# Patient Record
Sex: FEMALE | Race: WHITE | NOT HISPANIC OR LATINO | Employment: OTHER | URBAN - METROPOLITAN AREA
[De-identification: names, ages, dates, MRNs, and addresses within clinical notes are randomized per-mention and may not be internally consistent; named-entity substitution may affect disease eponyms.]

---

## 2017-12-09 ENCOUNTER — APPOINTMENT (OUTPATIENT)
Dept: RADIOLOGY | Facility: MEDICAL CENTER | Age: 52
DRG: 853 | End: 2017-12-09
Attending: HOSPITALIST
Payer: MEDICAID

## 2017-12-09 ENCOUNTER — RESOLUTE PROFESSIONAL BILLING HOSPITAL PROF FEE (OUTPATIENT)
Dept: HOSPITALIST | Facility: MEDICAL CENTER | Age: 52
End: 2017-12-09
Payer: MEDICAID

## 2017-12-09 ENCOUNTER — HOSPITAL ENCOUNTER (INPATIENT)
Facility: MEDICAL CENTER | Age: 52
LOS: 11 days | DRG: 853 | End: 2017-12-20
Attending: EMERGENCY MEDICINE | Admitting: HOSPITALIST
Payer: MEDICAID

## 2017-12-09 DIAGNOSIS — R07.9 CHEST PAIN, UNSPECIFIED TYPE: ICD-10-CM

## 2017-12-09 DIAGNOSIS — E10.8 TYPE 1 DIABETES MELLITUS WITH COMPLICATION (HCC): ICD-10-CM

## 2017-12-09 DIAGNOSIS — R79.89 ELEVATED TROPONIN: ICD-10-CM

## 2017-12-09 DIAGNOSIS — E11.628 DIABETIC FOOT INFECTION (HCC): ICD-10-CM

## 2017-12-09 DIAGNOSIS — J44.1 COPD WITH ACUTE EXACERBATION (HCC): ICD-10-CM

## 2017-12-09 DIAGNOSIS — L08.9 DIABETIC FOOT INFECTION (HCC): ICD-10-CM

## 2017-12-09 PROBLEM — E11.01 HYPEROSMOLAR COMA (HCC): Status: ACTIVE | Noted: 2017-12-09

## 2017-12-09 PROBLEM — I21.4 NSTEMI (NON-ST ELEVATED MYOCARDIAL INFARCTION) (HCC): Status: ACTIVE | Noted: 2017-12-09

## 2017-12-09 PROBLEM — E11.610: Status: ACTIVE | Noted: 2017-12-09

## 2017-12-09 LAB
ALBUMIN SERPL BCP-MCNC: 2.5 G/DL (ref 3.2–4.9)
ALBUMIN/GLOB SERPL: 0.8 G/DL
ALP SERPL-CCNC: 81 U/L (ref 30–99)
ALT SERPL-CCNC: 20 U/L (ref 2–50)
AMORPH CRY #/AREA URNS HPF: PRESENT /HPF
ANION GAP SERPL CALC-SCNC: 11 MMOL/L (ref 0–11.9)
ANION GAP SERPL CALC-SCNC: 11 MMOL/L (ref 0–11.9)
APPEARANCE UR: ABNORMAL
APTT PPP: 41.1 SEC (ref 24.7–36)
APTT PPP: 51 SEC (ref 24.7–36)
AST SERPL-CCNC: 23 U/L (ref 12–45)
BACTERIA #/AREA URNS HPF: ABNORMAL /HPF
BASOPHILS # BLD AUTO: 0 % (ref 0–1.8)
BASOPHILS # BLD: 0 K/UL (ref 0–0.12)
BILIRUB SERPL-MCNC: 0.5 MG/DL (ref 0.1–1.5)
BILIRUB UR QL STRIP.AUTO: NEGATIVE
BNP SERPL-MCNC: 610 PG/ML (ref 0–100)
BUN SERPL-MCNC: 31 MG/DL (ref 8–22)
BUN SERPL-MCNC: 32 MG/DL (ref 8–22)
BURR CELLS BLD QL SMEAR: NORMAL
CALCIUM SERPL-MCNC: 8 MG/DL (ref 8.5–10.5)
CALCIUM SERPL-MCNC: 8.5 MG/DL (ref 8.5–10.5)
CHLORIDE SERPL-SCNC: 95 MMOL/L (ref 96–112)
CHLORIDE SERPL-SCNC: 95 MMOL/L (ref 96–112)
CO2 SERPL-SCNC: 20 MMOL/L (ref 20–33)
CO2 SERPL-SCNC: 20 MMOL/L (ref 20–33)
COLOR UR: YELLOW
CREAT SERPL-MCNC: 0.78 MG/DL (ref 0.5–1.4)
CREAT SERPL-MCNC: 0.89 MG/DL (ref 0.5–1.4)
DOHLE BOD BLD QL SMEAR: NORMAL
EOSINOPHIL # BLD AUTO: 0 K/UL (ref 0–0.51)
EOSINOPHIL NFR BLD: 0 % (ref 0–6.9)
EPI CELLS #/AREA URNS HPF: ABNORMAL /HPF
ERYTHROCYTE [DISTWIDTH] IN BLOOD BY AUTOMATED COUNT: 48 FL (ref 35.9–50)
EST. AVERAGE GLUCOSE BLD GHB EST-MCNC: 280 MG/DL
GFR SERPL CREATININE-BSD FRML MDRD: >60 ML/MIN/1.73 M 2
GFR SERPL CREATININE-BSD FRML MDRD: >60 ML/MIN/1.73 M 2
GLOBULIN SER CALC-MCNC: 3.2 G/DL (ref 1.9–3.5)
GLUCOSE BLD-MCNC: 341 MG/DL (ref 65–99)
GLUCOSE BLD-MCNC: 424 MG/DL (ref 65–99)
GLUCOSE SERPL-MCNC: 352 MG/DL (ref 65–99)
GLUCOSE SERPL-MCNC: 416 MG/DL (ref 65–99)
GLUCOSE UR STRIP.AUTO-MCNC: >=1000 MG/DL
HBA1C MFR BLD: 11.4 % (ref 0–5.6)
HCT VFR BLD AUTO: 34.7 % (ref 37–47)
HGB BLD-MCNC: 11.2 G/DL (ref 12–16)
HYALINE CASTS #/AREA URNS LPF: ABNORMAL /LPF
KETONES UR STRIP.AUTO-MCNC: 15 MG/DL
LACTATE BLD-SCNC: 2.1 MMOL/L (ref 0.5–2)
LEUKOCYTE ESTERASE UR QL STRIP.AUTO: NEGATIVE
LYMPHOCYTES # BLD AUTO: 0.9 K/UL (ref 1–4.8)
LYMPHOCYTES NFR BLD: 7.9 % (ref 22–41)
MANUAL DIFF BLD: NORMAL
MCH RBC QN AUTO: 27.6 PG (ref 27–33)
MCHC RBC AUTO-ENTMCNC: 32.3 G/DL (ref 33.6–35)
MCV RBC AUTO: 85.5 FL (ref 81.4–97.8)
MICRO URNS: ABNORMAL
MONOCYTES # BLD AUTO: 1.4 K/UL (ref 0–0.85)
MONOCYTES NFR BLD AUTO: 12.3 % (ref 0–13.4)
MORPHOLOGY BLD-IMP: NORMAL
MUCOUS THREADS #/AREA URNS HPF: ABNORMAL /HPF
MYELOCYTES NFR BLD MANUAL: 0.9 %
NEUTROPHILS # BLD AUTO: 8.99 K/UL (ref 2–7.15)
NEUTROPHILS NFR BLD: 75.4 % (ref 44–72)
NEUTS BAND NFR BLD MANUAL: 3.5 % (ref 0–10)
NITRITE UR QL STRIP.AUTO: NEGATIVE
NRBC # BLD AUTO: 0 K/UL
NRBC BLD AUTO-RTO: 0 /100 WBC
PH UR STRIP.AUTO: 5 [PH]
PLATELET # BLD AUTO: 106 K/UL (ref 164–446)
PLATELET BLD QL SMEAR: NORMAL
PMV BLD AUTO: 11.6 FL (ref 9–12.9)
POIKILOCYTOSIS BLD QL SMEAR: NORMAL
POTASSIUM SERPL-SCNC: 4.1 MMOL/L (ref 3.6–5.5)
POTASSIUM SERPL-SCNC: 4.1 MMOL/L (ref 3.6–5.5)
PROT SERPL-MCNC: 5.7 G/DL (ref 6–8.2)
PROT UR QL STRIP: 100 MG/DL
RBC # BLD AUTO: 4.06 M/UL (ref 4.2–5.4)
RBC # URNS HPF: ABNORMAL /HPF
RBC BLD AUTO: PRESENT
RBC UR QL AUTO: ABNORMAL
SODIUM SERPL-SCNC: 126 MMOL/L (ref 135–145)
SODIUM SERPL-SCNC: 126 MMOL/L (ref 135–145)
SP GR UR STRIP.AUTO: 1.03
TROPONIN I SERPL-MCNC: 0.53 NG/ML (ref 0–0.04)
TROPONIN I SERPL-MCNC: 1.13 NG/ML (ref 0–0.04)
TROPONIN I SERPL-MCNC: 1.36 NG/ML (ref 0–0.04)
UROBILINOGEN UR STRIP.AUTO-MCNC: 0.2 MG/DL
WBC # BLD AUTO: 11.4 K/UL (ref 4.8–10.8)
WBC #/AREA URNS HPF: ABNORMAL /HPF

## 2017-12-09 PROCEDURE — 87040 BLOOD CULTURE FOR BACTERIA: CPT

## 2017-12-09 PROCEDURE — 96375 TX/PRO/DX INJ NEW DRUG ADDON: CPT

## 2017-12-09 PROCEDURE — 700105 HCHG RX REV CODE 258: Performed by: EMERGENCY MEDICINE

## 2017-12-09 PROCEDURE — 84484 ASSAY OF TROPONIN QUANT: CPT

## 2017-12-09 PROCEDURE — 85730 THROMBOPLASTIN TIME PARTIAL: CPT | Mod: 91

## 2017-12-09 PROCEDURE — 80048 BASIC METABOLIC PNL TOTAL CA: CPT

## 2017-12-09 PROCEDURE — 96372 THER/PROPH/DIAG INJ SC/IM: CPT

## 2017-12-09 PROCEDURE — 85027 COMPLETE CBC AUTOMATED: CPT

## 2017-12-09 PROCEDURE — 80053 COMPREHEN METABOLIC PANEL: CPT

## 2017-12-09 PROCEDURE — 81001 URINALYSIS AUTO W/SCOPE: CPT

## 2017-12-09 PROCEDURE — 700102 HCHG RX REV CODE 250 W/ 637 OVERRIDE(OP): Performed by: HOSPITALIST

## 2017-12-09 PROCEDURE — 83036 HEMOGLOBIN GLYCOSYLATED A1C: CPT

## 2017-12-09 PROCEDURE — 94760 N-INVAS EAR/PLS OXIMETRY 1: CPT

## 2017-12-09 PROCEDURE — 700105 HCHG RX REV CODE 258: Performed by: HOSPITALIST

## 2017-12-09 PROCEDURE — 99285 EMERGENCY DEPT VISIT HI MDM: CPT

## 2017-12-09 PROCEDURE — 96365 THER/PROPH/DIAG IV INF INIT: CPT

## 2017-12-09 PROCEDURE — 700111 HCHG RX REV CODE 636 W/ 250 OVERRIDE (IP): Performed by: HOSPITALIST

## 2017-12-09 PROCEDURE — 96366 THER/PROPH/DIAG IV INF ADDON: CPT

## 2017-12-09 PROCEDURE — 93005 ELECTROCARDIOGRAM TRACING: CPT | Performed by: EMERGENCY MEDICINE

## 2017-12-09 PROCEDURE — 770020 HCHG ROOM/CARE - TELE (206)

## 2017-12-09 PROCEDURE — 36415 COLL VENOUS BLD VENIPUNCTURE: CPT

## 2017-12-09 PROCEDURE — A9270 NON-COVERED ITEM OR SERVICE: HCPCS | Performed by: HOSPITALIST

## 2017-12-09 PROCEDURE — 82962 GLUCOSE BLOOD TEST: CPT | Mod: 91

## 2017-12-09 PROCEDURE — 83605 ASSAY OF LACTIC ACID: CPT

## 2017-12-09 PROCEDURE — 99221 1ST HOSP IP/OBS SF/LOW 40: CPT | Performed by: HOSPITALIST

## 2017-12-09 PROCEDURE — 83880 ASSAY OF NATRIURETIC PEPTIDE: CPT

## 2017-12-09 PROCEDURE — 71010 DX-CHEST-PORTABLE (1 VIEW): CPT

## 2017-12-09 PROCEDURE — 85007 BL SMEAR W/DIFF WBC COUNT: CPT

## 2017-12-09 RX ORDER — SODIUM CHLORIDE 9 MG/ML
INJECTION, SOLUTION INTRAVENOUS CONTINUOUS
Status: DISCONTINUED | OUTPATIENT
Start: 2017-12-09 | End: 2017-12-10

## 2017-12-09 RX ORDER — SODIUM CHLORIDE 9 MG/ML
1000 INJECTION, SOLUTION INTRAVENOUS ONCE
Status: COMPLETED | OUTPATIENT
Start: 2017-12-09 | End: 2017-12-09

## 2017-12-09 RX ORDER — OXYCODONE HYDROCHLORIDE 5 MG/1
5 TABLET ORAL EVERY 4 HOURS PRN
Status: DISCONTINUED | OUTPATIENT
Start: 2017-12-09 | End: 2017-12-17

## 2017-12-09 RX ORDER — NICOTINE 21 MG/24HR
21 PATCH, TRANSDERMAL 24 HOURS TRANSDERMAL
Status: DISCONTINUED | OUTPATIENT
Start: 2017-12-09 | End: 2017-12-20 | Stop reason: HOSPADM

## 2017-12-09 RX ORDER — INSULIN GLARGINE 100 [IU]/ML
87 INJECTION, SOLUTION SUBCUTANEOUS 2 TIMES DAILY
COMMUNITY
End: 2020-09-28

## 2017-12-09 RX ORDER — POLYETHYLENE GLYCOL 3350 17 G/17G
1 POWDER, FOR SOLUTION ORAL
Status: DISCONTINUED | OUTPATIENT
Start: 2017-12-09 | End: 2017-12-20 | Stop reason: HOSPADM

## 2017-12-09 RX ORDER — ATORVASTATIN CALCIUM 20 MG/1
40 TABLET, FILM COATED ORAL
Status: DISCONTINUED | OUTPATIENT
Start: 2017-12-09 | End: 2017-12-20 | Stop reason: HOSPADM

## 2017-12-09 RX ORDER — ACETAMINOPHEN 325 MG/1
650 TABLET ORAL EVERY 4 HOURS PRN
Status: DISCONTINUED | OUTPATIENT
Start: 2017-12-09 | End: 2017-12-20 | Stop reason: HOSPADM

## 2017-12-09 RX ORDER — DEXTROSE MONOHYDRATE 25 G/50ML
25 INJECTION, SOLUTION INTRAVENOUS
Status: DISCONTINUED | OUTPATIENT
Start: 2017-12-09 | End: 2017-12-20 | Stop reason: HOSPADM

## 2017-12-09 RX ORDER — HEPARIN SODIUM 1000 [USP'U]/ML
3200 INJECTION, SOLUTION INTRAVENOUS; SUBCUTANEOUS PRN
Status: DISCONTINUED | OUTPATIENT
Start: 2017-12-09 | End: 2017-12-10

## 2017-12-09 RX ORDER — HEPARIN SODIUM 1000 [USP'U]/ML
6000 INJECTION, SOLUTION INTRAVENOUS; SUBCUTANEOUS ONCE
Status: COMPLETED | OUTPATIENT
Start: 2017-12-09 | End: 2017-12-09

## 2017-12-09 RX ORDER — INSULIN GLARGINE 100 [IU]/ML
25 INJECTION, SOLUTION SUBCUTANEOUS 2 TIMES DAILY
Status: DISCONTINUED | OUTPATIENT
Start: 2017-12-09 | End: 2017-12-10

## 2017-12-09 RX ORDER — OXYCODONE HYDROCHLORIDE 5 MG/1
5-10 TABLET ORAL
Status: DISCONTINUED | OUTPATIENT
Start: 2017-12-09 | End: 2017-12-09

## 2017-12-09 RX ORDER — AMOXICILLIN 250 MG
2 CAPSULE ORAL 2 TIMES DAILY
Status: DISCONTINUED | OUTPATIENT
Start: 2017-12-09 | End: 2017-12-20 | Stop reason: HOSPADM

## 2017-12-09 RX ORDER — BISACODYL 10 MG
10 SUPPOSITORY, RECTAL RECTAL
Status: DISCONTINUED | OUTPATIENT
Start: 2017-12-09 | End: 2017-12-20 | Stop reason: HOSPADM

## 2017-12-09 RX ORDER — OXYCODONE HYDROCHLORIDE 10 MG/1
10 TABLET ORAL EVERY 4 HOURS PRN
Status: DISCONTINUED | OUTPATIENT
Start: 2017-12-09 | End: 2017-12-17

## 2017-12-09 RX ORDER — ASPIRIN 325 MG
325 TABLET ORAL DAILY
Status: DISCONTINUED | OUTPATIENT
Start: 2017-12-09 | End: 2017-12-20 | Stop reason: HOSPADM

## 2017-12-09 RX ADMIN — METOPROLOL TARTRATE 25 MG: 25 TABLET, FILM COATED ORAL at 20:54

## 2017-12-09 RX ADMIN — INSULIN GLARGINE 25 UNITS: 100 INJECTION, SOLUTION SUBCUTANEOUS at 17:18

## 2017-12-09 RX ADMIN — ASPIRIN 325 MG: 325 TABLET, FILM COATED ORAL at 15:59

## 2017-12-09 RX ADMIN — HEPARIN SODIUM 6000 UNITS: 1000 INJECTION, SOLUTION INTRAVENOUS; SUBCUTANEOUS at 16:08

## 2017-12-09 RX ADMIN — HEPARIN SODIUM 1450 UNITS/HR: 5000 INJECTION, SOLUTION INTRAVENOUS at 23:59

## 2017-12-09 RX ADMIN — INSULIN HUMAN 14 UNITS: 100 INJECTION, SOLUTION PARENTERAL at 20:58

## 2017-12-09 RX ADMIN — OXYCODONE HYDROCHLORIDE 10 MG: 10 TABLET ORAL at 22:44

## 2017-12-09 RX ADMIN — SODIUM CHLORIDE 1000 ML: 9 INJECTION, SOLUTION INTRAVENOUS at 15:38

## 2017-12-09 RX ADMIN — SODIUM CHLORIDE 1000 ML: 9 INJECTION, SOLUTION INTRAVENOUS at 12:30

## 2017-12-09 RX ADMIN — NICOTINE 21 MG: 21 PATCH, EXTENDED RELEASE TRANSDERMAL at 15:58

## 2017-12-09 RX ADMIN — HEPARIN SODIUM 3200 UNITS: 1000 INJECTION, SOLUTION INTRAVENOUS; SUBCUTANEOUS at 23:58

## 2017-12-09 RX ADMIN — SODIUM CHLORIDE: 9 INJECTION, SOLUTION INTRAVENOUS at 22:44

## 2017-12-09 RX ADMIN — ATORVASTATIN CALCIUM 40 MG: 40 TABLET, FILM COATED ORAL at 20:54

## 2017-12-09 RX ADMIN — HEPARIN SODIUM 1200 UNITS/HR: 5000 INJECTION, SOLUTION INTRAVENOUS at 16:08

## 2017-12-09 RX ADMIN — OXYCODONE HYDROCHLORIDE 10 MG: 10 TABLET ORAL at 18:47

## 2017-12-09 ASSESSMENT — PATIENT HEALTH QUESTIONNAIRE - PHQ9
1. LITTLE INTEREST OR PLEASURE IN DOING THINGS: NOT AT ALL
2. FEELING DOWN, DEPRESSED, IRRITABLE, OR HOPELESS: NOT AT ALL
SUM OF ALL RESPONSES TO PHQ QUESTIONS 1-9: 0
SUM OF ALL RESPONSES TO PHQ9 QUESTIONS 1 AND 2: 0

## 2017-12-09 ASSESSMENT — LIFESTYLE VARIABLES
DO YOU DRINK ALCOHOL: NO
ALCOHOL_USE: NO
EVER_SMOKED: YES

## 2017-12-09 ASSESSMENT — COGNITIVE AND FUNCTIONAL STATUS - GENERAL
SUGGESTED CMS G CODE MODIFIER DAILY ACTIVITY: CK
MOBILITY SCORE: 13
TOILETING: A LOT
DRESSING REGULAR UPPER BODY CLOTHING: A LITTLE
SUGGESTED CMS G CODE MODIFIER MOBILITY: CL
DRESSING REGULAR LOWER BODY CLOTHING: A LITTLE
DAILY ACTIVITIY SCORE: 19
STANDING UP FROM CHAIR USING ARMS: A LOT
MOVING TO AND FROM BED TO CHAIR: A LITTLE
CLIMB 3 TO 5 STEPS WITH RAILING: TOTAL
HELP NEEDED FOR BATHING: A LITTLE
MOVING FROM LYING ON BACK TO SITTING ON SIDE OF FLAT BED: A LOT
WALKING IN HOSPITAL ROOM: TOTAL

## 2017-12-09 ASSESSMENT — PAIN SCALES - GENERAL
PAINLEVEL_OUTOF10: 8
PAINLEVEL_OUTOF10: 10
PAINLEVEL_OUTOF10: 9

## 2017-12-09 ASSESSMENT — ENCOUNTER SYMPTOMS
CHILLS: 0
SHORTNESS OF BREATH: 0
FEVER: 0
COUGH: 1
DIZZINESS: 1
WHEEZING: 0

## 2017-12-09 NOTE — ED PROVIDER NOTES
"ED Provider Note    CHIEF COMPLAINT  Chest pain, elevated troponin, elevated blood sugar.    HPI  Greta Echevarria is a 52 y.o. female who presents by ambulance from [facility] for evaluation of chest pain. Reviewing the EKG from the previous facility the EKG shows a sinus tachycardia rate of 120 with a left axis deviation. She has a previous history of opioid withdrawal, alcohol and tobacco abuse, diabetes, diabetic ulcer insulin therapy toe amputation from his diabetes. His blood sugar today is 647. Creatinine 1.46. CO2 21. Troponin #2 is 0.61. Troponin #1 was 0.27. CBC normal no anemia and no bandemia. Chest x-ray showed some cardiomegaly.    REVIEW OF SYSTEMS  No headache, jaw pain, no abdominal pain.  ALL OTHER SYSTEMS NEGATIVE    ALLERGIES  No Known Allergies    CURRENT MEDICATIONS  Insulin.    PAST MEDICAL HISTORY  Past Medical History:   Diagnosis Date   • Bronchitis    • Diabetes    • Hypertension        SURGICAL HISTORY  Past Surgical History:   Procedure Laterality Date   • SPLIT THICKNESS SKIN GRAFT Left 1/7/2016    Procedure: SPLIT THICKNESS SKIN GRAFT FOOT;  Surgeon: Jerzy Mayberry M.D.;  Location: SURGERY [facility];  Service:    • STUMP REVISION Left 12/24/2015    Procedure: STUMP REVISION FOOT WITH VAC DRESSING;  Surgeon: Jerzy Mayberry M.D.;  Location: SURGERY [facility];  Service:    • INCISION AND DRAINAGE ORTHOPEDIC Left 12/17/2015    Procedure: INCISION AND DRAINAGE ORTHOPEDIC- Foot ;  Surgeon: Jerzy Mayberry M.D.;  Location: SURGERY [facility];  Service:    • CHOLECYSTECTOMY ROBOTIC  07/2015   • HYSTERECTOMY, VAGINAL  04/2001       FAMILY HISTORY  ABDs and hypertension    SOCIAL HISTORY  Cigarette smoker    PHYSICAL EXAM  GENERAL: Alert female  VITAL SIGNS: /70   Pulse (!) 123   Temp 36.6 °C (97.8 °F)   Resp (!) 28   Ht 1.702 m (5' 7\")   Wt 121.1 kg (267 lb)   SpO2 99%   BMI 41.82 kg/m²   Constitutional: Alert female adult   HENT: Scalp is normal " size and nontender. Ears are clear. Nose is clear. Throat is clear with no stridor no drooling no trismus. Teeth are all intact.  Eyes: Pupils equal round and reactive to light, extraocular motor fall. There is no scleral icterus.  Neck: Neck is supple and nontender. There is no meningismus. No adenitis. No thyromegaly.  Lymphatic: No adenopathy.   Cardiovascular: Heart regular rhythm without murmurs or gallops   Thorax & Lungs: No chest wall tenderness. Lungs are clear. Patient has good breath sounds bilateral. No rales, no rhonchi, no wheezes.  Abdomen: Abdomen is soft, nontender, not rigid, no guarding, and no organomegaly. There is no palpable hernia   Skin: Warm, pink, and dry with no erythema and no rash.   Back: Nontender, no midline bony tenderness, no flank tenderness.                                        Extremities: Full range of motion  No tenderness to palpation and no deformities noted. No calf or thigh swelling. No calf or thigh tenderness. No clinical DVT.  Neurologic: Alert & oriented . Cranial nerves are grossly intact as tested. Patient moves all 4 extremities well. Patient has good strong flexion and extension of the ankle joints knee joints hip joints and elbow joints. Sensation is normal and symmetrical in the upper and lower extremities.   Psychiatric: Patient is alert oriented coherent and rational.     EKG  EKG Interpretation from the previous facility [facility].    Interpreted by me at 12:15 PM    Rhythm: normal sinus   Rate: normal  Axis: normal  Ectopy: none  Conduction: normal  ST Segments: no acute change  T Waves: no acute change  Q Waves: none    Clinical Impression: Normal sinus rhythm, left axis deviation, no specific ST-T wave change.    RADIOLOGY/PROCEDURES  Next x-ray at the previous facility showed a little bit of a patchy infiltrate probably not pneumonia and a little cardiomegaly.    COURSE & MEDICAL DECISION MAKING  Patient arrives by ambulance from [facility]  [facility] for evaluation of chest pain and an elevated troponin and an elevated blood sugar. Cigarette smoker.    Review of the medical records from the previous facility: Blood sugar is elevated. No anemia. No bandemia. No renal dysfunction. No liver dysfunction. Patient was placed on an insulin drip there. EKG showed no acute change. Troponin #1 was 0.2 and troponin #2 was 0.6.    Plan: #1 IV #2 cardiac monitor, pulse ox monitor, blood pressure monitor. #3. Lab including repeat troponin, repeat EKG, admission to the hospital here for cardiac evaluation.      Hospitalist has been called and case discussed. Stable on admission at 1:30 PM.  FINAL IMPRESSION  1. Chest pain  2. Elevated troponin  3. Diabetes mellitus with elevated blood sugar      Electronically signed by: Gary Gansert, 12/9/2017 12:13 PM

## 2017-12-09 NOTE — ED NOTES
.  Chief Complaint   Patient presents with   • High Blood Sugar   • Foot Ulcer   Pt transport via air [facility] from [facility]. Pt's initial blood sugar 647.  Pt arrived to [facility] on an insulin gtt - FSBS .  Pt on NRBM, per medic was uncomfortable wearing a nasal canula.  Pt RR of 28/min.  Right foot dressing intact. Pt received PTA Rocephin 2gm, Zofran total of 8 mg IVP, Morphine Sulfate total 15 mg IVP, Dilaudid 2 mgIVP, and NS 2 liters.

## 2017-12-09 NOTE — ASSESSMENT & PLAN NOTE
WBC 17 in [place].  She states that her foot is at baseline.   UA, cxr, and BC x 2 ordered. Refrain from antibiotics unless a source of bacterial infection is found.

## 2017-12-09 NOTE — ED NOTES
Pt stated that she was in a nursing home in [facility] but has   Been back home in [place].  Only current medication that pt has been taking/using has been her insulin  Since her discharge home.  No antibiotic use at home.  Confirmed that pt did arrive from home to [facility]  Today. (savannah verma)

## 2017-12-09 NOTE — ED NOTES
Dr Abbott at bedside  Additional green top sent to lab  Urine sample collected from bedpan and sent to lab

## 2017-12-10 ENCOUNTER — APPOINTMENT (OUTPATIENT)
Dept: RADIOLOGY | Facility: MEDICAL CENTER | Age: 52
DRG: 853 | End: 2017-12-10
Attending: INTERNAL MEDICINE
Payer: MEDICAID

## 2017-12-10 PROBLEM — E11.40 POORLY CONTROLLED TYPE 2 DIABETES MELLITUS WITH NEUROPATHY (HCC): Status: ACTIVE | Noted: 2017-12-09

## 2017-12-10 PROBLEM — E11.65 POORLY CONTROLLED TYPE 2 DIABETES MELLITUS WITH NEUROPATHY (HCC): Status: ACTIVE | Noted: 2017-12-09

## 2017-12-10 PROBLEM — J96.01 ACUTE RESPIRATORY FAILURE WITH HYPOXIA (HCC): Status: ACTIVE | Noted: 2017-12-10

## 2017-12-10 PROBLEM — A41.9 SEVERE SEPSIS (HCC): Status: ACTIVE | Noted: 2017-12-10

## 2017-12-10 PROBLEM — E66.01 MORBID OBESITY WITH BMI OF 40.0-44.9, ADULT (HCC): Status: ACTIVE | Noted: 2017-12-10

## 2017-12-10 PROBLEM — L08.9 DIABETIC FOOT INFECTION (HCC): Status: ACTIVE | Noted: 2017-12-10

## 2017-12-10 PROBLEM — J44.1 COPD WITH ACUTE EXACERBATION (HCC): Status: ACTIVE | Noted: 2017-12-10

## 2017-12-10 PROBLEM — E11.628 DIABETIC FOOT INFECTION (HCC): Status: ACTIVE | Noted: 2017-12-10

## 2017-12-10 PROBLEM — E87.1 HYPONATREMIA: Status: ACTIVE | Noted: 2017-12-10

## 2017-12-10 PROBLEM — R65.20 SEVERE SEPSIS (HCC): Status: ACTIVE | Noted: 2017-12-10

## 2017-12-10 LAB
ALBUMIN SERPL BCP-MCNC: 2.4 G/DL (ref 3.2–4.9)
ALBUMIN/GLOB SERPL: 0.7 G/DL
ALP SERPL-CCNC: 80 U/L (ref 30–99)
ALT SERPL-CCNC: 19 U/L (ref 2–50)
ANION GAP SERPL CALC-SCNC: 10 MMOL/L (ref 0–11.9)
APTT PPP: 41.9 SEC (ref 24.7–36)
AST SERPL-CCNC: 25 U/L (ref 12–45)
BASOPHILS # BLD AUTO: 0 % (ref 0–1.8)
BASOPHILS # BLD: 0 K/UL (ref 0–0.12)
BILIRUB SERPL-MCNC: 0.5 MG/DL (ref 0.1–1.5)
BUN SERPL-MCNC: 29 MG/DL (ref 8–22)
BURR CELLS BLD QL SMEAR: NORMAL
CALCIUM SERPL-MCNC: 7.9 MG/DL (ref 8.5–10.5)
CHLORIDE SERPL-SCNC: 98 MMOL/L (ref 96–112)
CHOLEST SERPL-MCNC: 71 MG/DL (ref 100–199)
CO2 SERPL-SCNC: 20 MMOL/L (ref 20–33)
CORTIS SERPL-MCNC: 31.7 UG/DL (ref 0–23)
CREAT SERPL-MCNC: 0.63 MG/DL (ref 0.5–1.4)
CRP SERPL HS-MCNC: 38.84 MG/DL (ref 0–0.75)
EOSINOPHIL # BLD AUTO: 0.17 K/UL (ref 0–0.51)
EOSINOPHIL NFR BLD: 1.8 % (ref 0–6.9)
ERYTHROCYTE [DISTWIDTH] IN BLOOD BY AUTOMATED COUNT: 49.1 FL (ref 35.9–50)
ERYTHROCYTE [SEDIMENTATION RATE] IN BLOOD BY WESTERGREN METHOD: 109 MM/HOUR (ref 0–30)
FLUAV RNA SPEC QL NAA+PROBE: NEGATIVE
FLUBV RNA SPEC QL NAA+PROBE: NEGATIVE
GFR SERPL CREATININE-BSD FRML MDRD: >60 ML/MIN/1.73 M 2
GLOBULIN SER CALC-MCNC: 3.3 G/DL (ref 1.9–3.5)
GLUCOSE BLD-MCNC: 344 MG/DL (ref 65–99)
GLUCOSE BLD-MCNC: 362 MG/DL (ref 65–99)
GLUCOSE BLD-MCNC: 364 MG/DL (ref 65–99)
GLUCOSE BLD-MCNC: 377 MG/DL (ref 65–99)
GLUCOSE BLD-MCNC: 498 MG/DL (ref 65–99)
GLUCOSE SERPL-MCNC: 356 MG/DL (ref 65–99)
HCT VFR BLD AUTO: 34.4 % (ref 37–47)
HDLC SERPL-MCNC: 4 MG/DL
HGB BLD-MCNC: 11.1 G/DL (ref 12–16)
LACTATE BLD-SCNC: 1.9 MMOL/L (ref 0.5–2)
LACTATE BLD-SCNC: 1.9 MMOL/L (ref 0.5–2)
LACTATE BLD-SCNC: 2 MMOL/L (ref 0.5–2)
LACTATE BLD-SCNC: 2.1 MMOL/L (ref 0.5–2)
LDLC SERPL CALC-MCNC: 5 MG/DL
LYMPHOCYTES # BLD AUTO: 1.7 K/UL (ref 1–4.8)
LYMPHOCYTES NFR BLD: 17.7 % (ref 22–41)
MANUAL DIFF BLD: NORMAL
MCH RBC QN AUTO: 28.1 PG (ref 27–33)
MCHC RBC AUTO-ENTMCNC: 32.3 G/DL (ref 33.6–35)
MCV RBC AUTO: 87.1 FL (ref 81.4–97.8)
MONOCYTES # BLD AUTO: 0.68 K/UL (ref 0–0.85)
MONOCYTES NFR BLD AUTO: 7.1 % (ref 0–13.4)
MORPHOLOGY BLD-IMP: NORMAL
NEUTROPHILS # BLD AUTO: 7.05 K/UL (ref 2–7.15)
NEUTROPHILS NFR BLD: 72.5 % (ref 44–72)
NEUTS BAND NFR BLD MANUAL: 0.9 % (ref 0–10)
NRBC # BLD AUTO: 0 K/UL
NRBC BLD AUTO-RTO: 0 /100 WBC
OVALOCYTES BLD QL SMEAR: NORMAL
PLATELET # BLD AUTO: 93 K/UL (ref 164–446)
PLATELET BLD QL SMEAR: NORMAL
PMV BLD AUTO: 12.7 FL (ref 9–12.9)
POIKILOCYTOSIS BLD QL SMEAR: NORMAL
POTASSIUM SERPL-SCNC: 4.1 MMOL/L (ref 3.6–5.5)
PROCALCITONIN SERPL-MCNC: 34.73 NG/ML
PROT SERPL-MCNC: 5.7 G/DL (ref 6–8.2)
RBC # BLD AUTO: 3.95 M/UL (ref 4.2–5.4)
RBC BLD AUTO: PRESENT
SODIUM SERPL-SCNC: 128 MMOL/L (ref 135–145)
TOXIC GRANULES BLD QL SMEAR: SLIGHT
TRIGL SERPL-MCNC: 309 MG/DL (ref 0–149)
TROPONIN I SERPL-MCNC: 0.29 NG/ML (ref 0–0.04)
TROPONIN I SERPL-MCNC: 0.37 NG/ML (ref 0–0.04)
TROPONIN I SERPL-MCNC: 0.69 NG/ML (ref 0–0.04)
TSH SERPL DL<=0.005 MIU/L-ACNC: 1.96 UIU/ML (ref 0.3–3.7)
WBC # BLD AUTO: 9.6 K/UL (ref 4.8–10.8)

## 2017-12-10 PROCEDURE — 85027 COMPLETE CBC AUTOMATED: CPT

## 2017-12-10 PROCEDURE — 84145 PROCALCITONIN (PCT): CPT

## 2017-12-10 PROCEDURE — 84443 ASSAY THYROID STIM HORMONE: CPT

## 2017-12-10 PROCEDURE — 71010 DX-CHEST-PORTABLE (1 VIEW): CPT

## 2017-12-10 PROCEDURE — 87502 INFLUENZA DNA AMP PROBE: CPT

## 2017-12-10 PROCEDURE — 700102 HCHG RX REV CODE 250 W/ 637 OVERRIDE(OP): Performed by: INTERNAL MEDICINE

## 2017-12-10 PROCEDURE — 85007 BL SMEAR W/DIFF WBC COUNT: CPT

## 2017-12-10 PROCEDURE — 36415 COLL VENOUS BLD VENIPUNCTURE: CPT

## 2017-12-10 PROCEDURE — 700105 HCHG RX REV CODE 258: Performed by: INTERNAL MEDICINE

## 2017-12-10 PROCEDURE — A9270 NON-COVERED ITEM OR SERVICE: HCPCS | Performed by: HOSPITALIST

## 2017-12-10 PROCEDURE — 700111 HCHG RX REV CODE 636 W/ 250 OVERRIDE (IP)

## 2017-12-10 PROCEDURE — 87040 BLOOD CULTURE FOR BACTERIA: CPT

## 2017-12-10 PROCEDURE — 700105 HCHG RX REV CODE 258

## 2017-12-10 PROCEDURE — 84484 ASSAY OF TROPONIN QUANT: CPT | Mod: 91

## 2017-12-10 PROCEDURE — 85652 RBC SED RATE AUTOMATED: CPT

## 2017-12-10 PROCEDURE — 80061 LIPID PANEL: CPT

## 2017-12-10 PROCEDURE — 82533 TOTAL CORTISOL: CPT

## 2017-12-10 PROCEDURE — 83605 ASSAY OF LACTIC ACID: CPT | Mod: 91

## 2017-12-10 PROCEDURE — 770020 HCHG ROOM/CARE - TELE (206)

## 2017-12-10 PROCEDURE — 80053 COMPREHEN METABOLIC PANEL: CPT

## 2017-12-10 PROCEDURE — 700111 HCHG RX REV CODE 636 W/ 250 OVERRIDE (IP): Performed by: INTERNAL MEDICINE

## 2017-12-10 PROCEDURE — 86140 C-REACTIVE PROTEIN: CPT

## 2017-12-10 PROCEDURE — 99291 CRITICAL CARE FIRST HOUR: CPT | Performed by: INTERNAL MEDICINE

## 2017-12-10 PROCEDURE — 73630 X-RAY EXAM OF FOOT: CPT | Mod: RT

## 2017-12-10 PROCEDURE — 82962 GLUCOSE BLOOD TEST: CPT

## 2017-12-10 PROCEDURE — 700111 HCHG RX REV CODE 636 W/ 250 OVERRIDE (IP): Performed by: HOSPITALIST

## 2017-12-10 PROCEDURE — 73701 CT LOWER EXTREMITY W/DYE: CPT | Mod: RT

## 2017-12-10 PROCEDURE — 700102 HCHG RX REV CODE 250 W/ 637 OVERRIDE(OP): Performed by: HOSPITALIST

## 2017-12-10 PROCEDURE — 700105 HCHG RX REV CODE 258: Performed by: HOSPITALIST

## 2017-12-10 PROCEDURE — 700117 HCHG RX CONTRAST REV CODE 255: Performed by: INTERNAL MEDICINE

## 2017-12-10 PROCEDURE — 73590 X-RAY EXAM OF LOWER LEG: CPT | Mod: RT

## 2017-12-10 PROCEDURE — 85730 THROMBOPLASTIN TIME PARTIAL: CPT

## 2017-12-10 RX ORDER — INSULIN GLARGINE 100 [IU]/ML
35 INJECTION, SOLUTION SUBCUTANEOUS ONCE
Status: COMPLETED | OUTPATIENT
Start: 2017-12-10 | End: 2017-12-10

## 2017-12-10 RX ORDER — FUROSEMIDE 10 MG/ML
40 INJECTION INTRAMUSCULAR; INTRAVENOUS
Status: DISCONTINUED | OUTPATIENT
Start: 2017-12-10 | End: 2017-12-14

## 2017-12-10 RX ORDER — SODIUM CHLORIDE 9 MG/ML
500 INJECTION, SOLUTION INTRAVENOUS ONCE
Status: COMPLETED | OUTPATIENT
Start: 2017-12-10 | End: 2017-12-10

## 2017-12-10 RX ORDER — LORAZEPAM 2 MG/ML
2 INJECTION INTRAMUSCULAR ONCE
Status: COMPLETED | OUTPATIENT
Start: 2017-12-10 | End: 2017-12-10

## 2017-12-10 RX ORDER — SODIUM CHLORIDE 9 MG/ML
INJECTION, SOLUTION INTRAVENOUS
Status: ACTIVE
Start: 2017-12-10 | End: 2017-12-11

## 2017-12-10 RX ORDER — METHYLPREDNISOLONE SODIUM SUCCINATE 125 MG/2ML
125 INJECTION, POWDER, LYOPHILIZED, FOR SOLUTION INTRAMUSCULAR; INTRAVENOUS ONCE
Status: COMPLETED | OUTPATIENT
Start: 2017-12-10 | End: 2017-12-10

## 2017-12-10 RX ORDER — SODIUM CHLORIDE 9 MG/ML
500 INJECTION, SOLUTION INTRAVENOUS
Status: DISCONTINUED | OUTPATIENT
Start: 2017-12-10 | End: 2017-12-20 | Stop reason: HOSPADM

## 2017-12-10 RX ORDER — INSULIN GLARGINE 100 [IU]/ML
60 INJECTION, SOLUTION SUBCUTANEOUS 2 TIMES DAILY
Status: DISCONTINUED | OUTPATIENT
Start: 2017-12-10 | End: 2017-12-11

## 2017-12-10 RX ORDER — LORAZEPAM 2 MG/ML
0.5 INJECTION INTRAMUSCULAR ONCE
Status: DISCONTINUED | OUTPATIENT
Start: 2017-12-10 | End: 2017-12-10

## 2017-12-10 RX ORDER — HEPARIN SODIUM 5000 [USP'U]/ML
5000 INJECTION, SOLUTION INTRAVENOUS; SUBCUTANEOUS EVERY 8 HOURS
Status: DISCONTINUED | OUTPATIENT
Start: 2017-12-10 | End: 2017-12-20 | Stop reason: HOSPADM

## 2017-12-10 RX ORDER — MORPHINE SULFATE 4 MG/ML
2-4 INJECTION, SOLUTION INTRAMUSCULAR; INTRAVENOUS
Status: DISCONTINUED | OUTPATIENT
Start: 2017-12-10 | End: 2017-12-13

## 2017-12-10 RX ORDER — METHYLPREDNISOLONE SODIUM SUCCINATE 125 MG/2ML
62.5 INJECTION, POWDER, LYOPHILIZED, FOR SOLUTION INTRAMUSCULAR; INTRAVENOUS EVERY 6 HOURS
Status: DISCONTINUED | OUTPATIENT
Start: 2017-12-10 | End: 2017-12-11

## 2017-12-10 RX ORDER — SODIUM CHLORIDE 9 MG/ML
30 INJECTION, SOLUTION INTRAVENOUS
Status: DISCONTINUED | OUTPATIENT
Start: 2017-12-10 | End: 2017-12-20 | Stop reason: HOSPADM

## 2017-12-10 RX ORDER — ALPRAZOLAM 0.25 MG/1
0.25 TABLET ORAL 4 TIMES DAILY PRN
Status: DISCONTINUED | OUTPATIENT
Start: 2017-12-10 | End: 2017-12-20 | Stop reason: HOSPADM

## 2017-12-10 RX ADMIN — OXYCODONE HYDROCHLORIDE 10 MG: 10 TABLET ORAL at 06:34

## 2017-12-10 RX ADMIN — MORPHINE SULFATE 4 MG: 4 INJECTION INTRAVENOUS at 18:00

## 2017-12-10 RX ADMIN — INSULIN GLARGINE 35 UNITS: 100 INJECTION, SOLUTION SUBCUTANEOUS at 11:23

## 2017-12-10 RX ADMIN — SODIUM CHLORIDE: 9 INJECTION, SOLUTION INTRAVENOUS at 11:37

## 2017-12-10 RX ADMIN — INSULIN HUMAN 12 UNITS: 100 INJECTION, SOLUTION PARENTERAL at 06:15

## 2017-12-10 RX ADMIN — METHYLPREDNISOLONE SODIUM SUCCINATE 125 MG: 125 INJECTION, POWDER, FOR SOLUTION INTRAMUSCULAR; INTRAVENOUS at 12:30

## 2017-12-10 RX ADMIN — SODIUM CHLORIDE: 9 INJECTION, SOLUTION INTRAVENOUS at 06:07

## 2017-12-10 RX ADMIN — MORPHINE SULFATE 4 MG: 4 INJECTION INTRAVENOUS at 09:58

## 2017-12-10 RX ADMIN — LORAZEPAM 2 MG: 2 INJECTION INTRAMUSCULAR; INTRAVENOUS at 12:30

## 2017-12-10 RX ADMIN — OXYCODONE HYDROCHLORIDE 10 MG: 10 TABLET ORAL at 11:23

## 2017-12-10 RX ADMIN — SODIUM CHLORIDE 500 ML: 9 INJECTION, SOLUTION INTRAVENOUS at 01:21

## 2017-12-10 RX ADMIN — SODIUM CHLORIDE 500 ML: 9 INJECTION, SOLUTION INTRAVENOUS at 12:30

## 2017-12-10 RX ADMIN — METOPROLOL TARTRATE 25 MG: 25 TABLET, FILM COATED ORAL at 09:56

## 2017-12-10 RX ADMIN — ATORVASTATIN CALCIUM 40 MG: 40 TABLET, FILM COATED ORAL at 20:05

## 2017-12-10 RX ADMIN — ASPIRIN 325 MG: 325 TABLET, FILM COATED ORAL at 09:56

## 2017-12-10 RX ADMIN — INSULIN GLARGINE 25 UNITS: 100 INJECTION, SOLUTION SUBCUTANEOUS at 08:35

## 2017-12-10 RX ADMIN — IOHEXOL 100 ML: 350 INJECTION, SOLUTION INTRAVENOUS at 14:30

## 2017-12-10 RX ADMIN — HEPARIN SODIUM 5000 UNITS: 5000 INJECTION, SOLUTION INTRAVENOUS; SUBCUTANEOUS at 22:08

## 2017-12-10 RX ADMIN — INSULIN HUMAN 12 UNITS: 100 INJECTION, SOLUTION PARENTERAL at 16:54

## 2017-12-10 RX ADMIN — INSULIN HUMAN 10 UNITS: 100 INJECTION, SOLUTION PARENTERAL at 11:22

## 2017-12-10 RX ADMIN — HEPARIN SODIUM 1700 UNITS/HR: 5000 INJECTION, SOLUTION INTRAVENOUS at 07:14

## 2017-12-10 RX ADMIN — HEPARIN SODIUM 1700 UNITS/HR: 5000 INJECTION, SOLUTION INTRAVENOUS at 08:36

## 2017-12-10 RX ADMIN — METHYLPREDNISOLONE SODIUM SUCCINATE 62.5 MG: 125 INJECTION, POWDER, FOR SOLUTION INTRAMUSCULAR; INTRAVENOUS at 20:05

## 2017-12-10 RX ADMIN — FUROSEMIDE 40 MG: 10 INJECTION, SOLUTION INTRAMUSCULAR; INTRAVENOUS at 17:48

## 2017-12-10 RX ADMIN — OXYCODONE HYDROCHLORIDE 10 MG: 10 TABLET ORAL at 02:41

## 2017-12-10 RX ADMIN — MORPHINE SULFATE 2 MG: 4 INJECTION INTRAVENOUS at 20:07

## 2017-12-10 RX ADMIN — METOPROLOL TARTRATE 25 MG: 25 TABLET, FILM COATED ORAL at 20:06

## 2017-12-10 RX ADMIN — HEPARIN SODIUM 3200 UNITS: 1000 INJECTION, SOLUTION INTRAVENOUS; SUBCUTANEOUS at 07:14

## 2017-12-10 RX ADMIN — INSULIN HUMAN 14 UNITS: 100 INJECTION, SOLUTION PARENTERAL at 22:07

## 2017-12-10 RX ADMIN — NICOTINE 21 MG: 21 PATCH, EXTENDED RELEASE TRANSDERMAL at 06:07

## 2017-12-10 RX ADMIN — INSULIN GLARGINE 60 UNITS: 100 INJECTION, SOLUTION SUBCUTANEOUS at 22:07

## 2017-12-10 RX ADMIN — PIPERACILLIN SODIUM AND TAZOBACTAM SODIUM 4.5 G: 4; .5 INJECTION, POWDER, FOR SOLUTION INTRAVENOUS at 09:56

## 2017-12-10 RX ADMIN — VANCOMYCIN HYDROCHLORIDE 3000 MG: 100 INJECTION, POWDER, LYOPHILIZED, FOR SOLUTION INTRAVENOUS at 11:30

## 2017-12-10 RX ADMIN — HEPARIN SODIUM 5000 UNITS: 5000 INJECTION, SOLUTION INTRAVENOUS; SUBCUTANEOUS at 14:34

## 2017-12-10 RX ADMIN — OXYCODONE HYDROCHLORIDE 10 MG: 10 TABLET ORAL at 22:19

## 2017-12-10 ASSESSMENT — PAIN SCALES - GENERAL
PAINLEVEL_OUTOF10: 0
PAINLEVEL_OUTOF10: 0
PAINLEVEL_OUTOF10: 8
PAINLEVEL_OUTOF10: 5
PAINLEVEL_OUTOF10: 8
PAINLEVEL_OUTOF10: 8
PAINLEVEL_OUTOF10: 9

## 2017-12-10 ASSESSMENT — ENCOUNTER SYMPTOMS
SHORTNESS OF BREATH: 0
ABDOMINAL PAIN: 0
BLOOD IN STOOL: 0
DIZZINESS: 0
EYE DISCHARGE: 0
HALLUCINATIONS: 0
SENSORY CHANGE: 0
MYALGIAS: 0
NAUSEA: 0
PND: 0
EYE PAIN: 0
PALPITATIONS: 0
BRUISES/BLEEDS EASILY: 0
FALLS: 0
CLAUDICATION: 0
BLURRED VISION: 0
CHILLS: 0
SPEECH CHANGE: 0
COUGH: 0
DEPRESSION: 0
WEIGHT LOSS: 0
HEADACHES: 0
VOMITING: 0
ORTHOPNEA: 0
FEVER: 0
LOSS OF CONSCIOUSNESS: 0
DOUBLE VISION: 0

## 2017-12-10 NOTE — PROGRESS NOTES
Pt arrived on floor. Tele box placed and MR notified. Chart and MAR reviewed. POC discussed with pt, all questions answered. Pt has no needs at this time.

## 2017-12-10 NOTE — PROGRESS NOTES
Bedside report received, call light in reach, heparin gtt adjusted per protocol, updated on POC, all questions answered

## 2017-12-10 NOTE — ASSESSMENT & PLAN NOTE
s/p Right transtibial amputation  DC Zosyn  Po pain meds, avoid narcotics, cont IV Toradol, N    neurontin increased to 600 mg tid  Fentanyl patch

## 2017-12-10 NOTE — PROGRESS NOTES
- Radiology called for CT RLE results, showing large abscess replacing the majority of the right midfoot, with gas within the midfoot and proximal calcaneus, along with extensive destructive changes in the midfoot c/w osteomyelitis. Pt may need amputation, which she's amenable to when I spoke to her earlier. I paged the on-call orthopedic surgeon (Dr. Crowell, [facility]) at 2:52PM, await call back.   - Additionally, CXR showed new mild to moderate pulmonary edema. D/C IVF, and start lasix 40mg IV BID.

## 2017-12-10 NOTE — CARE PLAN
Problem: Skin Integrity  Goal: Risk for impaired skin integrity will decrease  Outcome: PROGRESSING AS EXPECTED  Educated patient about the importance of frequent repositioning to prevent skin breakdown. Patient expressed understanding of importance of repositioning.

## 2017-12-10 NOTE — PROGRESS NOTES
Bedside report received by ector Gallardo. Patient lying in bed watching TV at this time. POC discussed, verbalized understanding. Heparin drip verified. No immediate concerns for patient at this time. All safety measures in place.

## 2017-12-10 NOTE — CODE DOCUMENTATION
Pt diaphoretic and tachycardic. RLE swollen, pulses present with doppler. On 5LNC, pt states does not wear at home. Pain medication given and pt currently on heparin gtt for NSTEMI per MAR. Pt resting comfortably and calm in bed.

## 2017-12-10 NOTE — CODE DOCUMENTATION
RR called for SOB and tachycardia. Dr. Castillo at bedside. Stat CXR ordered. Solu-mederol and ativan X1 ordered.

## 2017-12-10 NOTE — PROGRESS NOTES
[facility] Hospitalist Progress Note    Date of Service: 12/10/2017    Chief Complaint  52 y.o. female with T2DM, charcot foot s/p left foot amputation, HTN, tobacco dependence, admitted 2017 with CP. Presented to OSH, noted to have , WBC 17K, and trop 0.27. Initial labs here showed trop went up to 1.36, Na 126, . . EKG showed sinus tachycradia, inferior Qwaves, no ST-T wave changes. CXR showed stable cardiac silhouette enlargement with borderline interstitial edema. Started on heparin gtt, ASA, statin and beta blocker. Cardiology consulted, recommended continued medical management, and stress test in the future. Echo ordered. No other signs of infection. Limb preservation service consulted for charcot foot.     Interval Problem Update  12/10/2017 - uneventful night. VSS. Afebrile. Saturating well on 5L O2 NC. Tachycardic. No leukocytosis. Hgb stable at 11.1. Na 128. . Lactate normal. Trop trended down to 0.69.    > Seen and examined. LLE swelling and erythema worse. Pain rated 10/10. No CP, SOB, nausea, vomiting, abd pain, diarrhea. States she uses 83units lantus BID, but her PCP lowered it.        Consultants/Specialty  Cardiology  Limb preservation service    Disposition  Monitor on telemetry.        ROS     Pertinent positives/negatives as mentioned above.     A complete review of systems was done. All other systems were negative.      Physical Exam  Laboratory/Imaging   Hemodynamics  Temp (24hrs), Av.6 °C (97.9 °F), Min:36.3 °C (97.4 °F), Max:36.9 °C (98.4 °F)   Temperature: 36.3 °C (97.4 °F)  Pulse  Av.8  Min: 101  Max: 125 Heart Rate (Monitored): (!) 116  Blood Pressure: 108/70, NIBP: 110/84      Respiratory      Respiration: 20, Pulse Oximetry: 96 %, O2 Daily Delivery Respiratory : Silicone Nasal Cannula     Work Of Breathing / Effort: Mild  RUL Breath Sounds: Clear, RML Breath Sounds: Diminished, RLL Breath Sounds: Diminished, KWAME Breath Sounds: Clear, LLL Breath Sounds:  Diminished    Fluids  No intake or output data in the 24 hours ending 12/10/17 0751    Nutrition  Orders Placed This Encounter   Procedures   • DIET ORDER     Standing Status:   Standing     Number of Occurrences:   1     Order Specific Question:   Diet:     Answer:   Diabetic [3]     Physical Exam   Constitutional: She is oriented to person, place, and time. She appears well-developed. No distress.   morbidly obese. Body mass index is 43.71 kg/m².   HENT:   Head: Normocephalic and atraumatic.   Mouth/Throat: No oropharyngeal exudate.   Eyes: Conjunctivae are normal. Pupils are equal, round, and reactive to light. No scleral icterus.   Neck: Normal range of motion. Neck supple.   Cardiovascular: Normal rate and regular rhythm.  Exam reveals no gallop and no friction rub.    No murmur heard.  Pulmonary/Chest: Effort normal and breath sounds normal. No respiratory distress. She has no wheezes. She has no rales. She exhibits no tenderness.   Diminished air entry B/L bases   Abdominal: Soft. Bowel sounds are normal. She exhibits no distension. There is no tenderness. There is no rebound and no guarding.   Musculoskeletal:   Swollen, fluctuant right foot, with erythema extending to right leg. Fluid blister, erosion ankle and lateral foot.  Amputated toes left foot   Lymphadenopathy:     She has no cervical adenopathy.   Neurological: She is alert and oriented to person, place, and time. No cranial nerve deficit. Coordination normal.   Skin: Skin is warm and dry. No rash noted. She is not diaphoretic. No erythema. No pallor.   Psychiatric: She has a normal mood and affect. Her behavior is normal. Judgment and thought content normal.   Nursing note and vitals reviewed.      Recent Labs      12/09/17   2250  12/10/17   0354   WBC  11.4*  9.6   RBC  4.06*  3.95*   HEMOGLOBIN  11.2*  11.1*   HEMATOCRIT  34.7*  34.4*   MCV  85.5  87.1   MCH  27.6  28.1   MCHC  32.3*  32.3*   RDW  48.0  49.1   PLATELETCT  106*  93*   MPV  11.6   12.7     Recent Labs      12/09/17   1535  12/09/17   2250  12/10/17   0354   SODIUM  126*  126*  128*   POTASSIUM  4.1  4.1  4.1   CHLORIDE  95*  95*  98   CO2  20  20  20   GLUCOSE  352*  416*  356*   BUN  32*  31*  29*   CREATININE  0.89  0.78  0.63   CALCIUM  8.5  8.0*  7.9*     Recent Labs      12/09/17   1849  12/09/17   2209  12/10/17   0633   APTT  51.0*  41.1*  41.9*     Recent Labs      12/09/17   1240   BNPBTYPENAT  610*     Recent Labs      12/10/17   0354   TRIGLYCERIDE  309*   HDL  4*   LDL  5          Assessment/Plan     Severe sepsis due to infected right diabetic foot (CMS-HCC)- (present on admission)   Assessment & Plan    - This is severe sepsis with the following associated acute organ dysfunction(s): acute respiratory failure., NSTEMI  - continue IVF, start PRN bolus per sepsis protocol. Continue serial lactate monitoring.   - start xosyn and vancomycin, and further work-up as below.   - close hemodynamic monitoring on telemetry.         Hyponatremia- (present on admission)   Assessment & Plan    - suspect due to hypovolemia from sepsis, and component of SIADH, and hyperglycemia.   - continue IVF. Continue BG control. Check TSH. Control pain. Follow Na level.         Right diabetic foot infection, cellulits, possible abscess (CMS-HCC)- (present on admission)   Assessment & Plan    - very fluctuant, and tender, with cellulitic changes. ?abscess.   - limb preservation service consult placed. I will start her on IV zosyn and vancomycin for now. Monitor clinical appearance.   - check ESR, CRP, and procalcitonin. Will obtain a CT of the LE to evaluate for deep abscess or bone involvement.   - continue BG control, aim for BG<150 to allow for adequate healing.   - continue pain control with IV morphine, and PO oxycodone.         Acute respiratory failure with hypoxia due to sepsis (CMS-HCC)- (present on admission)   Assessment & Plan    - continue BD per RT protocol, O2 to keep sats>88%.  - check  influenza PCR.         Poorly controlled type 2 diabetes mellitus with neuropathy (CMS-HCC)- (present on admission)   Assessment & Plan    - HbA1c 11.4.   - will adjust insulin for better BG control. Will change lantus to 60 units BID (takes >80 units prior), and adjust based on response. Continue SSI. Accuchecks AC and HS.         NSTEMI (non-ST elevated myocardial infarction) (CMS-HCC)- (present on admission)   Assessment & Plan    - discussed with cardiology, doubt Type 1 NSTEMI, likely demand ischemia/type 2 from severe sepsis. Troponin has peaked.   - hold off on further cardiac intervention. Await echo. D/C heparin drip.  - continue ASA, statin, beta blocker.   - monitor on telemetry. Monitor for chest pains.           Morbid obesity with BMI of 40.0-44.9, adult (CMS-HCC)- (present on admission)   Assessment & Plan    - Body mass index is 43.7 kg/m²..  - outpatient referral for outpatient weight management.          Tobacco abuse- (present on admission)   Assessment & Plan    - I spent 5 minutes counseling her on smoking cessation. Pt states she wants to quit. Continue nicotine replacement while in house.         Hypertension- (present on admission)   Assessment & Plan    - continue lopressor. Monitor BG trend.            Reviewed items::  Labs reviewed, Medications reviewed and Radiology images reviewed  Long catheter::  No Long  DVT prophylaxis pharmacological::  Heparin  Ulcer Prophylaxis::  Not indicated  Antibiotics:  Treating active infection/contamination beyond 24 hours perioperative coverage

## 2017-12-10 NOTE — PROGRESS NOTES
Antonia Cano Fall Risk Assessment:     Last Known Fall: Within the last six months  Mobility: Use of assistive device/requires assist of two people  Medications: Cardiovascular or central nervous system meds  Mental Status/LOC/Awareness: Awake, alert, and oriented to date, place, and person  Toileting Needs: Use of assistive device (Bedside commode, bedpan, urinal)  Volume/Electrolyte Status: Use of IV fluids/tube feeds  Communication/Sensory: Visual (Glasses)/hearing deficit  Behavior: Appropriate behavior  Antonia Cano Fall Risk Total: 13  Fall Risk Level: MODERATE RISK    Universal Fall Precautions:  bed in low position and locked, call light/belongings in reach, wheelchairs and assistive devices out of sight, siderails up x 2, use non-slip footwear, adequate lighting, clutter free and spill free environment, educate on level of risk, educate to call for assistance    Fall Risk Level Interventions:    TRIAL (TELE 8, NEURO, MED JUANA 5) Moderate Fall Risk Interventions  Place yellow fall risk ID band on patient: completed  Provide patient/family education based on risk assessment : completed  Educate patient/family to call staff for assistance when getting out of bed: completed  Place fall precaution signage outside patient door: completed  Utilize bed/chair fall alarm: completed     Patient Specific Interventions:     Medication: review medications with patient and family  Mental Status/LOC/Awareness: reinforce falls education, check on patient hourly, utilize bed/chair fall alarm and reinforce the use of call light  Toileting: not applicable  Volume/Electrolyte Status: ensure patient remains hydrated, monitor abnormal lab values and ensure IV fluids are appropriate  Communication/Sensory: update plan of care on whiteboard  Behavioral: encourage patient to voice feelings, administer medication as ordered and instruct/reinforce fall program rationale  Mobility: utilize bed/chair fall alarm, ensure bed is locked  and in lowest position and provide appropriate assistive device

## 2017-12-10 NOTE — ASSESSMENT & PLAN NOTE
Ms. Echevarria states that her foot is not more swollen than usual. She states that this is her baseline condition.  The limb preservation service has been consulted. She will likely require an amputation at some point.

## 2017-12-10 NOTE — PROGRESS NOTES
2 RN skin assessment completed with Xiao GUTIERREZ. Ears red and blanching, groin redness, sacrum red and blanching, scabs on right forearm, all toes on left foot amputated and calloused on side of left foot, diabetic ulcer on right foot (bottom of foot open and lateral side of foot black discoloration).

## 2017-12-10 NOTE — PROGRESS NOTES
"Pharmacy Kinetics 52 y.o. female on vancomycin day # 1 12/10/2017    Currently on Vancomycin 3000 mg iv x1    Indication for Treatment: SSTI    Pertinent history per medical record: Admitted on 2017 for NSTEMI. 52 y.o. female who presents by ambulance from [facility] for evaluation of chest pain. PMHx: ETOH/Tobacco abuse, opioid withdrawal, DM w/ hx of amputation, HTN, obesity    Other antibiotics: Zosyn continuous infusion    Allergies: Patient has no known allergies.     List concerns for renal function: Obesity BMI>43    Pertinent cultures to date:   17:PBCx1:NGTD    Recent Labs      17   2250  12/10/17   0354   WBC  11.4*  9.6   NEUTSPOLYS  75.40*  72.50*   BANDSSTABS  3.50  0.90     Recent Labs      17   1535  17   2250  12/10/17   0354   BUN  32*  31*  29*   CREATININE  0.89  0.78  0.63   ALBUMIN   --   2.5*  2.4*     No results for input(s): VANCOTROUGH, VANCOPEAK, VANCORANDOM in the last 72 hours.No intake or output data in the 24 hours ending 12/10/17 0906   Blood pressure 109/78, pulse (!) 107, temperature 36.9 °C (98.4 °F), resp. rate 20, height 1.702 m (5' 7.01\"), weight (!) 126.6 kg (279 lb 1.6 oz), SpO2 94 %, not currently breastfeeding. Temp (24hrs), Av.7 °C (98 °F), Min:36.3 °C (97.4 °F), Max:36.9 °C (98.4 °F)      A/P   1. Vancomycin dose change: Pulse dosing   2. Next vancomycin level: 18 hr random level : 17@0600  3. Goal trough: 12-16 mcg/mL  4. Comments: Cc on admit,  CP and elevated BS. Per notes, diabetic ulcer on rt ft (bottom of foot open and lateral side of foot black discoloration). Vancomycin initiated per protocol , will pulse dose for now 2/2 BMI. WBC wnl, afebrile, cx NGTD. Will follow notes for abx plan.    Rodolfo Crumby PharmD BCPS   "

## 2017-12-10 NOTE — RESPIRATORY CARE
Respiratory Rapid Response Note    Symptoms diaphoresis     Breath Sounds  RUL Breath Sounds: Clear (12/09/17 2230)  RML Breath Sounds: Diminished (12/09/17 2230)  RLL Breath Sounds: Diminished (12/09/17 2230)  KWAME Breath Sounds: Clear (12/09/17 2230)  LLL Breath Sounds: Diminished (12/09/17 2230)     O2 (LPM): 5 (12/09/17 2230)  O2 Daily Delivery Respiratory : Silicone Nasal Cannula (12/09/17 2230)    Events/Summary/Plan: RRT diaphoresis (12/09/17 2230)

## 2017-12-10 NOTE — PROGRESS NOTES
Rapid Response called on Pt due to diaphoresis and change in status. Hospitalist paged. Dr. Nelson returned page. New orders in place. Will page Dr. Nelson when all results are in.

## 2017-12-10 NOTE — CONSULTS
DATE OF SERVICE:  12/09/2017    REFERRING PHYSICIAN:  Fabrizio Martin MD    CHIEF COMPLAINT:  Abnormal troponin level.    HISTORY OF PRESENT ILLNESS:  The patient is a 52-year-old female with past   medical history significant for hypertension, diabetes, and chronic tobacco   abuse.    The patient was transferred from [facility], [place], operated by [facility] to [facility] for worsening weakness, dizziness, and shortness of breath.  Her   laboratory examination showed elevated blood sugar over 600.  In addition,   her laboratory examination showed elevated troponin of 1.13.  She denies   associated chest pain, chest discomfort, diaphoresis, nausea, or vomiting.    Her EKG shows no significant ST segment abnormalities.    ALLERGIES:  No known drug allergies.    MEDICATIONS:  Aspirin 325 mg per day, atorvastatin 40 mg at bedtime, heparin   6000 units IV, lantus insulin 25 units subcutaneously b.i.d., metoprolol 25 mg   b.i.d., Nicoderm patch.    PAST MEDICAL ILLNESS:  As above plus multiple foot wounds requiring infection.    PAST SURGICAL HISTORY:  Bilateral foot surgeries, skin graft, I and D, as well   as hysterectomy and cholecystectomy.    SOCIAL HISTORY:  She is  and lives with her son.  Positive for tobacco   abuse.    FAMILY HISTORY:  Unremarkable.    REVIEW OF SYSTEMS:  Positive for fatigue, shortness of breath, dizziness,   diabetes, foot infection.  GENERAL: The patient denies fever, chills or weight changes.   HEENT: The patient denies headache, visual or hearing changes.   CENTRAL NERVOUS SYSTEM: The patient denies lightheadedness,   syncope or seizures.   ENDOCRINE: The patient denies thyroid disorder.  RESPIRATORY: The patient denies productive cough, asthma or emphysema.  CARDIOVASCULAR: As above.   GASTROINTESTINAL: The patient denies bowel changes.   GENITOURINARY: The patient denies urinary changes.   PSYCHIATRIC: The patient denies depression or anxiety.   EXTREMITIES: As above.    PHYSICAL  EXAMINATION:  VITAL SIGNS:  Pulse 125, respiration 21, /73, temperature 36.6.  GENERAL:  No acute distress.  HEENT:  Eyes equal, oral moist.  NECK:  Supple.  RESPIRATORY:  Clear to auscultation bilaterally.  Good effort.  CARDIOVASCULAR:  S1, S2 regular rate and rhythm without S3 or murmur.  ABDOMEN:  Soft, nondistended, nontender.  No hepatosplenomegaly noted.  EXTREMITIES:  Upper extremities, no clubbing or cyanosis.  Lower extremity   edema plus right foot wound infection, left foot stump.  NEUROLOGIC:  Alert and oriented x3.  PSYCHIATRIC:  No anxiety or depression.  SKIN:  Warm and dry.    CARDIAC STUDIES/PROCEDURES:    EKG performed on (12/09/17) was reviewed: EKG shows sinus tachycardia with non-specific intra-ventricular conduction delay.    Laboratory results of (12/09/17) were reviewed. Bun of 32 mg/dl, creatinine levels of 0.89 mg/dl noted. Troponin levels of 1.13 ng/ml noted.    ASSESSMENT AND PLAN:  1.  Abnormal troponin level:  The patient is a 52-year-old female with complex   medical history as described above.  Laboratory examination showed elevated   troponin level.  She is clinically stable without chest pain.  We will   recommend to continue with medical therapy, perform an echocardiogram to   assess her left ventricular function and plan for nuclear stress test in the   near future when she is clinically stable.  2.  Hypertension:  Her blood pressure is well controlled.  3.  Diabetes:  On insulin treatment.  4.  Chronic tobacco abuse:  Smoking cessation recommended.    Thank you for this consult.       ____________________________________     MD CAITLIN HUBBARD / DALLIN    DD:  12/09/2017 15:47:35  DT:  12/09/2017 16:24:30    D#:  [id number]  Job#:  [id number]

## 2017-12-10 NOTE — H&P
" Hospital Medicine History and Physical    Date of Service  12/9/2017    Chief Complaint  Chief Complaint   Patient presents with   • High Blood Sugar   • Foot Ulcer       History of Presenting Illness  52 y.o. female who presented 12/9/2017 with Chest pain and elevated blood sugars. Ms. Ehcevarria has a history of some dependent diabetes mellitus left foot amputation that was in a skilled nursing facility in [place], [place] for the past 6 months and was discharged 2 weeks ago and now is currently living with her son. Smoking she noticed that her glucometer read \"high\" so she presented to the emergency room in [place] blood sugars 647 and her troponin was 0.27. He did state that she had some chest pain this morning central nature though has now resolved. Troponin went up to 1.13 here. She remains tachycardic and still elevated blood sugars. Her WBC prior to transfer was 17,000. sHe states that her foot is the same as it always is WITH pink coloration and marked swelling. He has not had a fever. She was admitted for further workup including cardiology consultation possible orthopedic consultation for her foot.   Primary Care Physician  Pcp Pt States None    Consultants  cardiology    Code Status  full    Review of Systems  Review of Systems   Constitutional: Negative for chills and fever.        She has had polyuria and polydipsia   Respiratory: Positive for cough. Negative for shortness of breath and wheezing.    Cardiovascular: Positive for chest pain. Negative for leg swelling.   Gastrointestinal:        She is quite thirsty and hungry   Genitourinary: Negative for dysuria.   Neurological: Positive for dizziness.   All other systems reviewed and are negative.       Past Medical History  Past Medical History:   Diagnosis Date   • Bronchitis    • Diabetes    • Hypertension        Surgical History  Past Surgical History:   Procedure Laterality Date   • SPLIT THICKNESS SKIN GRAFT Left 1/7/2016    Procedure: SPLIT " THICKNESS SKIN GRAFT FOOT;  Surgeon: Jerzy Mayberry M.D.;  Location: SURGERY [facility];  Service:    • STUMP REVISION Left 2015    Procedure: STUMP REVISION FOOT WITH VAC DRESSING;  Surgeon: Jerzy Mayberry M.D.;  Location: SURGERY [facility];  Service:    • INCISION AND DRAINAGE ORTHOPEDIC Left 2015    Procedure: INCISION AND DRAINAGE ORTHOPEDIC- Foot ;  Surgeon: Jerzy Maybrery M.D.;  Location: SURGERY [facility];  Service:    • CHOLECYSTECTOMY ROBOTIC  2015   • HYSTERECTOMY, VAGINAL  2001       Medications  No current facility-administered medications on file prior to encounter.      No current outpatient prescriptions on file prior to encounter.       Family History  No family history on file.    Social History  Social History   Substance Use Topics   • Smoking status: Current Every Day Smoker     Packs/day: 0.50     Years: 35.00     Start date: 1980   • Smokeless tobacco: Not on file   • Alcohol use No       Allergies  No Known Allergies     Physical Exam  Laboratory   Hemodynamics  Temp (24hrs), Av.6 °C (97.8 °F), Min:36.6 °C (97.8 °F), Max:36.6 °C (97.8 °F)   Temperature: 36.6 °C (97.8 °F)  Pulse  Av.5  Min: 119  Max: 125 Heart Rate (Monitored): (!) 120  Blood Pressure: 114/73, NIBP: 117/46      Respiratory      Respiration: 18, Pulse Oximetry: 99 %             Physical Exam   HENT:   Hirsutism   Dry mucous membranes.   Poor dentition.    Neck: Normal range of motion. Neck supple.   Cardiovascular:   sijnus tachycardia with a rate of 120. No murmur.    Pulmonary/Chest: No respiratory distress. She has no wheezes.   Abdominal: Soft. She exhibits no distension. There is no tenderness.   obese   Musculoskeletal: She exhibits edema.   Left foot transmetatarsal amputation.  Right foot has a massive Charcot Foot that is warm to the touch though not hot. It is pink though not red. There is an ulcer on the plantar aspect that has some clear drainage though no  pus.    Neurological:   Her mentation is somewhat slow though she is oriented x 4.    Skin: Skin is warm and dry. She is not diaphoretic. There is pallor.   Psychiatric:   Normal affect and mood.           Recent Labs      12/09/17   1535   SODIUM  126*   POTASSIUM  4.1   CHLORIDE  95*   CO2  20   GLUCOSE  352*   BUN  32*   CREATININE  0.89   CALCIUM  8.5     Recent Labs      12/09/17   1535   GLUCOSE  352*         Recent Labs      12/09/17   1240   BNPBTYPENAT  610*         Lab Results   Component Value Date    TROPONINI 1.36 (H) 12/09/2017     Urinalysis:    Lab Results  Component Value Date/Time   SPECGRAVITY 1.029 12/09/2017 1538   GLUCOSEUR >=1000 (A) 12/09/2017 1538   KETONES 15 (A) 12/09/2017 1538   NITRITE Negative 12/09/2017 1538   WBCURINE 0-2 12/09/2017 1538   RBCURINE 2-5 (A) 12/09/2017 1538   BACTERIA Few (A) 12/09/2017 1538   EPITHELCELL Moderate (A) 12/09/2017 1538        Imaging  ECHOCARDIOGRAM COMP W/O CONT    (Results Pending)   NM-CARDIAC STRESS TEST    (Results Pending)   DX-CHEST-PORTABLE (1 VIEW)    (Results Pending)     EKG: sinus tachycardia at 128. Q waves inferiorly. Late transition of the precordial leads. No ST nor T wave changes.    Assessment/Plan     I anticipate this patient will require at least two midnights for appropriate medical management, necessitating inpatient admission.    NSTEMI (non-ST elevated myocardial infarction) (CMS-HCC)- (present on admission)   Assessment & Plan    Troponin has gone up to 1.1.  She had chest pain earlier today.  EKG does not show ST changes.   Cardiology consulted.  IV heparin drip, beta blocker, statin, ASA.  Serial troponins ordered.        Leukocytosis- (present on admission)   Assessment & Plan    WBC 17 in [place].  She states that her foot is at baseline.   UA, cxr, and BC x 2 ordered. Refrain from antibiotics unless a source of bacterial infection is found.         DM2 (diabetes mellitus, type 2) (CMS-HCC)- (present on admission)  "  Assessment & Plan    Very poorly controlled.  Lantus and sliding scale.  IV fluids. Glycohemoglobin ordered.         Type 2 diabetes mellitus with Charcot's joint of foot (CMS-HCC)- (present on admission)   Assessment & Plan    Ms. Echevarria states that her foot is not more swollen than usual. She states that this is her baseline condition.  The limb preservation service has been consulted. She will likely require an amputation at some point.        Hyperosmolar coma (CMS-HCC)- (present on admission)   Assessment & Plan    Prior to going to the hospital glucometer read \"high\" and her glucose in [place] was 627. She has associated pseudohyponatremia and will be treated with some IV fluids and will start Lantus insulin and sliding scale.        Tobacco abuse- (present on admission)   Assessment & Plan    Cessation was discussed and a nicotine patch will be offered.         Hypertension- (present on admission)   Assessment & Plan    History of. He is not on any medications for this.            VTE prophylaxis: heparin.       "

## 2017-12-10 NOTE — PROGRESS NOTES
- I was called to bedside for rapid response, patient suddenly felt SOB, and diaphoretic. Tight air entry, and pt anxious. Required increased 6L O2 by face mask for hypoxia. Patient admits to 40 pack year smoking history, but no documented COPD diagnosis.   - suspect Acute exacerbation of undiagnosed COPD, in setting of severe sepsis. Last lactate was 2.1, given 500cc bolus. I gave her 125mg IV solumedrol, and 2mg IV ativan, with improment in her respiratory status and anxiety.   - will continue with IV solu-medrol 62.5mg q6H. Send for flu PCR. Continue IVF and serial lactate for severe sepsis. Check CXR.   - close hemodynamic monitoring on telemetry. Clinical status tenuous, monitor closely as may need higher level of care.     Time spent: The patient is critically ill,, with high chance of deterioration into respiratory failure, shock, and  eventually death if left untreated. The care that has been undertaken is medically  complex. I spent 40 minutes CRITICAL CARE TIME, including managing medical   issues, coordination of care, not including doing procedures, with no overlap in critical  care time.

## 2017-12-10 NOTE — DIETARY
NUTRITION SERVICES: BMI - Pt with BMI >40 (=43.7). Weight loss counseling not appropriate in acute care setting. RECOMMEND - Referral to outpatient nutrition services for weight management after D/C.

## 2017-12-10 NOTE — PROGRESS NOTES
Cardiology Progress Note               Author: Sue To Date & Time created: 12/10/2017  9:03 AM     52 years old woman with uncontrolled DM, HTN, morbid obesity, presented to ER with not feeling well and found to have blood sugar of 600. Troponin is elevated but trending down. Also with significant right foot ulcer.    Interval History:  No chest pain.  No acute events overnight.  No telemetry events.      Review of Systems   Constitutional: Negative for chills, fever, malaise/fatigue and weight loss.   HENT: Negative for ear discharge, ear pain, hearing loss and nosebleeds.    Eyes: Negative for blurred vision, double vision, pain and discharge.   Respiratory: Negative for cough and shortness of breath.    Cardiovascular: Negative for chest pain, palpitations, orthopnea, claudication, leg swelling and PND.   Gastrointestinal: Negative for abdominal pain, blood in stool, melena, nausea and vomiting.   Genitourinary: Negative for dysuria and hematuria.   Musculoskeletal: Positive for joint pain. Negative for falls and myalgias.   Skin: Negative for itching and rash.   Neurological: Negative for dizziness, sensory change, speech change, loss of consciousness and headaches.   Endo/Heme/Allergies: Negative for environmental allergies. Does not bruise/bleed easily.   Psychiatric/Behavioral: Negative for depression, hallucinations and suicidal ideas.       Physical Exam   Constitutional: She is oriented to person, place, and time. She appears distressed.   HENT:   Head: Normocephalic and atraumatic.   Eyes: EOM are normal.   Neck: No JVD present.   Cardiovascular: Normal rate, regular rhythm, normal heart sounds and intact distal pulses.  Exam reveals no gallop and no friction rub.    No murmur heard.  Pulmonary/Chest: No respiratory distress. She has no wheezes. She has no rales. She exhibits no tenderness.   Abdominal: She exhibits no distension. There is no tenderness. There is no rebound and no guarding.    Musculoskeletal:   +significant right foot ulcer.   Lymphadenopathy:     She has no cervical adenopathy.   Neurological: She is alert and oriented to person, place, and time.   Skin: Skin is dry.   Psychiatric: She has a normal mood and affect.   Nursing note and vitals reviewed.      Hemodynamics:  Temp (24hrs), Av.7 °C (98 °F), Min:36.3 °C (97.4 °F), Max:36.9 °C (98.4 °F)  Temperature: 36.9 °C (98.4 °F)  Pulse  Av.6  Min: 101  Max: 125Heart Rate (Monitored): (!) 116  Blood Pressure: 109/78, NIBP: 110/84     Respiratory:    Respiration: 20, Pulse Oximetry: 94 %, O2 Daily Delivery Respiratory : Silicone Nasal Cannula     Work Of Breathing / Effort: Mild  RUL Breath Sounds: Clear, RML Breath Sounds: Diminished, RLL Breath Sounds: Diminished, KWAME Breath Sounds: Clear, LLL Breath Sounds: Diminished  Fluids:     Weight: (!) 126.6 kg (279 lb 1.6 oz)  GI/Nutrition:  Orders Placed This Encounter   Procedures   • DIET ORDER     Standing Status:   Standing     Number of Occurrences:   1     Order Specific Question:   Diet:     Answer:   Diabetic [3]     Lab Results:  Recent Labs      17   2250  12/10/17   0354   WBC  11.4*  9.6   RBC  4.06*  3.95*   HEMOGLOBIN  11.2*  11.1*   HEMATOCRIT  34.7*  34.4*   MCV  85.5  87.1   MCH  27.6  28.1   MCHC  32.3*  32.3*   RDW  48.0  49.1   PLATELETCT  106*  93*   MPV  11.6  12.7     Recent Labs      17   1535  17   2250  12/10/17   0354   SODIUM  126*  126*  128*   POTASSIUM  4.1  4.1  4.1   CHLORIDE  95*  95*  98   CO2  20  20  20   GLUCOSE  352*  416*  356*   BUN  32*  31*  29*   CREATININE  0.89  0.78  0.63   CALCIUM  8.5  8.0*  7.9*     Recent Labs      17   1849  17   2209  12/10/17   0633   APTT  51.0*  41.1*  41.9*     Recent Labs      17   1240   BNPBTYPENAT  610*     Recent Labs      17   1240  17   1535  17   2209  12/10/17   0354   TROPONINI  1.13*  1.36*  0.53*  0.69*   BNPBTYPENAT  610*   --    --    --       Recent Labs      12/10/17   0354   TRIGLYCERIDE  309*   HDL  4*   LDL  5         Medical Decision Making, by Problem:  Active Hospital Problems    Diagnosis   • NSTEMI (non-ST elevated myocardial infarction) (CMS-HCC) [I21.4]   • Leukocytosis [D72.829]   • DM2 (diabetes mellitus, type 2) (CMS-HCC) [E11.9]   • Tobacco abuse [Z72.0]   • Hypertension [I10]   • Hyperosmolar coma (CMS-HCC) [E11.01]   • Type 2 diabetes mellitus with Charcot's joint of foot (CMS-HCC) [E11.610]       Plan:    No suspicion for acute coronary syndrome.  Troponin leak is likely type 2 NSTEMI.  No indication for further ischemic work up at this time. In addition, further ischemic work up will not provide benefits for her overall clinical presentation.    Will cancel nuclear stress test.    Optimize wound care and treatment.  Optimize DM control.    Will sign off at this time, please call us with further questions.  Patient will be followed in the outpatient cardiology clinic for further cardiac care.    Thank you for referring this patient to our cardiology service.    Sue Betts MD.  [facility].        Reviewed items::  EKG reviewed, Radiology images reviewed, Labs reviewed and Medications reviewed

## 2017-12-11 ENCOUNTER — APPOINTMENT (OUTPATIENT)
Dept: RADIOLOGY | Facility: MEDICAL CENTER | Age: 52
DRG: 853 | End: 2017-12-11
Attending: INTERNAL MEDICINE
Payer: MEDICAID

## 2017-12-11 PROBLEM — E87.6 HYPOKALEMIA: Status: ACTIVE | Noted: 2017-12-11

## 2017-12-11 LAB
ANION GAP SERPL CALC-SCNC: 11 MMOL/L (ref 0–11.9)
ANION GAP SERPL CALC-SCNC: 13 MMOL/L (ref 0–11.9)
BASOPHILS # BLD AUTO: 0 % (ref 0–1.8)
BASOPHILS # BLD: 0 K/UL (ref 0–0.12)
BUN SERPL-MCNC: 23 MG/DL (ref 8–22)
BUN SERPL-MCNC: 25 MG/DL (ref 8–22)
BURR CELLS BLD QL SMEAR: NORMAL
CALCIUM SERPL-MCNC: 8 MG/DL (ref 8.5–10.5)
CALCIUM SERPL-MCNC: 8.6 MG/DL (ref 8.5–10.5)
CHLORIDE SERPL-SCNC: 100 MMOL/L (ref 96–112)
CHLORIDE SERPL-SCNC: 98 MMOL/L (ref 96–112)
CO2 SERPL-SCNC: 19 MMOL/L (ref 20–33)
CO2 SERPL-SCNC: 22 MMOL/L (ref 20–33)
CREAT SERPL-MCNC: 0.57 MG/DL (ref 0.5–1.4)
CREAT SERPL-MCNC: 0.65 MG/DL (ref 0.5–1.4)
EOSINOPHIL # BLD AUTO: 0 K/UL (ref 0–0.51)
EOSINOPHIL NFR BLD: 0 % (ref 0–6.9)
ERYTHROCYTE [DISTWIDTH] IN BLOOD BY AUTOMATED COUNT: 49.1 FL (ref 35.9–50)
GFR SERPL CREATININE-BSD FRML MDRD: >60 ML/MIN/1.73 M 2
GFR SERPL CREATININE-BSD FRML MDRD: >60 ML/MIN/1.73 M 2
GLUCOSE BLD-MCNC: 317 MG/DL (ref 65–99)
GLUCOSE BLD-MCNC: 386 MG/DL (ref 65–99)
GLUCOSE BLD-MCNC: 416 MG/DL (ref 65–99)
GLUCOSE BLD-MCNC: 416 MG/DL (ref 65–99)
GLUCOSE BLD-MCNC: 418 MG/DL (ref 65–99)
GLUCOSE SERPL-MCNC: 361 MG/DL (ref 65–99)
GLUCOSE SERPL-MCNC: 469 MG/DL (ref 65–99)
HCT VFR BLD AUTO: 33.8 % (ref 37–47)
HGB BLD-MCNC: 11 G/DL (ref 12–16)
LV EJECT FRACT  99904: 60
LYMPHOCYTES # BLD AUTO: 1.77 K/UL (ref 1–4.8)
LYMPHOCYTES NFR BLD: 14.3 % (ref 22–41)
MAGNESIUM SERPL-MCNC: 2.1 MG/DL (ref 1.5–2.5)
MANUAL DIFF BLD: NORMAL
MCH RBC QN AUTO: 27.8 PG (ref 27–33)
MCHC RBC AUTO-ENTMCNC: 32.5 G/DL (ref 33.6–35)
MCV RBC AUTO: 85.6 FL (ref 81.4–97.8)
MONOCYTES # BLD AUTO: 0.11 K/UL (ref 0–0.85)
MONOCYTES NFR BLD AUTO: 0.9 % (ref 0–13.4)
MORPHOLOGY BLD-IMP: NORMAL
NEUTROPHILS # BLD AUTO: 10.52 K/UL (ref 2–7.15)
NEUTROPHILS NFR BLD: 81.2 % (ref 44–72)
NEUTS BAND NFR BLD MANUAL: 3.6 % (ref 0–10)
NRBC # BLD AUTO: 0 K/UL
NRBC BLD AUTO-RTO: 0 /100 WBC
OVALOCYTES BLD QL SMEAR: NORMAL
PLATELET # BLD AUTO: 159 K/UL (ref 164–446)
PLATELET BLD QL SMEAR: NORMAL
PMV BLD AUTO: 13.3 FL (ref 9–12.9)
POIKILOCYTOSIS BLD QL SMEAR: NORMAL
POTASSIUM SERPL-SCNC: 3.4 MMOL/L (ref 3.6–5.5)
POTASSIUM SERPL-SCNC: 4 MMOL/L (ref 3.6–5.5)
PROCALCITONIN SERPL-MCNC: 25.59 NG/ML
RBC # BLD AUTO: 3.95 M/UL (ref 4.2–5.4)
RBC BLD AUTO: PRESENT
SODIUM SERPL-SCNC: 128 MMOL/L (ref 135–145)
SODIUM SERPL-SCNC: 135 MMOL/L (ref 135–145)
TROPONIN I SERPL-MCNC: 0.18 NG/ML (ref 0–0.04)
VANCOMYCIN SERPL-MCNC: 14.3 UG/ML
WBC # BLD AUTO: 12.4 K/UL (ref 4.8–10.8)

## 2017-12-11 PROCEDURE — A9270 NON-COVERED ITEM OR SERVICE: HCPCS | Performed by: HOSPITALIST

## 2017-12-11 PROCEDURE — 80202 ASSAY OF VANCOMYCIN: CPT

## 2017-12-11 PROCEDURE — 700105 HCHG RX REV CODE 258: Performed by: INTERNAL MEDICINE

## 2017-12-11 PROCEDURE — 93306 TTE W/DOPPLER COMPLETE: CPT

## 2017-12-11 PROCEDURE — 84484 ASSAY OF TROPONIN QUANT: CPT

## 2017-12-11 PROCEDURE — 700111 HCHG RX REV CODE 636 W/ 250 OVERRIDE (IP): Performed by: INTERNAL MEDICINE

## 2017-12-11 PROCEDURE — 80048 BASIC METABOLIC PNL TOTAL CA: CPT | Mod: 91

## 2017-12-11 PROCEDURE — 83735 ASSAY OF MAGNESIUM: CPT

## 2017-12-11 PROCEDURE — 99233 SBSQ HOSP IP/OBS HIGH 50: CPT | Performed by: INTERNAL MEDICINE

## 2017-12-11 PROCEDURE — 700102 HCHG RX REV CODE 250 W/ 637 OVERRIDE(OP): Performed by: INTERNAL MEDICINE

## 2017-12-11 PROCEDURE — 93306 TTE W/DOPPLER COMPLETE: CPT | Mod: 26 | Performed by: INTERNAL MEDICINE

## 2017-12-11 PROCEDURE — 700102 HCHG RX REV CODE 250 W/ 637 OVERRIDE(OP): Performed by: HOSPITALIST

## 2017-12-11 PROCEDURE — 85027 COMPLETE CBC AUTOMATED: CPT

## 2017-12-11 PROCEDURE — 51798 US URINE CAPACITY MEASURE: CPT

## 2017-12-11 PROCEDURE — A9270 NON-COVERED ITEM OR SERVICE: HCPCS | Performed by: INTERNAL MEDICINE

## 2017-12-11 PROCEDURE — 700105 HCHG RX REV CODE 258: Performed by: PHYSICIAN ASSISTANT

## 2017-12-11 PROCEDURE — 84145 PROCALCITONIN (PCT): CPT

## 2017-12-11 PROCEDURE — 94760 N-INVAS EAR/PLS OXIMETRY 1: CPT

## 2017-12-11 PROCEDURE — 82962 GLUCOSE BLOOD TEST: CPT | Mod: 91

## 2017-12-11 PROCEDURE — 36415 COLL VENOUS BLD VENIPUNCTURE: CPT

## 2017-12-11 PROCEDURE — 71010 DX-CHEST-PORTABLE (1 VIEW): CPT

## 2017-12-11 PROCEDURE — 85007 BL SMEAR W/DIFF WBC COUNT: CPT

## 2017-12-11 PROCEDURE — 770020 HCHG ROOM/CARE - TELE (206)

## 2017-12-11 RX ORDER — METHYLPREDNISOLONE SODIUM SUCCINATE 125 MG/2ML
62.5 INJECTION, POWDER, LYOPHILIZED, FOR SOLUTION INTRAMUSCULAR; INTRAVENOUS EVERY 12 HOURS
Status: DISCONTINUED | OUTPATIENT
Start: 2017-12-11 | End: 2017-12-12

## 2017-12-11 RX ORDER — LINEZOLID 600 MG/1
600 TABLET, FILM COATED ORAL EVERY 12 HOURS
Status: DISCONTINUED | OUTPATIENT
Start: 2017-12-11 | End: 2017-12-12

## 2017-12-11 RX ORDER — BUDESONIDE AND FORMOTEROL FUMARATE DIHYDRATE 160; 4.5 UG/1; UG/1
2 AEROSOL RESPIRATORY (INHALATION)
Status: DISCONTINUED | OUTPATIENT
Start: 2017-12-11 | End: 2017-12-16

## 2017-12-11 RX ORDER — INSULIN GLARGINE 100 [IU]/ML
85 INJECTION, SOLUTION SUBCUTANEOUS 2 TIMES DAILY
Status: DISCONTINUED | OUTPATIENT
Start: 2017-12-11 | End: 2017-12-11

## 2017-12-11 RX ORDER — ONDANSETRON 4 MG/1
4 TABLET, ORALLY DISINTEGRATING ORAL EVERY 4 HOURS PRN
Status: DISCONTINUED | OUTPATIENT
Start: 2017-12-11 | End: 2017-12-20 | Stop reason: HOSPADM

## 2017-12-11 RX ORDER — POTASSIUM CHLORIDE 20 MEQ/1
40 TABLET, EXTENDED RELEASE ORAL 2 TIMES DAILY
Status: COMPLETED | OUTPATIENT
Start: 2017-12-11 | End: 2017-12-11

## 2017-12-11 RX ORDER — TIOTROPIUM BROMIDE 18 UG/1
1 CAPSULE ORAL; RESPIRATORY (INHALATION)
Status: DISCONTINUED | OUTPATIENT
Start: 2017-12-11 | End: 2017-12-16

## 2017-12-11 RX ORDER — SODIUM CHLORIDE 9 MG/ML
INJECTION, SOLUTION INTRAVENOUS CONTINUOUS
Status: DISCONTINUED | OUTPATIENT
Start: 2017-12-11 | End: 2017-12-12

## 2017-12-11 RX ORDER — INSULIN GLARGINE 100 [IU]/ML
80 INJECTION, SOLUTION SUBCUTANEOUS 2 TIMES DAILY
Status: DISCONTINUED | OUTPATIENT
Start: 2017-12-11 | End: 2017-12-11

## 2017-12-11 RX ORDER — INSULIN GLARGINE 100 [IU]/ML
80 INJECTION, SOLUTION SUBCUTANEOUS 2 TIMES DAILY
Status: DISCONTINUED | OUTPATIENT
Start: 2017-12-11 | End: 2017-12-15

## 2017-12-11 RX ORDER — ONDANSETRON 2 MG/ML
4 INJECTION INTRAMUSCULAR; INTRAVENOUS EVERY 4 HOURS PRN
Status: DISCONTINUED | OUTPATIENT
Start: 2017-12-11 | End: 2017-12-20 | Stop reason: HOSPADM

## 2017-12-11 RX ADMIN — FUROSEMIDE 40 MG: 10 INJECTION, SOLUTION INTRAMUSCULAR; INTRAVENOUS at 06:18

## 2017-12-11 RX ADMIN — SODIUM CHLORIDE: 9 INJECTION, SOLUTION INTRAVENOUS at 18:49

## 2017-12-11 RX ADMIN — INSULIN HUMAN 10 UNITS: 100 INJECTION, SOLUTION PARENTERAL at 12:06

## 2017-12-11 RX ADMIN — HEPARIN SODIUM 5000 UNITS: 5000 INJECTION, SOLUTION INTRAVENOUS; SUBCUTANEOUS at 06:18

## 2017-12-11 RX ADMIN — METOPROLOL TARTRATE 25 MG: 25 TABLET, FILM COATED ORAL at 21:06

## 2017-12-11 RX ADMIN — METHYLPREDNISOLONE SODIUM SUCCINATE 62.5 MG: 125 INJECTION, POWDER, FOR SOLUTION INTRAMUSCULAR; INTRAVENOUS at 21:04

## 2017-12-11 RX ADMIN — INSULIN GLARGINE 80 UNITS: 100 INJECTION, SOLUTION SUBCUTANEOUS at 09:50

## 2017-12-11 RX ADMIN — POTASSIUM CHLORIDE 40 MEQ: 1500 TABLET, EXTENDED RELEASE ORAL at 21:07

## 2017-12-11 RX ADMIN — LINEZOLID 600 MG: 600 TABLET, FILM COATED ORAL at 21:07

## 2017-12-11 RX ADMIN — MORPHINE SULFATE 2 MG: 4 INJECTION INTRAVENOUS at 06:18

## 2017-12-11 RX ADMIN — ASPIRIN 325 MG: 325 TABLET, FILM COATED ORAL at 09:43

## 2017-12-11 RX ADMIN — OXYCODONE HYDROCHLORIDE 10 MG: 10 TABLET ORAL at 14:25

## 2017-12-11 RX ADMIN — INSULIN HUMAN 14 UNITS: 100 INJECTION, SOLUTION PARENTERAL at 06:19

## 2017-12-11 RX ADMIN — BUDESONIDE AND FORMOTEROL FUMARATE DIHYDRATE 2 PUFF: 160; 4.5 AEROSOL RESPIRATORY (INHALATION) at 21:08

## 2017-12-11 RX ADMIN — FUROSEMIDE 40 MG: 10 INJECTION, SOLUTION INTRAMUSCULAR; INTRAVENOUS at 17:45

## 2017-12-11 RX ADMIN — SODIUM CHLORIDE: 9 INJECTION, SOLUTION INTRAVENOUS at 18:44

## 2017-12-11 RX ADMIN — OXYCODONE HYDROCHLORIDE 10 MG: 10 TABLET ORAL at 21:06

## 2017-12-11 RX ADMIN — METOPROLOL TARTRATE 25 MG: 25 TABLET, FILM COATED ORAL at 09:41

## 2017-12-11 RX ADMIN — INSULIN HUMAN 14 UNITS: 100 INJECTION, SOLUTION PARENTERAL at 21:12

## 2017-12-11 RX ADMIN — METHYLPREDNISOLONE SODIUM SUCCINATE 62.5 MG: 125 INJECTION, POWDER, FOR SOLUTION INTRAMUSCULAR; INTRAVENOUS at 09:41

## 2017-12-11 RX ADMIN — MORPHINE SULFATE 4 MG: 4 INJECTION INTRAVENOUS at 09:52

## 2017-12-11 RX ADMIN — ATORVASTATIN CALCIUM 40 MG: 40 TABLET, FILM COATED ORAL at 21:06

## 2017-12-11 RX ADMIN — INSULIN HUMAN 12 UNITS: 100 INJECTION, SOLUTION PARENTERAL at 17:43

## 2017-12-11 RX ADMIN — STANDARDIZED SENNA CONCENTRATE AND DOCUSATE SODIUM 2 TABLET: 8.6; 5 TABLET, FILM COATED ORAL at 09:41

## 2017-12-11 RX ADMIN — LINEZOLID 600 MG: 600 TABLET, FILM COATED ORAL at 14:31

## 2017-12-11 RX ADMIN — METHYLPREDNISOLONE SODIUM SUCCINATE 62.5 MG: 125 INJECTION, POWDER, FOR SOLUTION INTRAMUSCULAR; INTRAVENOUS at 02:37

## 2017-12-11 RX ADMIN — INSULIN GLARGINE 80 UNITS: 100 INJECTION, SOLUTION SUBCUTANEOUS at 21:11

## 2017-12-11 RX ADMIN — ALPRAZOLAM 0.25 MG: 0.25 TABLET ORAL at 21:06

## 2017-12-11 RX ADMIN — POTASSIUM CHLORIDE 40 MEQ: 1500 TABLET, EXTENDED RELEASE ORAL at 12:07

## 2017-12-11 RX ADMIN — MORPHINE SULFATE 4 MG: 4 INJECTION INTRAVENOUS at 22:26

## 2017-12-11 RX ADMIN — ONDANSETRON 4 MG: 4 TABLET, ORALLY DISINTEGRATING ORAL at 14:35

## 2017-12-11 ASSESSMENT — PAIN SCALES - GENERAL
PAINLEVEL_OUTOF10: 9
PAINLEVEL_OUTOF10: 0
PAINLEVEL_OUTOF10: 9
PAINLEVEL_OUTOF10: 8
PAINLEVEL_OUTOF10: 4

## 2017-12-11 ASSESSMENT — COPD QUESTIONNAIRES
HAVE YOU SMOKED AT LEAST 100 CIGARETTES IN YOUR ENTIRE LIFE: YES
DURING THE PAST 4 WEEKS HOW MUCH DID YOU FEEL SHORT OF BREATH: SOME OF THE TIME
DO YOU EVER COUGH UP ANY MUCUS OR PHLEGM?: NO/ONLY WITH OCCASIONAL COLDS OR INFECTIONS
COPD SCREENING SCORE: 4

## 2017-12-11 NOTE — DISCHARGE PLANNING
Care Transition Team Assessment    IHD met with pt at bedside. Pt currently lives at home with her adult son, and states that he will provide transportation upon d/c if needed. Pt does not have home O2 or HHC at this time, but does use a walker and wheelchair.     Information Source  Orientation : Oriented x 4  Information Given By: Patient  Informant's Name: Greta  Who is responsible for making decisions for patient? : Patient         Elopement Risk  Legal Hold: No  Ambulatory or Self Mobile in Wheelchair: No-Not an Elopement Risk  Elopement Risk: Not at Risk for Elopement    Interdisciplinary Discharge Planning  Does Admitting Nurse Feel This Could be a Complex Discharge?: No  Primary Care Physician: none  Lives with - Patient's Self Care Capacity: Adult Children  Patient or legal guardian wants to designate a caregiver (see row info): No  Support Systems: Children, Friends / Neighbors  Housing / Facility: Mobile Home  Do You Take your Prescribed Medications Regularly: Yes  Able to Return to Previous ADL's: Yes  Mobility Issues: Yes  Prior Services: None  Patient Expects to be Discharged to:: home  Assistance Needed: Unknown at this Time  Durable Medical Equipment: Walker, Other - Specify (wheelchair)    Discharge Preparedness  What is your plan after discharge?: Uncertain - pending medical team collaboration  What are your discharge supports?: Child  Prior Functional Level: Ambulatory, Drives Self, Independent with Activities of Daily Living, Independent with Medication Management, Uses Walker, Uses Wheelchair  Difficulity with ADLs: Walking  Difficulty with ADLs Comment: uses walker and wheelchair  Difficulity with IADLs: None    Functional Assesment  Prior Functional Level: Ambulatory, Drives Self, Independent with Activities of Daily Living, Independent with Medication Management, Uses Walker, Uses Wheelchair    Finances  Financial Barriers to Discharge: No  Prescription Coverage: Yes ([facility])    Vision / Hearing Impairment  Vision Impairment : Yes  Right Eye Vision: Impaired, Wears Glasses  Left Eye Vision: Impaired, Wears Glasses  Hearing Impairment : No    Values / Beliefs / Concerns  Values / Beliefs Concerns : No  Special Hospitalization Concerns: none         Domestic Abuse  Have you ever been the victim of abuse or violence?: No  Physical Abuse or Sexual Abuse: No  Verbal Abuse or Emotional Abuse: No  Possible Abuse Reported to:: Not Applicable    Psychological Assessment  History of Substance Abuse: None  History of Psychiatric Problems: No  Non-compliant with Treatment: No    Discharge Risks or Barriers  Discharge risks or barriers?: No    Anticipated Discharge Information  Anticipated discharge disposition: Discharge needs currently unknown  Discharge Address: unknown at this time  Discharge Contact Phone Number: [phone]

## 2017-12-11 NOTE — PROGRESS NOTES
1200: Pt resting comfortably, no distress    1215: RN alerted by CNA of change in patient, pt was tachypneic with RR in 30-40, sweating, flushed, felt hot to touch, stated she felt chilled, rapid response called, Dr Castillo notified of change of pt condition. RRT and MD arrived at bedside, orders received, 2mg ativan IV given, pt work of breathing decreased    1315: pt lethargic d/t IV ativan, no complaints of pain, no increased work of breathing, O2 in 90s at 5L [place], RR 20., will continue to monitor

## 2017-12-11 NOTE — RESPIRATORY CARE
COPD EDUCATION by COPD CLINICAL EDUCATOR  12/11/2017 at 6:08 AM by Mireya Edwards     Patient reviewed by COPD education team. Patient does not qualify for COPD program.

## 2017-12-11 NOTE — PROGRESS NOTES
Antonia Cano Fall Risk Assessment:     Last Known Fall: Within the last six months  Mobility: Dizziness/generalized weakness, Use of assistive device/requires assist of two people  Medications: Cardiovascular or central nervous system meds  Mental Status/LOC/Awareness: Awake, alert, and oriented to date, place, and person  Toileting Needs: Use of assistive device (Bedside commode, bedpan, urinal)  Volume/Electrolyte Status: Use of IV fluids/tube feeds  Communication/Sensory: Visual (Glasses)/hearing deficit  Behavior: Appropriate behavior  Antonia Cano Fall Risk Total: 14  Fall Risk Level: MODERATE RISK    Universal Fall Precautions:  call light/belongings in reach, bed in low position and locked, siderails up x 2, use non-slip footwear, adequate lighting, clutter free and spill free environment, educate on level of risk, educate to call for assistance    Fall Risk Level Interventions:    TRIAL (TELE 8, NEURO, MED JUANA 5) Moderate Fall Risk Interventions  Place yellow fall risk ID band on patient: verified  Provide patient/family education based on risk assessment : completed  Educate patient/family to call staff for assistance when getting out of bed: completed  Place fall precaution signage outside patient door: verified  Utilize bed/chair fall alarm: verified     Patient Specific Interventions:     Medication: review medications with patient and family and limit combination of prn medications  Mental Status/LOC/Awareness: reorient patient, reinforce falls education, check on patient hourly, utilize bed/chair fall alarm and reinforce the use of call light  Toileting: provide frquent toileting, monitor intake and output/use of appropriate interventions and instruct patient/family on the need to call for assistance when toileting  Volume/Electrolyte Status: ensure patient remains hydrated, monitor blood sugars and maintain appropriate blood sugar levels if diabetic, monitor abnormal lab values and ensure IV fluids  are appropriate  Communication/Sensory: update plan of care on whiteboard  Behavioral: encourage patient to voice feelings, engage patient in daily activities, administer medication as ordered and instruct/reinforce fall program rationale  Mobility: utilize bed/chair fall alarm and ensure bed is locked and in lowest position

## 2017-12-11 NOTE — PROGRESS NOTES
LIMB PRESERVATION SERVICE      DATE OF CONSULTATION: 12/11/2017    REASON FOR CONSULT: Infected right foot, complicated by Charcot deformity     HISTORY OF PRESENT ILLNESS: Patient is a 52-year-old female transferred from [place] for an infected right foot. She has a history of poorly controlled type 2 diabetes, Charcot foot, morbid obesity, and left transmetatarsal amputation.  She is a bit confused today, not certain of where she is, history is obtained mostly from chart review.  She had been at a SNF in [place] for several months due to a wound to her right foot, and was discharged a few weeks ago and is currently living with her son.  She presented to the emergency room in [place] with an elevated blood glucose and for chest pain.  She was admitted to [facility] for further workup including cardiology consultation and orthopedic consultation for her foot.   An xray of her foot shows midfoot bony destructive change with severe soft tissue swelling.  Dr. Delgado has consulted and has scheduled her for a BKA later today.  However, his partner, Dr. Crowell, rounded this morning and stated that surgery may need to be postponed due to elevated blood sugars.  Her blood sugars have been in the brian 300's to mid 400's.  Her A1c this admission is 11.4.    PAST MEDICAL HISTORY:   Past Medical History:   Diagnosis Date   • Bronchitis    • Diabetes    • Hypertension         MEDICATIONS:   Current Facility-Administered Medications   Medication Dose   • insulin glargine (LANTUS) injection 85 Units  85 Units   • piperacillin-tazobactam (ZOSYN) 13.5 g in  mL infusion     • MD ALERT... vancomycin per pharmacy protocol     • morphine (pf) 4 mg/ml injection 2-4 mg  2-4 mg   • NS infusion 3,798 mL  30 mL/kg   • NS (BOLUS) infusion 500 mL  500 mL   • heparin injection 5,000 Units  5,000 Units   • methylPREDNISolone sod succ (SOLU-MEDROL) 125 MG injection 62.5 mg  62.5 mg   • alprazolam (XANAX) tablet 0.25 mg  0.25 mg   • Respiratory  Care per Protocol     • furosemide (LASIX) injection 40 mg  40 mg   • aspirin (ASA) tablet 325 mg  325 mg   • metoprolol (LOPRESSOR) tablet 25 mg  25 mg   • senna-docusate (PERICOLACE or SENOKOT S) 8.6-50 MG per tablet 2 Tab  2 Tab    And   • polyethylene glycol/lytes (MIRALAX) PACKET 1 Packet  1 Packet    And   • magnesium hydroxide (MILK OF MAGNESIA) suspension 30 mL  30 mL    And   • bisacodyl (DULCOLAX) suppository 10 mg  10 mg   • nicotine (NICODERM) 21 MG/24HR 21 mg  21 mg    And   • nicotine polacrilex (NICORETTE) 2 MG piece 2 mg  2 mg   • insulin regular (HUMULIN R) injection 3-14 Units  3-14 Units    And   • glucose 4 g chewable tablet 16 g  16 g    And   • dextrose 50% (D50W) injection 25 mL  25 mL   • atorvastatin (LIPITOR) tablet 40 mg  40 mg   • acetaminophen (TYLENOL) tablet 650 mg  650 mg   • oxycodone immediate-release (ROXICODONE) tablet 5 mg  5 mg    Or   • oxycodone immediate release (ROXICODONE) tablet 10 mg  10 mg       ALLERGIES:  No Known Allergies     PAST SURGICAL HISTORY:   Past Surgical History:   Procedure Laterality Date   • SPLIT THICKNESS SKIN GRAFT Left 1/7/2016    Procedure: SPLIT THICKNESS SKIN GRAFT FOOT;  Surgeon: eJrzy Mayberry M.D.;  Location: [facility];  Service:    • STUMP REVISION Left 12/24/2015    Procedure: STUMP REVISION FOOT WITH VAC DRESSING;  Surgeon: Jerzy Mayberry M.D.;  Location: SURGERY [facility];  Service:    • INCISION AND DRAINAGE ORTHOPEDIC Left 12/17/2015    Procedure: INCISION AND DRAINAGE ORTHOPEDIC- Foot ;  Surgeon: Jerzy Mayberry M.D.;  Location: SURGERY [facility];  Service:    • CHOLECYSTECTOMY ROBOTIC  07/2015   • HYSTERECTOMY, VAGINAL  04/2001       SOCIAL HISTORY:   Social History     Social History   • Marital status:      Spouse name: N/A   • Number of children: N/A   • Years of education: N/A     Social History Main Topics   • Smoking status: Current Every Day Smoker     Packs/day: 0.50     Years: 35.00      "Start date: 1/1/1980   • Smokeless tobacco: Not on file   • Alcohol use No   • Drug use: No   • Sexual activity: Not on file     Other Topics Concern   • Not on file     Social History Narrative   • No narrative on file        FAMILY HISTORY: No family history on file.    REVIEW OF SYSTEMS:   Review of Systems   Unable to perform ROS: Mental acuity   Patient is confised    PHYSICAL EXAMINATION:   VITAL SIGNS: Blood pressure 123/84, pulse 81, temperature 36.4 °C (97.5 °F), resp. rate 18, height 1.702 m (5' 7.01\"), weight (!) 128.9 kg (284 lb 2.8 oz), SpO2 97 %, not currently breastfeeding.  Physical Exam   Constitutional: She is well-developed, well-nourished, and in no distress.   obese   HENT:   Head: Normocephalic.   Cardiovascular:   Unable to palpate pedal pulses in R foot d/t edema  Hair growth to lower leg is normal  Foot and lower leg warm   Pulmonary/Chest: Effort normal.   Musculoskeletal: She exhibits edema.   Healed TMA of left foot   Neurological: She is alert.   Slightly confused, not sure of where she is  \"feel like I'm on a game show\"   Skin: There is erythema.   right foot edematous and erythemic  Necrotic wound to plantar foot  Large blood-filed blister to lateral right foot     Psychiatric:   anxious         DIAGNOSTIC DATA:   Recent Labs      12/09/17   2250  12/10/17   0354   WBC  11.4*  9.6   RBC  4.06*  3.95*   HEMOGLOBIN  11.2*  11.1*   HEMATOCRIT  34.7*  34.4*   MCV  85.5  87.1   MCH  27.6  28.1   MCHC  32.3*  32.3*   RDW  48.0  49.1   PLATELETCT  106*  93*   MPV  11.6  12.7     Recent Labs      12/09/17   2250  12/10/17   0354  12/11/17   0049   SODIUM  126*  128*  128*   POTASSIUM  4.1  4.1  4.0   CHLORIDE  95*  98  98   CO2  20  20  19*   GLUCOSE  416*  356*  469*   BUN  31*  29*  25*     IMAGING                 Results for orders placed during the hospital encounter of 12/09/17   DX-FOOT-COMPLETE 3+ RIGHT    Impression Midfoot bony destructive change with severe soft tissue swelling. " Differential includes Charcot joint and/or osteomyelitis. Likely both are present                                                          Results for orders placed during the hospital encounter of 12/09/17   DX-TIBIA AND FIBULA RIGHT    Impression No radiographic evidence of acute bony destruction to suggest osteomyelitis    Soft tissue swelling              ASSESSMENT AND PLAN:     1.Right foot infection- Complicated by Charcot deformity and uncontrolled type 2 diabetes. Xray shows midfoot bony destruction and extensive soft tissue swelling.  BKA scheduled for later today, may need to be postponed due to hyperglycemia. Will have prosthetist meet with patient.    2. Uncontrolled type 2 diabetes. - A1c 11.4.  Diabetes educator to see patient.

## 2017-12-11 NOTE — PROGRESS NOTES
Called Hospitalist Dr. Bai - pt  last night and now 416.  NPO for possible surgery - ok to give Insulin?     Give insulin recheck BG q1h.  Ok to give meds w/ sips of H20

## 2017-12-11 NOTE — CARE PLAN
Problem: Knowledge Deficit  Goal: Knowledge of disease process/condition, treatment plan, diagnostic tests, and medications will improve  Discussed need for limiting sugar intake and controlling blood sugars    Problem: Pain Management  Goal: Pain level will decrease to patient's comfort goal    Intervention: Follow pain managment plan developed in collaboration with patient and Interdisciplinary Team  Discussed pain management regemine and other ways besides medication we can control pain

## 2017-12-11 NOTE — PROGRESS NOTES
Assumed care of pt, call light w/in reach, and fall prec in place.  Bed locked in lowest position. Pt instructed to call for assistance before getting out of bed.  All questions answered, no needs at this time.  Pt would like to discuss pain management.

## 2017-12-11 NOTE — WOUND TEAM
Wound consult placed for right foot.  Patient being seen by LPS.  Need amputation when sugars stable.  Confirmed with LPS no wound care team involvement needed at this time.  RN aware, please re-consult if any other needs come up.

## 2017-12-11 NOTE — CONSULTS
DATE OF SERVICE:  12/10/2017    REASON FOR CONSULTATION:  Right foot infection.    REQUESTING PHYSICIAN:  Dr. Castillo, the hospitalist service.    HISTORY OF PRESENT ILLNESS:  Patient is a 52-year-old female with poorly   controlled insulin-dependent diabetes mellitus, transferred from a nursing   facility originally in [place], was in the hospital in [facility], had blood sugars in the high 600 and was transferred for possible   coronary event as well.  Currently, reports that she is stable.  Denies pain   in the foot, but reports she has been told for sometime that she is going to   likely need a right foot amputation.  She has a history of a left foot   amputation, which was performed in 01/2016, so roughly 24 months ago.    Consultation from the hospitalist was regarding this right foot and its   worsening condition.    PAST MEDICAL HISTORY:  Includes bronchitis, diabetes and hypertension.    PAST SURGICAL HISTORY:  Includes left foot amputation with skin graft,   cholecystectomy, hysterectomy.    MEDICATIONS:  Included aspirin 325 mg a day, atorvastatin 40 mg, heparin 6000   units IV, Lantus insulin, metoprolol 25 mg and Nicoderm patch.    SOCIAL HISTORY:  .  Lives in _____, [place].  Positive tobacco use.    FAMILY HISTORY:  Positive for hypertension and cholesterol.    REVIEW OF SYSTEMS:  Positive for questionable chest pain, elevated sugars   which resulted in a transferring the hospital.  She also had some fatigue,   shortness of breath.    PHYSICAL EXAMINATION:  VITAL SIGNS:  Temperature 36.6, blood pressure 114/73, pulse 120 and   respirations 21.  GENERAL:  No acute distress, morbidly obese.  HEENT:  Normocephalic, atraumatic.  NECK:  Supple, good range of motion.  CHEST:  Clear to auscultation.  HEART:  Regular rate and rhythm.  ABDOMEN:  Morbid obesity.  NEUROLOGIC:  Appropriate.  PSYCHIATRIC:  Appropriate.  EXTREMITIES:  Examination of the right foot shows an extremely swollen  foot   consistent with Charcot disease, evidence of a plantar ulcer which is deep   with black eschar blistering in different stages on the lateral aspect of the   foot.  She is able to demonstrate plantar flexion and dorsiflexion, has   decreased light touch on the dorsum and plantar aspect of the foot.  I am not   able to feel a dorsalis pedis and posterior tibial pulse.  Toes are pink and   warm with decent capillary refill.    IMAGING:  My independent review of 3 views of the right foot show a highly   degenerative Charcot foot with complete uncovering of the talonavicular joint.    There is no alignment of the navicular with the metatarsals.  CT has been   performed which shows a large abscess occupying this area.    IMPRESSION:  A 52-year-old female with poorly controlled diabetes, blood   sugars above 600.  I had a discussion with her today regarding the condition   of her right foot.  She says she has known for some time that she would likely   need an amputation of this right foot, had partial amputation of the left   foot.  Given the current condition of the foot, I feel there is no salvageable   option other than a below-knee amputation.  Patient demonstrates   understanding, has consented to this.  I instructed her that once she is   stable by medicine, my partner Dr. Srinivas Delgado, will discuss with her a   below-knee amputation.       ____________________________________     MD TJ Horowitz / DALLIN    DD:  12/11/2017 00:10:29  DT:  12/11/2017 00:51:46    D#:  [id number]  Job#:  [id number]

## 2017-12-11 NOTE — PROGRESS NOTES
[facility] Hospitalist Progress Note    Date of Service: 2017    Chief Complaint  52 y.o. female with T2DM, charcot foot s/p left foot amputation, HTN, tobacco dependence, admitted 2017 with CP. Presented to OSH, noted to have , WBC 17K, and trop 0.27. Initial labs here showed trop went up to 1.36, Na 126, . . EKG showed sinus tachycradia, inferior Qwaves, no ST-T wave changes. CXR showed stable cardiac silhouette enlargement with borderline interstitial edema. Started on heparin gtt, ASA, statin and beta blocker. Cardiology consulted, recommended continued medical management. Echo ordered. Limb preservation service consulted. Had respiratory distress, tight air entry. Freedom to have AECOPD as with long smoking history, started onsteroids. CXR showed mild to moderate pulmonary edema. IVF discontinued, and started on lasix. Felt to have infected foot, started on antibiotics. Trop trended down to 0.69.      Interval Problem Update  2017 - no overnight events. Remains hemodynamically stable and afebrile. Stable on 5L O2 NC. Tachycardia better. CT RLE results, showing large abscess replacing the majority of the right midfoot, with gas within the midfoot and proximal calcaneus, along with extensive destructive changes in the midfoot c/w osteomyelitis. Orthopedics consulted, recommended BKA. WBC 12.4. K 3.4. . Scheduled for BKA tonight.    > Seen and examined. Pain on RLE rated 9/10. No nausea, vomiting, abd pain. Sleepy but easily arousable.          Consultants/Specialty  Cardiology  Limb preservation service    Disposition  Monitor on telemetry.        ROS     Pertinent positives/negatives as mentioned above.     A complete review of systems was done. All other systems were negative.      Physical Exam  Laboratory/Imaging   Hemodynamics  Temp (24hrs), Av.8 °C (98.3 °F), Min:36.4 °C (97.5 °F), Max:37.9 °C (100.3 °F)   Temperature: 36.4 °C (97.5 °F)  Pulse  Av.5  Min: 81   Max: 156   Blood Pressure: 123/84      Respiratory      Respiration: 18, Pulse Oximetry: 97 %     Work Of Breathing / Effort: Mild  RUL Breath Sounds: Clear, RML Breath Sounds: Diminished, RLL Breath Sounds: Diminished, KWAME Breath Sounds: Clear, LLL Breath Sounds: Diminished    Fluids    Intake/Output Summary (Last 24 hours) at 12/11/17 0703  Last data filed at 12/11/17 0000   Gross per 24 hour   Intake             1280 ml   Output             1500 ml   Net             -220 ml       Nutrition  Orders Placed This Encounter   Procedures   • DIET NPO     Standing Status:   Standing     Number of Occurrences:   8     Order Specific Question:   Restrict to:     Answer:   Sips with Medications [3]     Physical Exam   Constitutional: She is oriented to person, place, and time. She appears well-developed. No distress.   morbidly obese. Body mass index is 43.71 kg/m².   HENT:   Head: Normocephalic and atraumatic.   Mouth/Throat: No oropharyngeal exudate.   Eyes: Conjunctivae are normal. Pupils are equal, round, and reactive to light. No scleral icterus.   Neck: Normal range of motion. Neck supple.   Cardiovascular: Normal rate and regular rhythm.  Exam reveals no gallop and no friction rub.    No murmur heard.  Pulmonary/Chest: Effort normal and breath sounds normal. No respiratory distress. She has no wheezes. She has no rales. She exhibits no tenderness.   Diminished air entry B/L bases   Abdominal: Soft. Bowel sounds are normal. She exhibits no distension. There is no tenderness. There is no rebound and no guarding.   Musculoskeletal: She exhibits tenderness (right foot).   Unchanged swollen, fluctuant right foot, with erythema extending to right leg. Fluid blister, and erosion on the ankle and lateral foot.  Amputated toes on the left foot   Lymphadenopathy:     She has no cervical adenopathy.   Neurological: She is oriented to person, place, and time. No cranial nerve deficit. Coordination normal.   sleepy, easily  arousable.    Skin: Skin is warm and dry. No rash noted. She is not diaphoretic. No erythema. No pallor.   Psychiatric: Her behavior is normal. Judgment and thought content normal.   Flat affect   Nursing note and vitals reviewed.      Recent Labs      12/09/17 2250  12/10/17   0354   WBC  11.4*  9.6   RBC  4.06*  3.95*   HEMOGLOBIN  11.2*  11.1*   HEMATOCRIT  34.7*  34.4*   MCV  85.5  87.1   MCH  27.6  28.1   MCHC  32.3*  32.3*   RDW  48.0  49.1   PLATELETCT  106*  93*   MPV  11.6  12.7     Recent Labs      12/09/17   2250  12/10/17   0354  12/11/17   0049   SODIUM  126*  128*  128*   POTASSIUM  4.1  4.1  4.0   CHLORIDE  95*  98  98   CO2  20  20  19*   GLUCOSE  416*  356*  469*   BUN  31*  29*  25*   CREATININE  0.78  0.63  0.65   CALCIUM  8.0*  7.9*  8.0*     Recent Labs      12/09/17   1849  12/09/17   2209  12/10/17   0633   APTT  51.0*  41.1*  41.9*     Recent Labs      12/09/17   1240   BNPBTYPENAT  610*     Recent Labs      12/10/17   0354   TRIGLYCERIDE  309*   HDL  4*   LDL  5          Assessment/Plan     Severe sepsis due to infected right diabetic foot (CMS-HCC)- (present on admission)   Assessment & Plan    - This is severe sepsis with the following associated acute organ dysfunction(s): acute respiratory failure., NSTEMI  - D/C IVF as has fluid overload. Continue PRN bolus for low BP.   - Continue zosyn and vancomycin. Plan for RLE amputation as foot unsalvageable.   - continue close hemodynamic monitoring on telemetry.         Hypokalemia   Assessment & Plan    - replace with 10mEq IV KCl as NPO. Check Mg level. Repeat BMP in AM.         COPD with acute exacerbation (CMS-HCC)   Assessment & Plan    - no formal diagnosis, but with long heavy smoking history.   - improved. Decrease solumedrol to 62.5mg q12H. Continue to taper with improvement. Continue BD per RT protocol. Start symbicort and spiriva. Continue O2, wean as tolerated.         Hyponatremia- (present on admission)   Assessment & Plan    -  suspect due to hypervolemia, and component of SIADH, and hyperglycemia.   - continue IV lasix. Continue BG control. Continue pain control.   - continue to follow Na level.         Right diabetic foot infection, cellulits, and abscess (CMS-HCC)- (present on admission)   Assessment & Plan    - imaging showed significant abscess. Unfortunately, foot unsalvageable, will need amputation.   - orthopedics/limb preservation service on-board. Continue IV zosyn and vancomycin for now.   - continue BG control, aim for BG<150 to allow for adequate healing.   - continue pain control with IV morphine, and PO oxycodone.         Acute respiratory failure with hypoxia due to sepsis, fluid overload, AECOPD (CMS-HCC)- (present on admission)   Assessment & Plan    - continue diuresis with lasix 40mg IV BID. Repeat CXR.   - continue solumedrol 62.5mg IV, taper to q12 hours. Continue to taper with improvement.   - continue BD per RT protocol, Oxygen support  to keep sats>88%.        Poorly controlled type 2 diabetes mellitus with neuropathy (CMS-HCC)- (present on admission)   Assessment & Plan    - HbA1c 11.4.   - increase lantus to 80 units BID as pt using about that at home. Continue SSI. Accuchecks AC and HS.         NSTEMI (non-ST elevated myocardial infarction) (CMS-HCC)- (present on admission)   Assessment & Plan    - likely demand ischemia/type 2 from severe sepsis. Troponin has peaked.   - hold off on further cardiac intervention. Pending echo.   - continue ASA, statin, beta blocker.   - continue to monitor on telemetry. Monitor for chest pains.           Morbid obesity with BMI of 40.0-44.9, adult (CMS-HCC)- (present on admission)   Assessment & Plan    - Body mass index is 43.7 kg/m²..  - outpatient referral for outpatient weight management.          Tobacco abuse- (present on admission)   Assessment & Plan    - counseled on cessation. Continue nicotine replacement while in house.         Hypertension- (present on admission)    Assessment & Plan    - good control. continue lopressor. Monitor BP trend.            Reviewed items::  Labs reviewed, Medications reviewed and Radiology images reviewed  Long catheter::  No Long  DVT prophylaxis pharmacological::  Heparin  Ulcer Prophylaxis::  Not indicated  Antibiotics:  Treating active infection/contamination beyond 24 hours perioperative coverage

## 2017-12-11 NOTE — PROGRESS NOTES
Progress Note               Author: Hernando Crowell Date & Time created: 2017  5:35 AM     Interval History:  Poorly controlled DM with infected right charcot foot     Review of Systems:  ROS    Physical Exam:  Physical Exam remains unchanged  - right charcot with neuropathy and significant swelling and erythema with blisters     Labs:        Invalid input(s): [id number]  Recent Labs      17   1240   12/10/17   1059  12/10/17   1503  17   0049   TROPONINI  1.13*   < >  0.37*  0.29*  0.18*   BNPBTYPENAT  610*   --    --    --    --     < > = values in this interval not displayed.     Recent Labs      17   2250  12/10/17   0354  17   0049   SODIUM  126*  128*  128*   POTASSIUM  4.1  4.1  4.0   CHLORIDE  95*  98  98   CO2  20  20  19*   BUN  31*  29*  25*   CREATININE  0.78  0.63  0.65   CALCIUM  8.0*  7.9*  8.0*     Recent Labs      17   2250  12/10/17   0354  17   0049   ALTSGPT  20  19   --    ASTSGOT  23  25   --    ALKPHOSPHAT  81  80   --    TBILIRUBIN  0.5  0.5   --    GLUCOSE  416*  356*  469*     Recent Labs      17   1849  17   2209  17   2250  12/10/17   0354  12/10/17   0633   RBC   --    --   4.06*  3.95*   --    HEMOGLOBIN   --    --   11.2*  11.1*   --    HEMATOCRIT   --    --   34.7*  34.4*   --    PLATELETCT   --    --   106*  93*   --    APTT  51.0*  41.1*   --    --   41.9*     Recent Labs      12/09/17   2250  12/10/17   035   WBC  11.4*  9.6   NEUTSPOLYS  75.40*  72.50*   LYMPHOCYTES  7.90*  17.70*   MONOCYTES  12.30  7.10   EOSINOPHILS  0.00  1.80   BASOPHILS  0.00  0.00   ASTSGOT  23  25   ALTSGPT  20  19   ALKPHOSPHAT  81  80   TBILIRUBIN  0.5  0.5     Hemodynamics:  Temp (24hrs), Av.8 °C (98.3 °F), Min:36.4 °C (97.5 °F), Max:37.9 °C (100.3 °F)  Temperature: 36.4 °C (97.5 °F)  Pulse  Av.5  Min: 81  Max: 156  Blood Pressure: 123/84     Respiratory:    Respiration: 18, Pulse Oximetry: 97 %     Work Of Breathing /  Effort: Mild  RUL Breath Sounds: Clear, RML Breath Sounds: Diminished, RLL Breath Sounds: Diminished, KWAME Breath Sounds: Clear, LLL Breath Sounds: Diminished  Fluids:    Intake/Output Summary (Last 24 hours) at 12/11/17 0535  Last data filed at 12/11/17 0000   Gross per 24 hour   Intake             1280 ml   Output             1500 ml   Net             -220 ml     Weight: (!) 128.9 kg (284 lb 2.8 oz)  GI/Nutrition:  Orders Placed This Encounter   Procedures   • DIET NPO     Standing Status:   Standing     Number of Occurrences:   8     Order Specific Question:   Restrict to:     Answer:   Sips with Medications [3]     Medical Decision Making, by Problem:  Active Hospital Problems    Diagnosis   • Severe sepsis due to infected right diabetic foot (CMS-HCC) [A41.9, R65.20]   • Acute respiratory failure with hypoxia due to sepsis (CMS-HCC) [J96.01]   • Right diabetic foot infection, cellulits, possible abscess (CMS-HCC) [E11.69, L08.9]   • Hyponatremia [E87.1]   • COPD with acute exacerbation (CMS-HCC) [J44.1]   • NSTEMI (non-ST elevated myocardial infarction) (CMS-HCC) [I21.4]   • Poorly controlled type 2 diabetes mellitus with neuropathy (CMS-HCC) [E11.40, E11.65]   • Morbid obesity with BMI of 40.0-44.9, adult (CMS-HCC) [E66.01, Z68.41]   • Tobacco abuse [Z72.0]   • Hypertension [I10]       Plan:  Right infected charcot - unsalvageable - discussed BKA   Blood sugar 469 this am - sugars need to be better controlled for OR     Quality-Core Measures

## 2017-12-12 ENCOUNTER — TELEPHONE (OUTPATIENT)
Dept: CARDIOLOGY | Facility: MEDICAL CENTER | Age: 52
End: 2017-12-12

## 2017-12-12 PROBLEM — J81.1 PULMONARY EDEMA: Status: ACTIVE | Noted: 2017-12-12

## 2017-12-12 LAB
ANION GAP SERPL CALC-SCNC: 9 MMOL/L (ref 0–11.9)
ANISOCYTOSIS BLD QL SMEAR: ABNORMAL
BASOPHILS # BLD AUTO: 0.9 % (ref 0–1.8)
BASOPHILS # BLD: 0.12 K/UL (ref 0–0.12)
BUN SERPL-MCNC: 25 MG/DL (ref 8–22)
CALCIUM SERPL-MCNC: 8 MG/DL (ref 8.5–10.5)
CHLORIDE SERPL-SCNC: 97 MMOL/L (ref 96–112)
CO2 SERPL-SCNC: 25 MMOL/L (ref 20–33)
CREAT SERPL-MCNC: 0.51 MG/DL (ref 0.5–1.4)
EOSINOPHIL # BLD AUTO: 0 K/UL (ref 0–0.51)
EOSINOPHIL NFR BLD: 0 % (ref 0–6.9)
ERYTHROCYTE [DISTWIDTH] IN BLOOD BY AUTOMATED COUNT: 49.4 FL (ref 35.9–50)
GFR SERPL CREATININE-BSD FRML MDRD: >60 ML/MIN/1.73 M 2
GLUCOSE BLD-MCNC: 262 MG/DL (ref 65–99)
GLUCOSE BLD-MCNC: 319 MG/DL (ref 65–99)
GLUCOSE BLD-MCNC: 361 MG/DL (ref 65–99)
GLUCOSE BLD-MCNC: 462 MG/DL (ref 65–99)
GLUCOSE SERPL-MCNC: 329 MG/DL (ref 65–99)
HCT VFR BLD AUTO: 34.3 % (ref 37–47)
HGB BLD-MCNC: 11 G/DL (ref 12–16)
LYMPHOCYTES # BLD AUTO: 2.15 K/UL (ref 1–4.8)
LYMPHOCYTES NFR BLD: 16.5 % (ref 22–41)
MACROCYTES BLD QL SMEAR: ABNORMAL
MANUAL DIFF BLD: NORMAL
MCH RBC QN AUTO: 27.4 PG (ref 27–33)
MCHC RBC AUTO-ENTMCNC: 32.1 G/DL (ref 33.6–35)
MCV RBC AUTO: 85.3 FL (ref 81.4–97.8)
MONOCYTES # BLD AUTO: 0.56 K/UL (ref 0–0.85)
MONOCYTES NFR BLD AUTO: 4.3 % (ref 0–13.4)
MORPHOLOGY BLD-IMP: NORMAL
NEUTROPHILS # BLD AUTO: 10.18 K/UL (ref 2–7.15)
NEUTROPHILS NFR BLD: 78.3 % (ref 44–72)
NRBC # BLD AUTO: 0 K/UL
NRBC BLD AUTO-RTO: 0 /100 WBC
PLATELET # BLD AUTO: 194 K/UL (ref 164–446)
PLATELET BLD QL SMEAR: NORMAL
PMV BLD AUTO: 12.6 FL (ref 9–12.9)
POTASSIUM SERPL-SCNC: 4 MMOL/L (ref 3.6–5.5)
RBC # BLD AUTO: 4.02 M/UL (ref 4.2–5.4)
RBC BLD AUTO: PRESENT
SODIUM SERPL-SCNC: 131 MMOL/L (ref 135–145)
WBC # BLD AUTO: 13 K/UL (ref 4.8–10.8)

## 2017-12-12 PROCEDURE — 700105 HCHG RX REV CODE 258: Performed by: PHYSICIAN ASSISTANT

## 2017-12-12 PROCEDURE — 700102 HCHG RX REV CODE 250 W/ 637 OVERRIDE(OP): Performed by: HOSPITALIST

## 2017-12-12 PROCEDURE — 99233 SBSQ HOSP IP/OBS HIGH 50: CPT | Performed by: FAMILY MEDICINE

## 2017-12-12 PROCEDURE — A9270 NON-COVERED ITEM OR SERVICE: HCPCS | Performed by: INTERNAL MEDICINE

## 2017-12-12 PROCEDURE — 82962 GLUCOSE BLOOD TEST: CPT

## 2017-12-12 PROCEDURE — 85027 COMPLETE CBC AUTOMATED: CPT

## 2017-12-12 PROCEDURE — 700111 HCHG RX REV CODE 636 W/ 250 OVERRIDE (IP): Performed by: INTERNAL MEDICINE

## 2017-12-12 PROCEDURE — 80048 BASIC METABOLIC PNL TOTAL CA: CPT

## 2017-12-12 PROCEDURE — 700102 HCHG RX REV CODE 250 W/ 637 OVERRIDE(OP): Performed by: FAMILY MEDICINE

## 2017-12-12 PROCEDURE — 700111 HCHG RX REV CODE 636 W/ 250 OVERRIDE (IP): Performed by: FAMILY MEDICINE

## 2017-12-12 PROCEDURE — 770020 HCHG ROOM/CARE - TELE (206)

## 2017-12-12 PROCEDURE — A9270 NON-COVERED ITEM OR SERVICE: HCPCS | Performed by: HOSPITALIST

## 2017-12-12 PROCEDURE — 85007 BL SMEAR W/DIFF WBC COUNT: CPT

## 2017-12-12 PROCEDURE — 36415 COLL VENOUS BLD VENIPUNCTURE: CPT

## 2017-12-12 PROCEDURE — 700102 HCHG RX REV CODE 250 W/ 637 OVERRIDE(OP): Performed by: INTERNAL MEDICINE

## 2017-12-12 RX ORDER — METHYLPREDNISOLONE SODIUM SUCCINATE 40 MG/ML
40 INJECTION, POWDER, LYOPHILIZED, FOR SOLUTION INTRAMUSCULAR; INTRAVENOUS EVERY 12 HOURS
Status: DISCONTINUED | OUTPATIENT
Start: 2017-12-12 | End: 2017-12-13

## 2017-12-12 RX ORDER — INSULIN GLARGINE 100 [IU]/ML
10 INJECTION, SOLUTION SUBCUTANEOUS ONCE
Status: COMPLETED | OUTPATIENT
Start: 2017-12-12 | End: 2017-12-12

## 2017-12-12 RX ADMIN — STANDARDIZED SENNA CONCENTRATE AND DOCUSATE SODIUM 2 TABLET: 8.6; 5 TABLET, FILM COATED ORAL at 20:41

## 2017-12-12 RX ADMIN — METHYLPREDNISOLONE SODIUM SUCCINATE 62.5 MG: 125 INJECTION, POWDER, FOR SOLUTION INTRAMUSCULAR; INTRAVENOUS at 11:29

## 2017-12-12 RX ADMIN — SODIUM CHLORIDE: 9 INJECTION, SOLUTION INTRAVENOUS at 05:53

## 2017-12-12 RX ADMIN — MORPHINE SULFATE 4 MG: 4 INJECTION INTRAVENOUS at 02:31

## 2017-12-12 RX ADMIN — OXYCODONE HYDROCHLORIDE 10 MG: 10 TABLET ORAL at 18:15

## 2017-12-12 RX ADMIN — INSULIN GLARGINE 70 UNITS: 100 INJECTION, SOLUTION SUBCUTANEOUS at 12:43

## 2017-12-12 RX ADMIN — BUDESONIDE AND FORMOTEROL FUMARATE DIHYDRATE 2 PUFF: 160; 4.5 AEROSOL RESPIRATORY (INHALATION) at 15:52

## 2017-12-12 RX ADMIN — INSULIN HUMAN 14 UNITS: 100 INJECTION, SOLUTION PARENTERAL at 20:38

## 2017-12-12 RX ADMIN — INSULIN HUMAN 7 UNITS: 100 INJECTION, SOLUTION PARENTERAL at 11:28

## 2017-12-12 RX ADMIN — ALPRAZOLAM 0.25 MG: 0.25 TABLET ORAL at 01:08

## 2017-12-12 RX ADMIN — OXYCODONE HYDROCHLORIDE 10 MG: 10 TABLET ORAL at 22:46

## 2017-12-12 RX ADMIN — MORPHINE SULFATE 4 MG: 4 INJECTION INTRAVENOUS at 10:37

## 2017-12-12 RX ADMIN — MORPHINE SULFATE 4 MG: 4 INJECTION INTRAVENOUS at 20:28

## 2017-12-12 RX ADMIN — INSULIN HUMAN 10 UNITS: 100 INJECTION, SOLUTION PARENTERAL at 06:09

## 2017-12-12 RX ADMIN — MORPHINE SULFATE 4 MG: 4 INJECTION INTRAVENOUS at 15:36

## 2017-12-12 RX ADMIN — HEPARIN SODIUM 5000 UNITS: 5000 INJECTION, SOLUTION INTRAVENOUS; SUBCUTANEOUS at 15:40

## 2017-12-12 RX ADMIN — INSULIN GLARGINE 10 UNITS: 100 INJECTION, SOLUTION SUBCUTANEOUS at 15:31

## 2017-12-12 RX ADMIN — FUROSEMIDE 40 MG: 10 INJECTION, SOLUTION INTRAMUSCULAR; INTRAVENOUS at 15:40

## 2017-12-12 RX ADMIN — INSULIN GLARGINE 80 UNITS: 100 INJECTION, SOLUTION SUBCUTANEOUS at 20:38

## 2017-12-12 RX ADMIN — HEPARIN SODIUM 5000 UNITS: 5000 INJECTION, SOLUTION INTRAVENOUS; SUBCUTANEOUS at 20:28

## 2017-12-12 RX ADMIN — METOPROLOL TARTRATE 25 MG: 25 TABLET, FILM COATED ORAL at 11:30

## 2017-12-12 RX ADMIN — OXYCODONE HYDROCHLORIDE 5 MG: 5 TABLET ORAL at 06:05

## 2017-12-12 RX ADMIN — METHYLPREDNISOLONE SODIUM SUCCINATE 40 MG: 40 INJECTION, POWDER, FOR SOLUTION INTRAMUSCULAR; INTRAVENOUS at 20:28

## 2017-12-12 RX ADMIN — BUDESONIDE AND FORMOTEROL FUMARATE DIHYDRATE 2 PUFF: 160; 4.5 AEROSOL RESPIRATORY (INHALATION) at 20:26

## 2017-12-12 RX ADMIN — TIOTROPIUM BROMIDE 1 CAPSULE: 18 CAPSULE ORAL; RESPIRATORY (INHALATION) at 15:53

## 2017-12-12 RX ADMIN — ATORVASTATIN CALCIUM 40 MG: 40 TABLET, FILM COATED ORAL at 20:41

## 2017-12-12 RX ADMIN — MORPHINE SULFATE 4 MG: 4 INJECTION INTRAVENOUS at 06:05

## 2017-12-12 RX ADMIN — NICOTINE 21 MG: 21 PATCH, EXTENDED RELEASE TRANSDERMAL at 06:05

## 2017-12-12 RX ADMIN — INSULIN HUMAN 10 UNITS: 100 INJECTION, SOLUTION PARENTERAL at 18:10

## 2017-12-12 RX ADMIN — MORPHINE SULFATE 4 MG: 4 INJECTION INTRAVENOUS at 22:46

## 2017-12-12 RX ADMIN — OXYCODONE HYDROCHLORIDE 10 MG: 10 TABLET ORAL at 11:44

## 2017-12-12 RX ADMIN — METOPROLOL TARTRATE 25 MG: 25 TABLET, FILM COATED ORAL at 20:41

## 2017-12-12 RX ADMIN — FUROSEMIDE 40 MG: 10 INJECTION, SOLUTION INTRAMUSCULAR; INTRAVENOUS at 06:04

## 2017-12-12 RX ADMIN — OXYCODONE HYDROCHLORIDE 5 MG: 5 TABLET ORAL at 01:08

## 2017-12-12 RX ADMIN — ASPIRIN 325 MG: 325 TABLET, FILM COATED ORAL at 11:30

## 2017-12-12 RX ADMIN — STANDARDIZED SENNA CONCENTRATE AND DOCUSATE SODIUM 2 TABLET: 8.6; 5 TABLET, FILM COATED ORAL at 11:30

## 2017-12-12 ASSESSMENT — ENCOUNTER SYMPTOMS
DIARRHEA: 0
SORE THROAT: 0
WEAKNESS: 1
ABDOMINAL PAIN: 0
WHEEZING: 1
DIZZINESS: 0
VOMITING: 0
SHORTNESS OF BREATH: 1
CHILLS: 0
FEVER: 0
HEARTBURN: 0
BLURRED VISION: 0
COUGH: 0
NAUSEA: 0

## 2017-12-12 ASSESSMENT — PAIN SCALES - GENERAL
PAINLEVEL_OUTOF10: 8
PAINLEVEL_OUTOF10: 4
PAINLEVEL_OUTOF10: 9
PAINLEVEL_OUTOF10: 9
PAINLEVEL_OUTOF10: 10
PAINLEVEL_OUTOF10: 9
PAINLEVEL_OUTOF10: 9
PAINLEVEL_OUTOF10: 10
PAINLEVEL_OUTOF10: 9
PAINLEVEL_OUTOF10: 9

## 2017-12-12 NOTE — PROGRESS NOTES
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  Admitted to medicine with NSTEMI as well.      S: Doing okay, daughter at bedside.    O:  Vitals:    12/11/17 2259 12/12/17 0305 12/12/17 0840 12/12/17 1135   BP: 154/94 133/82 129/80 (!) 161/94   Pulse: 77 80 80 74   Resp: 18 18 18 18   Temp:  36.3 °C (97.3 °F) 36.4 °C (97.5 °F) 36.3 °C (97.3 °F)   SpO2: 99% 93% 95% 96%   Weight:       Height:         Exam:  General-NAD, alert and following commands  RLE-deformity at foot with swelling and plantar draining wound. 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  Admitted to medicine with NSTEMI as well.  Glycemic control improving.    Recs:  -we discussed treatment options for right foot.  I recommend a below knee amputation for definitive treatment of her infection.  She is in agreement.  -scheduled for right BKA on 12/14

## 2017-12-12 NOTE — PROGRESS NOTES
[facility] Hospitalist Progress Note    Date of Service: 2017    Chief Complaint  52 y.o. female admitted 2017 with Sepsis, Respiratory failure, Diabetic foot ulcer with osteomyelitis and Leg abscess, NSTEMI, also with COPD exaceration and pulmonary edema    Interval Problem Update    Diabetic foot - for BKA  NSTEMI - trops trended down, Echo reviewed  Resp failure - remains on O2  COPDE - less wheezing  Pulm edema - less short of breath  Diabetes - not controlled  HTN - controlled    Consultants/Specialty  LPS - Socrates  Consult ID    Disposition  For BKA        Review of Systems   Constitutional: Positive for malaise/fatigue. Negative for chills and fever.   HENT: Negative for hearing loss and sore throat.    Eyes: Negative for blurred vision.   Respiratory: Positive for shortness of breath and wheezing. Negative for cough.    Cardiovascular: Positive for leg swelling. Negative for chest pain.   Gastrointestinal: Negative for abdominal pain, diarrhea, heartburn, nausea and vomiting.   Genitourinary: Negative for dysuria.   Neurological: Positive for weakness. Negative for dizziness.      Physical Exam  Laboratory/Imaging   Hemodynamics  Temp (24hrs), Av.4 °C (97.5 °F), Min:36.3 °C (97.3 °F), Max:36.4 °C (97.6 °F)   Temperature: 36.4 °C (97.5 °F)  Pulse  Av.8  Min: 76  Max: 156   Blood Pressure: 129/80      Respiratory      Respiration: 18, Pulse Oximetry: 95 %     Work Of Breathing / Effort: Mild  RUL Breath Sounds: Diminished, RML Breath Sounds: Diminished, RLL Breath Sounds: Diminished, KWAME Breath Sounds: Diminished, LLL Breath Sounds: Diminished    Fluids    Intake/Output Summary (Last 24 hours) at 17 1204  Last data filed at 17 0600   Gross per 24 hour   Intake                0 ml   Output             2650 ml   Net            -2650 ml       Nutrition  Orders Placed This Encounter   Procedures   • DIET ORDER     Standing Status:   Standing     Number of Occurrences:   1     Order  Specific Question:   Diet:     Answer:   Diabetic [3]     Physical Exam   Constitutional: She is oriented to person, place, and time. She appears well-developed.   Morbidly obese   HENT:   Head: Normocephalic and atraumatic.   Eyes: Conjunctivae are normal. Pupils are equal, round, and reactive to light.   Neck: No tracheal deviation present. No thyromegaly present.   Cardiovascular: Normal rate and regular rhythm.    Pulmonary/Chest: Effort normal. She has wheezes. She has rales.   Abdominal: Soft. Bowel sounds are normal. She exhibits no distension. There is no tenderness.   Musculoskeletal: She exhibits edema.   Swelling right leg  Transmetatarsal amputation Left foot   Lymphadenopathy:     She has no cervical adenopathy.   Neurological: She is alert and oriented to person, place, and time.   Skin:   Foot ulcers Right foot   Nursing note and vitals reviewed.      Recent Labs      12/10/17   0354  12/11/17   0917  12/12/17   0156   WBC  9.6  12.4*  13.0*   RBC  3.95*  3.95*  4.02*   HEMOGLOBIN  11.1*  11.0*  11.0*   HEMATOCRIT  34.4*  33.8*  34.3*   MCV  87.1  85.6  85.3   MCH  28.1  27.8  27.4   MCHC  32.3*  32.5*  32.1*   RDW  49.1  49.1  49.4   PLATELETCT  93*  159*  194   MPV  12.7  13.3*  12.6     Recent Labs      12/11/17   0049  12/11/17   0917  12/12/17   0156   SODIUM  128*  135  131*   POTASSIUM  4.0  3.4*  4.0   CHLORIDE  98  100  97   CO2  19*  22  25   GLUCOSE  469*  361*  329*   BUN  25*  23*  25*   CREATININE  0.65  0.57  0.51   CALCIUM  8.0*  8.6  8.0*     Recent Labs      12/09/17   1849  12/09/17   2209  12/10/17   0633   APTT  51.0*  41.1*  41.9*     Recent Labs      12/09/17   1240   BNPBTYPENAT  610*     Recent Labs      12/10/17   0354   TRIGLYCERIDE  309*   HDL  4*   LDL  5          Assessment/Plan     * Severe sepsis due to infected right diabetic foot (CMS-HCC)- (present on admission)   Assessment & Plan    This is severe sepsis with the following associated acute organ dysfunction(s):  acute respiratory failure., NSTEMI  Off IVF         Right diabetic foot infection, cellulits, and abscess (CMS-HCC)- (present on admission)   Assessment & Plan    LPS, for BKA  IV Zosyn, consult ID        Acute respiratory failure with hypoxia due to sepsis, fluid overload, AECOPD (CMS-HCC)- (present on admission)   Assessment & Plan    Keep O2 sats above 90%        Poorly controlled type 2 diabetes mellitus with neuropathy (CMS-HCC)- (present on admission)   Assessment & Plan    Lantus, SSI        Pulmonary edema   Assessment & Plan    IV Lasix, stop IVF        Hypokalemia   Assessment & Plan    Follow bmp        COPD with acute exacerbation (CMS-HCC)   Assessment & Plan    suspected  Symbicort, Spiriva, RT protocol, decrease IV Solumedrol        Hyponatremia- (present on admission)   Assessment & Plan    Follow bmp        NSTEMI (non-ST elevated myocardial infarction) (CMS-HCC)- (present on admission)   Assessment & Plan    ASA, Lipitor, Metoprolol            Hypertension- (present on admission)   Assessment & Plan    Metoprolol         Morbid obesity with BMI of 40.0-44.9, adult (CMS-HCC)- (present on admission)   Assessment & Plan    Body mass index is 43.7 kg/m²..  referral for outpatient weight management.          Tobacco abuse- (present on admission)   Assessment & Plan    Nicotine replacement             Reviewed items::  EKG reviewed, Radiology images reviewed, Labs reviewed and Medications reviewed  Long catheter::  Urinary Tract Retention or Urinary Tract Obstruction  DVT prophylaxis pharmacological::  Heparin  Ulcer Prophylaxis::  No  Antibiotics:  Treating active infection/contamination beyond 24 hours perioperative coverage

## 2017-12-12 NOTE — TELEPHONE ENCOUNTER
----- Message from MEI Barr sent at 12/11/2017 12:47 PM PST -----  Great echo. FU as planned. SC    ===================================================================    S/w pt, discussed Echo results per SC, pt verbalizes understanding

## 2017-12-12 NOTE — PROGRESS NOTES
Report received. Care assumed. Patient A&Ox4. Pt reports feeling 0/10 pain, no SOB at this time.  Pt currently has no further needs.Discussed POC for the day with Pt. Call light within reach, encouraged pt to call for assistance.

## 2017-12-12 NOTE — PROGRESS NOTES
Antonia Cano Fall Risk Assessment:     Last Known Fall: Within the last six months  Mobility: Use of assistive device/requires assist of two people  Medications: Cardiovascular or central nervous system meds, Diuretics  Mental Status/LOC/Awareness: Awake, alert, and oriented to date, place, and person  Toileting Needs: Use of assistive device (Bedside commode, bedpan, urinal)  Volume/Electrolyte Status: Use of IV fluids/tube feeds  Communication/Sensory: Visual (Glasses)/hearing deficit, Neuropathy  Behavior: Appropriate behavior  Antonia Cano Fall Risk Total: 19  Fall Risk Level: HIGH RISK    Universal Fall Precautions:  call light/belongings in reach, bed in low position and locked, wheelchairs and assistive devices out of sight, siderails up x 2, use non-slip footwear, adequate lighting, clutter free and spill free environment, educate on level of risk, educate to call for assistance    Fall Risk Level Interventions:    TRIAL (TELE 8, NEURO, MED JUANA 5) Moderate Fall Risk Interventions  Place yellow fall risk ID band on patient: verified  Provide patient/family education based on risk assessment : completed  Educate patient/family to call staff for assistance when getting out of bed: completed  Place fall precaution signage outside patient door: verified  Utilize bed/chair fall alarm: verifiedTRIAL (TELE 8, NEURO, MED JUANA 5) High Fall Risk Interventions  Place yellow fall risk ID band on patient: verified  Provide patient/family education based on risk assessment: verified  Educate patient/family to call staff for assistance when getting out of bed: verified  Place fall precaution signage outside patient door: verified  Place patient in room close to nursing station: verified  Utilize bed/chair fall alarm: verified  Notify charge of high risk for huddle: verified    Patient Specific Interventions:     Medication: review medications with patient and family, assess for medications that can be discontinued or  dosage decreased, limit combination of prn medications and provide patients who received diuretics/laxatives frequent toileting  Mental Status/LOC/Awareness: reinforce falls education, encourage family to stay with patient, check on patient hourly, utilize bed/chair fall alarm, reinforce the use of call light and provide activity  Toileting: provide frquent toileting and monitor intake and output/use of appropriate interventions  Volume/Electrolyte Status: ensure patient remains hydrated, monitor blood sugars and maintain appropriate blood sugar levels if diabetic, advance diet as tolerated, administer medications as ordered for nausea and vomiting, monitor abnormal lab values and ensure IV fluids are appropriate  Communication/Sensory: update plan of care on whiteboard, ensure proper positioning when transferrng/ambulating and ensure patient has glasses/contacts and hearing aids/dentures  Behavioral: encourage patient to voice feelings, administer medication as ordered and instruct/reinforce fall program rationale  Mobility: utilize bed/chair fall alarm, ensure bed is locked and in lowest position, provide appropriate assistive device, instruct patient to exit bed on their strongest side and collaborate with doctor for possible PT/OT consult

## 2017-12-12 NOTE — CONSULTS
INFECTIOUS DISEASES INPATIENT CONSULT NOTE     Date of Service: 12/12/17    Consult Requested By: Akil Ann M.D.    Reason for Consultation: Right foot osteomyelitis    History of Present Illness:   Greta Echevarria is a 52 y.o. Woman with a history of DM2, left foot amputation and recent long stay at a SNF admitted 12/9/2017 for worsening right foot infection. Pt states she was at a SNF in [place] for 6 months and discharged home approximately two weeks ago. She noted a blister at the bottom of her right foot when she returned home and over the past two weeks, her foot became more swollen, erythematous and painful. She denies any drainage. No recent fevers, chills, nausea or vomiting. She notes her blood sugars have not been controlled. She has not been on any abx in the past 30 days. Denies any dysuria or abd pain but has urinary retention. She was initially seen at an outside ER in [place] and was transferred her for higher level of care. On presentation, she was afebrile with a leukocytosis of 11.4. ESR was 109 and PCT of 25.59. Xray of her R foot revealed midfoot bony destructive change with severe soft tissue swelling and a CT scan revealed a large abscess replacing the majority of the right midfoot measuring at least 7 cm craniocaudal and 6.8 x 8.9 cm transversely with associated gas, osteomyelitis and cellulitis. She was evaluated by ortho who has deemed her right charcot foot infection to be unsalvageable and is recommending a BKA for which she is agreeable. She is currently on linezolid and zosyn. Blood cultures are negative to date. ID consulted for further recommendations.    Review Of Systems:  ROS are negative except as noted above in the HPI.    PMH:   Past Medical History:   Diagnosis Date   • Bronchitis    • Diabetes    • Hypertension    H/o urinary retention      PSH:  Past Surgical History:   Procedure Laterality Date   • SPLIT THICKNESS SKIN GRAFT Left 1/7/2016    Procedure: SPLIT  THICKNESS SKIN GRAFT FOOT;  Surgeon: Jerzy Mayberry M.D.;  Location: SURGERY [facility];  Service:    • STUMP REVISION Left 12/24/2015    Procedure: STUMP REVISION FOOT WITH VAC DRESSING;  Surgeon: Jerzy Mayberry M.D.;  Location: SURGERY [facility];  Service:    • INCISION AND DRAINAGE ORTHOPEDIC Left 12/17/2015    Procedure: INCISION AND DRAINAGE ORTHOPEDIC- Foot ;  Surgeon: Jerzy Mayberry M.D.;  Location: SURGERY [facility];  Service:    • CHOLECYSTECTOMY ROBOTIC  07/2015   • HYSTERECTOMY, VAGINAL  04/2001       FAMILY HX:  Father with CAD and DM2    SOCIAL HX:  Social History     Social History   • Marital status:      Spouse name: N/A   • Number of children: N/A   • Years of education: N/A     Occupational History   • Not on file.     Social History Main Topics   • Smoking status: Current Every Day Smoker     Packs/day: 0.50     Years: 35.00     Start date: 1/1/1980   • Smokeless tobacco: Not on file   • Alcohol use No   • Drug use: No   • Sexual activity: Not on file     Other Topics Concern   • Not on file     Social History Narrative   • No narrative on file     History   Smoking Status   • Current Every Day Smoker   • Packs/day: 0.50   • Years: 35.00   • Start date: 1/1/1980   Smokeless Tobacco   • Not on file     History   Alcohol Use No       Allergies/Intolerances:  No Known Allergies    History reviewed with the patient    Other Current Medications:    Current Facility-Administered Medications:   •  insulin glargine (LANTUS) injection 80 Units, 80 Units, Subcutaneous, BID, Yohan Castillo M.D., 80 Units at 12/11/17 2111  •  methylPREDNISolone sod succ (SOLU-MEDROL) 125 MG injection 62.5 mg, 62.5 mg, Intravenous, Q12HRS, Yohan Castillo M.D., 62.5 mg at 12/11/17 2104  •  budesonide-formoterol (SYMBICORT) 160-4.5 MCG/ACT inhaler 2 Puff, 2 Puff, Inhalation, BID (RT), Yohan Castillo M.D., 2 Puff at 12/11/17 2108  •  tiotropium (SPIRIVA) 18 MCG inhalation capsule 1 Cap, 1 Cap,  Inhalation, QDAILY (RT), Yohan Castillo M.D.  •  linezolid (ZYVOX) tablet 600 mg, 600 mg, Oral, Q12HRS, Yohan Castillo M.D., 600 mg at 12/11/17 2107  •  ondansetron (ZOFRAN ODT) dispertab 4 mg, 4 mg, Oral, Q4HRS PRN, Yohan Castillo M.D., 4 mg at 12/11/17 1435  •  ondansetron (ZOFRAN) syringe/vial injection 4 mg, 4 mg, Intravenous, Q4HRS PRN, Yohan Castillo M.D.  •  NS infusion, , Intravenous, Continuous, Praneeth Cummings P.A.-C., Last Rate: 100 mL/hr at 12/12/17 0553  •  [COMPLETED] piperacillin-tazobactam (ZOSYN) 4.5 g in  mL IVPB, 4.5 g, Intravenous, Once, Stopped at 12/10/17 1026 **AND** piperacillin-tazobactam (ZOSYN) 13.5 g in  mL infusion, , Intravenous, Continuous Abx, Yohan Castillo M.D., Last Rate: 20.83 mL/hr at 12/11/17 1849  •  morphine (pf) 4 mg/ml injection 2-4 mg, 2-4 mg, Intravenous, Q2HRS PRN, Yohan Castillo M.D., 4 mg at 12/12/17 1037  •  NS infusion 3,798 mL, 30 mL/kg, Intravenous, Once PRN, Yohan Castillo M.D.  •  NS (BOLUS) infusion 500 mL, 500 mL, Intravenous, Once PRN, Yohan Castillo M.D.  •  heparin injection 5,000 Units, 5,000 Units, Subcutaneous, Q8HRS, Yohan Castillo M.D., Stopped at 12/11/17 1400  •  alprazolam (XANAX) tablet 0.25 mg, 0.25 mg, Oral, 4X/DAY PRN, Yohan Castillo M.D., 0.25 mg at 12/12/17 0108  •  Respiratory Care per Protocol, , Nebulization, Continuous RT, Yohan Castillo M.D.  •  furosemide (LASIX) injection 40 mg, 40 mg, Intravenous, BID DIURETIC, Yohan Castillo M.D., 40 mg at 12/12/17 0604  •  aspirin (ASA) tablet 325 mg, 325 mg, Oral, DAILY, Fabrizio Martin M.D., 325 mg at 12/11/17 0943  •  metoprolol (LOPRESSOR) tablet 25 mg, 25 mg, Oral, BID, Fabrizio Martin M.D., 25 mg at 12/11/17 2106  •  senna-docusate (PERICOLACE or SENOKOT S) 8.6-50 MG per tablet 2 Tab, 2 Tab, Oral, BID, 2 Tab at 12/11/17 0941 **AND** polyethylene glycol/lytes (MIRALAX) PACKET 1 Packet, 1 Packet, Oral, QDAY PRN **AND** magnesium hydroxide (MILK OF MAGNESIA) suspension 30  "mL, 30 mL, Oral, QDAY PRN **AND** bisacodyl (DULCOLAX) suppository 10 mg, 10 mg, Rectal, QDAY PRN, Fabrizio Martin M.D.  •  INITIATE NICOTINE REPLACEMENT PROTOCOL , , , Once **AND** nicotine (NICODERM) 21 MG/24HR 21 mg, 21 mg, Transdermal, Daily-0600, 21 mg at 17 06 **AND** Protocol 205 PATIENT EDUCATION MATERIALS, , , Once **AND** Protocol 205 Rotate nicotine patch application sites daily , , , CONTINUOUS **AND** nicotine polacrilex (NICORETTE) 2 MG piece 2 mg, 2 mg, Oral, Q HOUR PRN, Fabrizio Martin M.D.  •  insulin regular (HUMULIN R) injection 3-14 Units, 3-14 Units, Subcutaneous, 4X/DAY ACHS, 10 Units at 17 06 **AND** Accu-Chek ACHS, , , Q AC AND BEDTIME(S) **AND** NOTIFY MD and PharmD, , , Once **AND** glucose 4 g chewable tablet 16 g, 16 g, Oral, Q15 MIN PRN **AND** dextrose 50% (D50W) injection 25 mL, 25 mL, Intravenous, Q15 MIN PRN, Fabrizio Martin M.D.  •  atorvastatin (LIPITOR) tablet 40 mg, 40 mg, Oral, QHS, Fabrizio Martin M.D., 40 mg at 17  •  acetaminophen (TYLENOL) tablet 650 mg, 650 mg, Oral, Q4HRS PRN, Fabrizio Martin M.D.  •  oxycodone immediate-release (ROXICODONE) tablet 5 mg, 5 mg, Oral, Q4HRS PRN, 5 mg at 17 **OR** oxycodone immediate release (ROXICODONE) tablet 10 mg, 10 mg, Oral, Q4HRS PRN, Fabrizio Martin M.D., 10 mg at 17  [unfilled]    Most Recent Vital Signs:  /80   Pulse 80   Temp 36.4 °C (97.5 °F)   Resp 18   Ht 1.702 m (5' 7.01\")   Wt 124.6 kg (274 lb 11.1 oz)   SpO2 95%   Breastfeeding? No   BMI 43.01 kg/m²   Temp  Av.6 °C (97.9 °F)  Min: 36.3 °C (97.3 °F)  Max: 37.9 °C (100.3 °F)    Physical Exam:  General: obese but well-appearing, no acute distress  HEENT: sclera anicteric, PERRL, EOMI, MMM, no oral lesions  Neck: supple, no lymphadenopathy  Chest: CTAB, no r/r/w, normal work of breathing.  Cardiac: RRR, normal S1 S2, no m/r/g   Abdomen: + bowel sounds, soft, non-tender, non-distended, no HSM  Extremities: " "Evidence of left foot amputation. Surgical site clean. R charcot foot with extensive deformity and swelling. There is a lateral wound in addition to a plantar wound with necrotic tissue  : torres catheter  Skin: no rashes or worrisome lesions  Neuro: Alert and oriented times 3, non-focal exam, speech fluent, moves all extremities    Pertinent Lab Results:  Recent Labs      12/10/17   0354  12/11/17   0917  12/12/17   0156   WBC  9.6  12.4*  13.0*      Recent Labs      12/10/17   0354  12/11/17   0917  12/12/17   0156   HEMOGLOBIN  11.1*  11.0*  11.0*   HEMATOCRIT  34.4*  33.8*  34.3*   MCV  87.1  85.6  85.3   MCH  28.1  27.8  27.4   MACROCYTOSIS   --    --   1+   ANISOCYTOSIS   --    --   1+   PLATELETCT  93*  159*  194         Recent Labs      12/11/17   0049  12/11/17 0917 12/12/17   0156   SODIUM  128*  135  131*   POTASSIUM  4.0  3.4*  4.0   CHLORIDE  98  100  97   CO2  19*  22  25   CREATININE  0.65  0.57  0.51        Recent Labs      12/09/17   2250  12/10/17   0354   ALBUMIN  2.5*  2.4*        Pertinent Micro:  Results     Procedure Component Value Units Date/Time    BLOOD CULTURE [[id number]] Collected:  12/10/17 1059    Order Status:  Completed Specimen:  Blood from Peripheral Updated:  12/11/17 0900     Significant Indicator NEG     Source BLD     Site PERIPHERAL     Blood Culture --     No Growth    Note: Blood cultures are incubated for 5 days and  are monitored continuously.Positive blood cultures  are called to the RN and reported as soon as  they are identified.      Narrative:       Per Hospital Policy: Only change Specimen Src: to \"Line\" if  specified by physician order.    INFLUENZA A/B BY PCR [[id number]] Collected:  12/10/17 1230    Order Status:  Completed Updated:  12/10/17 1426     Influenza virus A RNA Negative     Influenza virus B, PCR Negative    Narrative:       Collected By:[id number] [facility]    INFLUENZA BY PCR, A/B/H1N1 [[id number]] Collected:  12/10/17 1230    Order Status:  " "Canceled Specimen:  Nasal from Nasopharyngeal     Blood Culture [[id number]]     Order Status:  No result Specimen:  Blood from Peripheral     Blood Culture [[id number]]     Order Status:  No result Specimen:  Blood from Peripheral     BLOOD CULTURE [[id number]] Collected:  12/09/17 1849    Order Status:  Completed Specimen:  Blood from Peripheral Updated:  12/10/17 0853     Significant Indicator NEG     Source BLD     Site PERIPHERAL     Blood Culture --     No Growth    Note: Blood cultures are incubated for 5 days and  are monitored continuously.Positive blood cultures  are called to the RN and reported as soon as  they are identified.      Narrative:       Per Hospital Policy: Only change Specimen Src: to \"Line\" if  specified by physician order.    URINALYSIS [[id number]]  (Abnormal) Collected:  12/09/17 1538    Order Status:  Completed Specimen:  Urine from Urine, Cath Updated:  12/09/17 1547     Color Yellow     Character Cloudy (A)     Specific Gravity 1.029     Ph 5.0     Glucose >=1000 (A) mg/dL      Ketones 15 (A) mg/dL      Protein 100 (A) mg/dL      Bilirubin Negative     Urobilinogen, Urine 0.2     Nitrite Negative     Leukocyte Esterase Negative     Occult Blood Moderate (A)     Micro Urine Req Microscopic    BLOOD CULTURE [[id number]]     Order Status:  Canceled Specimen:  Blood from Peripheral     BLOOD CULTURE [[id number]]     Order Status:  Sent Specimen:  Blood from Peripheral         Blood Culture   Date Value Ref Range Status   12/10/2017   Preliminary    No Growth    Note: Blood cultures are incubated for 5 days and  are monitored continuously.Positive blood cultures  are called to the RN and reported as soon as  they are identified.          Studies:  CT RLE  Impression:       1.  Large abscess replacing the majority of the RIGHT midfoot measuring at least 7 cm craniocaudal and 6.8 x 8.9 cm transversely    2.  Associated gas within the mid foot as well as proximal to the calcaneus consistent with " necrotizing infection    3.  Destructive change of the midfoot consistent with osteomyelitis    4.  Extensive cellulitis       IMPRESSION:   1. Right foot osteomyelitis  2. Right foot cellulitis  3. DM2, poorly controlled      PLAN:   Greta Echevarria is a 52 y.o. woman with a history of DM2, and left foot amputation admitted for worsening right foot infection found to have significant charcot deformity of her right foot with osteomyelitis, abscess and possible necrotizing infection. Agree with BKA for source control and the patient is amenable to this. DC linezolid as no evidence of MRSA. Continue zosyn for now. Further recommendations per clinical course and surgical course.     Plan of care discussed with SEAN Ann M.D.. Will continue to follow    Nano Ardon M.D.

## 2017-12-12 NOTE — CARE PLAN
Problem: Safety  Goal: Will remain free from injury  Outcome: PROGRESSING AS EXPECTED  Fall risk assessed every shift and fall precautions in place.  Pt educated to not get up without assistance.  Verbalized understanding.       Problem: Pain Management  Goal: Pain level will decrease to patient's comfort goal  Outcome: PROGRESSING SLOWER THAN EXPECTED  Pt assessed for pain q4h and medicated PRN. Pt instructed to notify RN of any new or increasing pain to prevent it from becoming intolerable. Verbalized understanding.

## 2017-12-12 NOTE — DOCUMENTATION QUERY
DOCUMENTATION QUERY    PROVIDERS: Please select “Cosign w/ note”to reply to query.    To better represent the severity of illness of your patient, please review the following information and exercise your independent professional judgment in responding to this query.     Pulmonary edema is noted in the 12/11 Progress Note. Based upon the clinical findings, risk factors, and treatment, can this diagnosis be further specified?    • Acute Pulmonary Edema, Cardiac Related (if applicable, please specify CHF type and acuity of heart failure)  • Acute Pulmonary Edema, Non-Cardiogenic  • Chronic Pulmonary Edema, Cardiac Related (if applicable, please specify CHF type and acuity of heart failure)  • Chronic Pulmonary Edema, Non-Cardiogenic  • Other explanation of clinical findings (please specify)  • Unable to determine        The medical record reflects the following:   Clinical Findings  12/11 Progress Note  · Diminished breath sounds in RML RLL, & LLL  · Fluid overload, hypervolemia  12/10 CXR  · Development of mild-moderate pulmonary edema  12/11 Echo  · EF 60%   Treatment  IV lasix, IVF discontinued, repeat CXR   Risk Factors  COPD, tobacco use, HTN, NSTEMI    Location within medical record  History and Physical, Progress Notes, Lab Results  and Radiology Results     Thank you,   Prem Pratt RN, BSN  Clinical   [phone]

## 2017-12-12 NOTE — PROGRESS NOTES
"LIMB PRESERVATION SERVICE     52-year-old female transferred from [place] for an infected right foot and chest pain. She has a history of poorly controlled type 2 diabetes, Charcot foot, morbid obesity, and left transmetatarsal amputation.    Was scheduled for BKA yesterday with Dr. Delgado, however surgery was canceled d/t very high blood sugars  Rescheduled for Thursday      Patient is now on telemetry  Less confused today    ID managing abx      BP (!) 161/94 Comment: nurse notified  Pulse 74   Temp 36.3 °C (97.3 °F)   Resp 18   Ht 1.702 m (5' 7.01\")   Wt 124.6 kg (274 lb 11.1 oz)   SpO2 96%   Breastfeeding? No   BMI 43.01 kg/m²    Blood glucose 262      PLAN  -BKA 12/14 with Dr. Delgado  -Diabetes educator to see pt  -Prosthetist to see pt for pre-op counseling.  Will call [facility] as they have an office in [place]    "

## 2017-12-13 LAB
ANION GAP SERPL CALC-SCNC: 11 MMOL/L (ref 0–11.9)
BASOPHILS # BLD AUTO: 0 % (ref 0–1.8)
BASOPHILS # BLD: 0 K/UL (ref 0–0.12)
BUN SERPL-MCNC: 21 MG/DL (ref 8–22)
CALCIUM SERPL-MCNC: 8.3 MG/DL (ref 8.5–10.5)
CHLORIDE SERPL-SCNC: 97 MMOL/L (ref 96–112)
CO2 SERPL-SCNC: 28 MMOL/L (ref 20–33)
CREAT SERPL-MCNC: 0.48 MG/DL (ref 0.5–1.4)
EOSINOPHIL # BLD AUTO: 0.1 K/UL (ref 0–0.51)
EOSINOPHIL NFR BLD: 0.9 % (ref 0–6.9)
ERYTHROCYTE [DISTWIDTH] IN BLOOD BY AUTOMATED COUNT: 49.3 FL (ref 35.9–50)
GFR SERPL CREATININE-BSD FRML MDRD: >60 ML/MIN/1.73 M 2
GLUCOSE BLD-MCNC: 246 MG/DL (ref 65–99)
GLUCOSE BLD-MCNC: 272 MG/DL (ref 65–99)
GLUCOSE BLD-MCNC: 276 MG/DL (ref 65–99)
GLUCOSE BLD-MCNC: 324 MG/DL (ref 65–99)
GLUCOSE BLD-MCNC: 415 MG/DL (ref 65–99)
GLUCOSE SERPL-MCNC: 304 MG/DL (ref 65–99)
HCT VFR BLD AUTO: 39.4 % (ref 37–47)
HGB BLD-MCNC: 12.8 G/DL (ref 12–16)
LG PLATELETS BLD QL SMEAR: NORMAL
LYMPHOCYTES # BLD AUTO: 2.12 K/UL (ref 1–4.8)
LYMPHOCYTES NFR BLD: 18.4 % (ref 22–41)
MANUAL DIFF BLD: NORMAL
MCH RBC QN AUTO: 28.2 PG (ref 27–33)
MCHC RBC AUTO-ENTMCNC: 32.5 G/DL (ref 33.6–35)
MCV RBC AUTO: 86.8 FL (ref 81.4–97.8)
METAMYELOCYTES NFR BLD MANUAL: 1.8 %
MONOCYTES # BLD AUTO: 1.21 K/UL (ref 0–0.85)
MONOCYTES NFR BLD AUTO: 10.5 % (ref 0–13.4)
MORPHOLOGY BLD-IMP: NORMAL
MYELOCYTES NFR BLD MANUAL: 1.8 %
NEUTROPHILS # BLD AUTO: 7.66 K/UL (ref 2–7.15)
NEUTROPHILS NFR BLD: 64.9 % (ref 44–72)
NEUTS BAND NFR BLD MANUAL: 1.7 % (ref 0–10)
NRBC # BLD AUTO: 0.02 K/UL
NRBC BLD AUTO-RTO: 0.2 /100 WBC
PLATELET # BLD AUTO: 227 K/UL (ref 164–446)
PLATELET BLD QL SMEAR: NORMAL
PMV BLD AUTO: 12 FL (ref 9–12.9)
POTASSIUM SERPL-SCNC: 3.7 MMOL/L (ref 3.6–5.5)
RBC # BLD AUTO: 4.54 M/UL (ref 4.2–5.4)
RBC BLD AUTO: PRESENT
SODIUM SERPL-SCNC: 136 MMOL/L (ref 135–145)
WBC # BLD AUTO: 11.5 K/UL (ref 4.8–10.8)

## 2017-12-13 PROCEDURE — 700102 HCHG RX REV CODE 250 W/ 637 OVERRIDE(OP): Performed by: INTERNAL MEDICINE

## 2017-12-13 PROCEDURE — 99233 SBSQ HOSP IP/OBS HIGH 50: CPT | Performed by: FAMILY MEDICINE

## 2017-12-13 PROCEDURE — 770020 HCHG ROOM/CARE - TELE (206)

## 2017-12-13 PROCEDURE — 85027 COMPLETE CBC AUTOMATED: CPT

## 2017-12-13 PROCEDURE — 700111 HCHG RX REV CODE 636 W/ 250 OVERRIDE (IP): Performed by: INTERNAL MEDICINE

## 2017-12-13 PROCEDURE — 700105 HCHG RX REV CODE 258: Performed by: INTERNAL MEDICINE

## 2017-12-13 PROCEDURE — A9270 NON-COVERED ITEM OR SERVICE: HCPCS | Performed by: HOSPITALIST

## 2017-12-13 PROCEDURE — 700102 HCHG RX REV CODE 250 W/ 637 OVERRIDE(OP): Performed by: HOSPITALIST

## 2017-12-13 PROCEDURE — 80048 BASIC METABOLIC PNL TOTAL CA: CPT

## 2017-12-13 PROCEDURE — 36415 COLL VENOUS BLD VENIPUNCTURE: CPT

## 2017-12-13 PROCEDURE — 82962 GLUCOSE BLOOD TEST: CPT | Mod: 91

## 2017-12-13 PROCEDURE — 85007 BL SMEAR W/DIFF WBC COUNT: CPT

## 2017-12-13 PROCEDURE — 700111 HCHG RX REV CODE 636 W/ 250 OVERRIDE (IP): Performed by: FAMILY MEDICINE

## 2017-12-13 RX ORDER — HYDROMORPHONE HYDROCHLORIDE 2 MG/ML
1-2 INJECTION, SOLUTION INTRAMUSCULAR; INTRAVENOUS; SUBCUTANEOUS
Status: DISCONTINUED | OUTPATIENT
Start: 2017-12-13 | End: 2017-12-17

## 2017-12-13 RX ORDER — PREDNISONE 20 MG/1
40 TABLET ORAL DAILY
Status: DISCONTINUED | OUTPATIENT
Start: 2017-12-13 | End: 2017-12-15

## 2017-12-13 RX ADMIN — INSULIN HUMAN 7 UNITS: 100 INJECTION, SOLUTION PARENTERAL at 11:52

## 2017-12-13 RX ADMIN — METOPROLOL TARTRATE 25 MG: 25 TABLET, FILM COATED ORAL at 20:56

## 2017-12-13 RX ADMIN — HYDROMORPHONE HYDROCHLORIDE 1 MG: 2 INJECTION INTRAMUSCULAR; INTRAVENOUS; SUBCUTANEOUS at 16:29

## 2017-12-13 RX ADMIN — MORPHINE SULFATE 4 MG: 4 INJECTION INTRAVENOUS at 08:25

## 2017-12-13 RX ADMIN — OXYCODONE HYDROCHLORIDE 5 MG: 5 TABLET ORAL at 06:16

## 2017-12-13 RX ADMIN — TIOTROPIUM BROMIDE 1 CAPSULE: 18 CAPSULE ORAL; RESPIRATORY (INHALATION) at 10:11

## 2017-12-13 RX ADMIN — HYDROMORPHONE HYDROCHLORIDE 1 MG: 2 INJECTION INTRAMUSCULAR; INTRAVENOUS; SUBCUTANEOUS at 12:37

## 2017-12-13 RX ADMIN — FUROSEMIDE 40 MG: 10 INJECTION, SOLUTION INTRAMUSCULAR; INTRAVENOUS at 15:43

## 2017-12-13 RX ADMIN — HEPARIN SODIUM 5000 UNITS: 5000 INJECTION, SOLUTION INTRAVENOUS; SUBCUTANEOUS at 06:15

## 2017-12-13 RX ADMIN — INSULIN HUMAN 4 UNITS: 100 INJECTION, SOLUTION PARENTERAL at 06:18

## 2017-12-13 RX ADMIN — BUDESONIDE AND FORMOTEROL FUMARATE DIHYDRATE 2 PUFF: 160; 4.5 AEROSOL RESPIRATORY (INHALATION) at 10:11

## 2017-12-13 RX ADMIN — HYDROMORPHONE HYDROCHLORIDE 2 MG: 2 INJECTION INTRAMUSCULAR; INTRAVENOUS; SUBCUTANEOUS at 18:41

## 2017-12-13 RX ADMIN — BUDESONIDE AND FORMOTEROL FUMARATE DIHYDRATE 2 PUFF: 160; 4.5 AEROSOL RESPIRATORY (INHALATION) at 20:56

## 2017-12-13 RX ADMIN — SODIUM CHLORIDE: 9 INJECTION, SOLUTION INTRAVENOUS at 00:57

## 2017-12-13 RX ADMIN — OXYCODONE HYDROCHLORIDE 10 MG: 10 TABLET ORAL at 15:43

## 2017-12-13 RX ADMIN — OXYCODONE HYDROCHLORIDE 10 MG: 10 TABLET ORAL at 10:16

## 2017-12-13 RX ADMIN — HYDROMORPHONE HYDROCHLORIDE 1 MG: 2 INJECTION INTRAMUSCULAR; INTRAVENOUS; SUBCUTANEOUS at 10:12

## 2017-12-13 RX ADMIN — INSULIN HUMAN 10 UNITS: 100 INJECTION, SOLUTION PARENTERAL at 16:30

## 2017-12-13 RX ADMIN — INSULIN GLARGINE 80 UNITS: 100 INJECTION, SOLUTION SUBCUTANEOUS at 08:17

## 2017-12-13 RX ADMIN — HYDROMORPHONE HYDROCHLORIDE 2 MG: 2 INJECTION INTRAMUSCULAR; INTRAVENOUS; SUBCUTANEOUS at 20:54

## 2017-12-13 RX ADMIN — INSULIN HUMAN 14 UNITS: 100 INJECTION, SOLUTION PARENTERAL at 20:57

## 2017-12-13 RX ADMIN — STANDARDIZED SENNA CONCENTRATE AND DOCUSATE SODIUM 2 TABLET: 8.6; 5 TABLET, FILM COATED ORAL at 20:55

## 2017-12-13 RX ADMIN — HEPARIN SODIUM 5000 UNITS: 5000 INJECTION, SOLUTION INTRAVENOUS; SUBCUTANEOUS at 20:55

## 2017-12-13 RX ADMIN — ATORVASTATIN CALCIUM 40 MG: 40 TABLET, FILM COATED ORAL at 20:56

## 2017-12-13 RX ADMIN — FUROSEMIDE 40 MG: 10 INJECTION, SOLUTION INTRAMUSCULAR; INTRAVENOUS at 06:16

## 2017-12-13 RX ADMIN — OXYCODONE HYDROCHLORIDE 10 MG: 10 TABLET ORAL at 20:55

## 2017-12-13 RX ADMIN — INSULIN GLARGINE 80 UNITS: 100 INJECTION, SOLUTION SUBCUTANEOUS at 20:57

## 2017-12-13 RX ADMIN — ASPIRIN 325 MG: 325 TABLET, FILM COATED ORAL at 08:12

## 2017-12-13 RX ADMIN — PREDNISONE 40 MG: 20 TABLET ORAL at 08:12

## 2017-12-13 RX ADMIN — STANDARDIZED SENNA CONCENTRATE AND DOCUSATE SODIUM 2 TABLET: 8.6; 5 TABLET, FILM COATED ORAL at 08:12

## 2017-12-13 RX ADMIN — MORPHINE SULFATE 4 MG: 4 INJECTION INTRAVENOUS at 06:14

## 2017-12-13 RX ADMIN — METOPROLOL TARTRATE 25 MG: 25 TABLET, FILM COATED ORAL at 08:12

## 2017-12-13 ASSESSMENT — ENCOUNTER SYMPTOMS
DIARRHEA: 0
CHILLS: 0
WHEEZING: 1
VOMITING: 0
FEVER: 0
SHORTNESS OF BREATH: 1
DIZZINESS: 0
COUGH: 0
SORE THROAT: 0
MYALGIAS: 1
ABDOMINAL PAIN: 0
WEAKNESS: 1
NAUSEA: 0
BLURRED VISION: 0
HEARTBURN: 0

## 2017-12-13 ASSESSMENT — PAIN SCALES - GENERAL
PAINLEVEL_OUTOF10: 7
PAINLEVEL_OUTOF10: 8
PAINLEVEL_OUTOF10: 8
PAINLEVEL_OUTOF10: 9
PAINLEVEL_OUTOF10: 9
PAINLEVEL_OUTOF10: 8
PAINLEVEL_OUTOF10: 10

## 2017-12-13 NOTE — PROGRESS NOTES
Order for [facility] to consult with pt has been submitted to [facility]. If any questions [facility] can be reached at  # [phone]

## 2017-12-13 NOTE — DISCHARGE PLANNING
Pt has been discussed during daily rounds. She is pending BKA and at this time her DC needs are undetermined. SW will continue to follow and remains available for any needs that may arise.

## 2017-12-13 NOTE — PROGRESS NOTES
Antonia Cano Fall Risk Assessment:     Last Known Fall: Within the last six months  Mobility: Use of assistive device/requires assist of two people  Medications: Diuretics, Cardiovascular or central nervous system meds  Mental Status/LOC/Awareness: Awake, alert, and oriented to date, place, and person  Toileting Needs: Use of catheters or diversion devices  Volume/Electrolyte Status: Use of IV fluids/tube feeds  Communication/Sensory: Visual (Glasses)/hearing deficit  Behavior: Depression/anxiety  Antonia Cano Fall Risk Total: 16  Fall Risk Level: HIGH RISK    Universal Fall Precautions:  call light/belongings in reach, bed in low position and locked, wheelchairs and assistive devices out of sight, siderails up x 2, use non-slip footwear, adequate lighting, clutter free and spill free environment, educate on level of risk, educate to call for assistance    Fall Risk Level Interventions:    TRIAL (TELE 8, NEURO, MED JUANA 5) Moderate Fall Risk Interventions  Place yellow fall risk ID band on patient: verified  Provide patient/family education based on risk assessment : completed  Educate patient/family to call staff for assistance when getting out of bed: completed  Place fall precaution signage outside patient door: verified  Utilize bed/chair fall alarm: verifiedTRIAL (TELE 8, NEURO, [employer] JUANA 5) High Fall Risk Interventions  Place yellow fall risk ID band on patient: verified  Provide patient/family education based on risk assessment: verified  Educate patient/family to call staff for assistance when getting out of bed: verified  Place fall precaution signage outside patient door: verified  Place patient in room close to nursing station: verified  Utilize bed/chair fall alarm: verified  Notify charge of high risk for huddle: verified    Patient Specific Interventions:     Medication: review medications with patient and family, assess for medications that can be discontinued or dosage decreased, limit combination  of prn medications and provide patients who received diuretics/laxatives frequent toileting  Mental Status/LOC/Awareness: reorient patient, reinforce falls education, encourage family to stay with patient, check on patient hourly, utilize bed/chair fall alarm, reinforce the use of call light and provide activity  Toileting: provide frquent toileting  Volume/Electrolyte Status: ensure patient remains hydrated, monitor blood sugars and maintain appropriate blood sugar levels if diabetic, advance diet as tolerated, administer medications as ordered for nausea and vomiting, monitor abnormal lab values and ensure IV fluids are appropriate  Communication/Sensory: update plan of care on whiteboard and ensure proper positioning when transferrng/ambulating  Behavioral: encourage patient to voice feelings, administer medication as ordered and instruct/reinforce fall program rationale  Mobility: dangle prior to standing, utilize bed/chair fall alarm, ensure bed is locked and in lowest position, provide appropriate assistive device, instruct patient to exit bed on their strongest side and collaborate with doctor for possible PT/OT consult

## 2017-12-13 NOTE — PROGRESS NOTES
[facility] Hospitalist Progress Note    Date of Service: 2017    Chief Complaint  52 y.o. female admitted 2017 with Sepsis, Respiratory failure, Diabetic foot ulcer with osteomyelitis and Leg abscess, NSTEMI, also with COPD exaceration and pulmonary edema    Interval Problem Update    Diabetic foot - for BKA, pain not controlled  NSTEMI - trops trended down, Echo reviewed  Resp failure - remains on O2  COPDE - less wheezing  Pulm edema - less short of breath  Diabetes - not controlled  HTN - controlled    Consultants/Specialty  AMRITA - Socrates  ID - Ardon    Disposition  For BKA        Review of Systems   Constitutional: Positive for malaise/fatigue. Negative for chills and fever.   HENT: Negative for hearing loss and sore throat.    Eyes: Negative for blurred vision.   Respiratory: Positive for shortness of breath and wheezing. Negative for cough.    Cardiovascular: Positive for leg swelling. Negative for chest pain.   Gastrointestinal: Negative for abdominal pain, diarrhea, heartburn, nausea and vomiting.   Genitourinary: Negative for dysuria.   Neurological: Positive for weakness. Negative for dizziness.      Physical Exam  Laboratory/Imaging   Hemodynamics  Temp (24hrs), Av.5 °C (97.7 °F), Min:36.4 °C (97.5 °F), Max:36.7 °C (98.1 °F)   Temperature: 36.5 °C (97.7 °F)  Pulse  Av.6  Min: 74  Max: 156   Blood Pressure: 132/77      Respiratory      Respiration: 19, Pulse Oximetry: 95 %     Work Of Breathing / Effort: Mild  RUL Breath Sounds: Diminished, RML Breath Sounds: Diminished, RLL Breath Sounds: Diminished, KWAME Breath Sounds: Diminished, LLL Breath Sounds: Diminished    Fluids    Intake/Output Summary (Last 24 hours) at 17 1219  Last data filed at 17 1000   Gross per 24 hour   Intake              480 ml   Output             7650 ml   Net            -7170 ml       Nutrition  Orders Placed This Encounter   Procedures   • DIET ORDER     Standing Status:   Standing     Number of  Occurrences:   1     Order Specific Question:   Diet:     Answer:   Diabetic [3]   • DIET NPO     Standing Status:   Standing     Number of Occurrences:   8     Order Specific Question:   Restrict to:     Answer:   Sips with Medications [3]     Physical Exam   Constitutional: She is oriented to person, place, and time. She appears well-developed.   Morbidly obese   HENT:   Head: Normocephalic and atraumatic.   Eyes: Conjunctivae are normal. Pupils are equal, round, and reactive to light.   Neck: No tracheal deviation present. No thyromegaly present.   Cardiovascular: Normal rate and regular rhythm.    Pulmonary/Chest: Effort normal. She has wheezes. She has rales.   Abdominal: Soft. Bowel sounds are normal. She exhibits no distension. There is no tenderness.   Musculoskeletal: She exhibits edema.   Swelling right leg  Transmetatarsal amputation Left foot   Lymphadenopathy:     She has no cervical adenopathy.   Neurological: She is alert and oriented to person, place, and time.   Skin:   ulcers Right foot   Nursing note and vitals reviewed.      Recent Labs      12/11/17   0917 12/12/17 0156  12/13/17   0240   WBC  12.4*  13.0*  11.5*   RBC  3.95*  4.02*  4.54   HEMOGLOBIN  11.0*  11.0*  12.8   HEMATOCRIT  33.8*  34.3*  39.4   MCV  85.6  85.3  86.8   MCH  27.8  27.4  28.2   MCHC  32.5*  32.1*  32.5*   RDW  49.1  49.4  49.3   PLATELETCT  159*  194  227   MPV  13.3*  12.6  12.0     Recent Labs      12/11/17   0917 12/12/17   0156  12/13/17   0240   SODIUM  135  131*  136   POTASSIUM  3.4*  4.0  3.7   CHLORIDE  100  97  97   CO2  22  25  28   GLUCOSE  361*  329*  304*   BUN  23*  25*  21   CREATININE  0.57  0.51  0.48*   CALCIUM  8.6  8.0*  8.3*                      Assessment/Plan     * Severe sepsis due to infected right diabetic foot (CMS-HCC)- (present on admission)   Assessment & Plan    This is severe sepsis with the following associated acute organ dysfunction(s): acute respiratory failure., NSTEMI  Off IVF          Right diabetic foot infection, cellulits, and abscess (CMS-HCC)- (present on admission)   Assessment & Plan    LPS, for BKA  IV Zosyn  Change Morphine to Dilaudid        Acute respiratory failure with hypoxia due to sepsis, fluid overload, AECOPD (CMS-HCC)- (present on admission)   Assessment & Plan    Keep O2 sats above 90%        Poorly controlled type 2 diabetes mellitus with neuropathy (CMS-HCC)- (present on admission)   Assessment & Plan    Lantus, SSI        Pulmonary edema   Assessment & Plan    IV Lasix, stop IVF        Hypokalemia   Assessment & Plan    Follow bmp        COPD with acute exacerbation (CMS-HCC)   Assessment & Plan    suspected  Symbicort, Spiriva, RT protocol, change to oral prednisone        Hyponatremia- (present on admission)   Assessment & Plan    Follow bmp        NSTEMI (non-ST elevated myocardial infarction) (CMS-HCC)- (present on admission)   Assessment & Plan    ASA, Lipitor, Metoprolol            Hypertension- (present on admission)   Assessment & Plan    Metoprolol         Morbid obesity with BMI of 40.0-44.9, adult (CMS-HCC)- (present on admission)   Assessment & Plan    Body mass index is 43.7 kg/m²..  referral for outpatient weight management.          Tobacco abuse- (present on admission)   Assessment & Plan    Nicotine replacement             Reviewed items::  EKG reviewed, Radiology images reviewed, Labs reviewed and Medications reviewed  Long catheter::  Urinary Tract Retention or Urinary Tract Obstruction  DVT prophylaxis pharmacological::  Heparin  Ulcer Prophylaxis::  No  Antibiotics:  Treating active infection/contamination beyond 24 hours perioperative coverage

## 2017-12-13 NOTE — PROGRESS NOTES
Report received. Care assumed. Patient A&Ox4. Pt reports feeling 8/10 pain, no SOB at this time.  Pt currently requesting pain meds. Discussed POC for the day with Pt. Call light within reach, encouraged pt to call for assistance.

## 2017-12-13 NOTE — CARE PLAN
Problem: Nutritional:  Goal: Patient to verbalize or demonstrate understanding of diet  Outcome: MET Date Met: 12/13/17  Provided pt with DM diet educational handouts. Patient asked that I leave the handouts and not provide an in depth explanation as she has been diabetic for a long time and has heard the information before. Respected the patient's wishes and left the carb counting handout at bedside.

## 2017-12-13 NOTE — CARE PLAN
"Problem: Communication  Goal: The ability to communicate needs accurately and effectively will improve  Outcome: PROGRESSING SLOWER THAN EXPECTED  Patient is very anxious and gets agitated at times.  She is working on staying calm this evening and said \"i will try not to be so crazy tonight\"    Problem: Safety  Goal: Will remain free from falls  Outcome: PROGRESSING AS EXPECTED  Fall risk assessed every shift and fall precautions in place.  Pt educated to not get up without assistance.  Verbalized understanding.         "

## 2017-12-13 NOTE — PROGRESS NOTES
Bedside report received, assumed pt care @5294. Pt A&Ox4. She c/o pain in R foot; medicated pt per mar. POC discussed with pt and daughter, they verbalized understanding. Call light within reach

## 2017-12-14 LAB
ANION GAP SERPL CALC-SCNC: 9 MMOL/L (ref 0–11.9)
BACTERIA BLD CULT: NORMAL
BASOPHILS # BLD AUTO: 0.9 % (ref 0–1.8)
BASOPHILS # BLD: 0.12 K/UL (ref 0–0.12)
BUN SERPL-MCNC: 26 MG/DL (ref 8–22)
CALCIUM SERPL-MCNC: 9.3 MG/DL (ref 8.5–10.5)
CHLORIDE SERPL-SCNC: 98 MMOL/L (ref 96–112)
CO2 SERPL-SCNC: 32 MMOL/L (ref 20–33)
CREAT SERPL-MCNC: 0.65 MG/DL (ref 0.5–1.4)
EOSINOPHIL # BLD AUTO: 0 K/UL (ref 0–0.51)
EOSINOPHIL NFR BLD: 0 % (ref 0–6.9)
ERYTHROCYTE [DISTWIDTH] IN BLOOD BY AUTOMATED COUNT: 49.1 FL (ref 35.9–50)
GFR SERPL CREATININE-BSD FRML MDRD: >60 ML/MIN/1.73 M 2
GLUCOSE BLD-MCNC: 119 MG/DL (ref 65–99)
GLUCOSE BLD-MCNC: 142 MG/DL (ref 65–99)
GLUCOSE BLD-MCNC: 151 MG/DL (ref 65–99)
GLUCOSE BLD-MCNC: 166 MG/DL (ref 65–99)
GLUCOSE BLD-MCNC: 249 MG/DL (ref 65–99)
GLUCOSE SERPL-MCNC: 232 MG/DL (ref 65–99)
HCT VFR BLD AUTO: 41.6 % (ref 37–47)
HGB BLD-MCNC: 13.2 G/DL (ref 12–16)
LYMPHOCYTES # BLD AUTO: 3.55 K/UL (ref 1–4.8)
LYMPHOCYTES NFR BLD: 25.7 % (ref 22–41)
MANUAL DIFF BLD: NORMAL
MCH RBC QN AUTO: 27.7 PG (ref 27–33)
MCHC RBC AUTO-ENTMCNC: 31.7 G/DL (ref 33.6–35)
MCV RBC AUTO: 87.2 FL (ref 81.4–97.8)
METAMYELOCYTES NFR BLD MANUAL: 3.5 %
MONOCYTES # BLD AUTO: 1.59 K/UL (ref 0–0.85)
MONOCYTES NFR BLD AUTO: 11.5 % (ref 0–13.4)
MORPHOLOGY BLD-IMP: NORMAL
MYELOCYTES NFR BLD MANUAL: 3.5 %
NEUTROPHILS # BLD AUTO: 7.58 K/UL (ref 2–7.15)
NEUTROPHILS NFR BLD: 50.5 % (ref 44–72)
NEUTS BAND NFR BLD MANUAL: 4.4 % (ref 0–10)
NRBC # BLD AUTO: 0.04 K/UL
NRBC BLD AUTO-RTO: 0.3 /100 WBC
PLATELET # BLD AUTO: 289 K/UL (ref 164–446)
PLATELET BLD QL SMEAR: NORMAL
PMV BLD AUTO: 11.3 FL (ref 9–12.9)
POTASSIUM SERPL-SCNC: 3 MMOL/L (ref 3.6–5.5)
RBC # BLD AUTO: 4.77 M/UL (ref 4.2–5.4)
RBC BLD AUTO: NORMAL
SIGNIFICANT IND 70042: NORMAL
SITE SITE: NORMAL
SODIUM SERPL-SCNC: 139 MMOL/L (ref 135–145)
SOURCE SOURCE: NORMAL
WBC # BLD AUTO: 13.8 K/UL (ref 4.8–10.8)

## 2017-12-14 PROCEDURE — 80048 BASIC METABOLIC PNL TOTAL CA: CPT

## 2017-12-14 PROCEDURE — 99233 SBSQ HOSP IP/OBS HIGH 50: CPT | Performed by: FAMILY MEDICINE

## 2017-12-14 PROCEDURE — 700111 HCHG RX REV CODE 636 W/ 250 OVERRIDE (IP): Performed by: INTERNAL MEDICINE

## 2017-12-14 PROCEDURE — 700101 HCHG RX REV CODE 250

## 2017-12-14 PROCEDURE — 700111 HCHG RX REV CODE 636 W/ 250 OVERRIDE (IP)

## 2017-12-14 PROCEDURE — 501480 HCHG STOCKINETTE: Performed by: ORTHOPAEDIC SURGERY

## 2017-12-14 PROCEDURE — 160028 HCHG SURGERY MINUTES - 1ST 30 MINS LEVEL 3: Performed by: ORTHOPAEDIC SURGERY

## 2017-12-14 PROCEDURE — 82962 GLUCOSE BLOOD TEST: CPT | Mod: 91

## 2017-12-14 PROCEDURE — 700102 HCHG RX REV CODE 250 W/ 637 OVERRIDE(OP): Performed by: HOSPITALIST

## 2017-12-14 PROCEDURE — 85027 COMPLETE CBC AUTOMATED: CPT

## 2017-12-14 PROCEDURE — 36415 COLL VENOUS BLD VENIPUNCTURE: CPT

## 2017-12-14 PROCEDURE — A9270 NON-COVERED ITEM OR SERVICE: HCPCS | Performed by: FAMILY MEDICINE

## 2017-12-14 PROCEDURE — 160035 HCHG PACU - 1ST 60 MINS PHASE I: Performed by: ORTHOPAEDIC SURGERY

## 2017-12-14 PROCEDURE — 160009 HCHG ANES TIME/MIN: Performed by: ORTHOPAEDIC SURGERY

## 2017-12-14 PROCEDURE — 160048 HCHG OR STATISTICAL LEVEL 1-5: Performed by: ORTHOPAEDIC SURGERY

## 2017-12-14 PROCEDURE — 160036 HCHG PACU - EA ADDL 30 MINS PHASE I: Performed by: ORTHOPAEDIC SURGERY

## 2017-12-14 PROCEDURE — 700102 HCHG RX REV CODE 250 W/ 637 OVERRIDE(OP): Performed by: FAMILY MEDICINE

## 2017-12-14 PROCEDURE — 501838 HCHG SUTURE GENERAL: Performed by: ORTHOPAEDIC SURGERY

## 2017-12-14 PROCEDURE — 700102 HCHG RX REV CODE 250 W/ 637 OVERRIDE(OP)

## 2017-12-14 PROCEDURE — 88307 TISSUE EXAM BY PATHOLOGIST: CPT

## 2017-12-14 PROCEDURE — 88311 DECALCIFY TISSUE: CPT

## 2017-12-14 PROCEDURE — 700102 HCHG RX REV CODE 250 W/ 637 OVERRIDE(OP): Performed by: INTERNAL MEDICINE

## 2017-12-14 PROCEDURE — 0Y6H0Z2 DETACHMENT AT RIGHT LOWER LEG, MID, OPEN APPROACH: ICD-10-PCS | Performed by: ORTHOPAEDIC SURGERY

## 2017-12-14 PROCEDURE — 500881 HCHG PACK, EXTREMITY: Performed by: ORTHOPAEDIC SURGERY

## 2017-12-14 PROCEDURE — A9270 NON-COVERED ITEM OR SERVICE: HCPCS

## 2017-12-14 PROCEDURE — A9270 NON-COVERED ITEM OR SERVICE: HCPCS | Performed by: INTERNAL MEDICINE

## 2017-12-14 PROCEDURE — 500054 HCHG BANDAGE, ELASTIC 6: Performed by: ORTHOPAEDIC SURGERY

## 2017-12-14 PROCEDURE — 85007 BL SMEAR W/DIFF WBC COUNT: CPT

## 2017-12-14 PROCEDURE — 160039 HCHG SURGERY MINUTES - EA ADDL 1 MIN LEVEL 3: Performed by: ORTHOPAEDIC SURGERY

## 2017-12-14 PROCEDURE — 700105 HCHG RX REV CODE 258

## 2017-12-14 PROCEDURE — 500122 HCHG BOVIE, BLADE: Performed by: ORTHOPAEDIC SURGERY

## 2017-12-14 PROCEDURE — 700111 HCHG RX REV CODE 636 W/ 250 OVERRIDE (IP): Performed by: FAMILY MEDICINE

## 2017-12-14 PROCEDURE — 700105 HCHG RX REV CODE 258: Performed by: INTERNAL MEDICINE

## 2017-12-14 PROCEDURE — 770020 HCHG ROOM/CARE - TELE (206)

## 2017-12-14 PROCEDURE — 160002 HCHG RECOVERY MINUTES (STAT): Performed by: ORTHOPAEDIC SURGERY

## 2017-12-14 PROCEDURE — A9270 NON-COVERED ITEM OR SERVICE: HCPCS | Performed by: HOSPITALIST

## 2017-12-14 PROCEDURE — 501329 HCHG SET, CYSTO IRRIG Y TUR: Performed by: ORTHOPAEDIC SURGERY

## 2017-12-14 PROCEDURE — 700105 HCHG RX REV CODE 258: Performed by: FAMILY MEDICINE

## 2017-12-14 RX ORDER — SODIUM CHLORIDE 9 MG/ML
INJECTION, SOLUTION INTRAVENOUS
Status: COMPLETED
Start: 2017-12-14 | End: 2017-12-14

## 2017-12-14 RX ORDER — OXYCODONE HCL 5 MG/5 ML
SOLUTION, ORAL ORAL
Status: COMPLETED
Start: 2017-12-14 | End: 2017-12-14

## 2017-12-14 RX ORDER — POTASSIUM CHLORIDE 7.45 MG/ML
10 INJECTION INTRAVENOUS
Status: DISCONTINUED | OUTPATIENT
Start: 2017-12-14 | End: 2017-12-14

## 2017-12-14 RX ORDER — POTASSIUM CHLORIDE 20 MEQ/1
20 TABLET, EXTENDED RELEASE ORAL DAILY
Status: DISCONTINUED | OUTPATIENT
Start: 2017-12-14 | End: 2017-12-20 | Stop reason: HOSPADM

## 2017-12-14 RX ORDER — MEPERIDINE HYDROCHLORIDE 25 MG/ML
INJECTION INTRAMUSCULAR; INTRAVENOUS; SUBCUTANEOUS
Status: COMPLETED
Start: 2017-12-14 | End: 2017-12-14

## 2017-12-14 RX ORDER — POTASSIUM CHLORIDE 20 MEQ/1
40 TABLET, EXTENDED RELEASE ORAL ONCE
Status: COMPLETED | OUTPATIENT
Start: 2017-12-14 | End: 2017-12-14

## 2017-12-14 RX ADMIN — INSULIN HUMAN 4 UNITS: 100 INJECTION, SOLUTION PARENTERAL at 20:28

## 2017-12-14 RX ADMIN — FENTANYL CITRATE 50 MCG: 50 INJECTION, SOLUTION INTRAMUSCULAR; INTRAVENOUS at 16:50

## 2017-12-14 RX ADMIN — HYDROMORPHONE HYDROCHLORIDE 2 MG: 2 INJECTION INTRAMUSCULAR; INTRAVENOUS; SUBCUTANEOUS at 10:54

## 2017-12-14 RX ADMIN — OXYCODONE HYDROCHLORIDE 10 MG: 10 TABLET ORAL at 01:54

## 2017-12-14 RX ADMIN — HYDROMORPHONE HYDROCHLORIDE 2 MG: 2 INJECTION INTRAMUSCULAR; INTRAVENOUS; SUBCUTANEOUS at 23:10

## 2017-12-14 RX ADMIN — POTASSIUM CHLORIDE 40 MEQ: 1500 TABLET, EXTENDED RELEASE ORAL at 04:52

## 2017-12-14 RX ADMIN — HYDROMORPHONE HYDROCHLORIDE 2 MG: 2 INJECTION INTRAMUSCULAR; INTRAVENOUS; SUBCUTANEOUS at 13:14

## 2017-12-14 RX ADMIN — INSULIN HUMAN 3 UNITS: 100 INJECTION, SOLUTION PARENTERAL at 18:18

## 2017-12-14 RX ADMIN — BUDESONIDE AND FORMOTEROL FUMARATE DIHYDRATE 2 PUFF: 160; 4.5 AEROSOL RESPIRATORY (INHALATION) at 09:33

## 2017-12-14 RX ADMIN — SODIUM CHLORIDE: 9 INJECTION, SOLUTION INTRAVENOUS at 09:20

## 2017-12-14 RX ADMIN — PREDNISONE 40 MG: 20 TABLET ORAL at 08:27

## 2017-12-14 RX ADMIN — SODIUM CHLORIDE: 9 INJECTION, SOLUTION INTRAVENOUS at 20:14

## 2017-12-14 RX ADMIN — HYDROMORPHONE HYDROCHLORIDE 2 MG: 2 INJECTION INTRAMUSCULAR; INTRAVENOUS; SUBCUTANEOUS at 18:10

## 2017-12-14 RX ADMIN — STANDARDIZED SENNA CONCENTRATE AND DOCUSATE SODIUM 2 TABLET: 8.6; 5 TABLET, FILM COATED ORAL at 20:15

## 2017-12-14 RX ADMIN — MEPERIDINE HYDROCHLORIDE 12.5 MG: 25 INJECTION INTRAMUSCULAR; INTRAVENOUS; SUBCUTANEOUS at 16:08

## 2017-12-14 RX ADMIN — SODIUM CHLORIDE: 9 INJECTION, SOLUTION INTRAVENOUS at 17:48

## 2017-12-14 RX ADMIN — PIPERACILLIN SODIUM AND TAZOBACTAM SODIUM 4.5 G: 4; .5 INJECTION, POWDER, FOR SOLUTION INTRAVENOUS at 20:14

## 2017-12-14 RX ADMIN — HEPARIN SODIUM 5000 UNITS: 5000 INJECTION, SOLUTION INTRAVENOUS; SUBCUTANEOUS at 20:15

## 2017-12-14 RX ADMIN — OXYCODONE HYDROCHLORIDE 10 MG: 5 SOLUTION ORAL at 16:30

## 2017-12-14 RX ADMIN — HYDROMORPHONE HYDROCHLORIDE 2 MG: 2 INJECTION INTRAMUSCULAR; INTRAVENOUS; SUBCUTANEOUS at 01:54

## 2017-12-14 RX ADMIN — FENTANYL CITRATE 50 MCG: 50 INJECTION, SOLUTION INTRAMUSCULAR; INTRAVENOUS at 16:32

## 2017-12-14 RX ADMIN — POTASSIUM CHLORIDE: 2 INJECTION, SOLUTION, CONCENTRATE INTRAVENOUS at 09:18

## 2017-12-14 RX ADMIN — POTASSIUM CHLORIDE 20 MEQ: 1500 TABLET, EXTENDED RELEASE ORAL at 12:21

## 2017-12-14 RX ADMIN — HYDROMORPHONE HYDROCHLORIDE 2 MG: 2 INJECTION INTRAMUSCULAR; INTRAVENOUS; SUBCUTANEOUS at 08:18

## 2017-12-14 RX ADMIN — HYDROMORPHONE HYDROCHLORIDE 2 MG: 2 INJECTION INTRAMUSCULAR; INTRAVENOUS; SUBCUTANEOUS at 20:11

## 2017-12-14 RX ADMIN — HYDROMORPHONE HYDROCHLORIDE 2 MG: 2 INJECTION INTRAMUSCULAR; INTRAVENOUS; SUBCUTANEOUS at 04:52

## 2017-12-14 RX ADMIN — NICOTINE 21 MG: 21 PATCH, EXTENDED RELEASE TRANSDERMAL at 05:04

## 2017-12-14 RX ADMIN — METOPROLOL TARTRATE 25 MG: 25 TABLET, FILM COATED ORAL at 20:15

## 2017-12-14 RX ADMIN — ALPRAZOLAM 0.25 MG: 0.25 TABLET ORAL at 20:16

## 2017-12-14 RX ADMIN — INSULIN GLARGINE 80 UNITS: 100 INJECTION, SOLUTION SUBCUTANEOUS at 20:28

## 2017-12-14 RX ADMIN — ATORVASTATIN CALCIUM 40 MG: 40 TABLET, FILM COATED ORAL at 20:15

## 2017-12-14 RX ADMIN — INSULIN GLARGINE 80 UNITS: 100 INJECTION, SOLUTION SUBCUTANEOUS at 08:24

## 2017-12-14 RX ADMIN — TIOTROPIUM BROMIDE 1 CAPSULE: 18 CAPSULE ORAL; RESPIRATORY (INHALATION) at 09:33

## 2017-12-14 RX ADMIN — FUROSEMIDE 40 MG: 10 INJECTION, SOLUTION INTRAMUSCULAR; INTRAVENOUS at 05:02

## 2017-12-14 RX ADMIN — METOPROLOL TARTRATE 25 MG: 25 TABLET, FILM COATED ORAL at 08:28

## 2017-12-14 ASSESSMENT — ENCOUNTER SYMPTOMS
DIARRHEA: 0
BLURRED VISION: 0
ABDOMINAL PAIN: 0
SORE THROAT: 0
VOMITING: 0
MYALGIAS: 1
WEAKNESS: 1
WHEEZING: 1
NAUSEA: 0
DIZZINESS: 0
SHORTNESS OF BREATH: 1
FEVER: 0
COUGH: 0
HEARTBURN: 0
CHILLS: 0
SHORTNESS OF BREATH: 0

## 2017-12-14 ASSESSMENT — PAIN SCALES - GENERAL
PAINLEVEL_OUTOF10: 8
PAINLEVEL_OUTOF10: 9
PAINLEVEL_OUTOF10: 8
PAINLEVEL_OUTOF10: 10
PAINLEVEL_OUTOF10: 8
PAINLEVEL_OUTOF10: 9
PAINLEVEL_OUTOF10: 8
PAINLEVEL_OUTOF10: 10
PAINLEVEL_OUTOF10: 9
PAINLEVEL_OUTOF10: 2
PAINLEVEL_OUTOF10: 9
PAINLEVEL_OUTOF10: 8
PAINLEVEL_OUTOF10: 10
PAINLEVEL_OUTOF10: 4

## 2017-12-14 NOTE — PROGRESS NOTES
[facility] Hospitalist Progress Note    Date of Service: 2017    Chief Complaint  52 y.o. female admitted 2017 with Sepsis, Respiratory failure, Diabetic foot ulcer with osteomyelitis and Leg abscess, NSTEMI, also with COPD exaceration and pulmonary edema    Interval Problem Update    Diabetic foot - for BKA today  NSTEMI - trops trended down, Echo reviewed  Resp failure - remains on O2  COPDE - less wheezing  Pulm edema - less shortness of breath  Diabetes - better controlled  HTN - controlled  Low K    Consultants/Specialty  LPS - Socrates  ID - Ardon    Disposition  SNF        Review of Systems   Constitutional: Positive for malaise/fatigue. Negative for chills and fever.   HENT: Negative for hearing loss and sore throat.    Eyes: Negative for blurred vision.   Respiratory: Positive for shortness of breath and wheezing. Negative for cough.    Cardiovascular: Positive for leg swelling. Negative for chest pain.   Gastrointestinal: Negative for abdominal pain, diarrhea, heartburn, nausea and vomiting.   Genitourinary: Negative for dysuria.   Neurological: Positive for weakness. Negative for dizziness.      Physical Exam  Laboratory/Imaging   Hemodynamics  Temp (24hrs), Av.4 °C (97.6 °F), Min:36.2 °C (97.1 °F), Max:36.7 °C (98 °F)   Temperature: 36.7 °C (98 °F)  Pulse  Av.2  Min: 73  Max: 156   Blood Pressure: 115/80      Respiratory      Respiration: 20, Pulse Oximetry: 93 %     Work Of Breathing / Effort: Mild  RUL Breath Sounds: Clear, RML Breath Sounds: Diminished, RLL Breath Sounds: Diminished, KWAME Breath Sounds: Clear, LLL Breath Sounds: Diminished    Fluids    Intake/Output Summary (Last 24 hours) at 17 1211  Last data filed at 17 0200   Gross per 24 hour   Intake              480 ml   Output             1500 ml   Net            -1020 ml       Nutrition  Orders Placed This Encounter   Procedures   • DIET NPO     Standing Status:   Standing     Number of Occurrences:   8     Order  Specific Question:   Restrict to:     Answer:   Sips with Medications [3]   • DIET NPO     Standing Status:   Standing     Number of Occurrences:   8     Order Specific Question:   Restrict to:     Answer:   Sips with Medications [3]     Physical Exam   Constitutional: She is oriented to person, place, and time. She appears well-developed.   Morbidly obese   HENT:   Head: Normocephalic and atraumatic.   Eyes: Conjunctivae are normal. Pupils are equal, round, and reactive to light.   Neck: No tracheal deviation present. No thyromegaly present.   Cardiovascular: Normal rate and regular rhythm.    Pulmonary/Chest: Effort normal. She has wheezes. She has rales.   Abdominal: Soft. Bowel sounds are normal. She exhibits no distension. There is no tenderness.   Musculoskeletal: She exhibits edema.   Swelling right leg  Transmetatarsal amputation Left foot   Lymphadenopathy:     She has no cervical adenopathy.   Neurological: She is alert and oriented to person, place, and time.   Skin:   ulcers Right foot   Nursing note and vitals reviewed.      Recent Labs      12/12/17   0156  12/13/17   0240  12/14/17   0237   WBC  13.0*  11.5*  13.8*   RBC  4.02*  4.54  4.77   HEMOGLOBIN  11.0*  12.8  13.2   HEMATOCRIT  34.3*  39.4  41.6   MCV  85.3  86.8  87.2   MCH  27.4  28.2  27.7   MCHC  32.1*  32.5*  31.7*   RDW  49.4  49.3  49.1   PLATELETCT  194  227  289   MPV  12.6  12.0  11.3     Recent Labs      12/12/17   0156  12/13/17   0240  12/14/17   0237   SODIUM  131*  136  139   POTASSIUM  4.0  3.7  3.0*   CHLORIDE  97  97  98   CO2  25  28  32   GLUCOSE  329*  304*  232*   BUN  25*  21  26*   CREATININE  0.51  0.48*  0.65   CALCIUM  8.0*  8.3*  9.3                      Assessment/Plan     * Severe sepsis due to infected right diabetic foot (CMS-HCC)- (present on admission)   Assessment & Plan    This is severe sepsis with the following associated acute organ dysfunction(s): acute respiratory failure., NSTEMI  Off IVF         Right  diabetic foot infection, cellulits, and abscess (CMS-HCC)- (present on admission)   Assessment & Plan    LPS, for BKA today  IV Zosyn  IV Dilaudid        Acute respiratory failure with hypoxia due to sepsis, fluid overload, AECOPD (CMS-HCC)- (present on admission)   Assessment & Plan    Keep O2 sats above 90%        Poorly controlled type 2 diabetes mellitus with neuropathy (CMS-HCC)- (present on admission)   Assessment & Plan    Lantus, SSI        Pulmonary edema   Assessment & Plan    Stop IV Lasix        Hypokalemia   Assessment & Plan    IV KCl, Kdur  Follow bmp        COPD with acute exacerbation (CMS-HCC)   Assessment & Plan    suspected  Symbicort, Spiriva, RT protocol, Prednisone        Hyponatremia- (present on admission)   Assessment & Plan    Follow bmp        NSTEMI (non-ST elevated myocardial infarction) (CMS-HCC)- (present on admission)   Assessment & Plan    ASA, Lipitor, Metoprolol            Hypertension- (present on admission)   Assessment & Plan    Metoprolol         Morbid obesity with BMI of 40.0-44.9, adult (CMS-HCC)- (present on admission)   Assessment & Plan    Body mass index is 43.7 kg/m²..  referral for outpatient weight management.          Tobacco abuse- (present on admission)   Assessment & Plan    Nicotine replacement             Reviewed items::  EKG reviewed, Radiology images reviewed, Labs reviewed and Medications reviewed  Long catheter::  Urinary Tract Retention or Urinary Tract Obstruction  DVT prophylaxis pharmacological::  Heparin  Ulcer Prophylaxis::  No  Antibiotics:  Treating active infection/contamination beyond 24 hours perioperative coverage

## 2017-12-14 NOTE — PROGRESS NOTES
Report received. Care assumed. Patient A&Ox4. Pt reports feeling 8/10 pain, no SOB at this time.  Pt currently is requesting pain medications.Discussed POC for the day with Pt. Call light within reach, encouraged pt to call for assistance.   Patient was showered on day shift

## 2017-12-14 NOTE — PROGRESS NOTES
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  Admitted to medicine with NSTEMI as well.      S: Doing okay, no complaints, no questions    O:  Vitals:    12/14/17 0404 12/14/17 0753 12/14/17 1245 12/14/17 1405   BP: 119/74 115/80 134/85 148/98   Pulse: 73 97 85 85   Resp: 18 20 16 (!) 23   Temp: 36.4 °C (97.5 °F) 36.7 °C (98 °F) 36.4 °C (97.5 °F) 36.4 °C (97.6 °F)   SpO2: 98% 93% 94% 97%   Weight:       Height:         Exam:  General-NAD, alert and following commands  RLE-deformity at foot with swelling and plantar draining wound. 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  Admitted to medicine with NSTEMI as well.  Glycemic control much improved.    Recs:  -we discussed treatment options for right foot.  I recommend a below knee amputation for definitive treatment of her infection.  She is in agreement.  -scheduled for right BKA today

## 2017-12-14 NOTE — PROGRESS NOTES
Infectious Disease Progress Note    Author: Nano Ardon M.D. Date & Time of service: 2017  10:28 AM    Chief Complaint:  FU R diabetic foot OM    Interval History:   AF, WBC 11.5, foot pain better controlled today, tolerating abx without issues, plan for R BKA tomorrow   AF, WBC 13.8, lots of foot pain, plan for R BKA today  Labs Reviewed, Medications Reviewed, Radiology Reviewed and Wound Reviewed.    Review of Systems:  Review of Systems   Constitutional: Negative for chills and fever.   Respiratory: Negative for cough and shortness of breath.    Gastrointestinal: Negative for abdominal pain, diarrhea, nausea and vomiting.   Musculoskeletal: Positive for joint pain and myalgias.       Hemodynamics:  Temp (24hrs), Av.4 °C (97.6 °F), Min:36.2 °C (97.1 °F), Max:36.7 °C (98 °F)  Temperature: 36.7 °C (98 °F)  Pulse  Av.2  Min: 73  Max: 156  Blood Pressure: 115/80       Physical Exam:  Physical Exam   Constitutional: She is oriented to person, place, and time. She appears well-developed.   Obese   HENT:   Head: Normocephalic and atraumatic.   Hirsuite   Eyes: EOM are normal. Pupils are equal, round, and reactive to light.   Neck: Neck supple.   Cardiovascular: Normal rate, regular rhythm and normal heart sounds.    Pulmonary/Chest: Effort normal and breath sounds normal.   Abdominal: Soft. There is no tenderness.   Genitourinary:   Genitourinary Comments: Long catheter   Musculoskeletal: She exhibits edema.   Evidence of left foot transmet amputation. Surgical site clean.   R charcot foot with extensive deformity and swelling. There is a lateral wound in addition to a plantar wound with necrotic tissue     Neurological: She is alert and oriented to person, place, and time.       Meds:    Current Facility-Administered Medications:   •  miscellaneous IVPB  •  predniSONE  •  hydromorphone  •  insulin glargine  •  budesonide-formoterol  •  tiotropium  •  ondansetron  •  ondansetron  •   [COMPLETED] piperacillin-tazobactam **AND** piperacillin-tazobactam (ZOSYN) continuous infusion  •  NS  •  NS  •  heparin  •  alprazolam  •  Respiratory Care per Protocol  •  aspirin  •  metoprolol  •  senna-docusate **AND** polyethylene glycol/lytes **AND** magnesium hydroxide **AND** bisacodyl  •  INITIATE NICOTINE REPLACEMENT PROTOCOL  **AND** nicotine **AND** Protocol 205 PATIENT EDUCATION MATERIALS **AND** Protocol 205 Rotate nicotine patch application sites daily  **AND** nicotine polacrilex  •  insulin regular **AND** Accu-Chek ACHS **AND** NOTIFY MD and PharmD **AND** glucose 4 g **AND** dextrose 50%  •  atorvastatin  •  acetaminophen  •  oxycodone immediate-release **OR** oxycodone immediate-release    Labs:  Recent Labs      12/12/17 0156 12/13/17 0240 12/14/17   0237   WBC  13.0*  11.5*  13.8*   RBC  4.02*  4.54  4.77   HEMOGLOBIN  11.0*  12.8  13.2   HEMATOCRIT  34.3*  39.4  41.6   MCV  85.3  86.8  87.2   MCH  27.4  28.2  27.7   RDW  49.4  49.3  49.1   PLATELETCT  194  227  289   MPV  12.6  12.0  11.3   NEUTSPOLYS  78.30*  64.90  50.50   LYMPHOCYTES  16.50*  18.40*  25.70   MONOCYTES  4.30  10.50  11.50   EOSINOPHILS  0.00  0.90  0.00   BASOPHILS  0.90  0.00  0.90   RBCMORPHOLO  Present  Present  Normal     Recent Labs      12/12/17 0156 12/13/17   0240  12/14/17   0237   SODIUM  131*  136  139   POTASSIUM  4.0  3.7  3.0*   CHLORIDE  97  97  98   CO2  25  28  32   GLUCOSE  329*  304*  232*   BUN  25*  21  26*     Recent Labs      12/12/17 0156 12/13/17   0240  12/14/17   0237   CREATININE  0.51  0.48*  0.65       Imaging:  Ct-extremity, Lower With Right    Result Date: 12/10/2017  12/10/2017 1:58 PM HISTORY/REASON FOR EXAM:  Atraumatic Pain/Swelling/Deformity. Right foot pain and swelling, infection TECHNIQUE/EXAM DESCRIPTION AND NUMBER OF VIEWS:  CT scan of the RIGHT lower extremity with contrast, with reconstructions. Thin helical 3 mm sections were obtained from the distal femur through  the proximal tibia/fibula. Sagittal and coronal multiplanar reconstructions were generated from the axial images. A total of 100 mL of Omnipaque 350 nonionic contrast was administered  IV without complication. Up to date radiation dose reduction adjustments have been utilized to meet ALARA standards for radiation dose reduction. COMPARISON: Right foot x-ray 12/15/2015 FINDINGS: There is a large rim-enhancing fluid collection surrounding and within the midfoot. It extends from the dorsal to plantar surface measuring 7.0 cm craniocaudal, at least 6.8 cm in length, and 8.9 cm transversely. This is consistent with an abscess. It contains gas. There is destructive change of the navicular, cuneiforms, cuboid consistent with a combination of osteomyelitis and Charcot change. There appears to be an open wound in the lateral hindfoot midfoot junction. There is extensive associated cutaneous and subcutaneous edema extending at the distal leg consistent with cellulitis. There is also soft tissue gas located superior to the calcaneus.     1.  Large abscess replacing the majority of the RIGHT midfoot measuring at least 7 cm craniocaudal and 6.8 x 8.9 cm transversely 2.  Associated gas within the mid foot as well as proximal to the calcaneus consistent with necrotizing infection 3.  Destructive change of the midfoot consistent with osteomyelitis 4.  Extensive cellulitis Findings were discussed with HAKAN CRUZ on 12/10/2017 2:39 PM.    Dx-chest-portable (1 View)    Result Date: 12/11/2017 12/11/2017 12:16 PM HISTORY/REASON FOR EXAM:  Shortness of Breath. TECHNIQUE/EXAM DESCRIPTION AND NUMBER OF VIEWS: Single portable view of the chest. COMPARISON: 12/10/2017 FINDINGS: The heart is enlarged. Patchy bibasilar opacities may represent atelectasis or pneumonitis and are improved compared to prior. No pleural effusion or pneumothorax is identified.     Patchy bibasilar opacities may represent atelectasis or pneumonitis and are  improved compared to prior. Stable cardiomegaly.    Dx-chest-portable (1 View)    Result Date: 12/10/2017  12/10/2017 12:46 PM HISTORY/REASON FOR EXAM:  Shortness of Breath. TECHNIQUE/EXAM DESCRIPTION AND NUMBER OF VIEWS: Single portable view of the chest. COMPARISON: 1 view chest 12/10/2017 0005 hours FINDINGS: There are pulmonary interstitial and alveolar densities that are consistent with edema. Cardiac silhouette: enlarged. Pleura: There are no pleural effusion or pneumothoraces. Osseous structures: No significant bony abnormality is present.     Development of mild-moderate pulmonary edema    Dx-chest-portable (1 View)    Result Date: 12/10/2017  12/10/2017 12:00 AM HISTORY/REASON FOR EXAM:  Shortness of Breath. TECHNIQUE/EXAM DESCRIPTION AND NUMBER OF VIEWS: Single portable view of the chest. COMPARISON: 12/9/2017 FINDINGS: Single portable view of the chest demonstrates a stable cardiac silhouette and mediastinal contours. No discrete opacity, pleural fluid, or pneumothorax. The pulmonary vasculature remains prominent. No suspicious bony lesions.     No significant interval change.    Dx-chest-portable (1 View)    Result Date: 12/9/2017 12/9/2017 6:32 PM HISTORY/REASON FOR EXAM: Chest Pain. TECHNIQUE/EXAM DESCRIPTION AND NUMBER OF VIEWS: Single AP view of the chest. COMPARISON: December 27, 2015 FINDINGS: Hardware: None. Lungs: New minor fissure visualization and mild hazy opacity Pleura:  No pleural space process is seen. Heart and mediastinum: There is stable cardiac silhouette enlargement.     Stable cardiac silhouette enlargement with borderline interstitial edema    Dx-foot-complete 3+ Right    Result Date: 12/10/2017  12/10/2017 6:57 PM HISTORY/REASON FOR EXAM: Foot [id number] TECHNIQUE/EXAM DESCRIPTION AND NUMBER OF VIEWS: 3 nonweightbearing views of the RIGHT foot. COMPARISON:  CT today. FINDINGS: Soft tissue swelling is severe and there is rocker-bottom deformity There is midfoot bony destruction  with dorsal subluxation. Cuneiforms are fragmented. There is lateral dislocation of the cuboid with partial resorption. There is bony destruction at the metatarsal bases laterally. There is sclerosis. There is an age indeterminate fifth proximal phalanx fracture. There is periosteal reaction of the fifth metatarsal neck anteriorly     Midfoot bony destructive change with severe soft tissue swelling. Differential includes Charcot joint and/or osteomyelitis. Likely both are present    Dx-tibia And Fibula Right    Result Date: 12/10/2017  12/10/2017 6:58 PM HISTORY/REASON FOR EXAM:  Atraumatic Pain/Swelling/Deformity. Clinically suspected osteomyelitis TECHNIQUE/EXAM DESCRIPTION AND NUMBER OF VIEWS:  2 views of the RIGHT tibia and fibula. COMPARISON: None FINDINGS: There is diffuse subcutaneous fat stranding Vascular calcifications No acute bony destruction is seen. No acute periosteal reaction. There is some dorsal bony spurring along the tibia which is related to a likely remote injury/tug lesion.     No radiographic evidence of acute bony destruction to suggest osteomyelitis Soft tissue swelling    Echocardiogram-comp W/ Cont    Result Date: 2017  Transthoracic Echo Report Echocardiography Laboratory CONCLUSIONS No prior study is available for comparison. Normal left ventricular systolic function. No evidence of valvular abnormality based on Doppler evaluation. TADEO HARMAN Exam Date:         2017                    09:13 Exam Location:     Inpatient Priority:          Routine Ordering Physician:        FOZIA MCNAMARA Referring Physician: Sonographer:               Selina Olsen RDCS,                            RVT Age:    52     Gender:    F MRN:    [id number] :    1965 BSA:    2.29   Ht (in):    67     Wt (lb):    267 Exam Type:     Complete, Contrast Indications:     Acute MI ICD Codes:       410.9 CPT Codes:       35712,  BP:   114    /   73     HR: Technical Quality:       Poor  MEASUREMENTS  (Male / Female) Normal Values 2D ECHO LV Diastolic Diameter PLAX        4.2 cm                4.2 - 5.9 / 3.9 - 5.3 cm LV Systolic Diameter PLAX         3.1 cm                2.1 - 4.0 cm IVS Diastolic Thickness           1.8 cm                LVPW Diastolic Thickness          1.2 cm                LVOT Diameter                     2.2 cm                Estimated LV Ejection Fraction    60 %                  M-MODE Aortic Root Diameter MM           3.3 cm                DOPPLER AV Peak Velocity                  1.4 m/s               AV Peak Gradient                  7.7 mmHg              AV Mean Gradient                  5.2 mmHg              LVOT Peak Velocity                0.88 m/s              AV Area Cont Eq vti               2 cm²                 Mitral E Point Velocity           0.79 m/s              Mitral E to A Ratio               0.8                   MV Pressure Half Time             47.5 ms               MV Area PHT                       4.6 cm²               MV Deceleration Time              164 ms                TV Peak E Velocity                0.57 m/s              TR Peak Velocity                  281 cm/s              PV Peak Velocity                  0.82 m/s              PV Peak Gradient                  2.7 mmHg              RVOT Peak Velocity                0.75 m/s              * Indicates values subject to auto-interpretation LV EF:  60    % FINDINGS Left Ventricle Normal left ventricular chamber size. Normal left ventricular systolic function. Left ventricular ejection fraction is visually estimated to be 60%. Normal regional wall motion. Normal diastolic function. Contrast was used to enhance visualization of the endocardial border. 1ML of contrast was administered. Existing IV was used, located at the left forarm. Right Ventricle Right ventricle not well visualized. Right Atrium Right atrium not well visualized. Left Atrium The left atrium is normal in size. Mitral Valve  "Structurally normal mitral valve without significant stenosis. Trace mitral regurgitation. Aortic Valve Aortic sclerosis without stenosis. No aortic insufficiency. Tricuspid Valve Structurally normal tricuspid valve without significant stenosis. Moderate tricuspid regurgitation. Right ventricular systolic pressure is estimated to be 35mmHg. Pulmonic Valve The pulmonic valve is not well visualized. No stenosis or regurgitation seen. Pericardium Normal pericardium without effusion. Aorta The aortic root is normal. Sue GORDON To (Electronically Signed) Final Date:     11 December 2017                 11:05      Micro:  Results     Procedure Component Value Units Date/Time    BLOOD CULTURE [[id number]] Collected:  12/10/17 1059    Order Status:  Completed Specimen:  Blood from Peripheral Updated:  12/11/17 0900     Significant Indicator NEG     Source BLD     Site PERIPHERAL     Blood Culture --     No Growth    Note: Blood cultures are incubated for 5 days and  are monitored continuously.Positive blood cultures  are called to the RN and reported as soon as  they are identified.      Narrative:       Per Hospital Policy: Only change Specimen Src: to \"Line\" if  specified by physician order.    INFLUENZA A/B BY PCR [[id number]] Collected:  12/10/17 1230    Order Status:  Completed Updated:  12/10/17 1426     Influenza virus A RNA Negative     Influenza virus B, PCR Negative    Narrative:       Collected By:[id number] [facility].    INFLUENZA BY PCR, A/B/H1N1 [[id number]] Collected:  12/10/17 1230    Order Status:  Canceled Specimen:  Nasal from Nasopharyngeal     Blood Culture [[id number]]     Order Status:  No result Specimen:  Blood from Peripheral     Blood Culture [[id number]]     Order Status:  No result Specimen:  Blood from Peripheral     BLOOD CULTURE [[id number]] Collected:  12/09/17 1849    Order Status:  Completed Specimen:  Blood from Peripheral Updated:  12/10/17 0853     Significant Indicator NEG     " "Source BLD     Site PERIPHERAL     Blood Culture --     No Growth    Note: Blood cultures are incubated for 5 days and  are monitored continuously.Positive blood cultures  are called to the RN and reported as soon as  they are identified.      Narrative:       Per Hospital Policy: Only change Specimen Src: to \"Line\" if  specified by physician order.    URINALYSIS [[id number]]  (Abnormal) Collected:  12/09/17 1538    Order Status:  Completed Specimen:  Urine from Urine, Cath Updated:  12/09/17 1547     Color Yellow     Character Cloudy (A)     Specific Gravity 1.029     Ph 5.0     Glucose >=1000 (A) mg/dL      Ketones 15 (A) mg/dL      Protein 100 (A) mg/dL      Bilirubin Negative     Urobilinogen, Urine 0.2     Nitrite Negative     Leukocyte Esterase Negative     Occult Blood Moderate (A)     Micro Urine Req Microscopic    BLOOD CULTURE [[id number]]     Order Status:  Canceled Specimen:  Blood from Peripheral     BLOOD CULTURE [[id number]] Collected:  12/09/17 0000    Order Status:  Canceled Specimen:  Other from Peripheral           Assessment:  Active Hospital Problems    Diagnosis   • *Severe sepsis due to infected right diabetic foot (CMS-HCC) [A41.9, R65.20]   • Acute respiratory failure with hypoxia due to sepsis, fluid overload, AECOPD (CMS-HCC) [J96.01]   • Right diabetic foot infection, cellulits, and abscess (CMS-HCC) [E11.69, L08.9]   • Poorly controlled type 2 diabetes mellitus with neuropathy (CMS-HCC) [E11.40, E11.65]   • Pulmonary edema [J81.1]   • Hypokalemia [E87.6]   • Hyponatremia [E87.1]   • COPD with acute exacerbation (CMS-HCC) [J44.1]   • NSTEMI (non-ST elevated myocardial infarction) (CMS-HCC) [I21.4]   • Hypertension [I10]   • Morbid obesity with BMI of 40.0-44.9, adult (CMS-HCC) [E66.01, Z68.41]   • Tobacco abuse [Z72.0]       Plan:  Sepsis   2/2 Right DFU with osteomyelitis/abscess  Afebrile  Leukocytosis  Continue zosyn    Right foot osteomyelitis - additional work up ongoing  Abx per " above    Plan for R BKA today  Anticipate dc abx 48hrs post op if surgical site without signs of infection    Leukocytosis  2/2 above  On abx above  Monitor    DM2, uncontrolled  HgA1c 11.4  Maintain BS less than 150 to control infection and assist with wound healing    Discussed with internal medicine/ Dr. Ann

## 2017-12-14 NOTE — PROGRESS NOTES
Antonia Cano Fall Risk Assessment:     Last Known Fall: Within the last six months  Mobility: Use of assistive device/requires assist of two people  Medications: Cardiovascular or central nervous system meds  Mental Status/LOC/Awareness: Awake, alert, and oriented to date, place, and person  Toileting Needs: Use of catheters or diversion devices  Volume/Electrolyte Status: No problems  Communication/Sensory: Visual (Glasses)/hearing deficit  Behavior: Depression/anxiety  Antonia Cano Fall Risk Total: 11  Fall Risk Level: MODERATE RISK    Universal Fall Precautions:  call light/belongings in reach, bed in low position and locked, wheelchairs and assistive devices out of sight, siderails up x 2, use non-slip footwear, adequate lighting, clutter free and spill free environment, educate on level of risk, educate to call for assistance    Fall Risk Level Interventions:    TRIAL (TELE 8, NEURO, MED JUANA 5) Moderate Fall Risk Interventions  Place yellow fall risk ID band on patient: verified  Provide patient/family education based on risk assessment : completed  Educate patient/family to call staff for assistance when getting out of bed: completed  Place fall precaution signage outside patient door: completed  Utilize bed/chair fall alarm: completedTRIAL (TELE 8, NEURO, [employer] JUANA 5) High Fall Risk Interventions  Place yellow fall risk ID band on patient: verified  Provide patient/family education based on risk assessment: verified  Educate patient/family to call staff for assistance when getting out of bed: verified  Place fall precaution signage outside patient door: verified  Place patient in room close to nursing station: verified  Utilize bed/chair fall alarm: verified  Notify charge of high risk for huddle: verified    Patient Specific Interventions:     Medication: review medications with patient and family and limit combination of prn medications  Mental Status/LOC/Awareness: reinforce falls education, encourage  family to stay with patient, check on patient hourly, utilize bed/chair fall alarm, reinforce the use of call light and provide activity  Toileting: provide frquent toileting  Volume/Electrolyte Status: ensure patient remains hydrated, monitor blood sugars and maintain appropriate blood sugar levels if diabetic, advance diet as tolerated and ensure IV fluids are appropriate  Communication/Sensory: update plan of care on whiteboard and ensure patient has glasses/contacts and hearing aids/dentures  Behavioral: encourage patient to voice feelings, engage patient in daily activities, administer medication as ordered and instruct/reinforce fall program rationale  Mobility: provide comfort measures during transport, dangle prior to standing, utilize bed/chair fall alarm, ensure bed is locked and in lowest position, provide appropriate assistive device, instruct patient to exit bed on their strongest side and collaborate with doctor for possible PT/OT consult

## 2017-12-14 NOTE — PROGRESS NOTES
Antonia Cano Fall Risk Assessment:     Last Known Fall: Within the last six months  Mobility: Use of assistive device/requires assist of two people  Medications: Cardiovascular or central nervous system meds  Mental Status/LOC/Awareness: Awake, alert, and oriented to date, place, and person  Toileting Needs: Use of catheters or diversion devices  Volume/Electrolyte Status: Use of IV fluids/tube feeds, Low blood sugar/electrolyte imbalances  Communication/Sensory: Visual (Glasses)/hearing deficit  Behavior: Appropriate behavior  Antonia Cano Fall Risk Total: 16  Fall Risk Level: HIGH RISK    Universal Fall Precautions:  call light/belongings in reach, bed in low position and locked, wheelchairs and assistive devices out of sight, siderails up x 2, use non-slip footwear, clutter free and spill free environment, adequate lighting, educate on level of risk, educate to call for assistance    Fall Risk Level Interventions:    TRIAL (TELE 8, NEURO, MED JUANA 5) Moderate Fall Risk Interventions  Place yellow fall risk ID band on patient: verified  Provide patient/family education based on risk assessment : completed  Educate patient/family to call staff for assistance when getting out of bed: completed  Place fall precaution signage outside patient door: completed  Utilize bed/chair fall alarm: completedTRIAL (TELE 8, NEURO, MED JUANA 5) High Fall Risk Interventions  Place yellow fall risk ID band on patient: verified  Provide patient/family education based on risk assessment: completed  Educate patient/family to call staff for assistance when getting out of bed: completed  Place fall precaution signage outside patient door: verified  Place patient in room close to nursing station: verified  Utilize bed/chair fall alarm: verified  Notify charge of high risk for huddle: completed    Patient Specific Interventions:     Medication: review medications with patient and family, assess for medications that can be discontinued or  dosage decreased and limit combination of prn medications  Mental Status/LOC/Awareness: reinforce falls education, check on patient hourly, utilize bed/chair fall alarm and reinforce the use of call light  Toileting: provide frquent toileting, instruct patient/family on the use of grab bars, instruct patient/family on the need to call for assistance when toileting and do not leave patient unattended in bathroom/refer to toileting scripting  Volume/Electrolyte Status: ensure patient remains hydrated, monitor blood sugars and maintain appropriate blood sugar levels if diabetic, monitor abnormal lab values and ensure IV fluids are appropriate  Communication/Sensory: update plan of care on whiteboard, ensure proper positioning when transferrng/ambulating and ensure patient has glasses/contacts and hearing aids/dentures  Behavioral: encourage patient to voice feelings, administer medication as ordered and instruct/reinforce fall program rationale  Mobility: provide comfort measures during transport, dangle prior to standing, utilize bed/chair fall alarm, ensure bed is locked and in lowest position and instruct patient to exit bed on their strongest side

## 2017-12-14 NOTE — PROGRESS NOTES
Diabetes education: Met with patient this afternoon. Please see consult note.  Plan:  Pt may benefit in having a meal dose of insulin in addition to her sliding scale coverage.  Pt states she has all her insulin and supplies at home. Reminded her depending on how long she is away from home, she may need to discard the current insulin opened before admit. CDE will continue to follow. Please call [phone] if needs change

## 2017-12-14 NOTE — CONSULTS
"Diabetes education: Met with Greta regarding diabetes management. Pt has a hx of diabetes on insulin. Pt states she was just released from a care facility two ago and had a new PCP. She states her new PCP, was working on \"curing her diabetes and taking her off insulin\".   Discussed goals for blood sugars, how insulin works, need for insulin, insulin storage, shelf life and site rotation. She states she was taking lantus 78 units BID, with Regular coverage if over 200.    Pt is currently on Lantus 80 units BID with Regular sliding scale coverage ac and hs. Pt continues to have blood sugars over 200: 246 (4 units), 272 (7 units) and 324 ( 10 units).  Plan:  Pt may benefit in having a meal dose of insulin in addition to her sliding scale coverage.  Pt states she has all her insulin and supplies at home. Reminded her depending on how long she is away from home, she may need to discard the current insulin opened before admit. CDE will continue to follow. Please call [phone] if needs change.   "

## 2017-12-14 NOTE — CARE PLAN
Problem: Communication  Goal: The ability to communicate needs accurately and effectively will improve  Outcome: PROGRESSING AS EXPECTED  Patient's white board is updated. Patient is updated on plan of care. All questions answered.

## 2017-12-14 NOTE — PROGRESS NOTES
Received report from previous nurse regarding previous 12 hours. POC reviewed with patient, white board updated, patient verbalized understanding, call light within reach. Patient encouraged to call before getting up. Bed in lowest position, treaded socks on.

## 2017-12-14 NOTE — CARE PLAN
Problem: Communication  Goal: The ability to communicate needs accurately and effectively will improve  Outcome: PROGRESSING AS EXPECTED  Patient is more up front about her needs today than she has been earlier in the week. Will continue to guide patient in the right direction.    Problem: Respiratory:  Goal: Respiratory status will improve  Outcome: PROGRESSING SLOWER THAN EXPECTED  Pt is on 5LNC, takes O2 off during her sleep.  Continue to replace nasal canula and educate patient

## 2017-12-15 ENCOUNTER — HOSPITAL ENCOUNTER (INPATIENT)
Facility: REHABILITATION | Age: 52
End: 2017-12-15
Attending: PHYSICAL MEDICINE & REHABILITATION | Admitting: PHYSICAL MEDICINE & REHABILITATION
Payer: MEDICAID

## 2017-12-15 LAB
ANION GAP SERPL CALC-SCNC: 9 MMOL/L (ref 0–11.9)
ANISOCYTOSIS BLD QL SMEAR: ABNORMAL
BACTERIA BLD CULT: NORMAL
BASOPHILS # BLD AUTO: 0 % (ref 0–1.8)
BASOPHILS # BLD: 0 K/UL (ref 0–0.12)
BUN SERPL-MCNC: 15 MG/DL (ref 8–22)
CALCIUM SERPL-MCNC: 8.5 MG/DL (ref 8.5–10.5)
CHLORIDE SERPL-SCNC: 100 MMOL/L (ref 96–112)
CO2 SERPL-SCNC: 31 MMOL/L (ref 20–33)
CREAT SERPL-MCNC: 0.51 MG/DL (ref 0.5–1.4)
EOSINOPHIL # BLD AUTO: 0.3 K/UL (ref 0–0.51)
EOSINOPHIL NFR BLD: 1.8 % (ref 0–6.9)
ERYTHROCYTE [DISTWIDTH] IN BLOOD BY AUTOMATED COUNT: 50.7 FL (ref 35.9–50)
GFR SERPL CREATININE-BSD FRML MDRD: >60 ML/MIN/1.73 M 2
GLUCOSE BLD-MCNC: 108 MG/DL (ref 65–99)
GLUCOSE BLD-MCNC: 172 MG/DL (ref 65–99)
GLUCOSE BLD-MCNC: 207 MG/DL (ref 65–99)
GLUCOSE BLD-MCNC: 273 MG/DL (ref 65–99)
GLUCOSE SERPL-MCNC: 58 MG/DL (ref 65–99)
HCT VFR BLD AUTO: 43.5 % (ref 37–47)
HGB BLD-MCNC: 13.9 G/DL (ref 12–16)
LYMPHOCYTES # BLD AUTO: 3.67 K/UL (ref 1–4.8)
LYMPHOCYTES NFR BLD: 22.1 % (ref 22–41)
MANUAL DIFF BLD: NORMAL
MCH RBC QN AUTO: 28.3 PG (ref 27–33)
MCHC RBC AUTO-ENTMCNC: 32 G/DL (ref 33.6–35)
MCV RBC AUTO: 88.6 FL (ref 81.4–97.8)
MICROCYTES BLD QL SMEAR: ABNORMAL
MONOCYTES # BLD AUTO: 0.58 K/UL (ref 0–0.85)
MONOCYTES NFR BLD AUTO: 3.5 % (ref 0–13.4)
MORPHOLOGY BLD-IMP: NORMAL
MYELOCYTES NFR BLD MANUAL: 2.7 %
NEUTROPHILS # BLD AUTO: 11.6 K/UL (ref 2–7.15)
NEUTROPHILS NFR BLD: 69.9 % (ref 44–72)
NRBC # BLD AUTO: 0.03 K/UL
NRBC BLD AUTO-RTO: 0.2 /100 WBC
PLATELET # BLD AUTO: 325 K/UL (ref 164–446)
PLATELET BLD QL SMEAR: NORMAL
PMV BLD AUTO: 11.5 FL (ref 9–12.9)
POTASSIUM SERPL-SCNC: 3.7 MMOL/L (ref 3.6–5.5)
RBC # BLD AUTO: 4.91 M/UL (ref 4.2–5.4)
RBC BLD AUTO: PRESENT
SIGNIFICANT IND 70042: NORMAL
SITE SITE: NORMAL
SODIUM SERPL-SCNC: 140 MMOL/L (ref 135–145)
SOURCE SOURCE: NORMAL
WBC # BLD AUTO: 16.6 K/UL (ref 4.8–10.8)

## 2017-12-15 PROCEDURE — 99233 SBSQ HOSP IP/OBS HIGH 50: CPT | Performed by: FAMILY MEDICINE

## 2017-12-15 PROCEDURE — A9270 NON-COVERED ITEM OR SERVICE: HCPCS | Performed by: FAMILY MEDICINE

## 2017-12-15 PROCEDURE — G8988 SELF CARE GOAL STATUS: HCPCS | Mod: CJ

## 2017-12-15 PROCEDURE — G8978 MOBILITY CURRENT STATUS: HCPCS | Mod: CK

## 2017-12-15 PROCEDURE — A9270 NON-COVERED ITEM OR SERVICE: HCPCS | Performed by: HOSPITALIST

## 2017-12-15 PROCEDURE — 770006 HCHG ROOM/CARE - MED/SURG/GYN SEMI*

## 2017-12-15 PROCEDURE — 700111 HCHG RX REV CODE 636 W/ 250 OVERRIDE (IP): Performed by: FAMILY MEDICINE

## 2017-12-15 PROCEDURE — 80048 BASIC METABOLIC PNL TOTAL CA: CPT

## 2017-12-15 PROCEDURE — 700105 HCHG RX REV CODE 258: Performed by: FAMILY MEDICINE

## 2017-12-15 PROCEDURE — 97166 OT EVAL MOD COMPLEX 45 MIN: CPT

## 2017-12-15 PROCEDURE — 700102 HCHG RX REV CODE 250 W/ 637 OVERRIDE(OP): Performed by: PHYSICIAN ASSISTANT

## 2017-12-15 PROCEDURE — 97162 PT EVAL MOD COMPLEX 30 MIN: CPT

## 2017-12-15 PROCEDURE — 700102 HCHG RX REV CODE 250 W/ 637 OVERRIDE(OP): Performed by: FAMILY MEDICINE

## 2017-12-15 PROCEDURE — 82962 GLUCOSE BLOOD TEST: CPT

## 2017-12-15 PROCEDURE — 700102 HCHG RX REV CODE 250 W/ 637 OVERRIDE(OP): Performed by: HOSPITALIST

## 2017-12-15 PROCEDURE — 85027 COMPLETE CBC AUTOMATED: CPT

## 2017-12-15 PROCEDURE — G8987 SELF CARE CURRENT STATUS: HCPCS | Mod: CL

## 2017-12-15 PROCEDURE — 36415 COLL VENOUS BLD VENIPUNCTURE: CPT

## 2017-12-15 PROCEDURE — G8979 MOBILITY GOAL STATUS: HCPCS | Mod: CI

## 2017-12-15 PROCEDURE — A9270 NON-COVERED ITEM OR SERVICE: HCPCS | Performed by: PHYSICIAN ASSISTANT

## 2017-12-15 PROCEDURE — 700111 HCHG RX REV CODE 636 W/ 250 OVERRIDE (IP): Performed by: INTERNAL MEDICINE

## 2017-12-15 PROCEDURE — 85007 BL SMEAR W/DIFF WBC COUNT: CPT

## 2017-12-15 RX ORDER — INSULIN GLARGINE 100 [IU]/ML
60 INJECTION, SOLUTION SUBCUTANEOUS 2 TIMES DAILY
Status: DISCONTINUED | OUTPATIENT
Start: 2017-12-15 | End: 2017-12-17

## 2017-12-15 RX ORDER — GABAPENTIN 100 MG/1
200 CAPSULE ORAL 3 TIMES DAILY
Status: DISCONTINUED | OUTPATIENT
Start: 2017-12-15 | End: 2017-12-19

## 2017-12-15 RX ORDER — PREDNISONE 20 MG/1
20 TABLET ORAL DAILY
Status: DISCONTINUED | OUTPATIENT
Start: 2017-12-16 | End: 2017-12-18

## 2017-12-15 RX ORDER — KETOROLAC TROMETHAMINE 30 MG/ML
30 INJECTION, SOLUTION INTRAMUSCULAR; INTRAVENOUS EVERY 6 HOURS PRN
Status: DISPENSED | OUTPATIENT
Start: 2017-12-15 | End: 2017-12-20

## 2017-12-15 RX ORDER — GABAPENTIN 100 MG/1
100 CAPSULE ORAL 3 TIMES DAILY
Status: DISCONTINUED | OUTPATIENT
Start: 2017-12-15 | End: 2017-12-15

## 2017-12-15 RX ADMIN — HYDROMORPHONE HYDROCHLORIDE 1 MG: 2 INJECTION INTRAMUSCULAR; INTRAVENOUS; SUBCUTANEOUS at 06:37

## 2017-12-15 RX ADMIN — KETOROLAC TROMETHAMINE 30 MG: 30 INJECTION, SOLUTION INTRAMUSCULAR at 19:33

## 2017-12-15 RX ADMIN — HYDROMORPHONE HYDROCHLORIDE 2 MG: 2 INJECTION INTRAMUSCULAR; INTRAVENOUS; SUBCUTANEOUS at 20:40

## 2017-12-15 RX ADMIN — NICOTINE 21 MG: 21 PATCH, EXTENDED RELEASE TRANSDERMAL at 06:29

## 2017-12-15 RX ADMIN — METOPROLOL TARTRATE 25 MG: 25 TABLET, FILM COATED ORAL at 09:00

## 2017-12-15 RX ADMIN — PREDNISONE 40 MG: 20 TABLET ORAL at 09:00

## 2017-12-15 RX ADMIN — HEPARIN SODIUM 5000 UNITS: 5000 INJECTION, SOLUTION INTRAVENOUS; SUBCUTANEOUS at 06:29

## 2017-12-15 RX ADMIN — ASPIRIN 325 MG: 325 TABLET, FILM COATED ORAL at 09:00

## 2017-12-15 RX ADMIN — INSULIN GLARGINE 60 UNITS: 100 INJECTION, SOLUTION SUBCUTANEOUS at 20:48

## 2017-12-15 RX ADMIN — BUDESONIDE AND FORMOTEROL FUMARATE DIHYDRATE 2 PUFF: 160; 4.5 AEROSOL RESPIRATORY (INHALATION) at 20:40

## 2017-12-15 RX ADMIN — HEPARIN SODIUM 5000 UNITS: 5000 INJECTION, SOLUTION INTRAVENOUS; SUBCUTANEOUS at 13:34

## 2017-12-15 RX ADMIN — HYDROMORPHONE HYDROCHLORIDE 2 MG: 2 INJECTION INTRAMUSCULAR; INTRAVENOUS; SUBCUTANEOUS at 15:33

## 2017-12-15 RX ADMIN — TIOTROPIUM BROMIDE 1 CAPSULE: 18 CAPSULE ORAL; RESPIRATORY (INHALATION) at 09:10

## 2017-12-15 RX ADMIN — HYDROMORPHONE HYDROCHLORIDE 2 MG: 2 INJECTION INTRAMUSCULAR; INTRAVENOUS; SUBCUTANEOUS at 17:35

## 2017-12-15 RX ADMIN — STANDARDIZED SENNA CONCENTRATE AND DOCUSATE SODIUM 2 TABLET: 8.6; 5 TABLET, FILM COATED ORAL at 20:40

## 2017-12-15 RX ADMIN — INSULIN HUMAN 4 UNITS: 100 INJECTION, SOLUTION PARENTERAL at 11:21

## 2017-12-15 RX ADMIN — OXYCODONE HYDROCHLORIDE 10 MG: 10 TABLET ORAL at 16:39

## 2017-12-15 RX ADMIN — GABAPENTIN 200 MG: 100 CAPSULE ORAL at 20:40

## 2017-12-15 RX ADMIN — GABAPENTIN 100 MG: 100 CAPSULE ORAL at 12:26

## 2017-12-15 RX ADMIN — HYDROMORPHONE HYDROCHLORIDE 2 MG: 2 INJECTION INTRAMUSCULAR; INTRAVENOUS; SUBCUTANEOUS at 08:59

## 2017-12-15 RX ADMIN — OXYCODONE HYDROCHLORIDE 10 MG: 10 TABLET ORAL at 12:26

## 2017-12-15 RX ADMIN — KETOROLAC TROMETHAMINE 30 MG: 30 INJECTION, SOLUTION INTRAMUSCULAR at 13:34

## 2017-12-15 RX ADMIN — INSULIN GLARGINE 60 UNITS: 100 INJECTION, SOLUTION SUBCUTANEOUS at 09:57

## 2017-12-15 RX ADMIN — OXYCODONE HYDROCHLORIDE 10 MG: 10 TABLET ORAL at 05:03

## 2017-12-15 RX ADMIN — HEPARIN SODIUM 5000 UNITS: 5000 INJECTION, SOLUTION INTRAVENOUS; SUBCUTANEOUS at 20:40

## 2017-12-15 RX ADMIN — INSULIN HUMAN 10 UNITS: 100 INJECTION, SOLUTION PARENTERAL at 20:48

## 2017-12-15 RX ADMIN — STANDARDIZED SENNA CONCENTRATE AND DOCUSATE SODIUM 2 TABLET: 8.6; 5 TABLET, FILM COATED ORAL at 09:00

## 2017-12-15 RX ADMIN — METOPROLOL TARTRATE 25 MG: 25 TABLET, FILM COATED ORAL at 20:40

## 2017-12-15 RX ADMIN — BUDESONIDE AND FORMOTEROL FUMARATE DIHYDRATE 2 PUFF: 160; 4.5 AEROSOL RESPIRATORY (INHALATION) at 09:10

## 2017-12-15 RX ADMIN — ATORVASTATIN CALCIUM 40 MG: 40 TABLET, FILM COATED ORAL at 20:40

## 2017-12-15 RX ADMIN — POTASSIUM CHLORIDE 20 MEQ: 1500 TABLET, EXTENDED RELEASE ORAL at 09:00

## 2017-12-15 RX ADMIN — INSULIN HUMAN 7 UNITS: 100 INJECTION, SOLUTION PARENTERAL at 17:31

## 2017-12-15 RX ADMIN — HYDROMORPHONE HYDROCHLORIDE 2 MG: 2 INJECTION INTRAMUSCULAR; INTRAVENOUS; SUBCUTANEOUS at 11:23

## 2017-12-15 RX ADMIN — HYDROMORPHONE HYDROCHLORIDE 2 MG: 2 INJECTION INTRAMUSCULAR; INTRAVENOUS; SUBCUTANEOUS at 02:17

## 2017-12-15 RX ADMIN — OXYCODONE HYDROCHLORIDE 10 MG: 10 TABLET ORAL at 21:38

## 2017-12-15 RX ADMIN — HYDROMORPHONE HYDROCHLORIDE 2 MG: 2 INJECTION INTRAMUSCULAR; INTRAVENOUS; SUBCUTANEOUS at 22:45

## 2017-12-15 RX ADMIN — SODIUM CHLORIDE: 9 INJECTION, SOLUTION INTRAVENOUS at 19:33

## 2017-12-15 ASSESSMENT — COGNITIVE AND FUNCTIONAL STATUS - GENERAL
SUGGESTED CMS G CODE MODIFIER MOBILITY: CM
DRESSING REGULAR UPPER BODY CLOTHING: A LITTLE
MOVING FROM LYING ON BACK TO SITTING ON SIDE OF FLAT BED: UNABLE
TOILETING: A LOT
WALKING IN HOSPITAL ROOM: TOTAL
MOBILITY SCORE: 9
STANDING UP FROM CHAIR USING ARMS: TOTAL
SUGGESTED CMS G CODE MODIFIER DAILY ACTIVITY: CK
DAILY ACTIVITIY SCORE: 17
CLIMB 3 TO 5 STEPS WITH RAILING: TOTAL
DRESSING REGULAR LOWER BODY CLOTHING: A LOT
MOVING TO AND FROM BED TO CHAIR: UNABLE
HELP NEEDED FOR BATHING: A LOT

## 2017-12-15 ASSESSMENT — PAIN SCALES - GENERAL
PAINLEVEL_OUTOF10: 10
PAINLEVEL_OUTOF10: 10
PAINLEVEL_OUTOF10: 6
PAINLEVEL_OUTOF10: 10
PAINLEVEL_OUTOF10: 8
PAINLEVEL_OUTOF10: 9
PAINLEVEL_OUTOF10: 10
PAINLEVEL_OUTOF10: 9
PAINLEVEL_OUTOF10: 2
PAINLEVEL_OUTOF10: 10
PAINLEVEL_OUTOF10: 7
PAINLEVEL_OUTOF10: 7
PAINLEVEL_OUTOF10: 10
PAINLEVEL_OUTOF10: 10
PAINLEVEL_OUTOF10: 9

## 2017-12-15 ASSESSMENT — ENCOUNTER SYMPTOMS
NAUSEA: 0
MYALGIAS: 1
ABDOMINAL PAIN: 0
WHEEZING: 1
COUGH: 0
BLURRED VISION: 0
WEAKNESS: 1
DIZZINESS: 0
DIARRHEA: 0
HEARTBURN: 0
SORE THROAT: 0
CHILLS: 0
VOMITING: 0
SHORTNESS OF BREATH: 1
SHORTNESS OF BREATH: 0
FEVER: 0

## 2017-12-15 ASSESSMENT — GAIT ASSESSMENTS: GAIT LEVEL OF ASSIST: UNABLE TO PARTICIPATE

## 2017-12-15 NOTE — PROGRESS NOTES
Received report and met with patient at bedside. Discussed plan of care for the day. IV infusing. Denies any needs at this time. Bed locked & in lowest position, call bell and side table within reach.

## 2017-12-15 NOTE — DISCHARGE PLANNING
Received choice form from [facility] at 1220.  [facility] on 12-15-17.

## 2017-12-15 NOTE — OR NURSING
Patient transported to T804-1 with this RN and transport. Transport uneventful, VSS. Bedside handoff completed with RN.

## 2017-12-15 NOTE — DISCHARGE PLANNING
IP consult for physiatry from Dr. Marissa Tinoco to consult per protocol. Unilateral BKA ongoing medical management as well as therapy need. Anticipate post acute services to facilitate a successful transition to community home with family and outpatient support.

## 2017-12-15 NOTE — OR NURSING
"Report called to Lucila GUTIERREZ. Plan of care discussed. Patient reports 8/10 pain that is tolerable in nature and \"getting better\" per patient. No complaints of nausea. Patient disoriented to time, but appropriate and easily reoriented. VSS. Dressing CDI. Elevated on pillow.  "

## 2017-12-15 NOTE — THERAPY
"Physical Therapy Evaluation completed.   Bed Mobility:  Supine to Sit: Minimal Assist  Transfers: Sit to Stand: Refused (pt reporting too much pain)  Gait: Level Of Assist: Unable to Participate with No Equipment Needed       Plan of Care: Will benefit from Physical Therapy 3 times per week  Discharge Recommendations: Equipment: Will Continue to Assess for Equipment Needs. Post-acute therapy Discharge to a transitional care facility for continued skilled therapy services or Discharge to home with outpatient or home health for additional skilled therapy services.    Pt presents with decreased functional mobiltiy most attributable to pain to R LE. Pt was able to lift R LE against gravity during bed mob and required minor amount of assist for seated scooting to EOB. Pt has been educated on positioning R LE in bed to prevent contracture. She declined sit to stand assessment but reported willingness to attempt at a later date. She will benefit from further acute skilled PT services to improve functional mobility and will likely require placement upon DC.     See \"Rehab Therapy-Acute\" Patient Summary Report for complete documentation.     "

## 2017-12-15 NOTE — OR SURGEON
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    Anesthesiologist/Type of Anesthesia:  Anesthesiologist: Sudhir Shea M.D./General    Surgical Staff:  Circulator: Xiao Boyer R.N.  Relief Circulator: Trino Bryan R.N.  Scrub Person: Maribell Aguilar; [place] Prince  First Assist: Louis Kirkpatrick P.A.-C.    Specimens: amputated limb for gross only    Estimated Blood Loss: 100cc    Findings: see dictation    Complications: none known    PLAN:  --readmit medicine postop  --NWB RLE   --continue abx for 2-3 days to treat any potential residual cellulitis  --staples out 4 weeks postop        12/14/2017 4:01 PM Srinivas Delgado

## 2017-12-15 NOTE — PROGRESS NOTES
Pt lying in bed watching tv. Bedside shift report completed with dayshift RN. Discussed POC. Pt verbalizes understanding. Bed low and locked, call light in reach. No needs at this time.

## 2017-12-15 NOTE — THERAPY
"Occupational Therapy Evaluation completed.   Functional Status:  Max A with ADLs and txfs  Plan of Care: Will benefit from Occupational Therapy 3 times per week  Discharge Recommendations:  Equipment: Will Continue to Assess for Equipment Needs. Post-acute therapy Discharge to a transitional care facility for continued skilled therapy services.    See \"Rehab Therapy-Acute\" Patient Summary Report for complete documentation.    "

## 2017-12-15 NOTE — PROGRESS NOTES
Antonia Cano Fall Risk Assessment:     Last Known Fall: Within the last six months  Mobility: Use of assistive device/requires assist of two people  Medications: Cardiovascular or central nervous system meds  Mental Status/LOC/Awareness: Awake, alert, and oriented to date, place, and person  Toileting Needs: Use of catheters or diversion devices  Volume/Electrolyte Status: Low blood sugar/electrolyte imbalances  Communication/Sensory: Visual (Glasses)/hearing deficit  Behavior: Appropriate behavior  Antonia Cano Fall Risk Total: 14  Fall Risk Level: MODERATE RISK    Universal Fall Precautions:  call light/belongings in reach, bed in low position and locked, wheelchairs and assistive devices out of sight, siderails up x 2, adequate lighting, clutter free and spill free environment, educate on level of risk, educate to call for assistance    Fall Risk Level Interventions:    TRIAL (ehealthtracker 8, NEURO, MED JUANA 5) Moderate Fall Risk Interventions  Place yellow fall risk ID band on patient: verified  Provide patient/family education based on risk assessment : completed  Educate patient/family to call staff for assistance when getting out of bed: completed  Place fall precaution signage outside patient door: completed  Utilize bed/chair fall alarm: completedTRIAL (TELE 8, NEURO, [place] JUANA 5) High Fall Risk Interventions  Place yellow fall risk ID band on patient: verified  Provide patient/family education based on risk assessment: completed  Educate patient/family to call staff for assistance when getting out of bed: completed  Place fall precaution signage outside patient door: verified  Place patient in room close to nursing station: verified  Utilize bed/chair fall alarm: verified  Notify charge of high risk for huddle: completed    Patient Specific Interventions:     Medication: review medications with patient and family, limit combination of prn medications and provide patients who received diuretics/laxatives  frequent toileting  Mental Status/LOC/Awareness: reorient patient, reinforce falls education, encourage family to stay with patient, check on patient hourly, utilize bed/chair fall alarm, reinforce the use of call light and provide activity   Toileting: provide frquent toileting, monitor intake and output/use of appropriate interventions, instruct patient/family on the use of grab bars, instruct patient/family on the need to call for assistance when toileting and do not leave patient unattended in bathroom/refer to toileting scripting  Volume/Electrolyte Status: ensure patient remains hydrated, monitor blood sugars and maintain appropriate blood sugar levels if diabetic, administer medications as ordered for nausea and vomiting and monitor abnormal lab values  Communication/Sensory: update plan of care on whiteboard, ensure proper positioning when transferrng/ambulating and ensure patient has glasses/contacts and hearing aids/dentures  Behavioral: encourage patient to voice feelings, engage patient in daily activities and instruct/reinforce fall program rationale  Mobility: schedule physical activity throughout the day, provide comfort measures during transport, dangle prior to standing, utilize bed/chair fall alarm, ensure bed is locked and in lowest position, provide appropriate assistive device and instruct patient to exit bed on their strongest side

## 2017-12-15 NOTE — CONSULTS
Medical chart review completed.     Patient is a 52 y.o. year-old female with a past medical history significant for DM w toe amputation, alcohol abuse and HTN admitted to [facility] on 12/9/2017 12:01 PM with chest pain from [facility].  Per report patient with sinus tachycardia and elevated troponins on arrival.  Cardiology was consulted who recommended echocardiogram.  Echo showed EF of 60%. Blood sugars also in the 600's on arrival.  XR of right foot showed diffuse soft swelling and bony destructive changes concerning for charcot joint and osteomyelitis. XR of tib/fib showed no evidence of osteomyelitis.  CT of foot showed large abscess.  Orthopedics was consulted, she had her left foot amputated in January 2016.  Orthopedics recommended right BKA.  ID was consulted as well for antibiotic choice, originally narrowed to Zosyn which she continues on.  Patient underwent right BKA on 12/14/17 with Dr. Delgado who recommended continuing antibiotics 3 days post-operatively to treat any residual cellulitis.      Per report patient was previously at SNF and does not want to return.  She reportedly prior to that was living with adult age son in a mobile home with 3 steps to enter. Patient worked with PT this AM but only did bed mobility as pain was limiting transfers and no mobility.  Patient was seen by OT and was evaluated as maxA for ADLs.  Patient requesting IV dilaudid Q3H    PMH:  Past Medical History:   Diagnosis Date   • Bronchitis    • Diabetes (CMS-HCC)    • Hypertension        PSH:  Past Surgical History:   Procedure Laterality Date   • KNEE AMPUTATION BELOW Right 12/14/2017    Procedure: KNEE AMPUTATION BELOW;  Surgeon: Srinivas Delgado M.D.;  Location: SURGERY [facility];  Service: Orthopedics   • SPLIT THICKNESS SKIN GRAFT Left 1/7/2016    Procedure: SPLIT THICKNESS SKIN GRAFT FOOT;  Surgeon: Jerzy Mayberry M.D.;  Location: SURGERY [facility];  Service:    •  STUMP REVISION Left 12/24/2015    Procedure: STUMP REVISION FOOT WITH VAC DRESSING;  Surgeon: Jerzy Mayberry M.D.;  Location: SURGERY [facility];  Service:    • INCISION AND DRAINAGE ORTHOPEDIC Left 12/17/2015    Procedure: INCISION AND DRAINAGE ORTHOPEDIC- Foot ;  Surgeon: Jerzy Mayberry M.D.;  Location: SURGERY [facility];  Service:    • CHOLECYSTECTOMY ROBOTIC  07/2015   • HYSTERECTOMY, VAGINAL  04/2001       FAMILY HISTORY:  No family history on file.    MEDICATIONS:  Current Facility-Administered Medications   Medication Dose   • insulin glargine (LANTUS) injection 60 Units  60 Units   • ketorolac (TORADOL) injection 30 mg  30 mg   • gabapentin (NEURONTIN) capsule 100 mg  100 mg   • potassium chloride SA (Kdur) tablet 20 mEq  20 mEq   • piperacillin-tazobactam (ZOSYN) 13.5 g in  mL infusion     • predniSONE (DELTASONE) tablet 40 mg  40 mg   • HYDROmorphone (DILAUDID) injection 1-2 mg  1-2 mg   • budesonide-formoterol (SYMBICORT) 160-4.5 MCG/ACT inhaler 2 Puff  2 Puff   • tiotropium (SPIRIVA) 18 MCG inhalation capsule 1 Cap  1 Cap   • ondansetron (ZOFRAN ODT) dispertab 4 mg  4 mg   • ondansetron (ZOFRAN) syringe/vial injection 4 mg  4 mg   • NS infusion 3,798 mL  30 mL/kg   • NS (BOLUS) infusion 500 mL  500 mL   • heparin injection 5,000 Units  5,000 Units   • alprazolam (XANAX) tablet 0.25 mg  0.25 mg   • Respiratory Care per Protocol     • aspirin (ASA) tablet 325 mg  325 mg   • metoprolol (LOPRESSOR) tablet 25 mg  25 mg   • senna-docusate (PERICOLACE or SENOKOT S) 8.6-50 MG per tablet 2 Tab  2 Tab    And   • polyethylene glycol/lytes (MIRALAX) PACKET 1 Packet  1 Packet    And   • magnesium hydroxide (MILK OF MAGNESIA) suspension 30 mL  30 mL    And   • bisacodyl (DULCOLAX) suppository 10 mg  10 mg   • nicotine (NICODERM) 21 MG/24HR 21 mg  21 mg    And   • nicotine polacrilex (NICORETTE) 2 MG piece 2 mg  2 mg   • insulin regular (HUMULIN R) injection 3-14 Units  3-14 Units    And   •  glucose 4 g chewable tablet 16 g  16 g    And   • dextrose 50% (D50W) injection 25 mL  25 mL   • atorvastatin (LIPITOR) tablet 40 mg  40 mg   • acetaminophen (TYLENOL) tablet 650 mg  650 mg   • oxycodone immediate-release (ROXICODONE) tablet 5 mg  5 mg    Or   • oxycodone immediate release (ROXICODONE) tablet 10 mg  10 mg       ALLERGIES:  Patient has no known allergies.    PSYCHOSOCIAL HISTORY:  Living Site:  Home  Living With:  family  Caregiver's availability: Unknown  Number of stairs:  3  Substance use history:  Reportedly drug and alcohol abuse      The patient presents  with cognitive deficits and functional deficits in mobility/self-cares/swallowing/speech, and Severe  de-conditioning. Pre-morbidly, this patient lived in a single level home with Three steps to enter,with family  The patient was evaluated by acute care Physical Therapy and Occupational Therapy; currently requiring unable assistance for mobility and maximal assistance for ADLs. The patient's current diet is Regular with Thin liquids.    Patient is currently not a candidate for IRF as she most likely will not be able to tolerate 3 hours of therapy at current pain level. Patient is requesting IV dilaudid q3H regularly and would need better pain control.  Also medically complex, leukocytosis (post-op, steroids) is increasing.  Would also need a plan for ambulating up 3 steps into home.  Will have TCC discuss with family on level of support.  Most likely patient is more appropriate for SNF.     Thank you for allowing us to participate in her care. Please call with any questions regarding this recommendation.    Mellisa Tinoco M.D.

## 2017-12-15 NOTE — PROGRESS NOTES
[facility] Hospitalist Progress Note    Date of Service: 12/15/2017    Chief Complaint  52 y.o. female admitted 2017 with Sepsis, Respiratory failure, Diabetic foot ulcer with osteomyelitis and Leg abscess, NSTEMI, also with COPD exaceration and pulmonary edema    Interval Problem Update    Diabetic foot - transtibial amputation  NSTEMI - trops trended down, Echo reviewed  Resp failure - remains on O2  COPDE - less wheezing  Pulm edema - less shortness of breath  Diabetes -  this AM  HTN - controlled    Consultants/Specialty  AMRITA - Danny  ID - Ardon    Disposition  SNF vs Rehab        Review of Systems   Constitutional: Positive for malaise/fatigue. Negative for chills and fever.   HENT: Negative for hearing loss and sore throat.    Eyes: Negative for blurred vision.   Respiratory: Positive for shortness of breath and wheezing. Negative for cough.    Cardiovascular: Positive for leg swelling. Negative for chest pain.   Gastrointestinal: Negative for abdominal pain, diarrhea, heartburn, nausea and vomiting.   Genitourinary: Negative for dysuria.   Neurological: Positive for weakness. Negative for dizziness.      Physical Exam  Laboratory/Imaging   Hemodynamics  Temp (24hrs), Av.5 °C (97.7 °F), Min:36.1 °C (96.9 °F), Max:37.1 °C (98.8 °F)   Temperature: 37.1 °C (98.8 °F)  Pulse  Av.6  Min: 73  Max: 156 Heart Rate (Monitored): 96  Blood Pressure: 143/84, NIBP: 133/94      Respiratory      Respiration: 16, Pulse Oximetry: 97 %     Work Of Breathing / Effort: Mild  RUL Breath Sounds: Clear, RML Breath Sounds: Diminished, RLL Breath Sounds: Diminished, KWAME Breath Sounds: Clear, LLL Breath Sounds: Diminished    Fluids    Intake/Output Summary (Last 24 hours) at 12/15/17 1347  Last data filed at 12/15/17 0900   Gross per 24 hour   Intake             1240 ml   Output             1700 ml   Net             -460 ml       Nutrition  Orders Placed This Encounter   Procedures   • DIET ORDER     Standing Status:    Standing     Number of Occurrences:   1     Order Specific Question:   Diet:     Answer:   Diabetic [3]     Physical Exam   Constitutional: She is oriented to person, place, and time. She appears well-developed.   Morbidly obese   HENT:   Head: Normocephalic and atraumatic.   Eyes: Conjunctivae are normal. Pupils are equal, round, and reactive to light.   Neck: No tracheal deviation present. No thyromegaly present.   Cardiovascular: Normal rate and regular rhythm.    Pulmonary/Chest: Effort normal. She has wheezes. She has rales.   Abdominal: Soft. Bowel sounds are normal. She exhibits no distension. There is no tenderness.   Musculoskeletal: She exhibits edema.   Right Transtibial amputation  Transmetatarsal amputation Left foot   Lymphadenopathy:     She has no cervical adenopathy.   Neurological: She is alert and oriented to person, place, and time.   Skin: Skin is warm and dry.   Nursing note and vitals reviewed.      Recent Labs      12/13/17   0240  12/14/17   0237  12/15/17   0205   WBC  11.5*  13.8*  16.6*   RBC  4.54  4.77  4.91   HEMOGLOBIN  12.8  13.2  13.9   HEMATOCRIT  39.4  41.6  43.5   MCV  86.8  87.2  88.6   MCH  28.2  27.7  28.3   MCHC  32.5*  31.7*  32.0*   RDW  49.3  49.1  50.7*   PLATELETCT  227  289  325   MPV  12.0  11.3  11.5     Recent Labs      12/13/17   0240  12/14/17   0237  12/15/17   0205   SODIUM  136  139  140   POTASSIUM  3.7  3.0*  3.7   CHLORIDE  97  98  100   CO2  28  32  31   GLUCOSE  304*  232*  58*   BUN  21  26*  15   CREATININE  0.48*  0.65  0.51   CALCIUM  8.3*  9.3  8.5                      Assessment/Plan     * Severe sepsis due to infected right diabetic foot (CMS-HCC)- (present on admission)   Assessment & Plan    This is severe sepsis with the following associated acute organ dysfunction(s): acute respiratory failure., NSTEMI  Off IVF         Right diabetic foot infection, cellulits, and abscess (CMS-HCC)- (present on admission)   Assessment & Plan    s/p Right  transtibial amputation  IV Zosyn  IV Dilaudid, start IV Toradol, Neurontin        Acute respiratory failure with hypoxia due to sepsis, fluid overload, AECOPD (CMS-HCC)- (present on admission)   Assessment & Plan    Keep O2 sats above 90%        Poorly controlled type 2 diabetes mellitus with neuropathy (CMS-HCC)- (present on admission)   Assessment & Plan    decrease Lantus to 60 u BID, continue SSI        Pulmonary edema   Assessment & Plan    off IV Lasix        Hypokalemia   Assessment & Plan    Kdur  Follow bmp        COPD with acute exacerbation (CMS-HCC)   Assessment & Plan    suspected  Symbicort, Spiriva, RT protocol, decrease Prednisone        Hyponatremia- (present on admission)   Assessment & Plan    Follow bmp        NSTEMI (non-ST elevated myocardial infarction) (CMS-HCC)- (present on admission)   Assessment & Plan    ASA, Lipitor, Metoprolol            Hypertension- (present on admission)   Assessment & Plan    Metoprolol         Morbid obesity with BMI of 40.0-44.9, adult (CMS-HCC)- (present on admission)   Assessment & Plan    Body mass index is 43.7 kg/m²..  referral for outpatient weight management.          Tobacco abuse- (present on admission)   Assessment & Plan    Nicotine replacement             Reviewed items::  EKG reviewed, Radiology images reviewed, Labs reviewed and Medications reviewed  Long catheter::  Urinary Tract Retention or Urinary Tract Obstruction  DVT prophylaxis pharmacological::  Heparin  Ulcer Prophylaxis::  No  Antibiotics:  Treating active infection/contamination beyond 24 hours perioperative coverage

## 2017-12-15 NOTE — PROGRESS NOTES
Infectious Disease Progress Note    Author: Nano Ardon M.D. Date & Time of service: 12/15/2017  10:36 AM    Chief Complaint:  FU R diabetic foot OM    Interval History:   AF, WBC 11.5, foot pain better controlled today, tolerating abx without issues, plan for R BKA tomorrow   AF, WBC 13.8, lots of foot pain, plan for R BKA today  12/15 AF, WBC 16.6, s/p R BKA, now having severe pain not controlled with pain regimen  Labs Reviewed, Medications Reviewed, Radiology Reviewed and Wound Reviewed.    Review of Systems:  Review of Systems   Constitutional: Negative for chills and fever.   Respiratory: Negative for cough and shortness of breath.    Gastrointestinal: Negative for abdominal pain, diarrhea, nausea and vomiting.   Musculoskeletal: Positive for joint pain and myalgias.       Hemodynamics:  Temp (24hrs), Av.4 °C (97.5 °F), Min:36.1 °C (96.9 °F), Max:36.8 °C (98.3 °F)  Temperature: 36.6 °C (97.8 °F)  Pulse  Av.8  Min: 73  Max: 156Heart Rate (Monitored): 96  Blood Pressure: 157/90, NIBP: 133/94       Physical Exam:  Physical Exam   Constitutional: She is oriented to person, place, and time. She appears well-developed.   Obese   HENT:   Head: Normocephalic and atraumatic.   Hirsuite   Eyes: EOM are normal. Pupils are equal, round, and reactive to light.   Neck: Neck supple.   Cardiovascular: Normal rate, regular rhythm and normal heart sounds.    Pulmonary/Chest: Effort normal and breath sounds normal.   Abdominal: Soft. There is no tenderness.   Musculoskeletal: She exhibits edema.   Evidence of left foot transmet amputation. Surgical site clean.   R BKA site dressed     Neurological: She is alert and oriented to person, place, and time.       Meds:    Current Facility-Administered Medications:   •  insulin glargine  •  potassium chloride SA  •  [COMPLETED] piperacillin-tazobactam **AND** piperacillin-tazobactam (ZOSYN) continuous infusion  •  predniSONE  •  hydromorphone  •   budesonide-formoterol  •  tiotropium  •  ondansetron  •  ondansetron  •  NS  •  NS  •  heparin  •  alprazolam  •  Respiratory Care per Protocol  •  aspirin  •  metoprolol  •  senna-docusate **AND** polyethylene glycol/lytes **AND** magnesium hydroxide **AND** bisacodyl  •  INITIATE NICOTINE REPLACEMENT PROTOCOL  **AND** nicotine **AND** Protocol 205 PATIENT EDUCATION MATERIALS **AND** Protocol 205 Rotate nicotine patch application sites daily  **AND** nicotine polacrilex  •  insulin regular **AND** Accu-Chek ACHS **AND** NOTIFY MD and PharmD **AND** glucose 4 g **AND** dextrose 50%  •  atorvastatin  •  acetaminophen  •  oxycodone immediate-release **OR** oxycodone immediate-release    Labs:  Recent Labs      12/13/17   0240  12/14/17   0237  12/15/17   0205   WBC  11.5*  13.8*  16.6*   RBC  4.54  4.77  4.91   HEMOGLOBIN  12.8  13.2  13.9   HEMATOCRIT  39.4  41.6  43.5   MCV  86.8  87.2  88.6   MCH  28.2  27.7  28.3   RDW  49.3  49.1  50.7*   PLATELETCT  227  289  325   MPV  12.0  11.3  11.5   NEUTSPOLYS  64.90  50.50  69.90   LYMPHOCYTES  18.40*  25.70  22.10   MONOCYTES  10.50  11.50  3.50   EOSINOPHILS  0.90  0.00  1.80   BASOPHILS  0.00  0.90  0.00   RBCMORPHOLO  Present  Normal  Present     Recent Labs      12/13/17 0240 12/14/17   0237  12/15/17   0205   SODIUM  136  139  140   POTASSIUM  3.7  3.0*  3.7   CHLORIDE  97  98  100   CO2  28  32  31   GLUCOSE  304*  232*  58*   BUN  21  26*  15     Recent Labs      12/13/17 0240 12/14/17   0237  12/15/17   0205   CREATININE  0.48*  0.65  0.51       Imaging:  Ct-extremity, Lower With Right    Result Date: 12/10/2017  12/10/2017 1:58 PM HISTORY/REASON FOR EXAM:  Atraumatic Pain/Swelling/Deformity. Right foot pain and swelling, infection TECHNIQUE/EXAM DESCRIPTION AND NUMBER OF VIEWS:  CT scan of the RIGHT lower extremity with contrast, with reconstructions. Thin helical 3 mm sections were obtained from the distal femur through the proximal tibia/fibula. Sagittal  and coronal multiplanar reconstructions were generated from the axial images. A total of 100 mL of Omnipaque 350 nonionic contrast was administered  IV without complication. Up to date radiation dose reduction adjustments have been utilized to meet ALARA standards for radiation dose reduction. COMPARISON: Right foot x-ray 12/15/2015 FINDINGS: There is a large rim-enhancing fluid collection surrounding and within the midfoot. It extends from the dorsal to plantar surface measuring 7.0 cm craniocaudal, at least 6.8 cm in length, and 8.9 cm transversely. This is consistent with an abscess. It contains gas. There is destructive change of the navicular, cuneiforms, cuboid consistent with a combination of osteomyelitis and Charcot change. There appears to be an open wound in the lateral hindfoot midfoot junction. There is extensive associated cutaneous and subcutaneous edema extending at the distal leg consistent with cellulitis. There is also soft tissue gas located superior to the calcaneus.     1.  Large abscess replacing the majority of the RIGHT midfoot measuring at least 7 cm craniocaudal and 6.8 x 8.9 cm transversely 2.  Associated gas within the mid foot as well as proximal to the calcaneus consistent with necrotizing infection 3.  Destructive change of the midfoot consistent with osteomyelitis 4.  Extensive cellulitis Findings were discussed with HAKAN CRUZ on 12/10/2017 2:39 PM.    Dx-chest-portable (1 View)    Result Date: 12/11/2017 12/11/2017 12:16 PM HISTORY/REASON FOR EXAM:  Shortness of Breath. TECHNIQUE/EXAM DESCRIPTION AND NUMBER OF VIEWS: Single portable view of the chest. COMPARISON: 12/10/2017 FINDINGS: The heart is enlarged. Patchy bibasilar opacities may represent atelectasis or pneumonitis and are improved compared to prior. No pleural effusion or pneumothorax is identified.     Patchy bibasilar opacities may represent atelectasis or pneumonitis and are improved compared to prior. Stable  cardiomegaly.    Dx-chest-portable (1 View)    Result Date: 12/10/2017  12/10/2017 12:46 PM HISTORY/REASON FOR EXAM:  Shortness of Breath. TECHNIQUE/EXAM DESCRIPTION AND NUMBER OF VIEWS: Single portable view of the chest. COMPARISON: 1 view chest 12/10/2017 0005 hours FINDINGS: There are pulmonary interstitial and alveolar densities that are consistent with edema. Cardiac silhouette: enlarged. Pleura: There are no pleural effusion or pneumothoraces. Osseous structures: No significant bony abnormality is present.     Development of mild-moderate pulmonary edema    Dx-chest-portable (1 View)    Result Date: 12/10/2017  12/10/2017 12:00 AM HISTORY/REASON FOR EXAM:  Shortness of Breath. TECHNIQUE/EXAM DESCRIPTION AND NUMBER OF VIEWS: Single portable view of the chest. COMPARISON: 12/9/2017 FINDINGS: Single portable view of the chest demonstrates a stable cardiac silhouette and mediastinal contours. No discrete opacity, pleural fluid, or pneumothorax. The pulmonary vasculature remains prominent. No suspicious bony lesions.     No significant interval change.    Dx-chest-portable (1 View)    Result Date: 12/9/2017 12/9/2017 6:32 PM HISTORY/REASON FOR EXAM: Chest Pain. TECHNIQUE/EXAM DESCRIPTION AND NUMBER OF VIEWS: Single AP view of the chest. COMPARISON: December 27, 2015 FINDINGS: Hardware: None. Lungs: New minor fissure visualization and mild hazy opacity Pleura:  No pleural space process is seen. Heart and mediastinum: There is stable cardiac silhouette enlargement.     Stable cardiac silhouette enlargement with borderline interstitial edema    Dx-foot-complete 3+ Right    Result Date: 12/10/2017  12/10/2017 6:57 PM HISTORY/REASON FOR EXAM: Foot [id number] TECHNIQUE/EXAM DESCRIPTION AND NUMBER OF VIEWS: 3 nonweightbearing views of the RIGHT foot. COMPARISON:  CT today. FINDINGS: Soft tissue swelling is severe and there is rocker-bottom deformity There is midfoot bony destruction with dorsal subluxation. Cuneiforms  are fragmented. There is lateral dislocation of the cuboid with partial resorption. There is bony destruction at the metatarsal bases laterally. There is sclerosis. There is an age indeterminate fifth proximal phalanx fracture. There is periosteal reaction of the fifth metatarsal neck anteriorly     Midfoot bony destructive change with severe soft tissue swelling. Differential includes Charcot joint and/or osteomyelitis. Likely both are present    Dx-tibia And Fibula Right    Result Date: 12/10/2017  12/10/2017 6:58 PM HISTORY/REASON FOR EXAM:  Atraumatic Pain/Swelling/Deformity. Clinically suspected osteomyelitis TECHNIQUE/EXAM DESCRIPTION AND NUMBER OF VIEWS:  2 views of the RIGHT tibia and fibula. COMPARISON: None FINDINGS: There is diffuse subcutaneous fat stranding Vascular calcifications No acute bony destruction is seen. No acute periosteal reaction. There is some dorsal bony spurring along the tibia which is related to a likely remote injury/tug lesion.     No radiographic evidence of acute bony destruction to suggest osteomyelitis Soft tissue swelling    Echocardiogram-comp W/ Cont    Result Date: 2017  Transthoracic Echo Report Echocardiography Laboratory CONCLUSIONS No prior study is available for comparison. Normal left ventricular systolic function. No evidence of valvular abnormality based on Doppler evaluation. TADEO HARMAN Exam Date:         2017                    09:13 Exam Location:     Inpatient Priority:          Routine Ordering Physician:        FOZIA MCNAMARA Referring Physician: Sonographer:               Selina Olsen RDCS,                            RVT Age:    52     Gender:    F MRN:    [id number] :    [id number] BSA:    2.29   Ht (in):    67     Wt (lb):    267 Exam Type:     Complete, Contrast Indications:     Acute MI ICD Codes:       410.9 CPT Codes:       23825,  BP:   114    /   73     HR: Technical Quality:       Poor MEASUREMENTS  (Male / Female) Normal Values  2D ECHO LV Diastolic Diameter PLAX        4.2 cm                4.2 - 5.9 / 3.9 - 5.3 cm LV Systolic Diameter PLAX         3.1 cm                2.1 - 4.0 cm IVS Diastolic Thickness           1.8 cm                LVPW Diastolic Thickness          1.2 cm                LVOT Diameter                     2.2 cm                Estimated LV Ejection Fraction    60 %                  M-MODE Aortic Root Diameter MM           3.3 cm                DOPPLER AV Peak Velocity                  1.4 m/s               AV Peak Gradient                  7.7 mmHg              AV Mean Gradient                  5.2 mmHg              LVOT Peak Velocity                0.88 m/s              AV Area Cont Eq vti               2 cm²                 Mitral E Point Velocity           0.79 m/s              Mitral E to A Ratio               0.8                   MV Pressure Half Time             47.5 ms               MV Area PHT                       4.6 cm²               MV Deceleration Time              164 ms                TV Peak E Velocity                0.57 m/s              TR Peak Velocity                  281 cm/s              PV Peak Velocity                  0.82 m/s              PV Peak Gradient                  2.7 mmHg              RVOT Peak Velocity                0.75 m/s              * Indicates values subject to auto-interpretation LV EF:  60    % FINDINGS Left Ventricle Normal left ventricular chamber size. Normal left ventricular systolic function. Left ventricular ejection fraction is visually estimated to be 60%. Normal regional wall motion. Normal diastolic function. Contrast was used to enhance visualization of the endocardial border. 1ML of contrast was administered. Existing IV was used, located at the left forarm. Right Ventricle Right ventricle not well visualized. Right Atrium Right atrium not well visualized. Left Atrium The left atrium is normal in size. Mitral Valve Structurally normal mitral valve without  "significant stenosis. Trace mitral regurgitation. Aortic Valve Aortic sclerosis without stenosis. No aortic insufficiency. Tricuspid Valve Structurally normal tricuspid valve without significant stenosis. Moderate tricuspid regurgitation. Right ventricular systolic pressure is estimated to be 35mmHg. Pulmonic Valve The pulmonic valve is not well visualized. No stenosis or regurgitation seen. Pericardium Normal pericardium without effusion. Aorta The aortic root is normal. Sue Betts (Electronically Signed) Final Date:     11 December 2017                 11:05      Micro:  Results     Procedure Component Value Units Date/Time    BLOOD CULTURE [[id number]] Collected:  12/09/17 1849    Order Status:  Completed Specimen:  Blood from Peripheral Updated:  12/14/17 2100     Significant Indicator NEG     Source BLD     Site PERIPHERAL     Blood Culture No growth after 5 days of incubation.    Narrative:       Per Hospital Policy: Only change Specimen Src: to \"Line\" if  specified by physician order.    BLOOD CULTURE [[id number]] Collected:  12/10/17 1059    Order Status:  Completed Specimen:  Blood from Peripheral Updated:  12/11/17 0900     Significant Indicator NEG     Source BLD     Site PERIPHERAL     Blood Culture --     No Growth    Note: Blood cultures are incubated for 5 days and  are monitored continuously.Positive blood cultures  are called to the RN and reported as soon as  they are identified.      Narrative:       Per Hospital Policy: Only change Specimen Src: to \"Line\" if  specified by physician order.    INFLUENZA A/B BY PCR [[id number]] Collected:  12/10/17 1230    Order Status:  Completed Updated:  12/10/17 1426     Influenza virus A RNA Negative     Influenza virus B, PCR Negative    Narrative:       Collected By:[id number] [facility]    INFLUENZA BY PCR, A/B/H1N1 [[id number]] Collected:  12/10/17 1230    Order Status:  Canceled Specimen:  Nasal from Nasopharyngeal     Blood Culture [[id number]]  "    Order Status:  No result Specimen:  Blood from Peripheral     Blood Culture [[id number]]     Order Status:  No result Specimen:  Blood from Peripheral     URINALYSIS [[id number]]  (Abnormal) Collected:  12/09/17 1538    Order Status:  Completed Specimen:  Urine from Urine, Cath Updated:  12/09/17 1547     Color Yellow     Character Cloudy (A)     Specific Gravity 1.029     Ph 5.0     Glucose >=1000 (A) mg/dL      Ketones 15 (A) mg/dL      Protein 100 (A) mg/dL      Bilirubin Negative     Urobilinogen, Urine 0.2     Nitrite Negative     Leukocyte Esterase Negative     Occult Blood Moderate (A)     Micro Urine Req Microscopic    BLOOD CULTURE [[id number]]     Order Status:  Canceled Specimen:  Blood from Peripheral     BLOOD CULTURE [[id number]] Collected:  12/09/17 0000    Order Status:  Canceled Specimen:  Other from Peripheral           Assessment:  Active Hospital Problems    Diagnosis   • *Severe sepsis due to infected right diabetic foot (CMS-HCC) [A41.9, R65.20]   • Acute respiratory failure with hypoxia due to sepsis, fluid overload, AECOPD (CMS-HCC) [J96.01]   • Right diabetic foot infection, cellulits, and abscess (CMS-HCC) [E11.69, L08.9]   • Poorly controlled type 2 diabetes mellitus with neuropathy (CMS-HCC) [E11.40, E11.65]   • Pulmonary edema [J81.1]   • Hypokalemia [E87.6]   • Hyponatremia [E87.1]   • COPD with acute exacerbation (CMS-HCC) [J44.1]   • NSTEMI (non-ST elevated myocardial infarction) (CMS-HCC) [I21.4]   • Hypertension [I10]   • Morbid obesity with BMI of 40.0-44.9, adult (CMS-HCC) [E66.01, Z68.41]   • Tobacco abuse [Z72.0]       Plan:  Sepsis   2/2 Right DFU with osteomyelitis/abscess  Afebrile  Leukocytosis  Continue zosyn    Right foot osteomyelitis   Abx per above    S/p R BKA on 12/14 for source control  Anticipate dc abx 48hrs post op if surgical site without signs of infection    Leukocytosis, increased  2/2 above and post op  On abx above  Monitor    DM2, uncontrolled  HgA1c  11.4  Maintain BS less than 150 to control infection and assist with wound healing    Discussed with internal medicine/ Dr. Ann

## 2017-12-15 NOTE — DISCHARGE PLANNING
Thank you for the opportunity to assist with this patient as they transition to post acute services.  We are aware of the Rehab referral from Dr. Ann.  Dr. Tinoco to consult this referral. Our Transitional Case Coordinator will follow. At this time patient is showing to have [facility] as their coverage.   Please do not hesitate to call us if you require additional assistance my phone number is [phone] Bruno.

## 2017-12-15 NOTE — OP REPORT
DATE OF SERVICE:  12/14/2017    PREOPERATIVE DIAGNOSES:  1.  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  2.  Poorly controlled type 2 diabetes.    POSTOPERATIVE DIAGNOSES:  1.  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  2.  Poorly controlled type 2 diabetes.    PROCEDURE PERFORMED:  Right transtibial amputation.    SURGEON:  Srinivas Delgado MD    ANESTHESIOLOGIST:  Sudhir Shea MD    ANESTHESIA:  General.    ASSISTANT:  Louis Kirkpatrick PA-C    ESTIMATED BLOOD LOSS:  100 mL    TOURNIQUET TIME:  31 minutes at 250 mmHg to the right thigh.    INDICATION FOR PROCEDURE:  Patient is a 52-year-old female.  She was admitted   to the medical service on 12/09/2017 with poorly controlled diabetes, non-ST   elevation MI, and findings of her right foot consistent with chronic   osteomyelitis with draining sinus related to Charcot neuroarthropathy.  She   has a history of a left transmetatarsal amputation done about a year ago.  My   colleague, Dr. Crowell, was initially consulted and asked if I be available for   definitive surgical management, which I was happy to do.  I met the patient   and her daughter 2 days ago.  We discussed the treatment recommendations and I   agreed that her best definitive treatment for this is transtibial amputation.    We discussed risks, benefits, and alternatives to surgery.  She signed   informed consent and wished to proceed as outlined above.  Patient is felt to   be medically optimized preoperatively by her primary service.    DESCRIPTION OF PROCEDURE:  The patient was met in the preoperative holding   area and her surgical site was signed and consent was confirmed to be   accurate.  She was taken back to the operating room and general anesthesia was   induced.  She had already been receiving Zosyn.  A tourniquet was applied to   the right thigh.  Her foot where there was draining sinus was covered with    Dorian.  The remainder of the limb was then prepped and draped in the usual   sterile fashion.  A formal timeout was performed to confirm patient's correct   name, correct surgical site, correct procedure, and correct laterality.  The   limb was then elevated and the tourniquet was inflated to 250 mmHg.  Standard   incisions were marked with a marker for a posterior flap for transtibial   below-knee amputation and the skin flaps were incised sharply down to bone   anteriorly with a 10 blade scalpel and down through fascia medially,   laterally, and posteriorly.  Bovie cautery was used to dissect down to the   periosteum of the tibia and dissected through the anterior compartment muscle   and the lateral compartment muscle down to the fibula.  I marked the bone 12   cm from the medial joint line and using an oscillating saw with posterior   retractors in place to amputate the tibia at this level.  The same technique   was performed with retractors in place at the fibula about a centimeter more   proximally.  I then used the amputation knife to resect the soft tissue off   the posterior fibula and tibia and amputated the limb.  I then debulked the   deep posterior compartment with Bovie cautery, isolating the tibial   neurovascular bundle and tibial nerve was identified, placed on tension and   transected sharply and allowed to retract proximally.  The vessels were   ligated with 2-0 silk stick ties and O free ties.  The anterior compartment   neurovascular bundle was identified and the vessels were ligated with 2-0 silk   stick ties and O free ties.  The peroneal nerves were identified, placed on   tension, sharply transected, and allowed to retract proximally.  The saphenous   vein was identified and ligated.  I then tapered the gastrocnemius muscle to   allow for ____ at the anterior tibial periosteum.  I beveled the anterior   aspect of the tibia and smoothed with the oscillating saw.  The wound was then    irrigated with Pulsavac and normal saline.  I deflated the tourniquet after   31 minutes and sufficient hemostasis was present.  I then repaired the   gastrocnemius fascia to the anterior tibial periosteum with #2 Ethibond.  I   reapproximated the deep fascial layers with 0 Vicryl, subQ layers with 0   Vicryl, 2-0 Vicryl, and skin edges with staples.  There was no tension on the   skin edges.  I then cleansed and dried the wound, placed a sterile compressive   dressing.  She was then awoken from anesthesia and transferred on the [facility]   and taken to postanesthesia care unit in stable condition.    Louis Kirkpatrick PA-C, was present for the duration of the procedure and assisted   with patient positioning, retracting and wound closure.    PLAN:  1.  Patient will be readmitted to the medical service postop.  2.  Recommend continuing antibiotics for 2-3 days to treat any potential   residual cellulitis.  3.  She will need her staples out in about 4 weeks assuming she has   sufficiently healed her wound by that point.  4.  Recommend continuing routine medical management and good glycemic control   postoperatively.       ____________________________________     MD JAMA Truong / DALLIN    DD:  12/14/2017 16:09:22  DT:  12/14/2017 16:59:19    D#:  [id number]  Job#:  [id number]

## 2017-12-15 NOTE — DISCHARGE PLANNING
Transitional Care Navigator:    Met with pt at bedside to discuss d/c plan. Pt is post op Right BKA. Discussed plan for rehabilitation options. SNF vs IRF. Pt states that she refuses to go to any SNF d/t being in one in the past and not having the best care. Pt would like referral to [facility]. TCN requested for Rehab referral. Per BSN, PT/OT saw pt this AM. Therapy notes are pending.     Choice form completed for [facility].

## 2017-12-16 DIAGNOSIS — R73.9 HYPERGLYCEMIA: ICD-10-CM

## 2017-12-16 LAB
ANION GAP SERPL CALC-SCNC: 4 MMOL/L (ref 0–11.9)
BASOPHILS # BLD AUTO: 0 % (ref 0–1.8)
BASOPHILS # BLD: 0 K/UL (ref 0–0.12)
BUN SERPL-MCNC: 17 MG/DL (ref 8–22)
CALCIUM SERPL-MCNC: 8.6 MG/DL (ref 8.5–10.5)
CHLORIDE SERPL-SCNC: 100 MMOL/L (ref 96–112)
CO2 SERPL-SCNC: 30 MMOL/L (ref 20–33)
CREAT SERPL-MCNC: 0.5 MG/DL (ref 0.5–1.4)
EKG IMPRESSION: NORMAL
EOSINOPHIL # BLD AUTO: 0.11 K/UL (ref 0–0.51)
EOSINOPHIL NFR BLD: 0.9 % (ref 0–6.9)
ERYTHROCYTE [DISTWIDTH] IN BLOOD BY AUTOMATED COUNT: 51.2 FL (ref 35.9–50)
GFR SERPL CREATININE-BSD FRML MDRD: >60 ML/MIN/1.73 M 2
GLUCOSE BLD-MCNC: 123 MG/DL (ref 65–99)
GLUCOSE BLD-MCNC: 221 MG/DL (ref 65–99)
GLUCOSE BLD-MCNC: 297 MG/DL (ref 65–99)
GLUCOSE BLD-MCNC: 300 MG/DL (ref 65–99)
GLUCOSE BLD-MCNC: 340 MG/DL (ref 65–99)
GLUCOSE SERPL-MCNC: 207 MG/DL (ref 65–99)
HCT VFR BLD AUTO: 36.9 % (ref 37–47)
HGB BLD-MCNC: 11.5 G/DL (ref 12–16)
LYMPHOCYTES # BLD AUTO: 3.56 K/UL (ref 1–4.8)
LYMPHOCYTES NFR BLD: 28.7 % (ref 22–41)
MANUAL DIFF BLD: NORMAL
MCH RBC QN AUTO: 28 PG (ref 27–33)
MCHC RBC AUTO-ENTMCNC: 31.2 G/DL (ref 33.6–35)
MCV RBC AUTO: 89.8 FL (ref 81.4–97.8)
MONOCYTES # BLD AUTO: 0.43 K/UL (ref 0–0.85)
MONOCYTES NFR BLD AUTO: 3.5 % (ref 0–13.4)
MORPHOLOGY BLD-IMP: NORMAL
MYELOCYTES NFR BLD MANUAL: 2.6 %
NEUTROPHILS # BLD AUTO: 7.97 K/UL (ref 2–7.15)
NEUTROPHILS NFR BLD: 62.6 % (ref 44–72)
NEUTS BAND NFR BLD MANUAL: 1.7 % (ref 0–10)
NRBC # BLD AUTO: 0 K/UL
NRBC BLD AUTO-RTO: 0 /100 WBC
PLATELET # BLD AUTO: 257 K/UL (ref 164–446)
PLATELET BLD QL SMEAR: NORMAL
PMV BLD AUTO: 10.9 FL (ref 9–12.9)
POTASSIUM SERPL-SCNC: 3.8 MMOL/L (ref 3.6–5.5)
RBC # BLD AUTO: 4.11 M/UL (ref 4.2–5.4)
RBC BLD AUTO: NORMAL
SODIUM SERPL-SCNC: 134 MMOL/L (ref 135–145)
WBC # BLD AUTO: 12.4 K/UL (ref 4.8–10.8)

## 2017-12-16 PROCEDURE — A9270 NON-COVERED ITEM OR SERVICE: HCPCS | Performed by: INTERNAL MEDICINE

## 2017-12-16 PROCEDURE — 700111 HCHG RX REV CODE 636 W/ 250 OVERRIDE (IP): Performed by: INTERNAL MEDICINE

## 2017-12-16 PROCEDURE — A9270 NON-COVERED ITEM OR SERVICE: HCPCS | Performed by: FAMILY MEDICINE

## 2017-12-16 PROCEDURE — 80048 BASIC METABOLIC PNL TOTAL CA: CPT

## 2017-12-16 PROCEDURE — A9270 NON-COVERED ITEM OR SERVICE: HCPCS | Performed by: HOSPITALIST

## 2017-12-16 PROCEDURE — 99233 SBSQ HOSP IP/OBS HIGH 50: CPT | Mod: 25 | Performed by: INTERNAL MEDICINE

## 2017-12-16 PROCEDURE — 36415 COLL VENOUS BLD VENIPUNCTURE: CPT

## 2017-12-16 PROCEDURE — 700102 HCHG RX REV CODE 250 W/ 637 OVERRIDE(OP): Performed by: PHYSICIAN ASSISTANT

## 2017-12-16 PROCEDURE — 85007 BL SMEAR W/DIFF WBC COUNT: CPT

## 2017-12-16 PROCEDURE — 85027 COMPLETE CBC AUTOMATED: CPT

## 2017-12-16 PROCEDURE — 770006 HCHG ROOM/CARE - MED/SURG/GYN SEMI*

## 2017-12-16 PROCEDURE — 99406 BEHAV CHNG SMOKING 3-10 MIN: CPT | Performed by: INTERNAL MEDICINE

## 2017-12-16 PROCEDURE — 700102 HCHG RX REV CODE 250 W/ 637 OVERRIDE(OP): Performed by: HOSPITALIST

## 2017-12-16 PROCEDURE — 700102 HCHG RX REV CODE 250 W/ 637 OVERRIDE(OP): Performed by: FAMILY MEDICINE

## 2017-12-16 PROCEDURE — 700105 HCHG RX REV CODE 258: Performed by: FAMILY MEDICINE

## 2017-12-16 PROCEDURE — 82962 GLUCOSE BLOOD TEST: CPT | Mod: 91

## 2017-12-16 PROCEDURE — A9270 NON-COVERED ITEM OR SERVICE: HCPCS | Performed by: PHYSICIAN ASSISTANT

## 2017-12-16 PROCEDURE — 700102 HCHG RX REV CODE 250 W/ 637 OVERRIDE(OP): Performed by: INTERNAL MEDICINE

## 2017-12-16 PROCEDURE — 700111 HCHG RX REV CODE 636 W/ 250 OVERRIDE (IP): Performed by: FAMILY MEDICINE

## 2017-12-16 RX ORDER — OXYCODONE AND ACETAMINOPHEN 10; 325 MG/1; MG/1
1 TABLET ORAL EVERY 6 HOURS PRN
COMMUNITY
End: 2020-09-28

## 2017-12-16 RX ORDER — TIOTROPIUM BROMIDE 18 UG/1
1 CAPSULE ORAL; RESPIRATORY (INHALATION) DAILY
Status: DISCONTINUED | OUTPATIENT
Start: 2017-12-17 | End: 2017-12-20 | Stop reason: HOSPADM

## 2017-12-16 RX ORDER — FENTANYL 12.5 UG/1
1 PATCH TRANSDERMAL
COMMUNITY
End: 2020-09-28

## 2017-12-16 RX ORDER — BUDESONIDE AND FORMOTEROL FUMARATE DIHYDRATE 160; 4.5 UG/1; UG/1
2 AEROSOL RESPIRATORY (INHALATION) 2 TIMES DAILY
Status: DISCONTINUED | OUTPATIENT
Start: 2017-12-16 | End: 2017-12-20 | Stop reason: HOSPADM

## 2017-12-16 RX ADMIN — STANDARDIZED SENNA CONCENTRATE AND DOCUSATE SODIUM 2 TABLET: 8.6; 5 TABLET, FILM COATED ORAL at 08:02

## 2017-12-16 RX ADMIN — INSULIN GLARGINE 60 UNITS: 100 INJECTION, SOLUTION SUBCUTANEOUS at 21:04

## 2017-12-16 RX ADMIN — OXYCODONE HYDROCHLORIDE 10 MG: 10 TABLET ORAL at 16:52

## 2017-12-16 RX ADMIN — HEPARIN SODIUM 5000 UNITS: 5000 INJECTION, SOLUTION INTRAVENOUS; SUBCUTANEOUS at 20:59

## 2017-12-16 RX ADMIN — HEPARIN SODIUM 5000 UNITS: 5000 INJECTION, SOLUTION INTRAVENOUS; SUBCUTANEOUS at 06:15

## 2017-12-16 RX ADMIN — GABAPENTIN 200 MG: 100 CAPSULE ORAL at 08:02

## 2017-12-16 RX ADMIN — HYDROMORPHONE HYDROCHLORIDE 2 MG: 2 INJECTION INTRAMUSCULAR; INTRAVENOUS; SUBCUTANEOUS at 06:06

## 2017-12-16 RX ADMIN — HEPARIN SODIUM 5000 UNITS: 5000 INJECTION, SOLUTION INTRAVENOUS; SUBCUTANEOUS at 15:27

## 2017-12-16 RX ADMIN — ACETAMINOPHEN 650 MG: 325 TABLET, FILM COATED ORAL at 08:02

## 2017-12-16 RX ADMIN — INSULIN HUMAN 7 UNITS: 100 INJECTION, SOLUTION PARENTERAL at 21:05

## 2017-12-16 RX ADMIN — ACETAMINOPHEN 650 MG: 325 TABLET, FILM COATED ORAL at 16:51

## 2017-12-16 RX ADMIN — INSULIN HUMAN 7 UNITS: 100 INJECTION, SOLUTION PARENTERAL at 16:54

## 2017-12-16 RX ADMIN — ASPIRIN 325 MG: 325 TABLET, FILM COATED ORAL at 08:02

## 2017-12-16 RX ADMIN — INSULIN GLARGINE 60 UNITS: 100 INJECTION, SOLUTION SUBCUTANEOUS at 08:05

## 2017-12-16 RX ADMIN — METOPROLOL TARTRATE 25 MG: 25 TABLET, FILM COATED ORAL at 08:02

## 2017-12-16 RX ADMIN — TIOTROPIUM BROMIDE 1 CAPSULE: 18 CAPSULE ORAL; RESPIRATORY (INHALATION) at 08:41

## 2017-12-16 RX ADMIN — POTASSIUM CHLORIDE 20 MEQ: 1500 TABLET, EXTENDED RELEASE ORAL at 09:00

## 2017-12-16 RX ADMIN — ACETAMINOPHEN 650 MG: 325 TABLET, FILM COATED ORAL at 12:39

## 2017-12-16 RX ADMIN — BUDESONIDE AND FORMOTEROL FUMARATE DIHYDRATE 2 PUFF: 160; 4.5 AEROSOL RESPIRATORY (INHALATION) at 21:12

## 2017-12-16 RX ADMIN — SODIUM CHLORIDE: 9 INJECTION, SOLUTION INTRAVENOUS at 21:02

## 2017-12-16 RX ADMIN — GABAPENTIN 200 MG: 100 CAPSULE ORAL at 15:27

## 2017-12-16 RX ADMIN — ACETAMINOPHEN 650 MG: 325 TABLET, FILM COATED ORAL at 21:01

## 2017-12-16 RX ADMIN — INSULIN HUMAN 4 UNITS: 100 INJECTION, SOLUTION PARENTERAL at 11:10

## 2017-12-16 RX ADMIN — HYDROMORPHONE HYDROCHLORIDE 2 MG: 2 INJECTION INTRAMUSCULAR; INTRAVENOUS; SUBCUTANEOUS at 21:24

## 2017-12-16 RX ADMIN — OXYCODONE HYDROCHLORIDE 10 MG: 10 TABLET ORAL at 03:07

## 2017-12-16 RX ADMIN — BUDESONIDE AND FORMOTEROL FUMARATE DIHYDRATE 2 PUFF: 160; 4.5 AEROSOL RESPIRATORY (INHALATION) at 08:41

## 2017-12-16 RX ADMIN — METOPROLOL TARTRATE 25 MG: 25 TABLET, FILM COATED ORAL at 20:59

## 2017-12-16 RX ADMIN — STANDARDIZED SENNA CONCENTRATE AND DOCUSATE SODIUM 2 TABLET: 8.6; 5 TABLET, FILM COATED ORAL at 20:59

## 2017-12-16 RX ADMIN — GABAPENTIN 200 MG: 100 CAPSULE ORAL at 20:58

## 2017-12-16 RX ADMIN — ALPRAZOLAM 0.25 MG: 0.25 TABLET ORAL at 20:58

## 2017-12-16 RX ADMIN — PREDNISONE 20 MG: 20 TABLET ORAL at 08:02

## 2017-12-16 RX ADMIN — KETOROLAC TROMETHAMINE 30 MG: 30 INJECTION, SOLUTION INTRAMUSCULAR at 18:05

## 2017-12-16 RX ADMIN — HYDROMORPHONE HYDROCHLORIDE 2 MG: 2 INJECTION INTRAMUSCULAR; INTRAVENOUS; SUBCUTANEOUS at 00:46

## 2017-12-16 RX ADMIN — OXYCODONE HYDROCHLORIDE 10 MG: 10 TABLET ORAL at 12:39

## 2017-12-16 RX ADMIN — KETOROLAC TROMETHAMINE 30 MG: 30 INJECTION, SOLUTION INTRAMUSCULAR at 11:00

## 2017-12-16 RX ADMIN — NICOTINE 21 MG: 21 PATCH, EXTENDED RELEASE TRANSDERMAL at 06:14

## 2017-12-16 RX ADMIN — OXYCODONE HYDROCHLORIDE 10 MG: 10 TABLET ORAL at 08:02

## 2017-12-16 RX ADMIN — ATORVASTATIN CALCIUM 40 MG: 40 TABLET, FILM COATED ORAL at 20:58

## 2017-12-16 RX ADMIN — OXYCODONE HYDROCHLORIDE 10 MG: 10 TABLET ORAL at 20:59

## 2017-12-16 ASSESSMENT — ENCOUNTER SYMPTOMS
MYALGIAS: 1
ABDOMINAL PAIN: 0
ORTHOPNEA: 0
CHILLS: 0
BLURRED VISION: 0
SHORTNESS OF BREATH: 1
NAUSEA: 0
DIARRHEA: 0
COUGH: 0
WEAKNESS: 1
FEVER: 0
SHORTNESS OF BREATH: 0
HEARTBURN: 0
WHEEZING: 1
DIZZINESS: 0
PALPITATIONS: 0
SORE THROAT: 0
VOMITING: 0

## 2017-12-16 ASSESSMENT — PAIN SCALES - GENERAL
PAINLEVEL_OUTOF10: 9
PAINLEVEL_OUTOF10: 8
PAINLEVEL_OUTOF10: 9
PAINLEVEL_OUTOF10: 9
PAINLEVEL_OUTOF10: 10
PAINLEVEL_OUTOF10: 10
PAINLEVEL_OUTOF10: 5

## 2017-12-16 NOTE — PROGRESS NOTES
Pt. Transported to [facility]- from T803-1. All medications, chart and personal belongings with patient.

## 2017-12-16 NOTE — PROGRESS NOTES
Antonia Cano Fall Risk Assessment:     Last Known Fall: Within the last six months  Mobility: Use of assistive device/requires assist of two people  Medications: Cardiovascular and central nervous system meds  Mental Status/LOC/Awareness: Awake, alert, and oriented to date, place, and person  Toileting Needs: Use of catheters or diversion devices  Volume/Electrolyte Status: Use of IV fluids/tube feeds  Communication/Sensory: Neuropathy  Behavior: Appropriate behavior  Antonia Cano Fall Risk Total: 15  Fall Risk Level: HIGH RISK    Universal Fall Precautions:  call light/belongings in reach, bed in low position and locked, wheelchairs and assistive devices out of sight, siderails up x 2, use non-slip footwear, adequate lighting, clutter free and spill free environment, educate on level of risk, educate to call for assistance    Fall Risk Level Interventions:    TRIAL ([place] Wi Max 8, NEURO, MED JUANA 5) Moderate Fall Risk Interventions  Place yellow fall risk ID band on patient: verified  Provide patient/family education based on risk assessment : completed  Educate patient/family to call staff for assistance when getting out of bed: completed  Place fall precaution signage outside patient door: completed  Utilize bed/chair fall alarm: completedTRIAL ([place] Wi Max 8, NEURO, [place] JUANA 5) High Fall Risk Interventions  Place yellow fall risk ID band on patient: verified  Provide patient/family education based on risk assessment: completed  Educate patient/family to call staff for assistance when getting out of bed: completed  Place fall precaution signage outside patient door: verified  Place patient in room close to nursing station: verified  Utilize bed/chair fall alarm: verified  Notify charge of high risk for huddle: completed    Patient Specific Interventions:     Medication: review medications with patient and family and limit combination of prn medications  Mental Status/LOC/Awareness: reinforce falls education, check on  patient hourly, utilize bed/chair fall alarm and reinforce the use of call light  Toileting: monitor intake and output/use of appropriate interventions  Volume/Electrolyte Status: monitor abnormal lab values  Communication/Sensory: update plan of care on whiteboard  Behavioral: encourage patient to voice feelings  Mobility: utilize bed/chair fall alarm and ensure bed is locked and in lowest position

## 2017-12-16 NOTE — PROGRESS NOTES
[facility] Hospitalist Progress Note    Date of Service: 2017    Chief Complaint  52 y.o. female admitted 2017 with Sepsis, Respiratory failure, Diabetic foot ulcer with osteomyelitis and Leg abscess, NSTEMI, also with COPD exaceration and pulmonary edema    Interval Problem Update   stable and overnight still complains of pain. Patient's pain is local, 6-8/10, intermittent and does not radiate to other location, sharp and with some tingling. Can be controlled by pain meds. Dressing in place.  However patient can sleep well.    Diabetic foot - transtibial amputation  NSTEMI - trops trended down, Echo reviewed  Resp failure - remains on O2  COPDE - less wheezing  Pulm edema - less shortness of breath  Diabetes -  this AM  HTN - controlled    Consultants/Specialty  LPS - Danny  ID - Ardon    Disposition  SNF vs Rehab        Review of Systems   Constitutional: Positive for malaise/fatigue. Negative for fever.   HENT: Negative for hearing loss and sore throat.    Eyes: Negative for blurred vision.   Respiratory: Positive for shortness of breath and wheezing. Negative for cough.    Cardiovascular: Positive for leg swelling. Negative for chest pain, palpitations and orthopnea.   Gastrointestinal: Negative for diarrhea and heartburn.   Genitourinary: Negative for dysuria.   Musculoskeletal:        Left foot pain   Neurological: Positive for weakness. Negative for dizziness.      Physical Exam  Laboratory/Imaging   Hemodynamics  Temp (24hrs), Av.8 °C (98.3 °F), Min:36.2 °C (97.2 °F), Max:37.7 °C (99.8 °F)   Temperature: 36.3 °C (97.3 °F)  Pulse  Av.1  Min: 73  Max: 156    Blood Pressure: 127/75      Respiratory      Respiration: 17, Pulse Oximetry: 95 %     Work Of Breathing / Effort: Mild  RUL Breath Sounds: Clear, RML Breath Sounds: Diminished, RLL Breath Sounds: Diminished, KWAME Breath Sounds: Clear, LLL Breath Sounds: Diminished    Fluids    Intake/Output Summary (Last 24 hours) at 17  0741  Last data filed at 12/16/17 0200   Gross per 24 hour   Intake              240 ml   Output             1500 ml   Net            -1260 ml       Nutrition  Orders Placed This Encounter   Procedures   • DIET ORDER     Standing Status:   Standing     Number of Occurrences:   1     Order Specific Question:   Diet:     Answer:   Diabetic [3]     Physical Exam   Constitutional: She is oriented to person, place, and time. She appears well-developed and well-nourished.   Morbidly obese   HENT:   Head: Normocephalic and atraumatic.   Eyes: Conjunctivae are normal. Pupils are equal, round, and reactive to light.   Neck: No tracheal deviation present. No thyromegaly present.   Cardiovascular: Normal rate and regular rhythm.    Pulmonary/Chest: Effort normal. No respiratory distress. She has no wheezes. She has rales.   Abdominal: Soft. Bowel sounds are normal. She exhibits no distension and no mass. There is no tenderness. There is no guarding.   Musculoskeletal: She exhibits edema.   Right Transtibial amputation  Transmetatarsal amputation Left foot   Lymphadenopathy:     She has no cervical adenopathy.   Neurological: She is alert and oriented to person, place, and time.   Skin: Skin is warm and dry.   Nursing note and vitals reviewed.      Recent Labs      12/14/17   0237  12/15/17   0205  12/16/17   0344   WBC  13.8*  16.6*  12.4*   RBC  4.77  4.91  4.11*   HEMOGLOBIN  13.2  13.9  11.5*   HEMATOCRIT  41.6  43.5  36.9*   MCV  87.2  88.6  89.8   MCH  27.7  28.3  28.0   MCHC  31.7*  32.0*  31.2*   RDW  49.1  50.7*  51.2*   PLATELETCT  289  325  257   MPV  11.3  11.5  10.9     Recent Labs      12/14/17   0237  12/15/17   0205  12/16/17   0344   SODIUM  139  140  134*   POTASSIUM  3.0*  3.7  3.8   CHLORIDE  98  100  100   CO2  32  31  30   GLUCOSE  232*  58*  207*   BUN  26*  15  17   CREATININE  0.65  0.51  0.50   CALCIUM  9.3  8.5  8.6                      Assessment/Plan     * Severe sepsis due to infected right  diabetic foot (CMS-HCC)- (present on admission)   Assessment & Plan    This is severe sepsis with the following associated acute organ dysfunction(s): acute respiratory failure., NSTEMI  Off IVF   resolved        Right diabetic foot infection, cellulits, and abscess (CMS-HCC)- (present on admission)   Assessment & Plan    s/p Right transtibial amputation  IV Zosyn  Po pain meds, avoid narcotics, cont IV Toradol, Neurontin        Acute respiratory failure with hypoxia due to sepsis, fluid overload, AECOPD (CMS-HCC)- (present on admission)   Assessment & Plan    Keep O2 sats above 90%        Poorly controlled type 2 diabetes mellitus with neuropathy (CMS-HCC)- (present on admission)   Assessment & Plan    decrease Lantus to 60 u BID, continue SSI        Pulmonary edema   Assessment & Plan    off IV Lasix        Hypokalemia   Assessment & Plan    Kdur  Follow bmp        COPD with acute exacerbation (CMS-HCC)   Assessment & Plan    suspected  Symbicort, Spiriva, RT protocol, cont  Prednisone tapering        Hyponatremia- (present on admission)   Assessment & Plan    Follow bmp        NSTEMI (non-ST elevated myocardial infarction) (CMS-HCC)- (present on admission)   Assessment & Plan    ASA, Lipitor, Metoprolol            Hypertension- (present on admission)   Assessment & Plan    Metoprolol         Morbid obesity with BMI of 40.0-44.9, adult (CMS-HCC)- (present on admission)   Assessment & Plan    Body mass index is 43.7 kg/m²..  referral for outpatient weight management.          Tobacco abuse- (present on admission)   Assessment & Plan    - Smoking cessation education provided  - Nicotine patch            Reviewed items::  EKG reviewed, Radiology images reviewed, Labs reviewed and Medications reviewed  Long catheter::  Urinary Tract Retention or Urinary Tract Obstruction  DVT prophylaxis pharmacological::  Heparin  Ulcer Prophylaxis::  No  Antibiotics:  Treating active infection/contamination beyond 24 hours  perioperative coverage     For complexity-based billing, please refer to the history, exam, and decison making above. In addition, I spent >35 minutes caring for the patient today. More than 50% of the time was spent counseling and coordinating care.    I have discussed with RN and CM and SW and other consultants about patient's plan.     Spent 3 minutes on tobacco cessation counseling including nicotine patches, gum, and dangers of smoking.    The pt indicated that will quit.     02308 (smoking and tobacco cessation counseling visit; 3-10 min)

## 2017-12-16 NOTE — DISCHARGE PLANNING
Per Dr. Tinoco consult:    Patient is currently not a candidate for IRF as she most likely will not be able to tolerate 3 hours of therapy at current pain level. Patient is requesting IV dilaudid q3H regularly and would need better pain control.  Also medically complex, leukocytosis (post-op, steroids) is increasing.  Would also need a plan for ambulating up 3 steps into home.  Will have TCC discuss with family on level of support.  Most likely patient is more appropriate for SNF.

## 2017-12-16 NOTE — PROGRESS NOTES
Received patient from [facility] via bed.  Awake alert and no c/o at this time.  Iv antibiotics unasyn running via pvi without difficulties.  Patient made comfortable ;  Personal belongings at bedside.  Will continue to monitor.

## 2017-12-16 NOTE — PROGRESS NOTES
Assumed care at 1915. Bedside report received from Day RN Radha. Patient's chart and MAR reviewed. 12 hour chart check complete. Patient was updated on plan of care for the night. Questions answered and concerns addressed.  Pt denies any additional needs at this time. White board updated. Call light, phone and personal belongings within reach. Bed alarm on and working appropriately. Vital signs stable

## 2017-12-16 NOTE — PROGRESS NOTES
Pain in right amputation surgical site.8/10  Oxycodone  10 mg po given .  Will continue to monitor

## 2017-12-16 NOTE — PROGRESS NOTES
"   Orthopaedic Progress Note    Interval changes:  Pain control issues - gabapentin 200mg PO TID added today  Dressing CDI    ROS - Patient denies any new issues.  Pain well controlled.    Blood pressure 136/85, pulse 86, temperature 37.7 °C (99.8 °F), resp. rate 16, height 1.702 m (5' 7.01\"), weight 122.9 kg (270 lb 15.1 oz), SpO2 94 %, not currently breastfeeding.      Patient seen and examined  No acute distress  Breathing non labored  RRR  Right BKA dressing CDI, very TTP.  A lot of phantom pain.     Recent Labs      12/13/17   0240  12/14/17   0237  12/15/17   0205   WBC  11.5*  13.8*  16.6*   RBC  4.54  4.77  4.91   HEMOGLOBIN  12.8  13.2  13.9   HEMATOCRIT  39.4  41.6  43.5   MCV  86.8  87.2  88.6   MCH  28.2  27.7  28.3   MCHC  32.5*  31.7*  32.0*   RDW  49.3  49.1  50.7*   PLATELETCT  227  289  325   MPV  12.0  11.3  11.5       Active Hospital Problems    Diagnosis   • Severe sepsis due to infected right diabetic foot (CMS-HCC) [A41.9, R65.20]     Priority: High   • Acute respiratory failure with hypoxia due to sepsis, fluid overload, AECOPD (CMS-HCC) [J96.01]     Priority: High   • Right diabetic foot infection, cellulits, and abscess (CMS-HCC) [E11.69, L08.9]     Priority: High   • Poorly controlled type 2 diabetes mellitus with neuropathy (CMS-HCC) [E11.40, E11.65]     Priority: High   • Pulmonary edema [J81.1]     Priority: Medium   • Hypokalemia [E87.6]     Priority: Medium   • Hyponatremia [E87.1]     Priority: Medium   • COPD with acute exacerbation (CMS-HCC) [J44.1]     Priority: Medium   • NSTEMI (non-ST elevated myocardial infarction) (CMS-HCC) [I21.4]     Priority: Medium   • Hypertension [I10]     Priority: Medium   • Morbid obesity with BMI of 40.0-44.9, adult (CMS-HCC) [E66.01, Z68.41]     Priority: Low   • Tobacco abuse [Z72.0]     Priority: Low       Assessment/Plan:  Pain control issues -gabapentin added today  POD#1 S/P Right below knee amputation  Wt bearing status - NWB RLE  Wound " care/Drains - dressings left in place  Future Procedures - none planned  Sutures/Staples out- 10-14 days post operatively  PT/OT-initiated  Antibiotics: zosyn 13.5g IV continuous  DVT Prophylaxis- TEDS/SCDs/Foot pumps/aspirin/heparin  Long-none  Case Coordination for Discharge Planning - Disposition SNF

## 2017-12-16 NOTE — PROGRESS NOTES
Infectious Disease Progress Note    Author: Nano Ardon M.D. Date & Time of service: 2017  1:01 PM    Chief Complaint:  FU R diabetic foot OM    Interval History:   AF, WBC 11.5, foot pain better controlled today, tolerating abx without issues, plan for R BKA tomorrow   AF, WBC 13.8, lots of foot pain, plan for R BKA today  12/15 AF, WBC 16.6, s/p R BKA, now having severe pain not controlled with pain regimen   AF WBC 12.4, pain not controlled, requiring around the clock IV dilaudid  Labs Reviewed, Medications Reviewed, Radiology Reviewed and Wound Reviewed.    Review of Systems:  Review of Systems   Constitutional: Negative for chills and fever.   Respiratory: Negative for cough and shortness of breath.    Gastrointestinal: Negative for abdominal pain, diarrhea, nausea and vomiting.   Musculoskeletal: Positive for joint pain and myalgias.       Hemodynamics:  Temp (24hrs), Av.7 °C (98.1 °F), Min:36.2 °C (97.2 °F), Max:37.7 °C (99.8 °F)  Temperature: 36.7 °C (98 °F)  Pulse  Av.9  Min: 73  Max: 156   Blood Pressure: 132/91       Physical Exam:  Physical Exam   Constitutional: She is oriented to person, place, and time. She appears well-developed.   Obese   HENT:   Head: Normocephalic and atraumatic.   Hirsuite   Eyes: EOM are normal. Pupils are equal, round, and reactive to light.   Neck: Neck supple.   Cardiovascular: Normal rate, regular rhythm and normal heart sounds.    Pulmonary/Chest: Effort normal and breath sounds normal.   Abdominal: Soft. There is no tenderness.   Musculoskeletal: She exhibits edema.   Evidence of left foot transmet amputation. Surgical site clean.   R BKA in surgical dressing     Neurological: She is alert and oriented to person, place, and time.       Meds:    Current Facility-Administered Medications:   •  budesonide-formoterol  •  [START ON 2017] tiotropium  •  insulin glargine  •  ketorolac  •  predniSONE  •  gabapentin  •  potassium chloride  SA  •  [COMPLETED] piperacillin-tazobactam **AND** piperacillin-tazobactam (ZOSYN) continuous infusion  •  hydromorphone  •  ondansetron  •  ondansetron  •  NS  •  NS  •  heparin  •  alprazolam  •  Respiratory Care per Protocol  •  aspirin  •  metoprolol  •  senna-docusate **AND** polyethylene glycol/lytes **AND** magnesium hydroxide **AND** bisacodyl  •  INITIATE NICOTINE REPLACEMENT PROTOCOL  **AND** nicotine **AND** Protocol 205 PATIENT EDUCATION MATERIALS **AND** Protocol 205 Rotate nicotine patch application sites daily  **AND** nicotine polacrilex  •  insulin regular **AND** Accu-Chek ACHS **AND** NOTIFY MD and PharmD **AND** glucose 4 g **AND** dextrose 50%  •  atorvastatin  •  acetaminophen  •  oxycodone immediate-release **OR** oxycodone immediate-release    Labs:  Recent Labs      12/14/17   0237  12/15/17   0205 12/16/17   0344   WBC  13.8*  16.6*  12.4*   RBC  4.77  4.91  4.11*   HEMOGLOBIN  13.2  13.9  11.5*   HEMATOCRIT  41.6  43.5  36.9*   MCV  87.2  88.6  89.8   MCH  27.7  28.3  28.0   RDW  49.1  50.7*  51.2*   PLATELETCT  289  325  257   MPV  11.3  11.5  10.9   NEUTSPOLYS  50.50  69.90  62.60   LYMPHOCYTES  25.70  22.10  28.70   MONOCYTES  11.50  3.50  3.50   EOSINOPHILS  0.00  1.80  0.90   BASOPHILS  0.90  0.00  0.00   RBCMORPHOLO  Normal  Present  Normal     Recent Labs      12/14/17   0237  12/15/17   0205  12/16/17   0344   SODIUM  139  140  134*   POTASSIUM  3.0*  3.7  3.8   CHLORIDE  98  100  100   CO2  32  31  30   GLUCOSE  232*  58*  207*   BUN  26*  15  17     Recent Labs      12/14/17   0237  12/15/17   0205  12/16/17   0344   CREATININE  0.65  0.51  0.50       Imaging:  Ct-extremity, Lower With Right    Result Date: 12/10/2017  12/10/2017 1:58 PM HISTORY/REASON FOR EXAM:  Atraumatic Pain/Swelling/Deformity. Right foot pain and swelling, infection TECHNIQUE/EXAM DESCRIPTION AND NUMBER OF VIEWS:  CT scan of the RIGHT lower extremity with contrast, with reconstructions. Thin helical 3 mm  sections were obtained from the distal femur through the proximal tibia/fibula. Sagittal and coronal multiplanar reconstructions were generated from the axial images. A total of 100 mL of Omnipaque 350 nonionic contrast was administered  IV without complication. Up to date radiation dose reduction adjustments have been utilized to meet ALARA standards for radiation dose reduction. COMPARISON: Right foot x-ray 12/15/2015 FINDINGS: There is a large rim-enhancing fluid collection surrounding and within the midfoot. It extends from the dorsal to plantar surface measuring 7.0 cm craniocaudal, at least 6.8 cm in length, and 8.9 cm transversely. This is consistent with an abscess. It contains gas. There is destructive change of the navicular, cuneiforms, cuboid consistent with a combination of osteomyelitis and Charcot change. There appears to be an open wound in the lateral hindfoot midfoot junction. There is extensive associated cutaneous and subcutaneous edema extending at the distal leg consistent with cellulitis. There is also soft tissue gas located superior to the calcaneus.     1.  Large abscess replacing the majority of the RIGHT midfoot measuring at least 7 cm craniocaudal and 6.8 x 8.9 cm transversely 2.  Associated gas within the mid foot as well as proximal to the calcaneus consistent with necrotizing infection 3.  Destructive change of the midfoot consistent with osteomyelitis 4.  Extensive cellulitis Findings were discussed with HAKAN CRUZ on 12/10/2017 2:39 PM.    Dx-chest-portable (1 View)    Result Date: 12/11/2017 12/11/2017 12:16 PM HISTORY/REASON FOR EXAM:  Shortness of Breath. TECHNIQUE/EXAM DESCRIPTION AND NUMBER OF VIEWS: Single portable view of the chest. COMPARISON: 12/10/2017 FINDINGS: The heart is enlarged. Patchy bibasilar opacities may represent atelectasis or pneumonitis and are improved compared to prior. No pleural effusion or pneumothorax is identified.     Patchy bibasilar opacities  may represent atelectasis or pneumonitis and are improved compared to prior. Stable cardiomegaly.    Dx-chest-portable (1 View)    Result Date: 12/10/2017  12/10/2017 12:46 PM HISTORY/REASON FOR EXAM:  Shortness of Breath. TECHNIQUE/EXAM DESCRIPTION AND NUMBER OF VIEWS: Single portable view of the chest. COMPARISON: 1 view chest 12/10/2017 0005 hours FINDINGS: There are pulmonary interstitial and alveolar densities that are consistent with edema. Cardiac silhouette: enlarged. Pleura: There are no pleural effusion or pneumothoraces. Osseous structures: No significant bony abnormality is present.     Development of mild-moderate pulmonary edema    Dx-chest-portable (1 View)    Result Date: 12/10/2017  12/10/2017 12:00 AM HISTORY/REASON FOR EXAM:  Shortness of Breath. TECHNIQUE/EXAM DESCRIPTION AND NUMBER OF VIEWS: Single portable view of the chest. COMPARISON: 12/9/2017 FINDINGS: Single portable view of the chest demonstrates a stable cardiac silhouette and mediastinal contours. No discrete opacity, pleural fluid, or pneumothorax. The pulmonary vasculature remains prominent. No suspicious bony lesions.     No significant interval change.    Dx-chest-portable (1 View)    Result Date: 12/9/2017 12/9/2017 6:32 PM HISTORY/REASON FOR EXAM: Chest Pain. TECHNIQUE/EXAM DESCRIPTION AND NUMBER OF VIEWS: Single AP view of the chest. COMPARISON: December 27, 2015 FINDINGS: Hardware: None. Lungs: New minor fissure visualization and mild hazy opacity Pleura:  No pleural space process is seen. Heart and mediastinum: There is stable cardiac silhouette enlargement.     Stable cardiac silhouette enlargement with borderline interstitial edema    Dx-foot-complete 3+ Right    Result Date: 12/10/2017  12/10/2017 6:57 PM HISTORY/REASON FOR EXAM: Foot [id number] TECHNIQUE/EXAM DESCRIPTION AND NUMBER OF VIEWS: 3 nonweightbearing views of the RIGHT foot. COMPARISON:  CT today. FINDINGS: Soft tissue swelling is severe and there is  rocker-bottom deformity There is midfoot bony destruction with dorsal subluxation. Cuneiforms are fragmented. There is lateral dislocation of the cuboid with partial resorption. There is bony destruction at the metatarsal bases laterally. There is sclerosis. There is an age indeterminate fifth proximal phalanx fracture. There is periosteal reaction of the fifth metatarsal neck anteriorly     Midfoot bony destructive change with severe soft tissue swelling. Differential includes Charcot joint and/or osteomyelitis. Likely both are present    Dx-tibia And Fibula Right    Result Date: 12/10/2017  12/10/2017 6:58 PM HISTORY/REASON FOR EXAM:  Atraumatic Pain/Swelling/Deformity. Clinically suspected osteomyelitis TECHNIQUE/EXAM DESCRIPTION AND NUMBER OF VIEWS:  2 views of the RIGHT tibia and fibula. COMPARISON: None FINDINGS: There is diffuse subcutaneous fat stranding Vascular calcifications No acute bony destruction is seen. No acute periosteal reaction. There is some dorsal bony spurring along the tibia which is related to a likely remote injury/tug lesion.     No radiographic evidence of acute bony destruction to suggest osteomyelitis Soft tissue swelling    Echocardiogram-comp W/ Cont    Result Date: 2017  Transthoracic Echo Report Echocardiography Laboratory CONCLUSIONS No prior study is available for comparison. Normal left ventricular systolic function. No evidence of valvular abnormality based on Doppler evaluation. TADEO HARMAN Exam Date:         2017                    09:13 Exam Location:     Inpatient Priority:          Routine Ordering Physician:        FOZIA MCNAMARA Referring Physician: Sonographer:               Selina Olsen, RDCS,                            RVT Age:    52     Gender:    F MRN:    [id number] :    [id number] BSA:    2.29   Ht (in):    67     Wt (lb):    267 Exam Type:     Complete, Contrast Indications:     Acute MI ICD Codes:       410.9 CPT Codes:       17305,  BP:    114    /   73     HR: Technical Quality:       Poor MEASUREMENTS  (Male / Female) Normal Values 2D ECHO LV Diastolic Diameter PLAX        4.2 cm                4.2 - 5.9 / 3.9 - 5.3 cm LV Systolic Diameter PLAX         3.1 cm                2.1 - 4.0 cm IVS Diastolic Thickness           1.8 cm                LVPW Diastolic Thickness          1.2 cm                LVOT Diameter                     2.2 cm                Estimated LV Ejection Fraction    60 %                  M-MODE Aortic Root Diameter MM           3.3 cm                DOPPLER AV Peak Velocity                  1.4 m/s               AV Peak Gradient                  7.7 mmHg              AV Mean Gradient                  5.2 mmHg              LVOT Peak Velocity                0.88 m/s              AV Area Cont Eq vti               2 cm²                 Mitral E Point Velocity           0.79 m/s              Mitral E to A Ratio               0.8                   MV Pressure Half Time             47.5 ms               MV Area PHT                       4.6 cm²               MV Deceleration Time              164 ms                TV Peak E Velocity                0.57 m/s              TR Peak Velocity                  281 cm/s              PV Peak Velocity                  0.82 m/s              PV Peak Gradient                  2.7 mmHg              RVOT Peak Velocity                0.75 m/s              * Indicates values subject to auto-interpretation LV EF:  60    % FINDINGS Left Ventricle Normal left ventricular chamber size. Normal left ventricular systolic function. Left ventricular ejection fraction is visually estimated to be 60%. Normal regional wall motion. Normal diastolic function. Contrast was used to enhance visualization of the endocardial border. 1ML of contrast was administered. Existing IV was used, located at the left forarm. Right Ventricle Right ventricle not well visualized. Right Atrium Right atrium not well visualized. Left Atrium  "The left atrium is normal in size. Mitral Valve Structurally normal mitral valve without significant stenosis. Trace mitral regurgitation. Aortic Valve Aortic sclerosis without stenosis. No aortic insufficiency. Tricuspid Valve Structurally normal tricuspid valve without significant stenosis. Moderate tricuspid regurgitation. Right ventricular systolic pressure is estimated to be 35mmHg. Pulmonic Valve The pulmonic valve is not well visualized. No stenosis or regurgitation seen. Pericardium Normal pericardium without effusion. Aorta The aortic root is normal. Sue GORDON To (Electronically Signed) Final Date:     11 December 2017                 11:05      Micro:  Results     Procedure Component Value Units Date/Time    BLOOD CULTURE [[id number]] Collected:  12/10/17 1059    Order Status:  Completed Specimen:  Blood from Peripheral Updated:  12/15/17 1300     Significant Indicator NEG     Source BLD     Site PERIPHERAL     Blood Culture No growth after 5 days of incubation.    Narrative:       Per Hospital Policy: Only change Specimen Src: to \"Line\" if  specified by physician order.    BLOOD CULTURE [[id number]] Collected:  12/09/17 1849    Order Status:  Completed Specimen:  Blood from Peripheral Updated:  12/14/17 2100     Significant Indicator NEG     Source BLD     Site PERIPHERAL     Blood Culture No growth after 5 days of incubation.    Narrative:       Per Hospital Policy: Only change Specimen Src: to \"Line\" if  specified by physician order.    INFLUENZA A/B BY PCR [[id number]] Collected:  12/10/17 1230    Order Status:  Completed Updated:  12/10/17 1426     Influenza virus A RNA Negative     Influenza virus B, PCR Negative    Narrative:       Collected By:[id number] [facility] MARIS    INFLUENZA BY PCR, A/B/H1N1 [[id number]] Collected:  12/10/17 1230    Order Status:  Canceled Specimen:  Nasal from Nasopharyngeal     Blood Culture [[id number]]     Order Status:  No result Specimen:  Blood from Peripheral     " Blood Culture [[id number]]     Order Status:  No result Specimen:  Blood from Peripheral     URINALYSIS [[id number]]  (Abnormal) Collected:  12/09/17 1538    Order Status:  Completed Specimen:  Urine from Urine, Cath Updated:  12/09/17 1547     Color Yellow     Character Cloudy (A)     Specific Gravity 1.029     Ph 5.0     Glucose >=1000 (A) mg/dL      Ketones 15 (A) mg/dL      Protein 100 (A) mg/dL      Bilirubin Negative     Urobilinogen, Urine 0.2     Nitrite Negative     Leukocyte Esterase Negative     Occult Blood Moderate (A)     Micro Urine Req Microscopic    BLOOD CULTURE [[id number]]     Order Status:  Canceled Specimen:  Blood from Peripheral           Assessment:  Active Hospital Problems    Diagnosis   • *Severe sepsis due to infected right diabetic foot (CMS-HCC) [A41.9, R65.20]   • Acute respiratory failure with hypoxia due to sepsis, fluid overload, AECOPD (CMS-HCC) [J96.01]   • Right diabetic foot infection, cellulits, and abscess (CMS-HCC) [E11.69, L08.9]   • Poorly controlled type 2 diabetes mellitus with neuropathy (CMS-HCC) [E11.40, E11.65]   • Pulmonary edema [J81.1]   • Hypokalemia [E87.6]   • Hyponatremia [E87.1]   • COPD with acute exacerbation (CMS-HCC) [J44.1]   • NSTEMI (non-ST elevated myocardial infarction) (CMS-HCC) [I21.4]   • Hypertension [I10]   • Morbid obesity with BMI of 40.0-44.9, adult (CMS-HCC) [E66.01, Z68.41]   • Tobacco abuse [Z72.0]       Plan:  Sepsis   2/2 Right DFU with osteomyelitis/abscess  Afebrile  Leukocytosis  Continue zosyn    Right foot osteomyelitis   Abx per above    S/p R BKA on 12/14 for source control  Anticipate dc abx 48hrs post op if surgical site without signs of infection - pt has primary surgical dressing. Will examine site once OK to remove dressing    Leukocytosis, decreasing  2/2 above and post op  On abx above  Monitor    DM2, uncontrolled  HgA1c 11.4  Maintain BS less than 150 to control infection and assist with wound healing    Discussed  with internal medicine

## 2017-12-17 LAB
ANION GAP SERPL CALC-SCNC: 6 MMOL/L (ref 0–11.9)
BASOPHILS # BLD AUTO: 0.3 % (ref 0–1.8)
BASOPHILS # BLD: 0.03 K/UL (ref 0–0.12)
BUN SERPL-MCNC: 11 MG/DL (ref 8–22)
CALCIUM SERPL-MCNC: 8.2 MG/DL (ref 8.5–10.5)
CHLORIDE SERPL-SCNC: 100 MMOL/L (ref 96–112)
CO2 SERPL-SCNC: 29 MMOL/L (ref 20–33)
CREAT SERPL-MCNC: 0.44 MG/DL (ref 0.5–1.4)
EOSINOPHIL # BLD AUTO: 0.15 K/UL (ref 0–0.51)
EOSINOPHIL NFR BLD: 1.6 % (ref 0–6.9)
ERYTHROCYTE [DISTWIDTH] IN BLOOD BY AUTOMATED COUNT: 49.9 FL (ref 35.9–50)
GFR SERPL CREATININE-BSD FRML MDRD: >60 ML/MIN/1.73 M 2
GLUCOSE BLD-MCNC: 216 MG/DL (ref 65–99)
GLUCOSE BLD-MCNC: 267 MG/DL (ref 65–99)
GLUCOSE BLD-MCNC: 286 MG/DL (ref 65–99)
GLUCOSE BLD-MCNC: 293 MG/DL (ref 65–99)
GLUCOSE SERPL-MCNC: 219 MG/DL (ref 65–99)
HCT VFR BLD AUTO: 37.2 % (ref 37–47)
HGB BLD-MCNC: 11.6 G/DL (ref 12–16)
IMM GRANULOCYTES # BLD AUTO: 0.46 K/UL (ref 0–0.11)
IMM GRANULOCYTES NFR BLD AUTO: 4.8 % (ref 0–0.9)
LYMPHOCYTES # BLD AUTO: 2.71 K/UL (ref 1–4.8)
LYMPHOCYTES NFR BLD: 28.3 % (ref 22–41)
MCH RBC QN AUTO: 27.8 PG (ref 27–33)
MCHC RBC AUTO-ENTMCNC: 31.2 G/DL (ref 33.6–35)
MCV RBC AUTO: 89.2 FL (ref 81.4–97.8)
MONOCYTES # BLD AUTO: 0.56 K/UL (ref 0–0.85)
MONOCYTES NFR BLD AUTO: 5.8 % (ref 0–13.4)
NEUTROPHILS # BLD AUTO: 5.68 K/UL (ref 2–7.15)
NEUTROPHILS NFR BLD: 59.2 % (ref 44–72)
NRBC # BLD AUTO: 0 K/UL
NRBC BLD AUTO-RTO: 0 /100 WBC
PLATELET # BLD AUTO: 248 K/UL (ref 164–446)
PMV BLD AUTO: 10.8 FL (ref 9–12.9)
POTASSIUM SERPL-SCNC: 3.8 MMOL/L (ref 3.6–5.5)
RBC # BLD AUTO: 4.17 M/UL (ref 4.2–5.4)
SODIUM SERPL-SCNC: 135 MMOL/L (ref 135–145)
WBC # BLD AUTO: 9.6 K/UL (ref 4.8–10.8)

## 2017-12-17 PROCEDURE — A9270 NON-COVERED ITEM OR SERVICE: HCPCS | Performed by: HOSPITALIST

## 2017-12-17 PROCEDURE — A9270 NON-COVERED ITEM OR SERVICE: HCPCS | Performed by: FAMILY MEDICINE

## 2017-12-17 PROCEDURE — 770006 HCHG ROOM/CARE - MED/SURG/GYN SEMI*

## 2017-12-17 PROCEDURE — 700102 HCHG RX REV CODE 250 W/ 637 OVERRIDE(OP): Performed by: FAMILY MEDICINE

## 2017-12-17 PROCEDURE — 700102 HCHG RX REV CODE 250 W/ 637 OVERRIDE(OP): Performed by: HOSPITALIST

## 2017-12-17 PROCEDURE — 82962 GLUCOSE BLOOD TEST: CPT

## 2017-12-17 PROCEDURE — A9270 NON-COVERED ITEM OR SERVICE: HCPCS | Performed by: INTERNAL MEDICINE

## 2017-12-17 PROCEDURE — 36415 COLL VENOUS BLD VENIPUNCTURE: CPT

## 2017-12-17 PROCEDURE — 99232 SBSQ HOSP IP/OBS MODERATE 35: CPT | Mod: 25 | Performed by: INTERNAL MEDICINE

## 2017-12-17 PROCEDURE — A9270 NON-COVERED ITEM OR SERVICE: HCPCS | Performed by: PHYSICIAN ASSISTANT

## 2017-12-17 PROCEDURE — 700111 HCHG RX REV CODE 636 W/ 250 OVERRIDE (IP): Performed by: INTERNAL MEDICINE

## 2017-12-17 PROCEDURE — A6250 SKIN SEAL PROTECT MOISTURIZR: HCPCS | Performed by: INTERNAL MEDICINE

## 2017-12-17 PROCEDURE — 700102 HCHG RX REV CODE 250 W/ 637 OVERRIDE(OP): Performed by: INTERNAL MEDICINE

## 2017-12-17 PROCEDURE — 85025 COMPLETE CBC W/AUTO DIFF WBC: CPT

## 2017-12-17 PROCEDURE — 99406 BEHAV CHNG SMOKING 3-10 MIN: CPT | Performed by: INTERNAL MEDICINE

## 2017-12-17 PROCEDURE — 700102 HCHG RX REV CODE 250 W/ 637 OVERRIDE(OP): Performed by: PHYSICIAN ASSISTANT

## 2017-12-17 PROCEDURE — 700111 HCHG RX REV CODE 636 W/ 250 OVERRIDE (IP): Performed by: FAMILY MEDICINE

## 2017-12-17 PROCEDURE — 80048 BASIC METABOLIC PNL TOTAL CA: CPT

## 2017-12-17 RX ORDER — OXYCODONE AND ACETAMINOPHEN 10; 325 MG/1; MG/1
1 TABLET ORAL EVERY 6 HOURS PRN
Status: DISCONTINUED | OUTPATIENT
Start: 2017-12-17 | End: 2017-12-19

## 2017-12-17 RX ORDER — LISINOPRIL 10 MG/1
2.5 TABLET ORAL DAILY
COMMUNITY
Start: 2017-12-08 | End: 2020-09-28

## 2017-12-17 RX ORDER — FENTANYL 12.5 UG/1
1 PATCH TRANSDERMAL
Status: DISCONTINUED | OUTPATIENT
Start: 2017-12-17 | End: 2017-12-20 | Stop reason: HOSPADM

## 2017-12-17 RX ORDER — NICOTINE 21 MG/24HR
1 PATCH, TRANSDERMAL 24 HOURS TRANSDERMAL EVERY 24 HOURS
COMMUNITY
End: 2020-09-28

## 2017-12-17 RX ORDER — HYDROMORPHONE HYDROCHLORIDE 2 MG/ML
0.5 INJECTION, SOLUTION INTRAMUSCULAR; INTRAVENOUS; SUBCUTANEOUS EVERY 4 HOURS PRN
Status: DISCONTINUED | OUTPATIENT
Start: 2017-12-17 | End: 2017-12-20 | Stop reason: HOSPADM

## 2017-12-17 RX ORDER — FLUOXETINE 10 MG/1
10 CAPSULE ORAL DAILY
COMMUNITY
Start: 2017-12-08 | End: 2020-09-28

## 2017-12-17 RX ORDER — GABAPENTIN 300 MG/1
600 CAPSULE ORAL 4 TIMES DAILY
COMMUNITY
End: 2020-09-28

## 2017-12-17 RX ORDER — INSULIN GLARGINE 100 [IU]/ML
64 INJECTION, SOLUTION SUBCUTANEOUS 2 TIMES DAILY
Status: DISCONTINUED | OUTPATIENT
Start: 2017-12-17 | End: 2017-12-20 | Stop reason: HOSPADM

## 2017-12-17 RX ORDER — GEMFIBROZIL 600 MG/1
600 TABLET, FILM COATED ORAL 2 TIMES DAILY
COMMUNITY
Start: 2017-12-08 | End: 2020-09-28

## 2017-12-17 RX ADMIN — KETOROLAC TROMETHAMINE 30 MG: 30 INJECTION, SOLUTION INTRAMUSCULAR at 00:49

## 2017-12-17 RX ADMIN — KETOROLAC TROMETHAMINE 30 MG: 30 INJECTION, SOLUTION INTRAMUSCULAR at 07:16

## 2017-12-17 RX ADMIN — HYDROMORPHONE HYDROCHLORIDE 0.5 MG: 2 INJECTION INTRAMUSCULAR; INTRAVENOUS; SUBCUTANEOUS at 15:41

## 2017-12-17 RX ADMIN — FENTANYL 1 PATCH: 12 PATCH, EXTENDED RELEASE TRANSDERMAL at 13:03

## 2017-12-17 RX ADMIN — INSULIN HUMAN 4 UNITS: 100 INJECTION, SOLUTION PARENTERAL at 05:50

## 2017-12-17 RX ADMIN — ATORVASTATIN CALCIUM 40 MG: 40 TABLET, FILM COATED ORAL at 20:06

## 2017-12-17 RX ADMIN — HYDROMORPHONE HYDROCHLORIDE 0.5 MG: 2 INJECTION INTRAMUSCULAR; INTRAVENOUS; SUBCUTANEOUS at 20:06

## 2017-12-17 RX ADMIN — OXYCODONE HYDROCHLORIDE AND ACETAMINOPHEN 1 TABLET: 10; 325 TABLET ORAL at 21:58

## 2017-12-17 RX ADMIN — NICOTINE 21 MG: 21 PATCH, EXTENDED RELEASE TRANSDERMAL at 05:46

## 2017-12-17 RX ADMIN — NYSTATIN 500000 UNITS: 100000 SUSPENSION ORAL at 20:12

## 2017-12-17 RX ADMIN — BUDESONIDE AND FORMOTEROL FUMARATE DIHYDRATE 2 PUFF: 160; 4.5 AEROSOL RESPIRATORY (INHALATION) at 20:10

## 2017-12-17 RX ADMIN — GABAPENTIN 200 MG: 100 CAPSULE ORAL at 14:32

## 2017-12-17 RX ADMIN — HYDROMORPHONE HYDROCHLORIDE 0.5 MG: 2 INJECTION INTRAMUSCULAR; INTRAVENOUS; SUBCUTANEOUS at 10:50

## 2017-12-17 RX ADMIN — ASPIRIN 325 MG: 325 TABLET, FILM COATED ORAL at 08:21

## 2017-12-17 RX ADMIN — OXYCODONE HYDROCHLORIDE 10 MG: 10 TABLET ORAL at 05:46

## 2017-12-17 RX ADMIN — KETOROLAC TROMETHAMINE 30 MG: 30 INJECTION, SOLUTION INTRAMUSCULAR at 13:04

## 2017-12-17 RX ADMIN — PREDNISONE 20 MG: 20 TABLET ORAL at 08:22

## 2017-12-17 RX ADMIN — OXYCODONE HYDROCHLORIDE 10 MG: 10 TABLET ORAL at 00:49

## 2017-12-17 RX ADMIN — INSULIN HUMAN 7 UNITS: 100 INJECTION, SOLUTION PARENTERAL at 10:28

## 2017-12-17 RX ADMIN — ACETAMINOPHEN 650 MG: 325 TABLET, FILM COATED ORAL at 07:16

## 2017-12-17 RX ADMIN — INSULIN GLARGINE 64 UNITS: 100 INJECTION, SOLUTION SUBCUTANEOUS at 20:17

## 2017-12-17 RX ADMIN — INSULIN HUMAN 7 UNITS: 100 INJECTION, SOLUTION PARENTERAL at 20:18

## 2017-12-17 RX ADMIN — POTASSIUM CHLORIDE 20 MEQ: 1500 TABLET, EXTENDED RELEASE ORAL at 08:23

## 2017-12-17 RX ADMIN — INSULIN HUMAN 7 UNITS: 100 INJECTION, SOLUTION PARENTERAL at 16:32

## 2017-12-17 RX ADMIN — HEPARIN SODIUM 5000 UNITS: 5000 INJECTION, SOLUTION INTRAVENOUS; SUBCUTANEOUS at 20:06

## 2017-12-17 RX ADMIN — POLYETHYLENE GLYCOL 3350 1 PACKET: 17 POWDER, FOR SOLUTION ORAL at 15:48

## 2017-12-17 RX ADMIN — OXYCODONE HYDROCHLORIDE AND ACETAMINOPHEN 1 TABLET: 10; 325 TABLET ORAL at 14:57

## 2017-12-17 RX ADMIN — STANDARDIZED SENNA CONCENTRATE AND DOCUSATE SODIUM 2 TABLET: 8.6; 5 TABLET, FILM COATED ORAL at 08:21

## 2017-12-17 RX ADMIN — INSULIN GLARGINE 60 UNITS: 100 INJECTION, SOLUTION SUBCUTANEOUS at 08:23

## 2017-12-17 RX ADMIN — ALPRAZOLAM 0.25 MG: 0.25 TABLET ORAL at 21:58

## 2017-12-17 RX ADMIN — BUDESONIDE AND FORMOTEROL FUMARATE DIHYDRATE 2 PUFF: 160; 4.5 AEROSOL RESPIRATORY (INHALATION) at 08:24

## 2017-12-17 RX ADMIN — GABAPENTIN 200 MG: 100 CAPSULE ORAL at 20:06

## 2017-12-17 RX ADMIN — KETOROLAC TROMETHAMINE 30 MG: 30 INJECTION, SOLUTION INTRAMUSCULAR at 19:00

## 2017-12-17 RX ADMIN — GABAPENTIN 200 MG: 100 CAPSULE ORAL at 08:21

## 2017-12-17 RX ADMIN — NYSTATIN 500000 UNITS: 100000 SUSPENSION ORAL at 14:32

## 2017-12-17 RX ADMIN — TIOTROPIUM BROMIDE 1 CAPSULE: 18 CAPSULE ORAL; RESPIRATORY (INHALATION) at 08:26

## 2017-12-17 RX ADMIN — METOPROLOL TARTRATE 25 MG: 25 TABLET, FILM COATED ORAL at 20:06

## 2017-12-17 RX ADMIN — OXYCODONE HYDROCHLORIDE AND ACETAMINOPHEN 1 TABLET: 10; 325 TABLET ORAL at 09:22

## 2017-12-17 RX ADMIN — STANDARDIZED SENNA CONCENTRATE AND DOCUSATE SODIUM 2 TABLET: 8.6; 5 TABLET, FILM COATED ORAL at 20:06

## 2017-12-17 RX ADMIN — HEPARIN SODIUM 5000 UNITS: 5000 INJECTION, SOLUTION INTRAVENOUS; SUBCUTANEOUS at 05:45

## 2017-12-17 RX ADMIN — HEPARIN SODIUM 5000 UNITS: 5000 INJECTION, SOLUTION INTRAVENOUS; SUBCUTANEOUS at 13:06

## 2017-12-17 RX ADMIN — METOPROLOL TARTRATE 25 MG: 25 TABLET, FILM COATED ORAL at 08:22

## 2017-12-17 RX ADMIN — HYDROMORPHONE HYDROCHLORIDE 2 MG: 2 INJECTION INTRAMUSCULAR; INTRAVENOUS; SUBCUTANEOUS at 01:58

## 2017-12-17 ASSESSMENT — PAIN SCALES - GENERAL
PAINLEVEL_OUTOF10: 8
PAINLEVEL_OUTOF10: 9
PAINLEVEL_OUTOF10: 8
PAINLEVEL_OUTOF10: 7
PAINLEVEL_OUTOF10: 8
PAINLEVEL_OUTOF10: 7
PAINLEVEL_OUTOF10: 7
PAINLEVEL_OUTOF10: 8
PAINLEVEL_OUTOF10: 9
PAINLEVEL_OUTOF10: 8
PAINLEVEL_OUTOF10: 8
PAINLEVEL_OUTOF10: 7
PAINLEVEL_OUTOF10: 7

## 2017-12-17 ASSESSMENT — ENCOUNTER SYMPTOMS
NAUSEA: 0
ORTHOPNEA: 0
DIZZINESS: 0
FEVER: 0
ABDOMINAL PAIN: 0
WEAKNESS: 1
SHORTNESS OF BREATH: 0
SORE THROAT: 0
CHILLS: 0
BLURRED VISION: 0
HEARTBURN: 0
SHORTNESS OF BREATH: 1
COUGH: 0
PHOTOPHOBIA: 0
WHEEZING: 1
VOMITING: 0
DIARRHEA: 0
MYALGIAS: 1
PALPITATIONS: 0

## 2017-12-17 NOTE — CARE PLAN
"Problem: Infection  Goal: Will remain free from infection  Outcome: PROGRESSING AS EXPECTED  Pt complains about pain in the mouth. Pt states: \" I have a history of trash.\" MD notified. New orders received.    Problem: Pain Management  Goal: Pain level will decrease to patient's comfort goal  Outcome: PROGRESSING AS EXPECTED  Pt complains about pain. Med per MAR.       "

## 2017-12-17 NOTE — PROGRESS NOTES
"Assessment completed. Patient was resting, had to wake patient to do assessment. Patient immediately complained of pain. Left to get pain pill, upon return patient states \"it hurts really bad\". States pain #10 on pain scale. Pain medication administered.    "

## 2017-12-17 NOTE — PROGRESS NOTES
"   Orthopaedic Progress Note    Interval changes:  Dressings changed by nursing after ID team inspected incision  Incision without complication    ROS - Patient denies any new issues.  Pain well controlled.    Blood pressure 149/103, pulse 82, temperature 36.5 °C (97.7 °F), resp. rate 18, height 1.702 m (5' 7.01\"), weight 124.2 kg (273 lb 13 oz), SpO2 93 %, not currently breastfeeding.      Patient seen and examined  No acute distress  Breathing non labored  RRR  Right BKA dressing CDI.     Recent Labs      12/15/17   0205  12/16/17   0344  12/17/17   0545   WBC  16.6*  12.4*  9.6   RBC  4.91  4.11*  4.17*   HEMOGLOBIN  13.9  11.5*  11.6*   HEMATOCRIT  43.5  36.9*  37.2   MCV  88.6  89.8  89.2   MCH  28.3  28.0  27.8   MCHC  32.0*  31.2*  31.2*   RDW  50.7*  51.2*  49.9   PLATELETCT  325  257  248   MPV  11.5  10.9  10.8       Active Hospital Problems    Diagnosis   • Severe sepsis due to infected right diabetic foot (CMS-HCC) [A41.9, R65.20]     Priority: High   • Acute respiratory failure with hypoxia due to sepsis, fluid overload, AECOPD (CMS-HCC) [J96.01]     Priority: High   • Right diabetic foot infection, cellulits, and abscess (CMS-HCC) [E11.69, L08.9]     Priority: High   • Poorly controlled type 2 diabetes mellitus with neuropathy (CMS-HCC) [E11.40, E11.65]     Priority: High   • Pulmonary edema [J81.1]     Priority: Medium   • Hypokalemia [E87.6]     Priority: Medium   • Hyponatremia [E87.1]     Priority: Medium   • COPD with acute exacerbation (CMS-HCC) [J44.1]     Priority: Medium   • NSTEMI (non-ST elevated myocardial infarction) (CMS-HCC) [I21.4]     Priority: Medium   • Hypertension [I10]     Priority: Medium   • Morbid obesity with BMI of 40.0-44.9, adult (CMS-HCC) [E66.01, Z68.41]     Priority: Low   • Tobacco abuse [Z72.0]     Priority: Low       Assessment/Plan:  Pending placement  Cleared by ortho for DC to SNF once cleared by medicine  POD#3 S/P Right below knee amputation  Wt bearing status - " NWB RLE  Wound care/Drains - dressings changed   Future Procedures - none planned  Sutures/Staples out- 14-21 days post operatively  PT/OT-initiated  Antibiotics: zosyn 13.5g IV continuous  DVT Prophylaxis- TEDS/SCDs/Foot pumps/aspirin/heparin  Long-none  Case Coordination for Discharge Planning - Disposition SNF

## 2017-12-17 NOTE — DISCHARGE PLANNING
Received choice form from [facility]  Ana Laura at 1516,  Referral sent to all local SNF at 1515 on 12-17-17.

## 2017-12-17 NOTE — DISCHARGE PLANNING
Pt declined by rehab.  SW met with pt and pt's dtr, Edith regarding SNF choice.  Both were agreeable to send a blanket.  SW faxed choice to [facility].     Pt's dtrEdith #[phone] regarding dc planning updates.

## 2017-12-17 NOTE — PROGRESS NOTES
"   Orthopaedic Progress Note    Interval changes:  Pain control issues improved with Neurontin  No new changes   Dressing CDI    ROS - Patient denies any new issues.  Pain well controlled.    Blood pressure 157/85, pulse 87, temperature 36.7 °C (98 °F), resp. rate 20, height 1.702 m (5' 7.01\"), weight 124.2 kg (273 lb 13 oz), SpO2 96 %, not currently breastfeeding.      Patient seen and examined  No acute distress  Breathing non labored  RRR  Right BKA dressing CDI.  No contracture. TTP improved.    Recent Labs      12/14/17   0237  12/15/17   0205  12/16/17   0344   WBC  13.8*  16.6*  12.4*   RBC  4.77  4.91  4.11*   HEMOGLOBIN  13.2  13.9  11.5*   HEMATOCRIT  41.6  43.5  36.9*   MCV  87.2  88.6  89.8   MCH  27.7  28.3  28.0   MCHC  31.7*  32.0*  31.2*   RDW  49.1  50.7*  51.2*   PLATELETCT  289  325  257   MPV  11.3  11.5  10.9       Active Hospital Problems    Diagnosis   • Severe sepsis due to infected right diabetic foot (CMS-HCC) [A41.9, R65.20]     Priority: High   • Acute respiratory failure with hypoxia due to sepsis, fluid overload, AECOPD (CMS-HCC) [J96.01]     Priority: High   • Right diabetic foot infection, cellulits, and abscess (CMS-HCC) [E11.69, L08.9]     Priority: High   • Poorly controlled type 2 diabetes mellitus with neuropathy (CMS-HCC) [E11.40, E11.65]     Priority: High   • Pulmonary edema [J81.1]     Priority: Medium   • Hypokalemia [E87.6]     Priority: Medium   • Hyponatremia [E87.1]     Priority: Medium   • COPD with acute exacerbation (CMS-HCC) [J44.1]     Priority: Medium   • NSTEMI (non-ST elevated myocardial infarction) (CMS-HCC) [I21.4]     Priority: Medium   • Hypertension [I10]     Priority: Medium   • Morbid obesity with BMI of 40.0-44.9, adult (CMS-HCC) [E66.01, Z68.41]     Priority: Low   • Tobacco abuse [Z72.0]     Priority: Low       Assessment/Plan:  Pain control issues improved - sleeps a lot during the day  POD#2 S/P Right below knee amputation  Wt bearing status - NWB " RLE  Wound care/Drains - dressings left in place  Future Procedures - none planned  Sutures/Staples out- 10-14 days post operatively  PT/OT-initiated  Antibiotics: zosyn 13.5g IV continuous  DVT Prophylaxis- TEDS/SCDs/Foot pumps/aspirin/heparin  Long-none  Case Coordination for Discharge Planning - Disposition SNF

## 2017-12-17 NOTE — PROGRESS NOTES
Infectious Disease Progress Note    Author: Nano Ardon M.D. Date & Time of service: 2017  2:17 PM    Chief Complaint:  FU R diabetic foot OM    Interval History:   AF, WBC 11.5, foot pain better controlled today, tolerating abx without issues, plan for R BKA tomorrow   AF, WBC 13.8, lots of foot pain, plan for R BKA today  12/15 AF, WBC 16.6, s/p R BKA, now having severe pain not controlled with pain regimen   AF WBC 12.4, pain not controlled, requiring around the clock IV dilaudid   AF, WBC 9.6, ongoing pain, surgical site examined  Labs Reviewed, Medications Reviewed, Radiology Reviewed and Wound Reviewed.    Review of Systems:  Review of Systems   Constitutional: Negative for chills and fever.   Respiratory: Negative for cough and shortness of breath.    Gastrointestinal: Negative for abdominal pain, diarrhea, nausea and vomiting.   Musculoskeletal: Positive for joint pain and myalgias.       Hemodynamics:  Temp (24hrs), Av.4 °C (97.6 °F), Min:36.1 °C (96.9 °F), Max:36.9 °C (98.4 °F)  Temperature: 36.3 °C (97.3 °F)  Pulse  Av.8  Min: 73  Max: 156   Blood Pressure: 143/90       Physical Exam:  Physical Exam   Constitutional: She is oriented to person, place, and time. She appears well-developed.   Obese   HENT:   Head: Normocephalic and atraumatic.   Hirsuite   Eyes: EOM are normal. Pupils are equal, round, and reactive to light.   Neck: Neck supple.   Cardiovascular: Normal rate, regular rhythm and normal heart sounds.    Pulmonary/Chest: Effort normal and breath sounds normal.   Abdominal: Soft. There is no tenderness.   Musculoskeletal: She exhibits edema.   Evidence of left foot transmet amputation. Surgical site clean.   R BKA site clean with staples in place. No drainage or surrounding erythema     Neurological: She is alert and oriented to person, place, and time.       Meds:    Current Facility-Administered Medications:   •  fentanyl  •  oxycodone-acetaminophen  •   hydromorphone  •  insulin glargine  •  nystatin  •  budesonide-formoterol  •  tiotropium  •  ketorolac  •  predniSONE  •  gabapentin  •  potassium chloride SA  •  [COMPLETED] piperacillin-tazobactam **AND** piperacillin-tazobactam (ZOSYN) continuous infusion  •  ondansetron  •  ondansetron  •  NS  •  NS  •  heparin  •  alprazolam  •  Respiratory Care per Protocol  •  aspirin  •  metoprolol  •  senna-docusate **AND** polyethylene glycol/lytes **AND** magnesium hydroxide **AND** bisacodyl  •  INITIATE NICOTINE REPLACEMENT PROTOCOL  **AND** nicotine **AND** Protocol 205 PATIENT EDUCATION MATERIALS **AND** Protocol 205 Rotate nicotine patch application sites daily  **AND** nicotine polacrilex  •  insulin regular **AND** Accu-Chek ACHS **AND** NOTIFY MD and PharmD **AND** glucose 4 g **AND** dextrose 50%  •  atorvastatin  •  acetaminophen    Labs:  Recent Labs      12/15/17   0205  12/16/17   0344  12/17/17   0545   WBC  16.6*  12.4*  9.6   RBC  4.91  4.11*  4.17*   HEMOGLOBIN  13.9  11.5*  11.6*   HEMATOCRIT  43.5  36.9*  37.2   MCV  88.6  89.8  89.2   MCH  28.3  28.0  27.8   RDW  50.7*  51.2*  49.9   PLATELETCT  325  257  248   MPV  11.5  10.9  10.8   NEUTSPOLYS  69.90  62.60  59.20   LYMPHOCYTES  22.10  28.70  28.30   MONOCYTES  3.50  3.50  5.80   EOSINOPHILS  1.80  0.90  1.60   BASOPHILS  0.00  0.00  0.30   RBCMORPHOLO  Present  Normal   --      Recent Labs      12/15/17   0205  12/16/17   0344 12/17/17   0545   SODIUM  140  134*  135   POTASSIUM  3.7  3.8  3.8   CHLORIDE  100  100  100   CO2  31  30  29   GLUCOSE  58*  207*  219*   BUN  15  17  11     Recent Labs      12/15/17   0205  12/16/17   0344  12/17/17   0545   CREATININE  0.51  0.50  0.44*       Imaging:  Ct-extremity, Lower With Right    Result Date: 12/10/2017  12/10/2017 1:58 PM HISTORY/REASON FOR EXAM:  Atraumatic Pain/Swelling/Deformity. Right foot pain and swelling, infection TECHNIQUE/EXAM DESCRIPTION AND NUMBER OF VIEWS:  CT scan of the RIGHT  lower extremity with contrast, with reconstructions. Thin helical 3 mm sections were obtained from the distal femur through the proximal tibia/fibula. Sagittal and coronal multiplanar reconstructions were generated from the axial images. A total of 100 mL of Omnipaque 350 nonionic contrast was administered  IV without complication. Up to date radiation dose reduction adjustments have been utilized to meet ALARA standards for radiation dose reduction. COMPARISON: Right foot x-ray 12/15/2015 FINDINGS: There is a large rim-enhancing fluid collection surrounding and within the midfoot. It extends from the dorsal to plantar surface measuring 7.0 cm craniocaudal, at least 6.8 cm in length, and 8.9 cm transversely. This is consistent with an abscess. It contains gas. There is destructive change of the navicular, cuneiforms, cuboid consistent with a combination of osteomyelitis and Charcot change. There appears to be an open wound in the lateral hindfoot midfoot junction. There is extensive associated cutaneous and subcutaneous edema extending at the distal leg consistent with cellulitis. There is also soft tissue gas located superior to the calcaneus.     1.  Large abscess replacing the majority of the RIGHT midfoot measuring at least 7 cm craniocaudal and 6.8 x 8.9 cm transversely 2.  Associated gas within the mid foot as well as proximal to the calcaneus consistent with necrotizing infection 3.  Destructive change of the midfoot consistent with osteomyelitis 4.  Extensive cellulitis Findings were discussed with HAKAN CRUZ on 12/10/2017 2:39 PM.    Dx-chest-portable (1 View)    Result Date: 12/11/2017 12/11/2017 12:16 PM HISTORY/REASON FOR EXAM:  Shortness of Breath. TECHNIQUE/EXAM DESCRIPTION AND NUMBER OF VIEWS: Single portable view of the chest. COMPARISON: 12/10/2017 FINDINGS: The heart is enlarged. Patchy bibasilar opacities may represent atelectasis or pneumonitis and are improved compared to prior. No pleural  effusion or pneumothorax is identified.     Patchy bibasilar opacities may represent atelectasis or pneumonitis and are improved compared to prior. Stable cardiomegaly.    Dx-chest-portable (1 View)    Result Date: 12/10/2017  12/10/2017 12:46 PM HISTORY/REASON FOR EXAM:  Shortness of Breath. TECHNIQUE/EXAM DESCRIPTION AND NUMBER OF VIEWS: Single portable view of the chest. COMPARISON: 1 view chest 12/10/2017 0005 hours FINDINGS: There are pulmonary interstitial and alveolar densities that are consistent with edema. Cardiac silhouette: enlarged. Pleura: There are no pleural effusion or pneumothoraces. Osseous structures: No significant bony abnormality is present.     Development of mild-moderate pulmonary edema    Dx-chest-portable (1 View)    Result Date: 12/10/2017  12/10/2017 12:00 AM HISTORY/REASON FOR EXAM:  Shortness of Breath. TECHNIQUE/EXAM DESCRIPTION AND NUMBER OF VIEWS: Single portable view of the chest. COMPARISON: 12/9/2017 FINDINGS: Single portable view of the chest demonstrates a stable cardiac silhouette and mediastinal contours. No discrete opacity, pleural fluid, or pneumothorax. The pulmonary vasculature remains prominent. No suspicious bony lesions.     No significant interval change.    Dx-chest-portable (1 View)    Result Date: 12/9/2017 12/9/2017 6:32 PM HISTORY/REASON FOR EXAM: Chest Pain. TECHNIQUE/EXAM DESCRIPTION AND NUMBER OF VIEWS: Single AP view of the chest. COMPARISON: December 27, 2015 FINDINGS: Hardware: None. Lungs: New minor fissure visualization and mild hazy opacity Pleura:  No pleural space process is seen. Heart and mediastinum: There is stable cardiac silhouette enlargement.     Stable cardiac silhouette enlargement with borderline interstitial edema    Dx-foot-complete 3+ Right    Result Date: 12/10/2017  12/10/2017 6:57 PM HISTORY/REASON FOR EXAM: Foot [id number] TECHNIQUE/EXAM DESCRIPTION AND NUMBER OF VIEWS: 3 nonweightbearing views of the RIGHT foot. COMPARISON:  CT  today. FINDINGS: Soft tissue swelling is severe and there is rocker-bottom deformity There is midfoot bony destruction with dorsal subluxation. Cuneiforms are fragmented. There is lateral dislocation of the cuboid with partial resorption. There is bony destruction at the metatarsal bases laterally. There is sclerosis. There is an age indeterminate fifth proximal phalanx fracture. There is periosteal reaction of the fifth metatarsal neck anteriorly     Midfoot bony destructive change with severe soft tissue swelling. Differential includes Charcot joint and/or osteomyelitis. Likely both are present    Dx-tibia And Fibula Right    Result Date: 12/10/2017  12/10/2017 6:58 PM HISTORY/REASON FOR EXAM:  Atraumatic Pain/Swelling/Deformity. Clinically suspected osteomyelitis TECHNIQUE/EXAM DESCRIPTION AND NUMBER OF VIEWS:  2 views of the RIGHT tibia and fibula. COMPARISON: None FINDINGS: There is diffuse subcutaneous fat stranding Vascular calcifications No acute bony destruction is seen. No acute periosteal reaction. There is some dorsal bony spurring along the tibia which is related to a likely remote injury/tug lesion.     No radiographic evidence of acute bony destruction to suggest osteomyelitis Soft tissue swelling    Echocardiogram-comp W/ Cont    Result Date: 2017  Transthoracic Echo Report Echocardiography Laboratory CONCLUSIONS No prior study is available for comparison. Normal left ventricular systolic function. No evidence of valvular abnormality based on Doppler evaluation. TADEO HARMAN Exam Date:         2017                    09:13 Exam Location:     Inpatient Priority:          Routine Ordering Physician:        FOZIA MCNAMARA Referring Physician: Sonographer:               Selina Olsen, RDCS,                            RVT Age:    52     Gender:    F MRN:    [id number] :    1965 BSA:    2.29   Ht (in):    67     Wt (lb):    267 Exam Type:     Complete, Contrast Indications:     Acute MI  ICD Codes:       410.9 CPT Codes:       31835,  BP:   114    /   73     HR: Technical Quality:       Poor MEASUREMENTS  (Male / Female) Normal Values 2D ECHO LV Diastolic Diameter PLAX        4.2 cm                4.2 - 5.9 / 3.9 - 5.3 cm LV Systolic Diameter PLAX         3.1 cm                2.1 - 4.0 cm IVS Diastolic Thickness           1.8 cm                LVPW Diastolic Thickness          1.2 cm                LVOT Diameter                     2.2 cm                Estimated LV Ejection Fraction    60 %                  M-MODE Aortic Root Diameter MM           3.3 cm                DOPPLER AV Peak Velocity                  1.4 m/s               AV Peak Gradient                  7.7 mmHg              AV Mean Gradient                  5.2 mmHg              LVOT Peak Velocity                0.88 m/s              AV Area Cont Eq vti               2 cm²                 Mitral E Point Velocity           0.79 m/s              Mitral E to A Ratio               0.8                   MV Pressure Half Time             47.5 ms               MV Area PHT                       4.6 cm²               MV Deceleration Time              164 ms                TV Peak E Velocity                0.57 m/s              TR Peak Velocity                  281 cm/s              PV Peak Velocity                  0.82 m/s              PV Peak Gradient                  2.7 mmHg              RVOT Peak Velocity                0.75 m/s              * Indicates values subject to auto-interpretation LV EF:  60    % FINDINGS Left Ventricle Normal left ventricular chamber size. Normal left ventricular systolic function. Left ventricular ejection fraction is visually estimated to be 60%. Normal regional wall motion. Normal diastolic function. Contrast was used to enhance visualization of the endocardial border. 1ML of contrast was administered. Existing IV was used, located at the left forarm. Right Ventricle Right ventricle not well visualized.  "Right Atrium Right atrium not well visualized. Left Atrium The left atrium is normal in size. Mitral Valve Structurally normal mitral valve without significant stenosis. Trace mitral regurgitation. Aortic Valve Aortic sclerosis without stenosis. No aortic insufficiency. Tricuspid Valve Structurally normal tricuspid valve without significant stenosis. Moderate tricuspid regurgitation. Right ventricular systolic pressure is estimated to be 35mmHg. Pulmonic Valve The pulmonic valve is not well visualized. No stenosis or regurgitation seen. Pericardium Normal pericardium without effusion. Aorta The aortic root is normal. Sue Betts (Electronically Signed) Final Date:     11 December 2017                 11:05      Micro:  Results     Procedure Component Value Units Date/Time    BLOOD CULTURE [[id number]] Collected:  12/10/17 1059    Order Status:  Completed Specimen:  Blood from Peripheral Updated:  12/15/17 1300     Significant Indicator NEG     Source BLD     Site PERIPHERAL     Blood Culture No growth after 5 days of incubation.    Narrative:       Per Hospital Policy: Only change Specimen Src: to \"Line\" if  specified by physician order.    BLOOD CULTURE [[id number]] Collected:  12/09/17 1849    Order Status:  Completed Specimen:  Blood from Peripheral Updated:  12/14/17 2100     Significant Indicator NEG     Source BLD     Site PERIPHERAL     Blood Culture No growth after 5 days of incubation.    Narrative:       Per Hospital Policy: Only change Specimen Src: to \"Line\" if  specified by physician order.    INFLUENZA A/B BY PCR [[id number]] Collected:  12/10/17 1230    Order Status:  Completed Updated:  12/10/17 1426     Influenza virus A RNA Negative     Influenza virus B, PCR Negative    Narrative:       Collected [id number] [facility]          Assessment:  Active Hospital Problems    Diagnosis   • *Severe sepsis due to infected right diabetic foot (CMS-[facility]) [A41.9, R65.20]   • Acute respiratory " failure with hypoxia due to sepsis, fluid overload, AECOPD (CMS-HCC) [J96.01]   • Right diabetic foot infection, cellulits, and abscess (CMS-HCC) [E11.69, L08.9]   • Poorly controlled type 2 diabetes mellitus with neuropathy (CMS-HCC) [E11.40, E11.65]   • Pulmonary edema [J81.1]   • Hypokalemia [E87.6]   • Hyponatremia [E87.1]   • COPD with acute exacerbation (CMS-HCC) [J44.1]   • NSTEMI (non-ST elevated myocardial infarction) (CMS-HCC) [I21.4]   • Hypertension [I10]   • Morbid obesity with BMI of 40.0-44.9, adult (CMS-HCC) [E66.01, Z68.41]   • Tobacco abuse [Z72.0]       Plan:  Sepsis   2/2 Right DFU with osteomyelitis/abscess  Afebrile  Leukocytosis resolved    Right foot osteomyelitis   Abx per above    S/p R BKA on 12/14 for source control  DC zosyn as no evidence of surgical site infection    Leukocytosis, resolved  2/2 above and post op  On abx above  Monitor    DM2, uncontrolled  HgA1c 11.4  Maintain BS less than 150 to control infection and assist with wound healing    Discussed with internal medicine/ Dr. Boles. ID signing off.

## 2017-12-17 NOTE — PROGRESS NOTES
Pt c/o pain being 9/10 at all times. 10 mg Oxycodone and 650 mg Tylenol given every 4 hours throughout day along with Toradol every 6 hours. Pt calm and relaxed with each encounter and frequently asleep throughout day. Multiple occasions this shift where I walked in and said pt's name and pt did not stir until touched. Attempted to reach pt's pharmacy 4 times throughout day and was unable to reach any staff or answering machine to verify home meds. Dr Boles contacted re pt's report of Fentanyl patch at home.  does not want to reorder until verified with pt's pharmacy.

## 2017-12-17 NOTE — PROGRESS NOTES
[facility] Hospitalist Progress Note    Date of Service: 2017    Chief Complaint  52 y.o. female admitted 2017 with Sepsis, Respiratory failure, Diabetic foot ulcer with osteomyelitis and Leg abscess, NSTEMI, also with COPD exaceration and pulmonary edema    Interval Problem Update   stable and overnight still complains of pain. Patient's pain is local, 6-8/10, intermittent and does not radiate to other location, sharp and with some tingling. Can be controlled by pain meds. Dressing in place.  However patient can sleep well.   patient complained the pain is not controlled reveal patient history of medication patient was on fentanyl patch at home. Restart fentanyl patch.    Diabetic foot - transtibial amputation  NSTEMI - trops trended down, Echo reviewed  Resp failure - remains on O2  COPDE - less wheezing  Pulm edema - less shortness of breath  Diabetes -  this AM  HTN - controlled    Consultants/Specialty  LPS - Danny  ID - Ardon    Disposition  SNF vs Rehab        Review of Systems   Constitutional: Positive for malaise/fatigue. Negative for fever.   HENT: Negative for hearing loss and sore throat.    Eyes: Negative for blurred vision and photophobia.   Respiratory: Positive for shortness of breath and wheezing. Negative for cough.    Cardiovascular: Positive for leg swelling. Negative for chest pain, palpitations and orthopnea.   Gastrointestinal: Negative for diarrhea, heartburn and nausea.   Genitourinary: Negative for dysuria and hematuria.   Musculoskeletal:        Left foot pain   Neurological: Positive for weakness. Negative for dizziness.      Physical Exam  Laboratory/Imaging   Hemodynamics  Temp (24hrs), Av.5 °C (97.7 °F), Min:36.1 °C (96.9 °F), Max:36.9 °C (98.4 °F)   Temperature: 36.4 °C (97.5 °F)  Pulse  Av.1  Min: 73  Max: 156    Blood Pressure: 132/77      Respiratory      Respiration: 18, Pulse Oximetry: 98 %     Work Of Breathing / Effort: Mild  RUL Breath  Sounds: Clear, KWAME Breath Sounds: Clear    Fluids    Intake/Output Summary (Last 24 hours) at 12/17/17 0851  Last data filed at 12/17/17 0600   Gross per 24 hour   Intake              250 ml   Output             2875 ml   Net            -2625 ml       Nutrition  Orders Placed This Encounter   Procedures   • DIET ORDER     Standing Status:   Standing     Number of Occurrences:   1     Order Specific Question:   Diet:     Answer:   Diabetic [3]     Physical Exam   Constitutional: She is oriented to person, place, and time. She appears well-developed.   Morbidly obese   HENT:   Head: Normocephalic and atraumatic.   Eyes: Conjunctivae are normal. Pupils are equal, round, and reactive to light.   Neck: No tracheal deviation present. No thyromegaly present.   Cardiovascular: Normal rate and regular rhythm.  Exam reveals no gallop.    No murmur heard.  Pulmonary/Chest: Effort normal. No respiratory distress. She has no wheezes. She has no rales.   Abdominal: Soft. Bowel sounds are normal. She exhibits no distension and no mass. There is no guarding.   Musculoskeletal: She exhibits edema.   Right Transtibial amputation  Transmetatarsal amputation Left foot   Lymphadenopathy:     She has no cervical adenopathy.   Neurological: She is alert and oriented to person, place, and time.   Skin: Skin is warm and dry.   Nursing note and vitals reviewed.      Recent Labs      12/15/17   0205  12/16/17   0344  12/17/17   0545   WBC  16.6*  12.4*  9.6   RBC  4.91  4.11*  4.17*   HEMOGLOBIN  13.9  11.5*  11.6*   HEMATOCRIT  43.5  36.9*  37.2   MCV  88.6  89.8  89.2   MCH  28.3  28.0  27.8   MCHC  32.0*  31.2*  31.2*   RDW  50.7*  51.2*  49.9   PLATELETCT  325  257  248   MPV  11.5  10.9  10.8     Recent Labs      12/15/17   0205  12/16/17   0344  12/17/17   0545   SODIUM  140  134*  135   POTASSIUM  3.7  3.8  3.8   CHLORIDE  100  100  100   CO2  31  30  29   GLUCOSE  58*  207*  219*   BUN  15  17  11   CREATININE  0.51  0.50  0.44*    CALCIUM  8.5  8.6  8.2*                      Assessment/Plan     * Severe sepsis due to infected right diabetic foot (CMS-HCC)- (present on admission)   Assessment & Plan    This is severe sepsis with the following associated acute organ dysfunction(s): acute respiratory failure., NSTEMI  Off IVF   resolved        Right diabetic foot infection, cellulits, and abscess (CMS-HCC)- (present on admission)   Assessment & Plan    s/p Right transtibial amputation  IV Zosyn  Po pain meds, avoid narcotics, cont IV Toradol, Neurontin  Patient was on fentanyl patch at home, restart        Acute respiratory failure with hypoxia due to sepsis, fluid overload, AECOPD (CMS-HCC)- (present on admission)   Assessment & Plan    Keep O2 sats above 90%        Poorly controlled type 2 diabetes mellitus with neuropathy (CMS-HCC)- (present on admission)   Assessment & Plan    decrease Lantus to 60 u BID, continue SSI        Pulmonary edema   Assessment & Plan    off IV Lasix        Hypokalemia   Assessment & Plan    Kdur  Follow bmp        COPD with acute exacerbation (CMS-HCC)   Assessment & Plan    suspected  Symbicort, Spiriva, RT protocol, cont  Prednisone tapering        Hyponatremia- (present on admission)   Assessment & Plan    Follow bmp        NSTEMI (non-ST elevated myocardial infarction) (CMS-HCC)- (present on admission)   Assessment & Plan    ASA, Lipitor, Metoprolol            Hypertension- (present on admission)   Assessment & Plan    Metoprolol         Morbid obesity with BMI of 40.0-44.9, adult (CMS-HCC)- (present on admission)   Assessment & Plan    Body mass index is 43.7 kg/m²..  referral for outpatient weight management.          Tobacco abuse- (present on admission)   Assessment & Plan    - Smoking cessation education provided  - Nicotine patch            Reviewed items::  EKG reviewed, Radiology images reviewed, Labs reviewed and Medications reviewed  Long catheter::  Urinary Tract Retention or Urinary Tract  Obstruction  DVT prophylaxis pharmacological::  Heparin  Ulcer Prophylaxis::  No  Antibiotics:  Treating active infection/contamination beyond 24 hours perioperative coverage     For complexity-based billing, please refer to the history, exam, and decison making above. In addition, I spent >35 minutes caring for the patient today. More than 50% of the time was spent counseling and coordinating care.    I have discussed with RN and CM and SW and other consultants about patient's plan.     Spent 5 minutes on tobacco cessation counseling including nicotine patches, gum, and dangers of smoking.    The pt indicated that will quit.     [id number] (smoking and tobacco cessation counseling visit; 3-10 min)

## 2017-12-17 NOTE — PROGRESS NOTES
Assumed care of pt @0700. Bedside report received. Pt AOX 4. Pt complains about pain. Med per MAR. Dressing to L stump changed. Redness under breast noticed. Interdry ordered. PIV SL. Waffle overlay in place.  Fall precaution in place. POC discussed with pt, all questions answered at this time. Pt makes needs known, call light within reach, hourly rounding in place.

## 2017-12-18 LAB
ANION GAP SERPL CALC-SCNC: 6 MMOL/L (ref 0–11.9)
BASOPHILS # BLD AUTO: 0.5 % (ref 0–1.8)
BASOPHILS # BLD: 0.05 K/UL (ref 0–0.12)
BUN SERPL-MCNC: 10 MG/DL (ref 8–22)
CALCIUM SERPL-MCNC: 8.6 MG/DL (ref 8.5–10.5)
CHLORIDE SERPL-SCNC: 97 MMOL/L (ref 96–112)
CO2 SERPL-SCNC: 30 MMOL/L (ref 20–33)
CREAT SERPL-MCNC: 0.41 MG/DL (ref 0.5–1.4)
EOSINOPHIL # BLD AUTO: 0.13 K/UL (ref 0–0.51)
EOSINOPHIL NFR BLD: 1.3 % (ref 0–6.9)
ERYTHROCYTE [DISTWIDTH] IN BLOOD BY AUTOMATED COUNT: 49.3 FL (ref 35.9–50)
GFR SERPL CREATININE-BSD FRML MDRD: >60 ML/MIN/1.73 M 2
GLUCOSE BLD-MCNC: 204 MG/DL (ref 65–99)
GLUCOSE BLD-MCNC: 223 MG/DL (ref 65–99)
GLUCOSE SERPL-MCNC: 240 MG/DL (ref 65–99)
HCT VFR BLD AUTO: 38 % (ref 37–47)
HGB BLD-MCNC: 12.1 G/DL (ref 12–16)
IMM GRANULOCYTES # BLD AUTO: 0.42 K/UL (ref 0–0.11)
IMM GRANULOCYTES NFR BLD AUTO: 4.1 % (ref 0–0.9)
LYMPHOCYTES # BLD AUTO: 3.09 K/UL (ref 1–4.8)
LYMPHOCYTES NFR BLD: 30.5 % (ref 22–41)
MCH RBC QN AUTO: 28.3 PG (ref 27–33)
MCHC RBC AUTO-ENTMCNC: 31.8 G/DL (ref 33.6–35)
MCV RBC AUTO: 88.8 FL (ref 81.4–97.8)
MONOCYTES # BLD AUTO: 0.69 K/UL (ref 0–0.85)
MONOCYTES NFR BLD AUTO: 6.8 % (ref 0–13.4)
NEUTROPHILS # BLD AUTO: 5.76 K/UL (ref 2–7.15)
NEUTROPHILS NFR BLD: 56.8 % (ref 44–72)
NRBC # BLD AUTO: 0 K/UL
NRBC BLD AUTO-RTO: 0 /100 WBC
PLATELET # BLD AUTO: 269 K/UL (ref 164–446)
PMV BLD AUTO: 10.7 FL (ref 9–12.9)
POTASSIUM SERPL-SCNC: 3.9 MMOL/L (ref 3.6–5.5)
RBC # BLD AUTO: 4.28 M/UL (ref 4.2–5.4)
SODIUM SERPL-SCNC: 133 MMOL/L (ref 135–145)
WBC # BLD AUTO: 10.1 K/UL (ref 4.8–10.8)

## 2017-12-18 PROCEDURE — 700102 HCHG RX REV CODE 250 W/ 637 OVERRIDE(OP): Performed by: FAMILY MEDICINE

## 2017-12-18 PROCEDURE — 82962 GLUCOSE BLOOD TEST: CPT | Mod: 91

## 2017-12-18 PROCEDURE — 99232 SBSQ HOSP IP/OBS MODERATE 35: CPT | Mod: 25 | Performed by: INTERNAL MEDICINE

## 2017-12-18 PROCEDURE — A9270 NON-COVERED ITEM OR SERVICE: HCPCS | Performed by: HOSPITALIST

## 2017-12-18 PROCEDURE — 36415 COLL VENOUS BLD VENIPUNCTURE: CPT

## 2017-12-18 PROCEDURE — 700102 HCHG RX REV CODE 250 W/ 637 OVERRIDE(OP): Performed by: HOSPITALIST

## 2017-12-18 PROCEDURE — A9270 NON-COVERED ITEM OR SERVICE: HCPCS | Performed by: PHYSICIAN ASSISTANT

## 2017-12-18 PROCEDURE — 700102 HCHG RX REV CODE 250 W/ 637 OVERRIDE(OP): Performed by: INTERNAL MEDICINE

## 2017-12-18 PROCEDURE — 770006 HCHG ROOM/CARE - MED/SURG/GYN SEMI*

## 2017-12-18 PROCEDURE — 85025 COMPLETE CBC W/AUTO DIFF WBC: CPT

## 2017-12-18 PROCEDURE — A9270 NON-COVERED ITEM OR SERVICE: HCPCS | Performed by: INTERNAL MEDICINE

## 2017-12-18 PROCEDURE — 700102 HCHG RX REV CODE 250 W/ 637 OVERRIDE(OP): Performed by: PHYSICIAN ASSISTANT

## 2017-12-18 PROCEDURE — A9270 NON-COVERED ITEM OR SERVICE: HCPCS | Performed by: FAMILY MEDICINE

## 2017-12-18 PROCEDURE — 700111 HCHG RX REV CODE 636 W/ 250 OVERRIDE (IP): Performed by: FAMILY MEDICINE

## 2017-12-18 PROCEDURE — 700111 HCHG RX REV CODE 636 W/ 250 OVERRIDE (IP): Performed by: INTERNAL MEDICINE

## 2017-12-18 PROCEDURE — 80048 BASIC METABOLIC PNL TOTAL CA: CPT

## 2017-12-18 PROCEDURE — 99406 BEHAV CHNG SMOKING 3-10 MIN: CPT | Performed by: INTERNAL MEDICINE

## 2017-12-18 RX ORDER — PREDNISONE 10 MG/1
10 TABLET ORAL DAILY
Status: DISCONTINUED | OUTPATIENT
Start: 2017-12-18 | End: 2017-12-18

## 2017-12-18 RX ORDER — PREDNISONE 10 MG/1
10 TABLET ORAL DAILY
Status: COMPLETED | OUTPATIENT
Start: 2017-12-18 | End: 2017-12-20

## 2017-12-18 RX ADMIN — HEPARIN SODIUM 5000 UNITS: 5000 INJECTION, SOLUTION INTRAVENOUS; SUBCUTANEOUS at 13:37

## 2017-12-18 RX ADMIN — GABAPENTIN 200 MG: 100 CAPSULE ORAL at 08:41

## 2017-12-18 RX ADMIN — HYDROMORPHONE HYDROCHLORIDE 0.5 MG: 2 INJECTION INTRAMUSCULAR; INTRAVENOUS; SUBCUTANEOUS at 04:15

## 2017-12-18 RX ADMIN — HEPARIN SODIUM 5000 UNITS: 5000 INJECTION, SOLUTION INTRAVENOUS; SUBCUTANEOUS at 22:06

## 2017-12-18 RX ADMIN — KETOROLAC TROMETHAMINE 30 MG: 30 INJECTION, SOLUTION INTRAMUSCULAR at 10:31

## 2017-12-18 RX ADMIN — NYSTATIN 500000 UNITS: 100000 SUSPENSION ORAL at 16:30

## 2017-12-18 RX ADMIN — INSULIN HUMAN 14 UNITS: 100 INJECTION, SOLUTION PARENTERAL at 16:28

## 2017-12-18 RX ADMIN — INSULIN HUMAN 4 UNITS: 100 INJECTION, SOLUTION PARENTERAL at 06:12

## 2017-12-18 RX ADMIN — HYDROMORPHONE HYDROCHLORIDE 0.5 MG: 2 INJECTION INTRAMUSCULAR; INTRAVENOUS; SUBCUTANEOUS at 19:37

## 2017-12-18 RX ADMIN — OXYCODONE HYDROCHLORIDE AND ACETAMINOPHEN 1 TABLET: 10; 325 TABLET ORAL at 13:36

## 2017-12-18 RX ADMIN — HEPARIN SODIUM 5000 UNITS: 5000 INJECTION, SOLUTION INTRAVENOUS; SUBCUTANEOUS at 04:15

## 2017-12-18 RX ADMIN — METOPROLOL TARTRATE 25 MG: 25 TABLET, FILM COATED ORAL at 19:37

## 2017-12-18 RX ADMIN — INSULIN HUMAN 4 UNITS: 100 INJECTION, SOLUTION PARENTERAL at 10:35

## 2017-12-18 RX ADMIN — KETOROLAC TROMETHAMINE 30 MG: 30 INJECTION, SOLUTION INTRAMUSCULAR at 02:29

## 2017-12-18 RX ADMIN — MAGNESIUM HYDROXIDE 30 ML: 400 SUSPENSION ORAL at 08:40

## 2017-12-18 RX ADMIN — OXYCODONE HYDROCHLORIDE AND ACETAMINOPHEN 1 TABLET: 10; 325 TABLET ORAL at 06:15

## 2017-12-18 RX ADMIN — HYDROMORPHONE HYDROCHLORIDE 0.5 MG: 2 INJECTION INTRAMUSCULAR; INTRAVENOUS; SUBCUTANEOUS at 00:06

## 2017-12-18 RX ADMIN — GABAPENTIN 200 MG: 100 CAPSULE ORAL at 19:37

## 2017-12-18 RX ADMIN — BUDESONIDE AND FORMOTEROL FUMARATE DIHYDRATE 2 PUFF: 160; 4.5 AEROSOL RESPIRATORY (INHALATION) at 08:40

## 2017-12-18 RX ADMIN — NYSTATIN 500000 UNITS: 100000 SUSPENSION ORAL at 10:58

## 2017-12-18 RX ADMIN — ALPRAZOLAM 0.25 MG: 0.25 TABLET ORAL at 19:37

## 2017-12-18 RX ADMIN — METOPROLOL TARTRATE 25 MG: 25 TABLET, FILM COATED ORAL at 08:41

## 2017-12-18 RX ADMIN — GABAPENTIN 200 MG: 100 CAPSULE ORAL at 15:11

## 2017-12-18 RX ADMIN — ATORVASTATIN CALCIUM 40 MG: 40 TABLET, FILM COATED ORAL at 19:37

## 2017-12-18 RX ADMIN — ASPIRIN 325 MG: 325 TABLET, FILM COATED ORAL at 08:41

## 2017-12-18 RX ADMIN — BUDESONIDE AND FORMOTEROL FUMARATE DIHYDRATE 2 PUFF: 160; 4.5 AEROSOL RESPIRATORY (INHALATION) at 19:40

## 2017-12-18 RX ADMIN — STANDARDIZED SENNA CONCENTRATE AND DOCUSATE SODIUM 2 TABLET: 8.6; 5 TABLET, FILM COATED ORAL at 08:41

## 2017-12-18 RX ADMIN — KETOROLAC TROMETHAMINE 30 MG: 30 INJECTION, SOLUTION INTRAMUSCULAR at 18:33

## 2017-12-18 RX ADMIN — OXYCODONE HYDROCHLORIDE AND ACETAMINOPHEN 1 TABLET: 10; 325 TABLET ORAL at 22:06

## 2017-12-18 RX ADMIN — NYSTATIN 500000 UNITS: 100000 SUSPENSION ORAL at 13:36

## 2017-12-18 RX ADMIN — ACETAMINOPHEN 650 MG: 325 TABLET, FILM COATED ORAL at 23:11

## 2017-12-18 RX ADMIN — INSULIN GLARGINE 64 UNITS: 100 INJECTION, SOLUTION SUBCUTANEOUS at 19:43

## 2017-12-18 RX ADMIN — NICOTINE 21 MG: 21 PATCH, EXTENDED RELEASE TRANSDERMAL at 04:15

## 2017-12-18 RX ADMIN — INSULIN GLARGINE 64 UNITS: 100 INJECTION, SOLUTION SUBCUTANEOUS at 08:44

## 2017-12-18 RX ADMIN — INSULIN HUMAN 7 UNITS: 100 INJECTION, SOLUTION PARENTERAL at 19:43

## 2017-12-18 RX ADMIN — PREDNISONE 10 MG: 10 TABLET ORAL at 08:41

## 2017-12-18 RX ADMIN — HYDROMORPHONE HYDROCHLORIDE 0.5 MG: 2 INJECTION INTRAMUSCULAR; INTRAVENOUS; SUBCUTANEOUS at 08:38

## 2017-12-18 RX ADMIN — HYDROMORPHONE HYDROCHLORIDE 0.5 MG: 2 INJECTION INTRAMUSCULAR; INTRAVENOUS; SUBCUTANEOUS at 15:48

## 2017-12-18 RX ADMIN — POTASSIUM CHLORIDE 20 MEQ: 1500 TABLET, EXTENDED RELEASE ORAL at 09:00

## 2017-12-18 RX ADMIN — NYSTATIN 500000 UNITS: 100000 SUSPENSION ORAL at 19:40

## 2017-12-18 ASSESSMENT — ENCOUNTER SYMPTOMS
SHORTNESS OF BREATH: 0
NAUSEA: 0
WEAKNESS: 1
WHEEZING: 0
EYE PAIN: 0
COUGH: 0
PHOTOPHOBIA: 0
PALPITATIONS: 0
ORTHOPNEA: 0
FEVER: 0
SORE THROAT: 0
BLURRED VISION: 0
HEARTBURN: 0
DIARRHEA: 0
DIZZINESS: 0

## 2017-12-18 ASSESSMENT — PAIN SCALES - GENERAL
PAINLEVEL_OUTOF10: 9
PAINLEVEL_OUTOF10: 7
PAINLEVEL_OUTOF10: 5
PAINLEVEL_OUTOF10: 8
PAINLEVEL_OUTOF10: 8
PAINLEVEL_OUTOF10: 7
PAINLEVEL_OUTOF10: 8
PAINLEVEL_OUTOF10: 7
PAINLEVEL_OUTOF10: 8
PAINLEVEL_OUTOF10: 8
PAINLEVEL_OUTOF10: 6
PAINLEVEL_OUTOF10: 8
PAINLEVEL_OUTOF10: 9
PAINLEVEL_OUTOF10: 8

## 2017-12-18 NOTE — PROGRESS NOTES
Assumed care of pt @0700. Bedside report received. Pt AOX 4. Pt complains about pain( RLE and mouth). Med per MAR. Dressing to R stump CDI. PIV SL. Long in place draining to gravity. Pt complains about constipation. MOM given. Suppository offered. Fall precaution in place. POC discussed with pt, all questions answered at this time. Pt makes needs known, call light within reach, hourly rounding in place.

## 2017-12-18 NOTE — PROGRESS NOTES
[facility] Hospitalist Progress Note    Date of Service: 2017    Chief Complaint  52 y.o. female admitted 2017 with Sepsis, Respiratory failure, Diabetic foot ulcer with osteomyelitis and Leg abscess, NSTEMI, also with COPD exaceration and pulmonary edema    Interval Problem Update   stable and overnight still complains of pain. Patient's pain is local, 6-8/10, intermittent and does not radiate to other location, sharp and with some tingling. Can be controlled by pain meds. Dressing in place.  However patient can sleep well.   patient complained the pain is not controlled reveal patient history of medication patient was on fentanyl patch at home. Restart fentanyl patch.   still complaining of pain however already have pain patch. We'll continue monitoring and pending nursing care facility. Patient is off IV antibiotics at this moment.    Diabetic foot - transtibial amputation  NSTEMI - trops trended down, Echo reviewed  Resp failure - remains on O2  COPDE - less wheezing  Pulm edema - less shortness of breath  Diabetes -  this AM  HTN - controlled    Consultants/Specialty  LPS - Danny  ID - Ardon    Disposition  SNF vs Rehab        Review of Systems   Constitutional: Positive for malaise/fatigue. Negative for fever.   HENT: Negative for hearing loss and sore throat.    Eyes: Negative for blurred vision, photophobia and pain.   Respiratory: Negative for cough, shortness of breath and wheezing.    Cardiovascular: Negative for chest pain, palpitations, orthopnea and leg swelling.   Gastrointestinal: Negative for diarrhea, heartburn and nausea.   Genitourinary: Negative for dysuria and hematuria.   Musculoskeletal:        Left foot pain   Neurological: Positive for weakness. Negative for dizziness.      Physical Exam  Laboratory/Imaging   Hemodynamics  Temp (24hrs), Av.4 °C (97.5 °F), Min:36.2 °C (97.1 °F), Max:36.6 °C (97.9 °F)   Temperature: 36.4 °C (97.5 °F)  Pulse  Av.1  Min:  73  Max: 156    Blood Pressure: 131/83      Respiratory      Respiration: 16, Pulse Oximetry: 97 %     Work Of Breathing / Effort: Mild  RUL Breath Sounds: Clear, RML Breath Sounds: Diminished, RLL Breath Sounds: Diminished, KWAME Breath Sounds: Clear, LLL Breath Sounds: Diminished    Fluids    Intake/Output Summary (Last 24 hours) at 12/18/17 0819  Last data filed at 12/18/17 0600   Gross per 24 hour   Intake             1560 ml   Output             4850 ml   Net            -3290 ml       Nutrition  Orders Placed This Encounter   Procedures   • DIET ORDER     Standing Status:   Standing     Number of Occurrences:   1     Order Specific Question:   Diet:     Answer:   Diabetic [3]     Physical Exam   Constitutional: She is oriented to person, place, and time. She appears well-developed and well-nourished.   Morbidly obese   HENT:   Head: Normocephalic and atraumatic.   Eyes: Conjunctivae are normal. Pupils are equal, round, and reactive to light.   Neck: No tracheal deviation present. No thyromegaly present.   Cardiovascular: Normal rate and regular rhythm.  Exam reveals no gallop and no friction rub.    No murmur heard.  Pulmonary/Chest: Effort normal. She has no wheezes. She has no rales.   Abdominal: Soft. Bowel sounds are normal. She exhibits no distension and no mass. There is no guarding.   Musculoskeletal: She exhibits no edema.   Right Transtibial amputation  Transmetatarsal amputation Left foot   Lymphadenopathy:     She has no cervical adenopathy.   Neurological: She is alert and oriented to person, place, and time.   Skin: Skin is warm and dry.   Nursing note and vitals reviewed.      Recent Labs      12/16/17   0344  12/17/17   0545  12/18/17   0436   WBC  12.4*  9.6  10.1   RBC  4.11*  4.17*  4.28   HEMOGLOBIN  11.5*  11.6*  12.1   HEMATOCRIT  36.9*  37.2  38.0   MCV  89.8  89.2  88.8   MCH  28.0  27.8  28.3   MCHC  31.2*  31.2*  31.8*   RDW  51.2*  49.9  49.3   PLATELETCT  257  248  269   MPV  10.9  10.8   10.7     Recent Labs      12/16/17   0344  12/17/17   0545  12/18/17   0437   SODIUM  134*  135  133*   POTASSIUM  3.8  3.8  3.9   CHLORIDE  100  100  97   CO2  30  29  30   GLUCOSE  207*  219*  240*   BUN  17  11  10   CREATININE  0.50  0.44*  0.41*   CALCIUM  8.6  8.2*  8.6                      Assessment/Plan     * Severe sepsis due to infected right diabetic foot (CMS-HCC)- (present on admission)   Assessment & Plan    This is severe sepsis with the following associated acute organ dysfunction(s): acute respiratory failure., NSTEMI  Off IVF   resolved        Right diabetic foot infection, cellulits, and abscess (CMS-HCC)- (present on admission)   Assessment & Plan    s/p Right transtibial amputation  DC Zosyn  Po pain meds, avoid narcotics, cont IV Toradol, Neurontin  Patient was on fentanyl patch at home, restart        Acute respiratory failure with hypoxia due to sepsis, fluid overload, AECOPD (CMS-HCC)- (present on admission)   Assessment & Plan    Keep O2 sats above 90%        Poorly controlled type 2 diabetes mellitus with neuropathy (CMS-HCC)- (present on admission)   Assessment & Plan    decrease Lantus to 60 u BID, continue SSI        Pulmonary edema   Assessment & Plan    off IV Lasix        Hypokalemia   Assessment & Plan    Kdur  Follow bmp        COPD with acute exacerbation (CMS-HCC)   Assessment & Plan    suspected  Symbicort, Spiriva, RT protocol, cont  Prednisone tapering        Hyponatremia- (present on admission)   Assessment & Plan    Follow bmp        NSTEMI (non-ST elevated myocardial infarction) (CMS-HCC)- (present on admission)   Assessment & Plan    ASA, Lipitor, Metoprolol            Hypertension- (present on admission)   Assessment & Plan    Metoprolol         Morbid obesity with BMI of 40.0-44.9, adult (CMS-HCC)- (present on admission)   Assessment & Plan    Body mass index is 43.7 kg/m²..  referral for outpatient weight management.          Tobacco abuse- (present on admission)    Assessment & Plan    - Smoking cessation education provided  - Nicotine patch            Reviewed items::  EKG reviewed, Radiology images reviewed, Labs reviewed and Medications reviewed  Long catheter::  Urinary Tract Retention or Urinary Tract Obstruction  DVT prophylaxis pharmacological::  Heparin  Ulcer Prophylaxis::  No  Antibiotics:  Treating active infection/contamination beyond 24 hours perioperative coverage     For complexity-based billing, please refer to the history, exam, and decison making above. In addition, I spent >35 minutes caring for the patient today. More than 50% of the time was spent counseling and coordinating care.    I have discussed with RN and CM and SW and other consultants about patient's plan.     Spent 3 minutes on tobacco cessation counseling including nicotine patches, gum, and dangers of smoking.    The pt indicated that will quit.     [id number] (smoking and tobacco cessation counseling visit; 3-10 min)

## 2017-12-18 NOTE — DISCHARGE PLANNING
Received call from Courtney at [facility], they have accepted patient pending bed availability.

## 2017-12-18 NOTE — DISCHARGE PLANNING
Received call from Dakota at [facility], they have declined patient due to his everyday smoking issues.

## 2017-12-18 NOTE — DISCHARGE PLANNING
Received call from Brady at [facility], they have declined patient as no Medicaid beds.

## 2017-12-18 NOTE — DISCHARGE PLANNING
Received sukhjinder Xiao at [facility], they have declined patient as no Medicaid beds.

## 2017-12-18 NOTE — PROGRESS NOTES
Diabetes education: Post op blood sugars are consistently above 200: 267 (7 units), 293 (7 units) and 204 (4 units). Pt would benefit in having a meal dose of regular insulin in addition to sliding scale as she is on 64 units of Lantus BID with only 25 units of regular. Meal dose of regular ( not Humalog ) can be written with parameters to hold if blood sugar below 100 ( or higher). Please call [phone] if needs change.

## 2017-12-18 NOTE — PROGRESS NOTES
"   Orthopaedic Progress Note    Interval changes:  Dressings CDI  Cleared by ortho for DC to SNF pending medicine clearance    ROS - Patient denies any new issues.  Pain well controlled.    Blood pressure 131/83, pulse 81, temperature 36.4 °C (97.5 °F), resp. rate 16, height 1.702 m (5' 7.01\"), weight 124.2 kg (273 lb 13 oz), SpO2 97 %, not currently breastfeeding.      Patient seen and examined  No acute distress  Breathing non labored  RRR  Right BKA dressing CDI.     Recent Labs      12/16/17   0344  12/17/17   0545  12/18/17   0436   WBC  12.4*  9.6  10.1   RBC  4.11*  4.17*  4.28   HEMOGLOBIN  11.5*  11.6*  12.1   HEMATOCRIT  36.9*  37.2  38.0   MCV  89.8  89.2  88.8   MCH  28.0  27.8  28.3   MCHC  31.2*  31.2*  31.8*   RDW  51.2*  49.9  49.3   PLATELETCT  257  248  269   MPV  10.9  10.8  10.7       Active Hospital Problems    Diagnosis   • Severe sepsis due to infected right diabetic foot (CMS-HCC) [A41.9, R65.20]     Priority: High   • Acute respiratory failure with hypoxia due to sepsis, fluid overload, AECOPD (CMS-HCC) [J96.01]     Priority: High   • Right diabetic foot infection, cellulits, and abscess (CMS-HCC) [E11.69, L08.9]     Priority: High   • Poorly controlled type 2 diabetes mellitus with neuropathy (CMS-HCC) [E11.40, E11.65]     Priority: High   • Pulmonary edema [J81.1]     Priority: Medium   • Hypokalemia [E87.6]     Priority: Medium   • Hyponatremia [E87.1]     Priority: Medium   • COPD with acute exacerbation (CMS-HCC) [J44.1]     Priority: Medium   • NSTEMI (non-ST elevated myocardial infarction) (CMS-HCC) [I21.4]     Priority: Medium   • Hypertension [I10]     Priority: Medium   • Morbid obesity with BMI of 40.0-44.9, adult (CMS-HCC) [E66.01, Z68.41]     Priority: Low   • Tobacco abuse [Z72.0]     Priority: Low       Assessment/Plan:  Pending placement  Cleared by ortho for DC to SNF once cleared by medicine  POD#4 S/P Right below knee amputation  Wt bearing status - NWB RLE  Wound " care/Drains - dressings changed every other day by nursing  Future Procedures - none planned  Sutures/Staples out- 14-21 days post operatively  PT/OT-initiated  Antibiotics: zosyn 13.5g IV continuous  DVT Prophylaxis- TEDS/SCDs/Foot pumps/aspirin/heparin  Long-none  Case Coordination for Discharge Planning - Disposition SNF

## 2017-12-18 NOTE — CARE PLAN
Problem: Bowel/Gastric:  Goal: Normal bowel function is maintained or improved  Outcome: PROGRESSING SLOWER THAN EXPECTED  Pt complains about constipation. MOM given.     Problem: Pain Management  Goal: Pain level will decrease to patient's comfort goal  Outcome: PROGRESSING AS EXPECTED  Pt complains about pain. Med per MAR.     Problem: Mobility  Goal: Risk for activity intolerance will decrease  Outcome: PROGRESSING SLOWER THAN EXPECTED  Pt refuses ambulation to th chair for meals despite education.

## 2017-12-19 LAB
ANION GAP SERPL CALC-SCNC: 6 MMOL/L (ref 0–11.9)
BASOPHILS # BLD AUTO: 0.3 % (ref 0–1.8)
BASOPHILS # BLD: 0.04 K/UL (ref 0–0.12)
BUN SERPL-MCNC: 11 MG/DL (ref 8–22)
CALCIUM SERPL-MCNC: 8.7 MG/DL (ref 8.5–10.5)
CHLORIDE SERPL-SCNC: 97 MMOL/L (ref 96–112)
CO2 SERPL-SCNC: 31 MMOL/L (ref 20–33)
CREAT SERPL-MCNC: 0.48 MG/DL (ref 0.5–1.4)
EOSINOPHIL # BLD AUTO: 0.14 K/UL (ref 0–0.51)
EOSINOPHIL NFR BLD: 1.2 % (ref 0–6.9)
ERYTHROCYTE [DISTWIDTH] IN BLOOD BY AUTOMATED COUNT: 49.1 FL (ref 35.9–50)
GFR SERPL CREATININE-BSD FRML MDRD: >60 ML/MIN/1.73 M 2
GLUCOSE BLD-MCNC: 175 MG/DL (ref 65–99)
GLUCOSE BLD-MCNC: 178 MG/DL (ref 65–99)
GLUCOSE BLD-MCNC: 214 MG/DL (ref 65–99)
GLUCOSE BLD-MCNC: 244 MG/DL (ref 65–99)
GLUCOSE BLD-MCNC: 249 MG/DL (ref 65–99)
GLUCOSE BLD-MCNC: 289 MG/DL (ref 65–99)
GLUCOSE BLD-MCNC: 401 MG/DL (ref 65–99)
GLUCOSE SERPL-MCNC: 230 MG/DL (ref 65–99)
HCT VFR BLD AUTO: 37.5 % (ref 37–47)
HGB BLD-MCNC: 11.7 G/DL (ref 12–16)
IMM GRANULOCYTES # BLD AUTO: 0.27 K/UL (ref 0–0.11)
IMM GRANULOCYTES NFR BLD AUTO: 2.3 % (ref 0–0.9)
LYMPHOCYTES # BLD AUTO: 3.25 K/UL (ref 1–4.8)
LYMPHOCYTES NFR BLD: 27.2 % (ref 22–41)
MCH RBC QN AUTO: 27.5 PG (ref 27–33)
MCHC RBC AUTO-ENTMCNC: 31.2 G/DL (ref 33.6–35)
MCV RBC AUTO: 88.2 FL (ref 81.4–97.8)
MONOCYTES # BLD AUTO: 0.8 K/UL (ref 0–0.85)
MONOCYTES NFR BLD AUTO: 6.7 % (ref 0–13.4)
NEUTROPHILS # BLD AUTO: 7.46 K/UL (ref 2–7.15)
NEUTROPHILS NFR BLD: 62.3 % (ref 44–72)
NRBC # BLD AUTO: 0 K/UL
NRBC BLD-RTO: 0 /100 WBC
PLATELET # BLD AUTO: 286 K/UL (ref 164–446)
PMV BLD AUTO: 10.4 FL (ref 9–12.9)
POTASSIUM SERPL-SCNC: 3.9 MMOL/L (ref 3.6–5.5)
RBC # BLD AUTO: 4.25 M/UL (ref 4.2–5.4)
SODIUM SERPL-SCNC: 134 MMOL/L (ref 135–145)
WBC # BLD AUTO: 12 K/UL (ref 4.8–10.8)

## 2017-12-19 PROCEDURE — A9270 NON-COVERED ITEM OR SERVICE: HCPCS | Performed by: INTERNAL MEDICINE

## 2017-12-19 PROCEDURE — 85025 COMPLETE CBC W/AUTO DIFF WBC: CPT

## 2017-12-19 PROCEDURE — 80048 BASIC METABOLIC PNL TOTAL CA: CPT

## 2017-12-19 PROCEDURE — 700102 HCHG RX REV CODE 250 W/ 637 OVERRIDE(OP): Performed by: HOSPITALIST

## 2017-12-19 PROCEDURE — A9270 NON-COVERED ITEM OR SERVICE: HCPCS | Performed by: HOSPITALIST

## 2017-12-19 PROCEDURE — 700111 HCHG RX REV CODE 636 W/ 250 OVERRIDE (IP): Performed by: INTERNAL MEDICINE

## 2017-12-19 PROCEDURE — A9270 NON-COVERED ITEM OR SERVICE: HCPCS | Performed by: PHYSICIAN ASSISTANT

## 2017-12-19 PROCEDURE — 700102 HCHG RX REV CODE 250 W/ 637 OVERRIDE(OP): Performed by: PHYSICIAN ASSISTANT

## 2017-12-19 PROCEDURE — 82962 GLUCOSE BLOOD TEST: CPT

## 2017-12-19 PROCEDURE — 700102 HCHG RX REV CODE 250 W/ 637 OVERRIDE(OP): Performed by: INTERNAL MEDICINE

## 2017-12-19 PROCEDURE — A9270 NON-COVERED ITEM OR SERVICE: HCPCS | Performed by: FAMILY MEDICINE

## 2017-12-19 PROCEDURE — 36415 COLL VENOUS BLD VENIPUNCTURE: CPT

## 2017-12-19 PROCEDURE — 700102 HCHG RX REV CODE 250 W/ 637 OVERRIDE(OP): Performed by: FAMILY MEDICINE

## 2017-12-19 PROCEDURE — 99232 SBSQ HOSP IP/OBS MODERATE 35: CPT | Performed by: HOSPITALIST

## 2017-12-19 PROCEDURE — 700111 HCHG RX REV CODE 636 W/ 250 OVERRIDE (IP): Performed by: FAMILY MEDICINE

## 2017-12-19 PROCEDURE — 770006 HCHG ROOM/CARE - MED/SURG/GYN SEMI*

## 2017-12-19 RX ORDER — GABAPENTIN 300 MG/1
600 CAPSULE ORAL 3 TIMES DAILY
Status: DISCONTINUED | OUTPATIENT
Start: 2017-12-19 | End: 2017-12-20 | Stop reason: HOSPADM

## 2017-12-19 RX ORDER — OXYCODONE AND ACETAMINOPHEN 10; 325 MG/1; MG/1
1 TABLET ORAL EVERY 4 HOURS PRN
Status: DISCONTINUED | OUTPATIENT
Start: 2017-12-19 | End: 2017-12-20 | Stop reason: HOSPADM

## 2017-12-19 RX ORDER — NYSTATIN 100000 [USP'U]/G
POWDER TOPICAL 2 TIMES DAILY
Status: DISCONTINUED | OUTPATIENT
Start: 2017-12-19 | End: 2017-12-20 | Stop reason: HOSPADM

## 2017-12-19 RX ADMIN — INSULIN HUMAN 3 UNITS: 100 INJECTION, SOLUTION PARENTERAL at 16:29

## 2017-12-19 RX ADMIN — METOPROLOL TARTRATE 25 MG: 25 TABLET, FILM COATED ORAL at 08:57

## 2017-12-19 RX ADMIN — KETOROLAC TROMETHAMINE 30 MG: 30 INJECTION, SOLUTION INTRAMUSCULAR at 09:01

## 2017-12-19 RX ADMIN — NYSTATIN 1500000 UNITS: 100000 POWDER TOPICAL at 10:42

## 2017-12-19 RX ADMIN — NICOTINE 21 MG: 21 PATCH, EXTENDED RELEASE TRANSDERMAL at 05:55

## 2017-12-19 RX ADMIN — INSULIN HUMAN 3 UNITS: 100 INJECTION, SOLUTION PARENTERAL at 06:00

## 2017-12-19 RX ADMIN — GABAPENTIN 600 MG: 300 CAPSULE ORAL at 13:49

## 2017-12-19 RX ADMIN — INSULIN HUMAN 4 UNITS: 100 INJECTION, SOLUTION PARENTERAL at 11:32

## 2017-12-19 RX ADMIN — OXYCODONE HYDROCHLORIDE AND ACETAMINOPHEN 1 TABLET: 10; 325 TABLET ORAL at 10:42

## 2017-12-19 RX ADMIN — POTASSIUM CHLORIDE 20 MEQ: 1500 TABLET, EXTENDED RELEASE ORAL at 08:58

## 2017-12-19 RX ADMIN — OXYCODONE HYDROCHLORIDE AND ACETAMINOPHEN 1 TABLET: 10; 325 TABLET ORAL at 23:06

## 2017-12-19 RX ADMIN — INSULIN GLARGINE 64 UNITS: 100 INJECTION, SOLUTION SUBCUTANEOUS at 20:19

## 2017-12-19 RX ADMIN — NYSTATIN 1500000 UNITS: 100000 POWDER TOPICAL at 20:09

## 2017-12-19 RX ADMIN — GABAPENTIN 600 MG: 300 CAPSULE ORAL at 20:10

## 2017-12-19 RX ADMIN — OXYCODONE HYDROCHLORIDE AND ACETAMINOPHEN 1 TABLET: 10; 325 TABLET ORAL at 14:50

## 2017-12-19 RX ADMIN — NYSTATIN 500000 UNITS: 100000 SUSPENSION ORAL at 20:09

## 2017-12-19 RX ADMIN — HYDROMORPHONE HYDROCHLORIDE 0.5 MG: 2 INJECTION INTRAMUSCULAR; INTRAVENOUS; SUBCUTANEOUS at 09:01

## 2017-12-19 RX ADMIN — HEPARIN SODIUM 5000 UNITS: 5000 INJECTION, SOLUTION INTRAVENOUS; SUBCUTANEOUS at 05:55

## 2017-12-19 RX ADMIN — METOPROLOL TARTRATE 25 MG: 25 TABLET, FILM COATED ORAL at 20:11

## 2017-12-19 RX ADMIN — BUDESONIDE AND FORMOTEROL FUMARATE DIHYDRATE 2 PUFF: 160; 4.5 AEROSOL RESPIRATORY (INHALATION) at 08:55

## 2017-12-19 RX ADMIN — HYDROMORPHONE HYDROCHLORIDE 0.5 MG: 2 INJECTION INTRAMUSCULAR; INTRAVENOUS; SUBCUTANEOUS at 01:25

## 2017-12-19 RX ADMIN — ASPIRIN 325 MG: 325 TABLET, FILM COATED ORAL at 08:57

## 2017-12-19 RX ADMIN — BUDESONIDE AND FORMOTEROL FUMARATE DIHYDRATE 2 PUFF: 160; 4.5 AEROSOL RESPIRATORY (INHALATION) at 20:12

## 2017-12-19 RX ADMIN — NYSTATIN 500000 UNITS: 100000 SUSPENSION ORAL at 16:28

## 2017-12-19 RX ADMIN — STANDARDIZED SENNA CONCENTRATE AND DOCUSATE SODIUM 2 TABLET: 8.6; 5 TABLET, FILM COATED ORAL at 20:10

## 2017-12-19 RX ADMIN — HYDROMORPHONE HYDROCHLORIDE 0.5 MG: 2 INJECTION INTRAMUSCULAR; INTRAVENOUS; SUBCUTANEOUS at 19:39

## 2017-12-19 RX ADMIN — INSULIN HUMAN 4 UNITS: 100 INJECTION, SOLUTION PARENTERAL at 20:17

## 2017-12-19 RX ADMIN — HEPARIN SODIUM 5000 UNITS: 5000 INJECTION, SOLUTION INTRAVENOUS; SUBCUTANEOUS at 13:49

## 2017-12-19 RX ADMIN — GABAPENTIN 200 MG: 100 CAPSULE ORAL at 08:57

## 2017-12-19 RX ADMIN — NYSTATIN 500000 UNITS: 100000 SUSPENSION ORAL at 13:49

## 2017-12-19 RX ADMIN — NYSTATIN 500000 UNITS: 100000 SUSPENSION ORAL at 08:56

## 2017-12-19 RX ADMIN — KETOROLAC TROMETHAMINE 30 MG: 30 INJECTION, SOLUTION INTRAMUSCULAR at 23:06

## 2017-12-19 RX ADMIN — OXYCODONE HYDROCHLORIDE AND ACETAMINOPHEN 1 TABLET: 10; 325 TABLET ORAL at 05:55

## 2017-12-19 RX ADMIN — INSULIN GLARGINE 64 UNITS: 100 INJECTION, SOLUTION SUBCUTANEOUS at 08:56

## 2017-12-19 RX ADMIN — HEPARIN SODIUM 5000 UNITS: 5000 INJECTION, SOLUTION INTRAVENOUS; SUBCUTANEOUS at 20:09

## 2017-12-19 RX ADMIN — TIOTROPIUM BROMIDE 1 CAPSULE: 18 CAPSULE ORAL; RESPIRATORY (INHALATION) at 08:55

## 2017-12-19 RX ADMIN — ATORVASTATIN CALCIUM 40 MG: 40 TABLET, FILM COATED ORAL at 20:10

## 2017-12-19 RX ADMIN — PREDNISONE 10 MG: 10 TABLET ORAL at 08:57

## 2017-12-19 ASSESSMENT — ENCOUNTER SYMPTOMS
NAUSEA: 0
WHEEZING: 0
SHORTNESS OF BREATH: 0
PHOTOPHOBIA: 0
COUGH: 0
PALPITATIONS: 0
HEARTBURN: 0
WEAKNESS: 1
ORTHOPNEA: 0
SORE THROAT: 0
DIARRHEA: 0
DIZZINESS: 0
FEVER: 0
BLURRED VISION: 0
EYE PAIN: 0

## 2017-12-19 ASSESSMENT — PAIN SCALES - GENERAL
PAINLEVEL_OUTOF10: 8
PAINLEVEL_OUTOF10: 7
PAINLEVEL_OUTOF10: 8
PAINLEVEL_OUTOF10: 7
PAINLEVEL_OUTOF10: 8
PAINLEVEL_OUTOF10: 3
PAINLEVEL_OUTOF10: 9
PAINLEVEL_OUTOF10: 8
PAINLEVEL_OUTOF10: 9
PAINLEVEL_OUTOF10: 9

## 2017-12-19 NOTE — PROGRESS NOTES
Knee immobilizer was delivered and fitted to patient RLE.  If any further assistance needed, please call extension [phone] or place order for Ortho Technician assistance as a communication order in [facility].

## 2017-12-19 NOTE — PROGRESS NOTES
"LIMB PRESERVATION SERVICE     52-year-old female transferred from [place] for an infected right foot and chest pain. She has a history of poorly controlled type 2 diabetes, Charcot foot, morbid obesity, and left transmetatarsal amputation.    POD # 5 s/p R BKA by Dr. Delgado    /86   Pulse 79   Temp 36.8 °C (98.3 °F)   Resp 18   Ht 1.702 m (5' 7.01\")   Wt 124.2 kg (273 lb 13 oz)   SpO2 94%   Breastfeeding? No   BMI 42.87 kg/m²    Blood glucose 175  Pain controlled. C/o phantom pains  Knee elevated on pillows, straight    R BKA  drsg removed. incision approximated with staples. No erythema or edema noted.   Nursing to change dressing.     [facility] has seen pt. Pt states they will continue to see her after Sh[facility].   L TMA, skin intact. Pt states [facility] will be fitting her for shoe and insert.       ID managing abx    DM:  a1c 11.4%  DME has seen pt        PLAN  -nursing to continue daily drsg changes  Knee immobilizer RLE  [facility] to f/u with pt  abx per ID    D/C plan: SNF referrals  Pt to f/u with Dr. Delgado in about 3 wks from surgery day      "

## 2017-12-19 NOTE — CARE PLAN
Problem: Safety  Goal: Will remain free from injury  Call light within reach, pt calls for assistance at all times. Bed in low position and locked, upper bedside rails up, proper mobility signs placed, personal possessions within reach, treaded socks on. Hourly rounding in place.        Problem: Mobility  Goal: Risk for activity intolerance will decrease  Pt encouraged to get up oob this am but refused. Will try again this pm.

## 2017-12-19 NOTE — DISCHARGE PLANNING
SW left VM with [facility] to follow up on referral. Per bedside nurse, pt does not smoke.

## 2017-12-19 NOTE — PROGRESS NOTES
Assume care for patient at 0700. Pt AA/O X4. LS clear. On 2.5L NC. VSS. HRR. BS+ bm x1 this am via bedpan. On diabetic diet, fs q ac/hs. Denies N/V. Long catheter to d/d with clear yellow urine. CMS+ FELDER. NWB to R LE. refused SCDS to LLE. Rt bka stump incision with staples, drsg changed this am after Arian from [facility] has seen pt. Nystatin powder applied under breasts, panus and groin area due to redness, interdry in place also under breasts and lower abdomen. Pt on waffle overlay and encouraged to turn q2hrs. PIV to EVON saline lock. C/o severe pain to rt bka, pt on dilaudid and toradol iv given this am, and percocet tab changed from q6hrs to q4 hrs prn. Discussed hourly rounding and POC, pt agreeable. Refused bed alarm, pt educated re: fall risk and need of bed alarm, Pt educated and verbalized understanding of the education, pt call for assistance at all times. Pt reoriented to skylight and call light at bedside and within reach.

## 2017-12-19 NOTE — CARE PLAN
Problem: Discharge Barriers/Planning  Goal: Patient's continuum of care needs will be met  Outcome: PROGRESSING AS EXPECTED  Explained to patient that upon discharge to next facility of care, IV pain analgesics will not be available

## 2017-12-19 NOTE — PROGRESS NOTES
[facility] Hospitalist Progress Note    Date of Service: 2017    Chief Complaint  52 y.o. female admitted 2017 with Sepsis, Respiratory failure, Diabetic foot ulcer with osteomyelitis and Leg abscess, NSTEMI, also with COPD exaceration and pulmonary edema    Interval Problem Update   stable and overnight still complains of pain. Patient's pain is local, 6-8/10, intermittent and does not radiate to other location, sharp and with some tingling. Can be controlled by pain meds. Dressing in place.  However patient can sleep well.   patient complained the pain is not controlled reveal patient history of medication patient was on fentanyl patch at home. Restart fentanyl patch.   still complaining of pain however already have pain patch. We'll continue monitoring and pending nursing care facility. Patient is off IV antibiotics at this moment.     she is c/o of 9/10 right stump pain , sharp pain, constant, no radiation  Afebrile    Pod #5 right BKa        Consultants/Specialty  AMRITA - Danny ESTEVEZ - Duglas    Disposition  SNF vs Rehab        Review of Systems   Constitutional: Positive for malaise/fatigue. Negative for fever.   HENT: Negative for hearing loss and sore throat.    Eyes: Negative for blurred vision, photophobia and pain.   Respiratory: Negative for cough, shortness of breath and wheezing.    Cardiovascular: Negative for chest pain, palpitations, orthopnea and leg swelling.   Gastrointestinal: Negative for diarrhea, heartburn and nausea.   Genitourinary: Negative for dysuria and hematuria.   Musculoskeletal: Positive for joint pain.        Left foot pain   Neurological: Positive for weakness. Negative for dizziness.      Physical Exam  Laboratory/Imaging   Hemodynamics  Temp (24hrs), Av.5 °C (97.7 °F), Min:36.3 °C (97.4 °F), Max:36.8 °C (98.3 °F)   Temperature: 36.8 °C (98.3 °F)  Pulse  Av.6  Min: 73  Max: 156    Blood Pressure: 134/86      Respiratory      Respiration: 18,  Pulse Oximetry: 94 %     Work Of Breathing / Effort: Mild  RUL Breath Sounds: Clear, KWAME Breath Sounds: Clear    Fluids    Intake/Output Summary (Last 24 hours) at 12/19/17 1042  Last data filed at 12/19/17 0320   Gross per 24 hour   Intake              600 ml   Output             2900 ml   Net            -2300 ml       Nutrition  Orders Placed This Encounter   Procedures   • DIET ORDER     Standing Status:   Standing     Number of Occurrences:   1     Order Specific Question:   Diet:     Answer:   Diabetic [3]     Physical Exam   Constitutional: She is oriented to person, place, and time. She appears well-developed and well-nourished.   Morbidly obese   HENT:   Head: Normocephalic and atraumatic.   Eyes: Conjunctivae are normal. Pupils are equal, round, and reactive to light.   Neck: No tracheal deviation present. No thyromegaly present.   Cardiovascular: Normal rate and regular rhythm.  Exam reveals no gallop and no friction rub.    No murmur heard.  Pulmonary/Chest: Effort normal. She has no wheezes. She has no rales.   Abdominal: Soft. Bowel sounds are normal. She exhibits no distension and no mass. There is no tenderness. There is no guarding.   Musculoskeletal: She exhibits no edema.   Right bka     Lymphadenopathy:     She has no cervical adenopathy.   Neurological: She is alert and oriented to person, place, and time.   Skin: Skin is warm and dry.   Nursing note and vitals reviewed.      Recent Labs      12/17/17   0545  12/18/17   0436  12/19/17   0233   WBC  9.6  10.1  12.0*   RBC  4.17*  4.28  4.25   HEMOGLOBIN  11.6*  12.1  11.7*   HEMATOCRIT  37.2  38.0  37.5   MCV  89.2  88.8  88.2   MCH  27.8  28.3  27.5   MCHC  31.2*  31.8*  31.2*   RDW  49.9  49.3  49.1   PLATELETCT  248  269  286   MPV  10.8  10.7  10.4     Recent Labs      12/17/17   0545  12/18/17   0437  12/19/17   0233   SODIUM  135  133*  134*   POTASSIUM  3.8  3.9  3.9   CHLORIDE  100  97  97   CO2  29  30  31   GLUCOSE  219*  240*  230*    BUN  11  10  11   CREATININE  0.44*  0.41*  0.48*   CALCIUM  8.2*  8.6  8.7                      Assessment/Plan     * Severe sepsis due to infected right diabetic foot (CMS-HCC)- (present on admission)   Assessment & Plan      resolved        Right diabetic foot infection, cellulits, and abscess (CMS-HCC)- (present on admission)   Assessment & Plan    s/p Right transtibial amputation  DC Zosyn  Po pain meds, avoid narcotics, cont IV Toradol, N    neurontin increased to 600 mg tid  Fentanyl patch        Acute respiratory failure with hypoxia due to sepsis, fluid overload, AECOPD (CMS-HCC)- (present on admission)   Assessment & Plan    Keep O2 sats above 90%        Poorly controlled type 2 diabetes mellitus with neuropathy (CMS-HCC)- (present on admission)   Assessment & Plan    Lantus to 60 u BID, continue SSI        Pulmonary edema   Assessment & Plan    off IV Lasix        Hypokalemia   Assessment & Plan    resolved        COPD with acute exacerbation (CMS-HCC)   Assessment & Plan    suspected  Symbicort, Spiriva, RT protocol, cont  Prednisone tapering        Hyponatremia- (present on admission)   Assessment & Plan    mild        NSTEMI (non-ST elevated myocardial infarction) (CMS-HCC)- (present on admission)   Assessment & Plan    ASA, Lipitor, Metoprolol            Hypertension- (present on admission)   Assessment & Plan    Metoprolol         Morbid obesity with BMI of 40.0-44.9, adult (CMS-HCC)- (present on admission)   Assessment & Plan    Body mass index is 43.7 kg/m²..  referral for outpatient weight management.          Tobacco abuse- (present on admission)   Assessment & Plan    - Smoking cessation education provided  - Nicotine patch            Reviewed items::  EKG reviewed, Radiology images reviewed, Labs reviewed and Medications reviewed  Long catheter::  Urinary Tract Retention or Urinary Tract Obstruction  DVT prophylaxis pharmacological::  Heparin  Ulcer Prophylaxis::  No  Antibiotics:  Treating  active infection/contamination beyond 24 hours perioperative coverage     For complexity-based billing, please refer to the history, exam, and decison making above. In addition, I spent >35 minutes caring for the patient today. More than 50% of the time was spent counseling and coordinating care.    I have discussed with RN and CM and SW and other consultants about patient's plan.     Spent 3 minutes on tobacco cessation counseling including nicotine patches, gum, and dangers of smoking.    The pt indicated that will quit.     [id number] (smoking and tobacco cessation counseling visit; 3-10 min)     Remove melissa

## 2017-12-20 VITALS
RESPIRATION RATE: 18 BRPM | HEART RATE: 85 BPM | OXYGEN SATURATION: 95 % | HEIGHT: 67 IN | WEIGHT: 273.81 LBS | SYSTOLIC BLOOD PRESSURE: 130 MMHG | DIASTOLIC BLOOD PRESSURE: 89 MMHG | BODY MASS INDEX: 42.98 KG/M2 | TEMPERATURE: 97.5 F

## 2017-12-20 PROBLEM — I21.4 NSTEMI (NON-ST ELEVATED MYOCARDIAL INFARCTION) (HCC): Status: RESOLVED | Noted: 2017-12-09 | Resolved: 2017-12-20

## 2017-12-20 PROBLEM — E87.6 HYPOKALEMIA: Status: RESOLVED | Noted: 2017-12-11 | Resolved: 2017-12-20

## 2017-12-20 PROBLEM — R65.20 SEVERE SEPSIS (HCC): Status: RESOLVED | Noted: 2017-12-10 | Resolved: 2017-12-20

## 2017-12-20 PROBLEM — A41.9 SEVERE SEPSIS (HCC): Status: RESOLVED | Noted: 2017-12-10 | Resolved: 2017-12-20

## 2017-12-20 PROBLEM — J96.01 ACUTE RESPIRATORY FAILURE WITH HYPOXIA (HCC): Status: RESOLVED | Noted: 2017-12-10 | Resolved: 2017-12-20

## 2017-12-20 PROBLEM — J81.1 PULMONARY EDEMA: Status: RESOLVED | Noted: 2017-12-12 | Resolved: 2017-12-20

## 2017-12-20 PROBLEM — E87.1 HYPONATREMIA: Status: RESOLVED | Noted: 2017-12-10 | Resolved: 2017-12-20

## 2017-12-20 LAB
ANION GAP SERPL CALC-SCNC: 6 MMOL/L (ref 0–11.9)
BASOPHILS # BLD AUTO: 0.4 % (ref 0–1.8)
BASOPHILS # BLD: 0.06 K/UL (ref 0–0.12)
BUN SERPL-MCNC: 15 MG/DL (ref 8–22)
CALCIUM SERPL-MCNC: 8.4 MG/DL (ref 8.5–10.5)
CHLORIDE SERPL-SCNC: 98 MMOL/L (ref 96–112)
CO2 SERPL-SCNC: 30 MMOL/L (ref 20–33)
CREAT SERPL-MCNC: 0.55 MG/DL (ref 0.5–1.4)
EOSINOPHIL # BLD AUTO: 0.11 K/UL (ref 0–0.51)
EOSINOPHIL NFR BLD: 0.8 % (ref 0–6.9)
ERYTHROCYTE [DISTWIDTH] IN BLOOD BY AUTOMATED COUNT: 50.6 FL (ref 35.9–50)
GFR SERPL CREATININE-BSD FRML MDRD: >60 ML/MIN/1.73 M 2
GLUCOSE BLD-MCNC: 159 MG/DL (ref 65–99)
GLUCOSE BLD-MCNC: 182 MG/DL (ref 65–99)
GLUCOSE BLD-MCNC: 186 MG/DL (ref 65–99)
GLUCOSE BLD-MCNC: 229 MG/DL (ref 65–99)
GLUCOSE SERPL-MCNC: 165 MG/DL (ref 65–99)
HCT VFR BLD AUTO: 39.1 % (ref 37–47)
HGB BLD-MCNC: 12.1 G/DL (ref 12–16)
IMM GRANULOCYTES # BLD AUTO: 0.23 K/UL (ref 0–0.11)
IMM GRANULOCYTES NFR BLD AUTO: 1.7 % (ref 0–0.9)
LYMPHOCYTES # BLD AUTO: 2.95 K/UL (ref 1–4.8)
LYMPHOCYTES NFR BLD: 22.1 % (ref 22–41)
MCH RBC QN AUTO: 27.6 PG (ref 27–33)
MCHC RBC AUTO-ENTMCNC: 30.9 G/DL (ref 33.6–35)
MCV RBC AUTO: 89.3 FL (ref 81.4–97.8)
MONOCYTES # BLD AUTO: 0.96 K/UL (ref 0–0.85)
MONOCYTES NFR BLD AUTO: 7.2 % (ref 0–13.4)
NEUTROPHILS # BLD AUTO: 9.04 K/UL (ref 2–7.15)
NEUTROPHILS NFR BLD: 67.8 % (ref 44–72)
NRBC # BLD AUTO: 0 K/UL
NRBC BLD-RTO: 0 /100 WBC
PLATELET # BLD AUTO: 280 K/UL (ref 164–446)
PMV BLD AUTO: 10.7 FL (ref 9–12.9)
POTASSIUM SERPL-SCNC: 4.7 MMOL/L (ref 3.6–5.5)
RBC # BLD AUTO: 4.38 M/UL (ref 4.2–5.4)
SODIUM SERPL-SCNC: 134 MMOL/L (ref 135–145)
WBC # BLD AUTO: 13.4 K/UL (ref 4.8–10.8)

## 2017-12-20 PROCEDURE — 700102 HCHG RX REV CODE 250 W/ 637 OVERRIDE(OP): Performed by: HOSPITALIST

## 2017-12-20 PROCEDURE — 700111 HCHG RX REV CODE 636 W/ 250 OVERRIDE (IP): Performed by: FAMILY MEDICINE

## 2017-12-20 PROCEDURE — 80048 BASIC METABOLIC PNL TOTAL CA: CPT

## 2017-12-20 PROCEDURE — 85025 COMPLETE CBC W/AUTO DIFF WBC: CPT

## 2017-12-20 PROCEDURE — A9270 NON-COVERED ITEM OR SERVICE: HCPCS | Performed by: FAMILY MEDICINE

## 2017-12-20 PROCEDURE — 700102 HCHG RX REV CODE 250 W/ 637 OVERRIDE(OP): Performed by: INTERNAL MEDICINE

## 2017-12-20 PROCEDURE — 97110 THERAPEUTIC EXERCISES: CPT

## 2017-12-20 PROCEDURE — A9270 NON-COVERED ITEM OR SERVICE: HCPCS | Performed by: HOSPITALIST

## 2017-12-20 PROCEDURE — 700102 HCHG RX REV CODE 250 W/ 637 OVERRIDE(OP): Performed by: FAMILY MEDICINE

## 2017-12-20 PROCEDURE — 700111 HCHG RX REV CODE 636 W/ 250 OVERRIDE (IP): Performed by: INTERNAL MEDICINE

## 2017-12-20 PROCEDURE — 99239 HOSP IP/OBS DSCHRG MGMT >30: CPT | Performed by: HOSPITALIST

## 2017-12-20 PROCEDURE — 36415 COLL VENOUS BLD VENIPUNCTURE: CPT

## 2017-12-20 PROCEDURE — 97530 THERAPEUTIC ACTIVITIES: CPT

## 2017-12-20 PROCEDURE — A9270 NON-COVERED ITEM OR SERVICE: HCPCS | Performed by: INTERNAL MEDICINE

## 2017-12-20 PROCEDURE — 82962 GLUCOSE BLOOD TEST: CPT | Mod: 91

## 2017-12-20 RX ORDER — ASPIRIN 325 MG
325 TABLET ORAL DAILY
Qty: 100 TAB
Start: 2017-12-21 | End: 2020-09-28

## 2017-12-20 RX ORDER — ALPRAZOLAM 0.25 MG/1
0.25 TABLET ORAL 4 TIMES DAILY PRN
Qty: 30 TAB | Refills: 0
Start: 2017-12-20 | End: 2018-01-09

## 2017-12-20 RX ORDER — ACETAMINOPHEN 325 MG/1
650 TABLET ORAL EVERY 4 HOURS PRN
Qty: 30 TAB | Refills: 0 | Status: SHIPPED | OUTPATIENT
Start: 2017-12-20 | End: 2020-09-28

## 2017-12-20 RX ORDER — BUDESONIDE AND FORMOTEROL FUMARATE DIHYDRATE 160; 4.5 UG/1; UG/1
2 AEROSOL RESPIRATORY (INHALATION) 2 TIMES DAILY
Start: 2017-12-20 | End: 2020-09-28

## 2017-12-20 RX ORDER — ATORVASTATIN CALCIUM 40 MG/1
40 TABLET, FILM COATED ORAL
Qty: 30 TAB
Start: 2017-12-20 | End: 2020-09-28

## 2017-12-20 RX ORDER — TIOTROPIUM BROMIDE 18 UG/1
18 CAPSULE ORAL; RESPIRATORY (INHALATION) DAILY
Qty: 30 CAP | Refills: 3
Start: 2017-12-21 | End: 2020-09-28

## 2017-12-20 RX ORDER — HEPARIN SODIUM 5000 [USP'U]/ML
5000 INJECTION, SOLUTION INTRAVENOUS; SUBCUTANEOUS EVERY 8 HOURS
Refills: 0
Start: 2017-12-20 | End: 2020-09-28

## 2017-12-20 RX ADMIN — FENTANYL 1 PATCH: 12 PATCH, EXTENDED RELEASE TRANSDERMAL at 09:15

## 2017-12-20 RX ADMIN — ASPIRIN 325 MG: 325 TABLET, FILM COATED ORAL at 08:52

## 2017-12-20 RX ADMIN — NYSTATIN 500000 UNITS: 100000 SUSPENSION ORAL at 08:52

## 2017-12-20 RX ADMIN — HEPARIN SODIUM 5000 UNITS: 5000 INJECTION, SOLUTION INTRAVENOUS; SUBCUTANEOUS at 05:47

## 2017-12-20 RX ADMIN — OXYCODONE HYDROCHLORIDE AND ACETAMINOPHEN 1 TABLET: 10; 325 TABLET ORAL at 17:06

## 2017-12-20 RX ADMIN — NYSTATIN 500000 UNITS: 100000 SUSPENSION ORAL at 14:11

## 2017-12-20 RX ADMIN — NYSTATIN 1500000 UNITS: 100000 POWDER TOPICAL at 08:52

## 2017-12-20 RX ADMIN — PREDNISONE 10 MG: 10 TABLET ORAL at 08:51

## 2017-12-20 RX ADMIN — HYDROMORPHONE HYDROCHLORIDE 0.5 MG: 2 INJECTION INTRAMUSCULAR; INTRAVENOUS; SUBCUTANEOUS at 09:15

## 2017-12-20 RX ADMIN — BUDESONIDE AND FORMOTEROL FUMARATE DIHYDRATE 2 PUFF: 160; 4.5 AEROSOL RESPIRATORY (INHALATION) at 08:53

## 2017-12-20 RX ADMIN — TIOTROPIUM BROMIDE 1 CAPSULE: 18 CAPSULE ORAL; RESPIRATORY (INHALATION) at 08:52

## 2017-12-20 RX ADMIN — NYSTATIN 500000 UNITS: 100000 SUSPENSION ORAL at 17:03

## 2017-12-20 RX ADMIN — HYDROMORPHONE HYDROCHLORIDE 0.5 MG: 2 INJECTION INTRAMUSCULAR; INTRAVENOUS; SUBCUTANEOUS at 14:02

## 2017-12-20 RX ADMIN — INSULIN HUMAN 4 UNITS: 100 INJECTION, SOLUTION PARENTERAL at 17:00

## 2017-12-20 RX ADMIN — POTASSIUM CHLORIDE 20 MEQ: 1500 TABLET, EXTENDED RELEASE ORAL at 08:51

## 2017-12-20 RX ADMIN — OXYCODONE HYDROCHLORIDE AND ACETAMINOPHEN 1 TABLET: 10; 325 TABLET ORAL at 05:48

## 2017-12-20 RX ADMIN — INSULIN GLARGINE 64 UNITS: 100 INJECTION, SOLUTION SUBCUTANEOUS at 09:04

## 2017-12-20 RX ADMIN — GABAPENTIN 600 MG: 300 CAPSULE ORAL at 14:03

## 2017-12-20 RX ADMIN — STANDARDIZED SENNA CONCENTRATE AND DOCUSATE SODIUM 2 TABLET: 8.6; 5 TABLET, FILM COATED ORAL at 08:51

## 2017-12-20 RX ADMIN — METOPROLOL TARTRATE 25 MG: 25 TABLET, FILM COATED ORAL at 08:51

## 2017-12-20 RX ADMIN — OXYCODONE HYDROCHLORIDE AND ACETAMINOPHEN 1 TABLET: 10; 325 TABLET ORAL at 11:41

## 2017-12-20 RX ADMIN — INSULIN HUMAN 3 UNITS: 100 INJECTION, SOLUTION PARENTERAL at 11:43

## 2017-12-20 RX ADMIN — GABAPENTIN 600 MG: 300 CAPSULE ORAL at 08:51

## 2017-12-20 RX ADMIN — KETOROLAC TROMETHAMINE 30 MG: 30 INJECTION, SOLUTION INTRAMUSCULAR at 05:48

## 2017-12-20 RX ADMIN — HEPARIN SODIUM 5000 UNITS: 5000 INJECTION, SOLUTION INTRAVENOUS; SUBCUTANEOUS at 14:03

## 2017-12-20 RX ADMIN — INSULIN HUMAN 3 UNITS: 100 INJECTION, SOLUTION PARENTERAL at 06:04

## 2017-12-20 ASSESSMENT — PAIN SCALES - GENERAL
PAINLEVEL_OUTOF10: 8

## 2017-12-20 ASSESSMENT — COGNITIVE AND FUNCTIONAL STATUS - GENERAL
TURNING FROM BACK TO SIDE WHILE IN FLAT BAD: A LITTLE
SUGGESTED CMS G CODE MODIFIER MOBILITY: CL
MOVING TO AND FROM BED TO CHAIR: A LOT
CLIMB 3 TO 5 STEPS WITH RAILING: TOTAL
MOVING FROM LYING ON BACK TO SITTING ON SIDE OF FLAT BED: UNABLE
WALKING IN HOSPITAL ROOM: A LOT
STANDING UP FROM CHAIR USING ARMS: A LOT
MOBILITY SCORE: 11

## 2017-12-20 ASSESSMENT — GAIT ASSESSMENTS: GAIT LEVEL OF ASSIST: UNABLE TO PARTICIPATE

## 2017-12-20 ASSESSMENT — LIFESTYLE VARIABLES: EVER_SMOKED: YES

## 2017-12-20 NOTE — THERAPY
"Physical Therapy Treatment completed.   Bed Mobility:  Supine to Sit: Contact Guard Assist  Transfers: Sit to Stand: Minimal Assist  Gait: Level Of Assist: Unable to Participate with Front-Wheel Walker       Plan of Care: Will benefit from Physical Therapy 3 times per week  Discharge Recommendations: Equipment: Will Continue to Assess for Equipment Needs.     See \"Rehab Therapy-Acute\" Patient Summary Report for complete documentation.     Pt is presenting w/ improved functional mobility from initial eval. With the HOB elevated, pt is able to perform bed mobility at CGA and maintain seated balance w/ large dynamic disturbances. Pt also able to perform HEP in supine and sitting correctly to assist w/ possible prosthetic on the R. Pt able to stand at a Alejandro level w/ FWW only needing help initially to find balance. Pt able to take side steps up the bed folloing sequencing w/ FWW for pre gait training. As per initial eval, pt will benefit from post acute therapy.  "

## 2017-12-20 NOTE — DISCHARGE INSTRUCTIONS
Sepsis, Adult  Sepsis is a serious infection of your blood or tissues that affects your whole body. The infection that causes sepsis may be bacterial, viral, fungal, or parasitic. Sepsis may be life threatening. Sepsis can cause your blood pressure to drop. This may result in shock. Shock causes your central nervous system and your organs to stop working correctly.   RISK FACTORS  Sepsis can happen in anyone, but it is more likely to happen in people who have weakened immune systems.  SIGNS AND SYMPTOMS   Symptoms of sepsis can include:  · Fever or low body temperature (hypothermia).  · Rapid breathing (hyperventilation).  · Chills.  · Rapid heartbeat (tachycardia).  · Confusion or light-headedness.  · Trouble breathing.  · Urinating much less than usual.  · Cool, clammy skin or red, flushed skin.  · Other problems with the heart, kidneys, or brain.  DIAGNOSIS   Your health care provider will likely do tests to look for an infection, to see if the infection has spread to your blood, and to see how serious your condition is. Tests can include:  · Blood tests, including cultures of your blood.  · Cultures of other fluids from your body, such as:  ¨ Urine.  ¨ Pus from wounds.  ¨ Mucus coughed up from your lungs.  · Urine tests other than cultures.  · X-ray exams or other imaging tests.  TREATMENT   Treatment will begin with elimination of the source of infection. If your sepsis is likely caused by a bacterial or fungal infection, you will be given antibiotic or antifungal medicines.  You may also receive:  · Oxygen.  · Fluids through an IV tube.  · Medicines to increase your blood pressure.  · A machine to clean your blood (dialysis) if your kidneys fail.  · A machine to help you breathe if your lungs fail.  SEEK IMMEDIATE MEDICAL CARE IF:  You get an infection or develop any of the signs and symptoms of sepsis after surgery or a hospitalization.     This information is not intended to replace advice given to you by  your health care provider. Make sure you discuss any questions you have with your health care provider.     Document Released: 09/15/2004 Document Revised: 05/03/2016 Document Reviewed: 08/25/2014  [employer] Interactive Patient Education ©2016 [employer]      Type 2 Diabetes Mellitus, Adult  Type 2 diabetes mellitus is a long-term (chronic) disease. In type 2 diabetes:  · The pancreas does not make enough of a hormone called insulin.  · The cells in the body do not respond as well to the insulin that is made.  · Both of the above can happen.  Normally, insulin moves sugars from food into tissue cells. This gives you energy. If you have type 2 diabetes, sugars cannot be moved into tissue cells. This causes high blood sugar (hyperglycemia).   Your doctors will set personal treatment goals for you based on your age, your medicines, how long you have had diabetes, and any other medical conditions you have. Generally, the goal of treatment is to maintain the following blood glucose levels:  · Before meals (preprandial):  mg/dL.  · After meals (postprandial): below 180 mg/dL.  · A1c: less than 6.5-7%.  HOME CARE  · Have your hemoglobin A1c level checked twice a year. The level shows if your diabetes is under control or out of control.  · Test your blood sugar level every day as told by your doctor.  · Check your ketone levels by testing your pee (urine) when you are sick and as told.  · Take your diabetes or insulin medicine as told by your doctor.  ¨ Never run out of insulin.  ¨ Adjust how much insulin you give yourself based on how many carbs (carbohydrates) you eat. Carbs are in many foods, such as fruits, vegetables, whole grains, and dairy products.  · Have a healthy snack between every healthy meal. Have 3 meals and 3 snacks a day.  · Lose weight if you are overweight.  · Carry a medical alert card or wear your medical alert jewelry.  · Carry a 15-gram carb snack with you at all times. Examples  include:  ¨ Glucose pills, 3 or 4.  ¨ Glucose gel, 15-gram tube.  ¨ Raisins, 2 tablespoons (24 grams).  ¨ Jelly beans, 6.  ¨ Animal crackers, 8.  ¨ Regular (not diet) pop, 4 ounces (120 milliliters).  ¨ Gummy treats, 9.  · Notice low blood sugar (hypoglycemia) symptoms, such as:  ¨ Shaking (tremors).  ¨ Trouble thinking clearly.  ¨ Sweating.  ¨ Faster heart rate.  ¨ Headache.  ¨ Dry mouth.  ¨ Hunger.  ¨ Crabbiness (irritability).  ¨ Being worried or tense (anxious).  ¨ Restless sleep.  ¨ A change in speech or coordination.  ¨ Confusion.  · Treat low blood sugar right away. If you are alert and can swallow, follow the 15:15 rule:  ¨ Take 15-20 grams of a rapid-acting glucose or carb. This includes glucose gel, glucose pills, or 4 ounces (120 milliliters) of fruit juice, regular pop, or low-fat milk.  ¨ Check your blood sugar level 15 minutes after taking the glucose.  ¨ Take 15-20 grams more of glucose if the repeat blood sugar level is still 70 mg/dL (milligrams/deciliter) or below.  ¨ Eat a meal or snack within 1 hour of the blood sugar levels going back to normal.  · Notice early symptoms of high blood sugar, such as:  ¨ Being really thirsty or drinking a lot (polydipsia).  ¨ Peeing a lot (polyuria).  · Do at least 150 minutes of physical activity a week or as told.  ¨ Split the 150 minutes of activity up during the week. Do not do 150 minutes of activity in one day.  ¨ Perform exercises, such as weight lifting, at least 2 times a week or as told.  ¨ Spend no more than 90 minutes at one time inactive.  · Adjust your insulin or food intake as needed if you start a new exercise or sport.  · Follow your sick-day plan when you are not able to eat or drink as usual.  · Do not smoke, chew tobacco, or use electronic cigarettes.  · Women who are not pregnant should drink no more than 1 drink a day. Men should drink no more than 2 drinks a day.  ¨ Only drink alcohol with food.  ¨ Ask your doctor if alcohol is safe for  you.  ¨ Tell your doctor if you drink alcohol several times during the week.  · See your doctor regularly.  · Schedule an eye exam soon after you are told you have diabetes. Schedule exams once every year.  · Check your skin and feet every day. Check for cuts, bruises, redness, nail problems, bleeding, blisters, or sores. A doctor should do a foot exam once a year.  · Brush your teeth and gums twice a day. Floss once a day. Visit your dentist regularly.  · Share your diabetes plan with your workplace or school.  · Keep your shots that fight diseases (vaccines) up to date.  ¨ Get a flu (influenza) shot every year.  ¨ Get a pneumonia shot. If you are 65 years of age or older and you have never gotten a pneumonia shot, you might need to get two shots.  ¨ Ask your doctor which other shots you should get.  · Learn how to deal with stress.  · Get diabetes education and support as needed.  · Ask your doctor for special help if:  ¨ You need help to maintain or improve how you do things on your own.  ¨ You need help to maintain or improve the quality of your life.  ¨ You have foot or hand problems.  ¨ You have trouble cleaning yourself, dressing, eating, or doing physical activity.  GET HELP IF:  · You are unable to eat or drink for more than 6 hours.  · You feel sick to your stomach (nauseous) or throw up (vomit) for more than 6 hours.  · Your blood sugar level is over 240 mg/dL.  · There is a change in mental status.  · You get another serious illness.  · You have watery poop (diarrhea) for more than 6 hours.  · You have been sick or have had a fever for 2 or more days and are not getting better.  · You have pain when you are active.  GET HELP RIGHT AWAY IF:  · You have trouble breathing.  · Your ketone levels are higher than your doctor says they should be.  MAKE SURE YOU:  · Understand these instructions.  · Will watch your condition.  · Will get help right away if you are not doing well or get worse.     This  information is not intended to replace advice given to you by your health care provider. Make sure you discuss any questions you have with your health care provider.     Document Released: 09/26/2009 Document Revised: 05/03/2016 Document Reviewed: 07/19/2013  [employer] Interactive Patient Education ©2016 [employer]      Hyperglycemia  High blood sugar (hyperglycemia) means that the level of sugar in your blood is higher than it should be. Signs of high blood sugar include:  · Feeling thirsty.  · Frequent peeing (urinating).  · Feeling tired or sleepy.  · Dry mouth.  · Vision changes.  · Feeling weak.  · Feeling hungry but losing weight.  · Numbness and tingling in your hands or feet.  · Headache.  When you ignore these signs, your blood sugar may keep going up. These problems may get worse, and other problems may begin.  HOME CARE  · Check your blood sugars as told by your doctor. Write down the numbers with the date and time.  · Take the right amount of insulin or diabetes pills at the right time. Write down the dose with date and time.  · Refill your insulin or diabetes pills before running out.  · Watch what you eat. Follow your meal plan.  · Drink liquids without sugar, such as water. Check with your doctor if you have kidney or heart disease.  · Follow your doctor's orders for exercise. Exercise at the same time of day.  · Keep your doctor's appointments.  GET HELP RIGHT AWAY IF:   · You have trouble thinking or are confused.  · You have fast breathing with fruity smelling breath.  · You pass out (faint).  · You have 2 to 3 days of high blood sugars and you do not know why.  · You have chest pain.  · You are feeling sick to your stomach (nauseous) or throwing up (vomiting).  · You have sudden vision changes.  MAKE SURE YOU:   · Understand these instructions.  · Will watch your condition.  · Will get help right away if you are not doing well or get worse.     This information is not intended to replace advice  given to you by your health care provider. Make sure you discuss any questions you have with your health care provider.     Document Released: 10/15/2010 Document Revised: 01/08/2016 Document Reviewed: 10/15/2010  [employer] Interactive Patient Education ©2016 [employer]        Living With an Amputation  The most common causes of amputation from the hip down include:  · Diseases that:  ¨ Reduce the blood flow to an area of your body.  ¨ Decrease your body's ability to fight infection.  · Traumatic injuries that cause significant damage to body tissues.  · Birth defects.  · Cancerous lumps (malignant tumors).  Amputation above the hip is usually the result of trauma or birth defect. Disease is a less common cause.  Living with an amputation can be challenging, but you can still live a long, productive life.  WHAT ARE THE COMMON CHALLENGES OF LIVING WITH AN AMPUTATION?  The most common challenges are mobility and self-care. With some new habits, though, it is often possible to do all of the activities you used to do.  You may be able to use a device that substitutes for your limb (prosthesis). The prosthesis helps you adapt more quickly to these challenges. Your health care provider can help select a prosthesis to meet your needs. A person who helps you choose and fits you with a prosthesis (prosthetist) may also help.  A rehabilitation program can also help you gain mobility and self-reliance. Your rehabilitation team may include:  · Physicians.  · Physical and occupational therapists.  · Prosthetists.  · Nurses.  · Social workers.  · Psychologists.  · Dietitians.  Your rehabilitation team will help you with all aspects of recovery and returning to work, home, sports, and your community. You will learn to:  · Get around safely.  · Adjust your home.  · Exercise.  · Use a prosthetic.  · Work through emotional challenges.  · Connect with other people who have gone through the same experience.  Additional challenges may  include:  · Grieving period.  · Body image issues.  · Lifestyle issues, such as sex.  · Maintaining a healthy weight.  These issues are normal. Discuss these with your rehabilitation team.  WHEN CAN I RETURN TO MY REGULAR ACTIVITIES?   Returning to your normal activities is part of healing. Changes can often be made to equipment that allow you to return to a sport or hobby. Some companies design special equipment for this. Discuss all of your leisure interests with your health care provider and prosthetist.  WHEN CAN I RETURN TO WORK?  When you are ready to return to work, your therapists can perform job site evaluations and make recommendations to help you perform your job. You may not be able to return to your same job. Your local Office of Vocational Rehabilitation can assist you in job retraining.  FOR MORE INFORMATION:  Visit these online resources. You can find tips on everything from getting dressed and using bathrooms to driving and travel considerations. These resources can also connect you to a network of emotional support, activities, and innovations. You may also search the Internet to find a local support group.  · Amputee Coalition: http://www.amputee-coalition.org/ensuring-fall-safety/http://www.amputee-coalition.org/ensuring-fall-safety/  · Amputee Support Groups: http://amputee.supportgroups.com/http://amputee.supportgroups.com  · Daily Strength: http://www.dailystrength.org/c/Amputees/support-grouphttp://www.dailystrength.org/c/Amputees/support-group  · National Amputee Foundation: http://www.nationalamputation.org/http://www.nationalamputation.org  · National Center on Health, Physical Activity and Disability: http://www.nchpad.org/http://www.nchpad.org  · Disabled Sports USA: http://www.disabledsportsusa.orghttp://www.disabledsportsusa.org  · American Academy of Orthotists and Prosthetists: http://www.oandp.org/http://www.oandp.org     This information is not intended to replace advice given to  you by your health care provider. Make sure you discuss any questions you have with your health care provider.     Document Released: 09/09/2003 Document Revised: 01/08/2016 Document Reviewed: 05/05/2015  [employer] Interactive Patient Education ©2016 [employer]      Non-ST Segment Elevation Heart Attack  A heart attack (myocardial infarction) happens when some of the heart muscle is injured or dies because it does not get enough oxygen. A non-ST segment elevation heart attack is a type of heart attack. It happens when the body does not get enough oxygen because an artery carrying blood to the heart muscles (coronary artery) becomes partly or temporarily blocked. This type of heart attack is usually less severe than the type of heart attack in which a coronary artery becomes completely blocked.  CAUSES  The most common cause of this condition is a blocked coronary artery. A coronary artery can become blocked from a gradual buildup of cholesterol, fat, and plaque. A blood clot can form over the plaque and block blood flow.  RISK FACTORS  This condition is more likely to develop in:  · Smokers.  · Males.  · Older adults.  · Overweight and obese adults.  · People with high blood pressure (hypertension), high cholesterol, or diabetes.  · People with a family history of heart disease.  · People who do not get enough exercise.  · People who are under a lot of stress.  · People who drink too much alcohol.  · People who use illegal street drugs that increase the heart rate, such as cocaine and methamphetamines.  SYMPTOMS  Symptoms of this condition include:  · Chest pain or a feeling of pressure in the chest. It may feel like something is crushing or squeezing the chest.  · Discomfort in the upper back or in the area between the shoulder blades.  · Upper back pain.  · Tingling in the hands and arms.  · Shortness of breath.  · Heartburn or indigestion.  · Sudden cold sweats.  · Unexplained sweating.  · Sudden  lightheadedness.  · Unexplained feelings of nervousness or anxiety.  · Feeling of tiredness, or not feeling well.  DIAGNOSIS  This condition is diagnosed based on a person's signs and symptoms and a physical exam. You may also have tests done, including:  · Blood tests.  · A chest X-ray.  · An test to measure the electrical activity of the heart (electrocardiogram).  · A test that uses sound waves to produce a picture of the heart (echocardiogram).  · A test to look at the heart arteries (coronary angiogram).  If you are still having chest pain after 12-24 hours, or if your health care providers think your heart is at risk, you may have a procedure called cardiac catheterization. In this procedure, a long, thin tube is inserted into an artery in your groin and moved up to the arteries in your heart. This procedure helps your health care provider figure out the source of the problem.   TREATMENT  This condition may be treated with:  · Bed rest in the hospital.  · Medicines to relieve chest pain.  · Medicines to protect the heart.  · If you have a blockage, a procedure in which the artery is opened (angioplasty) and a stent is placed to keep the artery open.  After initial treatment you may need to take medicine to:  · Keep your blood from clotting too easily.  · Control your blood pressure.  · Lower your cholesterol.  · Control abnormal heart rhythms (arrhythmias).  HOME CARE INSTRUCTIONS  · Take medicines only as directed by your health care provider.  · Do not take the following medicines unless your health care provider approves:  ¨ Nonsteroidal anti-inflammatory drugs (NSAIDs), such as ibuprofen, naproxen, or celecoxib.  ¨ Vitamin supplements that contain vitamin A, vitamin E, or both.  ¨ Hormone replacement therapy that contains estrogen with or without progestin.  · Make lifestyle changes as directed by your health care provider. These may include:  ¨ Using no tobacco products, including cigarettes, chewing  tobacco, and electronic cigarettes. If you are struggling to quit, ask your health care provider for help.  ¨ Exercising as directed by your health care provider. Ask for a list of activities that are safe for you.  ¨ Eating a heart-healthy diet. Work with a registered dietitian to learn healthy eating options.  ¨ Maintaining a healthy weight.  ¨ Managing other medical conditions, like diabetes.  ¨ Reducing stress.  ¨ Limiting how much alcohol you drink as directed by your health care provider.  SEEK IMMEDIATE MEDICAL CARE IF:  · You have any symptoms of this condition.     This information is not intended to replace advice given to you by your health care provider. Make sure you discuss any questions you have with your health care provider.     Document Released: 07/17/2006 Document Revised: 03/11/2013 Document Reviewed: 11/25/2015  [employer] Interactive Patient Education ©2016 [employer].        Discharge Instructions    Discharged to other by [facility] with escort. Discharged via wheelchair, hospital escort: Yes.  Special equipment needed: Oxygen    Be sure to schedule a follow-up appointment with your primary care doctor or any specialists as instructed.     Discharge Plan:   Diet Plan: Discussed  Activity Level: Discussed  Smoking Cessation Offered: Patient Refused  Confirmed Follow up Appointment: Patient to Call and Schedule Appointment  Confirmed Symptoms Management: Discussed  Medication Reconciliation Updated: Yes  Influenza Vaccine Indication: Not indicated: Previously immunized this influenza season and > 8 years of age    I understand that a diet low in cholesterol, fat, and sodium is recommended for good health. Unless I have been given specific instructions below for another diet, I accept this instruction as my diet prescription.   Other diet: Diabetic    · Is patient discharged on Warfarin / Coumadin?   No     · Is patient Post Blood Transfusion?  No    Depression / Suicide Risk    As you are  discharged from this [facility] facility, it is important to learn how to keep safe from harming yourself.    Recognize the warning signs:  · Abrupt changes in personality, positive or negative- including increase in energy   · Giving away possessions  · Change in eating patterns- significant weight changes-  positive or negative  · Change in sleeping patterns- unable to sleep or sleeping all the time   · Unwillingness or inability to communicate  · Depression  · Unusual sadness, discouragement and loneliness  · Talk of wanting to die  · Neglect of personal appearance   · Rebelliousness- reckless behavior  · Withdrawal from people/activities they love  · Confusion- inability to concentrate     If you or a loved one observes any of these behaviors or has concerns about self-harm, here's what you can do:  · Talk about it- your feelings and reasons for harming yourself  · Remove any means that you might use to hurt yourself (examples: pills, rope, extension cords, firearm)  · Get professional help from the community (Mental Health, Substance Abuse, psychological counseling)  · Do not be alone:Call your Safe Contact- someone whom you trust who will be there for you.  · Call your local CRISIS HOTLINE 655-2137 or 589-936-2568  · Call your local Children's Mobile Crisis Response Team [place] (237) 022-0173 or www.[employer]  · Call the toll free National Suicide Prevention Hotlines   · National Suicide Prevention Lifeline 634-446-QHFI (6743)  · National Hope Line Network 800-SUICIDE (973-0859)

## 2017-12-20 NOTE — DISCHARGE SUMMARY
"CHIEF COMPLAINT ON ADMISSION  Chief Complaint   Patient presents with   • High Blood Sugar   • Foot Ulcer       CODE STATUS  Full Code    HPI & HOSPITAL COURSE  52 y.o. female who presented 12/9/2017 with Chest pain and elevated blood sugars. Ms. Echevarria has a history of some dependent diabetes mellitus left foot amputation that was in a skilled nursing facility in [place], [place] for the past 6 months and was discharged 2 weeks ago and now is currently living with her son. Smoking she noticed that her glucometer read \"high\" so she presented to the emergency room in [place] blood sugars 647 and her troponin was 0.27. He did state that she had some chest pain this morning central nature though has now resolved. Troponin went up to 1.13 here. She remains tachycardic and still elevated blood sugars. Her WBC prior to transfer was 17,000. sHe states that her foot is the same as it always is WITH pink coloration and marked swelling. He has not had a fever. She was admitted for further workup including cardiology consultation possible orthopedic consultation for her foot.     She was seen by ortho dr stallworth ad id dr casillas. She was given iv abx until it appeared that foot would not heal on its on. She had right BKA done on 12/14/2017    She had an incidental elevated troponin (c/w NSTEMI)  for which cardiology dr guillory consulted. She had an echo which is noted below. Her medications were adjusted. She can have a non emergent nuclear stress test as  an outpatient    She needs her blood sugars to be better controlled.last hba1c was 11.4 on 12/10    Therefore, she is discharged in fair and stable condition with close outpatient follow-up.    SPECIFIC OUTPATIENT FOLLOW-UP  Ortho  Dr stallworth 1 week    DISCHARGE PROBLEM LIST  Principal Problem (Resolved):    Severe sepsis due to infected right diabetic foot (CMS-[facility]) POA: Yes  Active Problems:    Poorly controlled type 2 diabetes mellitus with neuropathy (CMS-[facility]) POA: Yes    " Right diabetic foot infection, cellulits, and abscess (CMS-HCC) POA: Yes    Hypertension POA: Yes    COPD with acute exacerbation (CMS-HCC) POA: No    Tobacco abuse POA: Yes    Morbid obesity with BMI of 40.0-44.9, adult (CMS-HCC) POA: Yes  Resolved Problems:    Acute respiratory failure with hypoxia due to sepsis, fluid overload, AECOPD (CMS-HCC) POA: Yes    NSTEMI (non-ST elevated myocardial infarction) (CMS-HCC) POA: Yes    Hyponatremia POA: Yes    Hypokalemia POA: No    Pulmonary edema POA: No      FOLLOW UP  Future Appointments  Date Time Provider Department Center   12/21/2017 8:05 AM [facility] None     [facility]  [address] [facility] [id number]          MEDICATIONS ON DISCHARGE   WaleFarzad brandti   Home Medication Instructions GUERRERO:[id number]    Printed on:12/20/17 1333   Medication Information                      acetaminophen (TYLENOL) 325 MG Tab  Take 2 Tabs by mouth every four hours as needed for Mild Pain.             alprazolam (XANAX) 0.25 MG Tab  Take 1 Tab by mouth 4 times a day as needed for Anxiety for up to 20 days.             aspirin (ASA) 325 MG Tab  Take 1 Tab by mouth every day.             atorvastatin (LIPITOR) 40 MG Tab  Take 1 Tab by mouth every bedtime.             budesonide-formoterol (SYMBICORT) 160-4.5 MCG/ACT Aerosol  Inhale 2 Puffs by mouth 2 Times a Day.             fentanyl (DURAGESIC) 12 MCG/HR PATCH 72 HR  Apply 1 Patch to skin as directed every 72 hours.             fluoxetine (PROZAC) 10 MG Cap  Take 10 mg by mouth every day.             gabapentin (NEURONTIN) 300 MG Cap  Take 600 mg by mouth 4 times a day.             gemfibrozil (LOPID) 600 MG Tab  Take 600 mg by mouth 2 times a day.             heparin 5000 UNIT/ML Solution  Inject 1 mL as instructed every 8 hours.             insulin glargine (LANTUS) 100 UNIT/ML Solution  Inject 87 Units as instructed 2 times a day.             insulin regular (HUMULIN R) 100 Unit/mL Solution  Inject 3-14  Units as instructed 4 Times a Day,Before Meals and at Bedtime.             lisinopril (PRINIVIL) 10 MG Tab  Take 2.5 mg by mouth every day.             metoprolol (LOPRESSOR) 25 MG Tab  Take 1 Tab by mouth 2 Times a Day.             nicotine (NICODERM) 14 MG/24HR PATCH 24 HR  Apply 1 Patch to skin as directed every 24 hours.             oxycodone-acetaminophen (PERCOCET-10)  MG Tab  Take 1 Tab by mouth every 6 hours as needed for Severe Pain.             tiotropium (SPIRIVA) 18 MCG Cap  Inhale 1 Cap by mouth every day.                 DIET  Orders Placed This Encounter   Procedures   • DIET ORDER     Standing Status:   Standing     Number of Occurrences:   1     Order Specific Question:   Diet:     Answer:   Diabetic [3]       ACTIVITY  As tolerated.    Wt bearing status - NWB RLE      CONSULTATIONS  Dr guillory cards    Dr stallworth ortho  Dr casillas ID    PROCEDURES  Transthoracic  Echo Report      Echocardiography Laboratory    CONCLUSIONS  No prior study is available for comparison.   Normal left ventricular systolic function.   No evidence of valvular abnormality based on Doppler evaluation.     GHAZALMILLY VAZQUEZI  Exam Date:         2017                      09:13  Exam Location:     Inpatient  Priority:          Routine    Ordering Physician:        FOZIA GUILLORY  Referring Physician:  Sonographer:               Selina Olsen, RDCS,                              RVT    Age:    52     Gender:    F  MRN:    [id number]  :    1965  BSA:    2.29   Ht (in):    67     Wt (lb):    267  Exam Type:     Complete, Contrast    Indications:     Acute MI  ICD Codes:       410.9    CPT Codes:       26973,     BP:   114    /   73     HR:  Technical Quality:       Poor    MEASUREMENTS  (Male / Female) Normal Values  2D ECHO  LV Diastolic Diameter PLAX        4.2 cm                4.2 - 5.9 / 3.9 - 5.3   cm  LV Systolic Diameter PLAX         3.1 cm                2.1 - 4.0 cm  IVS Diastolic Thickness           1.8 cm                   LVPW Diastolic Thickness          1.2 cm                  LVOT Diameter                     2.2 cm                  Estimated LV Ejection Fraction    60 %                      M-MODE  Aortic Root Diameter MM           3.3 cm                    DOPPLER  AV Peak Velocity                  1.4 m/s                 AV Peak Gradient                  7.7 mmHg                AV Mean Gradient                  5.2 mmHg                LVOT Peak Velocity                0.88 m/s                AV Area Cont Eq vti               2 cm²                   Mitral E Point Velocity           0.79 m/s                Mitral E to A Ratio               0.8                     MV Pressure Half Time             47.5 ms                 MV Area PHT                       4.6 cm²                 MV Deceleration Time              164 ms                  TV Peak E Velocity                0.57 m/s                TR Peak Velocity                  281 cm/s                PV Peak Velocity                  0.82 m/s                PV Peak Gradient                  2.7 mmHg                RVOT Peak Velocity                0.75 m/s                  * Indicates values subject to auto-interpretation  LV EF:  60    %    FINDINGS  Left Ventricle  Normal left ventricular chamber size. Normal left ventricular systolic   function. Left ventricular ejection fraction is visually estimated to   be 60%. Normal regional wall motion. Normal diastolic function.   Contrast was used to enhance visualization of the endocardial border.   1ML of contrast was administered. Existing IV was used, located at the   left forarm.    Right Ventricle  Right ventricle not well visualized.    Right Atrium  Right atrium not well visualized.    Left Atrium  The left atrium is normal in size.    Mitral Valve  Structurally normal mitral valve without significant stenosis. Trace   mitral regurgitation.    Aortic Valve  Aortic sclerosis without stenosis. No aortic  insufficiency.    Tricuspid Valve  Structurally normal tricuspid valve without significant stenosis.   Moderate tricuspid regurgitation. Right ventricular systolic pressure   is estimated to be 35mmHg.    Pulmonic Valve  The pulmonic valve is not well visualized. No stenosis or regurgitation   seen.    Pericardium  Normal pericardium without effusion.    Aorta  The aortic root is normal.                      Jesicaevonkamran Betts  (Electronically Signed)  Final Date:     11 December 2017                   11:05    ----------------------------------------------------------------------------------------------------------------------------------------      PM         []Hide copied text  []Hover for attribution information  DATE OF SERVICE:  12/14/2017     PREOPERATIVE DIAGNOSES:  1.  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  2.  Poorly controlled type 2 diabetes.     POSTOPERATIVE DIAGNOSES:  1.  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  2.  Poorly controlled type 2 diabetes.     PROCEDURE PERFORMED:  Right transtibial amputation.     SURGEON:  Srinivas Delgado MD     ANESTHESIOLOGIST:  Sudhir Shea MD            LABORATORY  Lab Results   Component Value Date/Time    SODIUM 134 (L) 12/20/2017 04:13 AM    POTASSIUM 4.7 12/20/2017 04:13 AM    CHLORIDE 98 12/20/2017 04:13 AM    CO2 30 12/20/2017 04:13 AM    GLUCOSE 165 (H) 12/20/2017 04:13 AM    BUN 15 12/20/2017 04:13 AM    CREATININE 0.55 12/20/2017 04:13 AM        Lab Results   Component Value Date/Time    WBC 13.4 (H) 12/20/2017 04:13 AM    HEMOGLOBIN 12.1 12/20/2017 04:13 AM    HEMATOCRIT 39.1 12/20/2017 04:13 AM    PLATELETCT 280 12/20/2017 04:13 AM        Total time of the discharge process exceeds 38 minutes

## 2017-12-20 NOTE — PROGRESS NOTES
Pt dcd to [facility] via Outline - report given to [facility] RN - denies further questions at this time. PIV removed, discharge instructions given & COBRA completed. All belongings taken w/ pt. No further questions at this time.

## 2017-12-20 NOTE — DISCHARGE PLANNING
Transport arranged with [facility]. Patient will be leaving today @1630 via [facility] going to [facility]. RENAN Cam notified.

## 2017-12-21 ENCOUNTER — HOSPITAL ENCOUNTER (OUTPATIENT)
Dept: LAB | Facility: MEDICAL CENTER | Age: 52
End: 2017-12-21
Attending: INTERNAL MEDICINE
Payer: COMMERCIAL

## 2017-12-21 LAB
ANION GAP SERPL CALC-SCNC: 8 MMOL/L (ref 0–11.9)
BASOPHILS # BLD AUTO: 0.4 % (ref 0–1.8)
BASOPHILS # BLD: 0.05 K/UL (ref 0–0.12)
BUN SERPL-MCNC: 21 MG/DL (ref 8–22)
CALCIUM SERPL-MCNC: 9.5 MG/DL (ref 8.5–10.5)
CHLORIDE SERPL-SCNC: 98 MMOL/L (ref 96–112)
CO2 SERPL-SCNC: 29 MMOL/L (ref 20–33)
CREAT SERPL-MCNC: 0.54 MG/DL (ref 0.5–1.4)
EOSINOPHIL # BLD AUTO: 0.11 K/UL (ref 0–0.51)
EOSINOPHIL NFR BLD: 0.9 % (ref 0–6.9)
ERYTHROCYTE [DISTWIDTH] IN BLOOD BY AUTOMATED COUNT: 50.7 FL (ref 35.9–50)
GFR SERPL CREATININE-BSD FRML MDRD: >60 ML/MIN/1.73 M 2
GLUCOSE SERPL-MCNC: 229 MG/DL (ref 65–99)
HCT VFR BLD AUTO: 39.1 % (ref 37–47)
HGB BLD-MCNC: 12.1 G/DL (ref 12–16)
IMM GRANULOCYTES # BLD AUTO: 0.17 K/UL (ref 0–0.11)
IMM GRANULOCYTES NFR BLD AUTO: 1.3 % (ref 0–0.9)
LYMPHOCYTES # BLD AUTO: 3.44 K/UL (ref 1–4.8)
LYMPHOCYTES NFR BLD: 27 % (ref 22–41)
MCH RBC QN AUTO: 27.8 PG (ref 27–33)
MCHC RBC AUTO-ENTMCNC: 30.9 G/DL (ref 33.6–35)
MCV RBC AUTO: 89.9 FL (ref 81.4–97.8)
MONOCYTES # BLD AUTO: 0.88 K/UL (ref 0–0.85)
MONOCYTES NFR BLD AUTO: 6.9 % (ref 0–13.4)
NEUTROPHILS # BLD AUTO: 8.09 K/UL (ref 2–7.15)
NEUTROPHILS NFR BLD: 63.5 % (ref 44–72)
NRBC # BLD AUTO: 0 K/UL
NRBC BLD-RTO: 0 /100 WBC
PLATELET # BLD AUTO: 306 K/UL (ref 164–446)
PMV BLD AUTO: 10.7 FL (ref 9–12.9)
POTASSIUM SERPL-SCNC: 4.2 MMOL/L (ref 3.6–5.5)
RBC # BLD AUTO: 4.35 M/UL (ref 4.2–5.4)
SODIUM SERPL-SCNC: 135 MMOL/L (ref 135–145)
WBC # BLD AUTO: 12.7 K/UL (ref 4.8–10.8)

## 2017-12-21 NOTE — DISCHARGE PLANNING
SW met with pt at bedside to discuss cobra. Pt signed cobra. Transport packet provided to bedside nurse.

## 2017-12-23 LAB
BASOPHILS # BLD AUTO: 0.8 % (ref 0–1.8)
BASOPHILS # BLD: 0.08 K/UL (ref 0–0.12)
EOSINOPHIL # BLD AUTO: 0.14 K/UL (ref 0–0.51)
EOSINOPHIL NFR BLD: 1.4 % (ref 0–6.9)
ERYTHROCYTE [DISTWIDTH] IN BLOOD BY AUTOMATED COUNT: 53 FL (ref 35.9–50)
HCT VFR BLD AUTO: 38.7 % (ref 37–47)
HGB BLD-MCNC: 11.9 G/DL (ref 12–16)
IMM GRANULOCYTES # BLD AUTO: 0.13 K/UL (ref 0–0.11)
IMM GRANULOCYTES NFR BLD AUTO: 1.3 % (ref 0–0.9)
LYMPHOCYTES # BLD AUTO: 3.32 K/UL (ref 1–4.8)
LYMPHOCYTES NFR BLD: 33.6 % (ref 22–41)
MCH RBC QN AUTO: 28.3 PG (ref 27–33)
MCHC RBC AUTO-ENTMCNC: 30.7 G/DL (ref 33.6–35)
MCV RBC AUTO: 92.1 FL (ref 81.4–97.8)
MONOCYTES # BLD AUTO: 1.07 K/UL (ref 0–0.85)
MONOCYTES NFR BLD AUTO: 10.8 % (ref 0–13.4)
NEUTROPHILS # BLD AUTO: 5.13 K/UL (ref 2–7.15)
NEUTROPHILS NFR BLD: 52.1 % (ref 44–72)
NRBC # BLD AUTO: 0 K/UL
NRBC BLD-RTO: 0 /100 WBC
PLATELET # BLD AUTO: 313 K/UL (ref 164–446)
PMV BLD AUTO: 11.3 FL (ref 9–12.9)
RBC # BLD AUTO: 4.2 M/UL (ref 4.2–5.4)
WBC # BLD AUTO: 9.9 K/UL (ref 4.8–10.8)

## 2017-12-27 LAB
ANION GAP SERPL CALC-SCNC: 7 MMOL/L (ref 0–11.9)
BASOPHILS # BLD AUTO: 0.8 % (ref 0–1.8)
BASOPHILS # BLD: 0.07 K/UL (ref 0–0.12)
BUN SERPL-MCNC: 15 MG/DL (ref 8–22)
CALCIUM SERPL-MCNC: 9.7 MG/DL (ref 8.5–10.5)
CHLORIDE SERPL-SCNC: 98 MMOL/L (ref 96–112)
CO2 SERPL-SCNC: 30 MMOL/L (ref 20–33)
CREAT SERPL-MCNC: 0.61 MG/DL (ref 0.5–1.4)
EOSINOPHIL # BLD AUTO: 0.14 K/UL (ref 0–0.51)
EOSINOPHIL NFR BLD: 1.6 % (ref 0–6.9)
ERYTHROCYTE [DISTWIDTH] IN BLOOD BY AUTOMATED COUNT: 51.5 FL (ref 35.9–50)
GFR SERPL CREATININE-BSD FRML MDRD: >60 ML/MIN/1.73 M 2
GLUCOSE SERPL-MCNC: 172 MG/DL (ref 65–99)
HCT VFR BLD AUTO: 38.2 % (ref 37–47)
HGB BLD-MCNC: 12 G/DL (ref 12–16)
IMM GRANULOCYTES # BLD AUTO: 0.07 K/UL (ref 0–0.11)
IMM GRANULOCYTES NFR BLD AUTO: 0.8 % (ref 0–0.9)
LYMPHOCYTES # BLD AUTO: 1.98 K/UL (ref 1–4.8)
LYMPHOCYTES NFR BLD: 23.3 % (ref 22–41)
MCH RBC QN AUTO: 28.1 PG (ref 27–33)
MCHC RBC AUTO-ENTMCNC: 31.4 G/DL (ref 33.6–35)
MCV RBC AUTO: 89.5 FL (ref 81.4–97.8)
MONOCYTES # BLD AUTO: 0.73 K/UL (ref 0–0.85)
MONOCYTES NFR BLD AUTO: 8.6 % (ref 0–13.4)
NEUTROPHILS # BLD AUTO: 5.51 K/UL (ref 2–7.15)
NEUTROPHILS NFR BLD: 64.9 % (ref 44–72)
NRBC # BLD AUTO: 0 K/UL
NRBC BLD-RTO: 0 /100 WBC
PLATELET # BLD AUTO: 303 K/UL (ref 164–446)
PMV BLD AUTO: 11.2 FL (ref 9–12.9)
POTASSIUM SERPL-SCNC: 4.3 MMOL/L (ref 3.6–5.5)
RBC # BLD AUTO: 4.27 M/UL (ref 4.2–5.4)
SODIUM SERPL-SCNC: 135 MMOL/L (ref 135–145)
WBC # BLD AUTO: 8.5 K/UL (ref 4.8–10.8)

## 2017-12-29 PROCEDURE — 306637 HCHG MISC ORTHO ITEM RC 0274

## 2017-12-29 PROCEDURE — L3216 ORTHOPED LADIES SHOES DPTH I: HCPCS

## 2017-12-30 LAB — EKG IMPRESSION: NORMAL

## 2018-01-01 ENCOUNTER — HOSPITAL ENCOUNTER (OUTPATIENT)
Dept: LAB | Facility: MEDICAL CENTER | Age: 53
End: 2018-01-01
Attending: INTERNAL MEDICINE
Payer: MEDICAID

## 2018-01-17 LAB
ANION GAP SERPL CALC-SCNC: 11 MMOL/L (ref 0–11.9)
BASOPHILS # BLD AUTO: 0.9 % (ref 0–1.8)
BASOPHILS # BLD: 0.07 K/UL (ref 0–0.12)
BUN SERPL-MCNC: 23 MG/DL (ref 8–22)
CALCIUM SERPL-MCNC: 9.1 MG/DL (ref 8.5–10.5)
CHLORIDE SERPL-SCNC: 100 MMOL/L (ref 96–112)
CO2 SERPL-SCNC: 25 MMOL/L (ref 20–33)
CREAT SERPL-MCNC: 0.74 MG/DL (ref 0.5–1.4)
EOSINOPHIL # BLD AUTO: 0.15 K/UL (ref 0–0.51)
EOSINOPHIL NFR BLD: 2 % (ref 0–6.9)
ERYTHROCYTE [DISTWIDTH] IN BLOOD BY AUTOMATED COUNT: 50.9 FL (ref 35.9–50)
GLUCOSE SERPL-MCNC: 207 MG/DL (ref 65–99)
HCT VFR BLD AUTO: 38 % (ref 37–47)
HGB BLD-MCNC: 11.8 G/DL (ref 12–16)
IMM GRANULOCYTES # BLD AUTO: 0.05 K/UL (ref 0–0.11)
IMM GRANULOCYTES NFR BLD AUTO: 0.7 % (ref 0–0.9)
LYMPHOCYTES # BLD AUTO: 3.35 K/UL (ref 1–4.8)
LYMPHOCYTES NFR BLD: 44.7 % (ref 22–41)
MCH RBC QN AUTO: 27.9 PG (ref 27–33)
MCHC RBC AUTO-ENTMCNC: 31.1 G/DL (ref 33.6–35)
MCV RBC AUTO: 89.8 FL (ref 81.4–97.8)
MONOCYTES # BLD AUTO: 0.79 K/UL (ref 0–0.85)
MONOCYTES NFR BLD AUTO: 10.5 % (ref 0–13.4)
NEUTROPHILS # BLD AUTO: 3.09 K/UL (ref 2–7.15)
NEUTROPHILS NFR BLD: 41.2 % (ref 44–72)
NRBC # BLD AUTO: 0 K/UL
NRBC BLD-RTO: 0 /100 WBC
PLATELET # BLD AUTO: 328 K/UL (ref 164–446)
PMV BLD AUTO: 11.3 FL (ref 9–12.9)
POTASSIUM SERPL-SCNC: 4.1 MMOL/L (ref 3.6–5.5)
RBC # BLD AUTO: 4.23 M/UL (ref 4.2–5.4)
SODIUM SERPL-SCNC: 136 MMOL/L (ref 135–145)
WBC # BLD AUTO: 7.5 K/UL (ref 4.8–10.8)

## 2018-01-17 PROCEDURE — 85025 COMPLETE CBC W/AUTO DIFF WBC: CPT

## 2018-01-17 PROCEDURE — 80048 BASIC METABOLIC PNL TOTAL CA: CPT

## 2018-01-17 PROCEDURE — 36415 COLL VENOUS BLD VENIPUNCTURE: CPT

## 2018-01-22 PROCEDURE — 81003 URINALYSIS AUTO W/O SCOPE: CPT

## 2018-01-23 LAB
APPEARANCE UR: CLEAR
BILIRUB UR QL STRIP.AUTO: NEGATIVE
COLOR UR: YELLOW
CULTURE IF INDICATED INDCX: NO UA CULTURE
GLUCOSE UR STRIP.AUTO-MCNC: NEGATIVE MG/DL
KETONES UR STRIP.AUTO-MCNC: NEGATIVE MG/DL
LEUKOCYTE ESTERASE UR QL STRIP.AUTO: NEGATIVE
MICRO URNS: NORMAL
NITRITE UR QL STRIP.AUTO: NEGATIVE
PH UR STRIP.AUTO: 6 [PH]
PROT UR QL STRIP: NEGATIVE MG/DL
RBC UR QL AUTO: NEGATIVE
SP GR UR STRIP.AUTO: 1.03
UROBILINOGEN UR STRIP.AUTO-MCNC: 1 MG/DL

## 2018-01-24 LAB
ANION GAP SERPL CALC-SCNC: 13 MMOL/L (ref 0–11.9)
BASOPHILS # BLD AUTO: 0.7 % (ref 0–1.8)
BASOPHILS # BLD: 0.06 K/UL (ref 0–0.12)
BUN SERPL-MCNC: 23 MG/DL (ref 8–22)
CALCIUM SERPL-MCNC: 9.5 MG/DL (ref 8.5–10.5)
CHLORIDE SERPL-SCNC: 100 MMOL/L (ref 96–112)
CO2 SERPL-SCNC: 24 MMOL/L (ref 20–33)
CREAT SERPL-MCNC: 0.86 MG/DL (ref 0.5–1.4)
EOSINOPHIL # BLD AUTO: 0.2 K/UL (ref 0–0.51)
EOSINOPHIL NFR BLD: 2.3 % (ref 0–6.9)
ERYTHROCYTE [DISTWIDTH] IN BLOOD BY AUTOMATED COUNT: 50.5 FL (ref 35.9–50)
GLUCOSE SERPL-MCNC: 206 MG/DL (ref 65–99)
HCT VFR BLD AUTO: 37.6 % (ref 37–47)
HGB BLD-MCNC: 11.5 G/DL (ref 12–16)
IMM GRANULOCYTES # BLD AUTO: 0.04 K/UL (ref 0–0.11)
IMM GRANULOCYTES NFR BLD AUTO: 0.5 % (ref 0–0.9)
LYMPHOCYTES # BLD AUTO: 3.7 K/UL (ref 1–4.8)
LYMPHOCYTES NFR BLD: 43.4 % (ref 22–41)
MCH RBC QN AUTO: 27.6 PG (ref 27–33)
MCHC RBC AUTO-ENTMCNC: 30.6 G/DL (ref 33.6–35)
MCV RBC AUTO: 90.4 FL (ref 81.4–97.8)
MONOCYTES # BLD AUTO: 0.8 K/UL (ref 0–0.85)
MONOCYTES NFR BLD AUTO: 9.4 % (ref 0–13.4)
NEUTROPHILS # BLD AUTO: 3.73 K/UL (ref 2–7.15)
NEUTROPHILS NFR BLD: 43.7 % (ref 44–72)
NRBC # BLD AUTO: 0 K/UL
NRBC BLD-RTO: 0 /100 WBC
PLATELET # BLD AUTO: 324 K/UL (ref 164–446)
PMV BLD AUTO: 11.2 FL (ref 9–12.9)
POTASSIUM SERPL-SCNC: 4.1 MMOL/L (ref 3.6–5.5)
RBC # BLD AUTO: 4.16 M/UL (ref 4.2–5.4)
SODIUM SERPL-SCNC: 137 MMOL/L (ref 135–145)
WBC # BLD AUTO: 8.5 K/UL (ref 4.8–10.8)

## 2018-01-24 PROCEDURE — 36415 COLL VENOUS BLD VENIPUNCTURE: CPT

## 2018-01-24 PROCEDURE — 85025 COMPLETE CBC W/AUTO DIFF WBC: CPT

## 2018-01-24 PROCEDURE — 80048 BASIC METABOLIC PNL TOTAL CA: CPT

## 2018-01-25 LAB
ANION GAP SERPL CALC-SCNC: 13 MMOL/L (ref 0–11.9)
BUN SERPL-MCNC: 22 MG/DL (ref 8–22)
CALCIUM SERPL-MCNC: 9.2 MG/DL (ref 8.4–10.2)
CHLORIDE SERPL-SCNC: 101 MMOL/L (ref 96–112)
CO2 SERPL-SCNC: 19 MMOL/L (ref 20–33)
CREAT SERPL-MCNC: 0.67 MG/DL (ref 0.5–1.4)
GLUCOSE SERPL-MCNC: 287 MG/DL (ref 65–99)
POTASSIUM SERPL-SCNC: 4.6 MMOL/L (ref 3.6–5.5)
SODIUM SERPL-SCNC: 133 MMOL/L (ref 135–145)

## 2018-01-25 PROCEDURE — 36415 COLL VENOUS BLD VENIPUNCTURE: CPT

## 2018-01-25 PROCEDURE — 80048 BASIC METABOLIC PNL TOTAL CA: CPT

## 2018-02-01 ENCOUNTER — HOSPITAL ENCOUNTER (OUTPATIENT)
Dept: LAB | Facility: MEDICAL CENTER | Age: 53
End: 2018-02-01
Attending: INTERNAL MEDICINE
Payer: MEDICAID

## 2018-03-01 ENCOUNTER — HOSPITAL ENCOUNTER (OUTPATIENT)
Dept: LAB | Facility: MEDICAL CENTER | Age: 53
End: 2018-03-01
Attending: INTERNAL MEDICINE
Payer: MEDICAID

## 2018-03-07 LAB
ANION GAP SERPL CALC-SCNC: 8 MMOL/L (ref 0–11.9)
BASOPHILS # BLD AUTO: 0.6 % (ref 0–1.8)
BASOPHILS # BLD: 0.06 K/UL (ref 0–0.12)
BUN SERPL-MCNC: 21 MG/DL (ref 8–22)
CALCIUM SERPL-MCNC: 9 MG/DL (ref 8.5–10.5)
CHLORIDE SERPL-SCNC: 101 MMOL/L (ref 96–112)
CO2 SERPL-SCNC: 25 MMOL/L (ref 20–33)
CREAT SERPL-MCNC: 0.6 MG/DL (ref 0.5–1.4)
EOSINOPHIL # BLD AUTO: 0.17 K/UL (ref 0–0.51)
EOSINOPHIL NFR BLD: 1.8 % (ref 0–6.9)
ERYTHROCYTE [DISTWIDTH] IN BLOOD BY AUTOMATED COUNT: 45.6 FL (ref 35.9–50)
GLUCOSE SERPL-MCNC: 200 MG/DL (ref 65–99)
HCT VFR BLD AUTO: 36.8 % (ref 37–47)
HGB BLD-MCNC: 11.9 G/DL (ref 12–16)
IMM GRANULOCYTES # BLD AUTO: 0.05 K/UL (ref 0–0.11)
IMM GRANULOCYTES NFR BLD AUTO: 0.5 % (ref 0–0.9)
LYMPHOCYTES # BLD AUTO: 3.7 K/UL (ref 1–4.8)
LYMPHOCYTES NFR BLD: 38.5 % (ref 22–41)
MCH RBC QN AUTO: 29 PG (ref 27–33)
MCHC RBC AUTO-ENTMCNC: 32.3 G/DL (ref 33.6–35)
MCV RBC AUTO: 89.5 FL (ref 81.4–97.8)
MONOCYTES # BLD AUTO: 0.75 K/UL (ref 0–0.85)
MONOCYTES NFR BLD AUTO: 7.8 % (ref 0–13.4)
NEUTROPHILS # BLD AUTO: 4.88 K/UL (ref 2–7.15)
NEUTROPHILS NFR BLD: 50.8 % (ref 44–72)
NRBC # BLD AUTO: 0 K/UL
NRBC BLD-RTO: 0 /100 WBC
PLATELET # BLD AUTO: 274 K/UL (ref 164–446)
PMV BLD AUTO: 11.3 FL (ref 9–12.9)
POTASSIUM SERPL-SCNC: 3.8 MMOL/L (ref 3.6–5.5)
RBC # BLD AUTO: 4.11 M/UL (ref 4.2–5.4)
SODIUM SERPL-SCNC: 134 MMOL/L (ref 135–145)
WBC # BLD AUTO: 9.6 K/UL (ref 4.8–10.8)

## 2018-03-07 PROCEDURE — 80048 BASIC METABOLIC PNL TOTAL CA: CPT

## 2018-03-07 PROCEDURE — 85025 COMPLETE CBC W/AUTO DIFF WBC: CPT

## 2018-03-07 PROCEDURE — 36415 COLL VENOUS BLD VENIPUNCTURE: CPT

## 2018-03-31 ENCOUNTER — HOSPITAL ENCOUNTER (OUTPATIENT)
Dept: LAB | Facility: MEDICAL CENTER | Age: 53
End: 2018-03-31
Attending: INTERNAL MEDICINE
Payer: MEDICAID

## 2018-04-01 ENCOUNTER — HOSPITAL ENCOUNTER (OUTPATIENT)
Dept: LAB | Facility: MEDICAL CENTER | Age: 53
End: 2018-04-01
Attending: INTERNAL MEDICINE
Payer: MEDICAID

## 2018-04-04 LAB
CHOLEST SERPL-MCNC: 89 MG/DL (ref 100–199)
EST. AVERAGE GLUCOSE BLD GHB EST-MCNC: 226 MG/DL
HBA1C MFR BLD: 9.5 % (ref 0–5.6)
HDLC SERPL-MCNC: 26 MG/DL
LDLC SERPL CALC-MCNC: 22 MG/DL
TRIGL SERPL-MCNC: 207 MG/DL (ref 0–149)

## 2018-04-04 PROCEDURE — 83036 HEMOGLOBIN GLYCOSYLATED A1C: CPT

## 2018-04-04 PROCEDURE — 80061 LIPID PANEL: CPT

## 2018-05-01 ENCOUNTER — PATIENT OUTREACH (OUTPATIENT)
Dept: HEALTH INFORMATION MANAGEMENT | Facility: OTHER | Age: 53
End: 2018-05-01

## 2018-05-01 NOTE — PROGRESS NOTES
Placed discharge outreach phone call to patient s/p [facility] discharge 4/30/18.  Left voicemail providing my contact information and instructions to call with any questions or concerns.

## 2018-09-16 DIAGNOSIS — R06.02 SOB (SHORTNESS OF BREATH): ICD-10-CM

## 2018-09-27 LAB — EKG IMPRESSION: NORMAL

## 2019-11-06 NOTE — DISCHARGE PLANNING
Get EEG done per neurologist.    See cardiology as directed.     Get labs done    Flu shot and physical in near future [facility] can take the patient today. RENAN Cam notified.
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
## 2020-01-16 DIAGNOSIS — R07.89 OTHER CHEST PAIN: Primary | ICD-10-CM

## 2020-01-16 LAB
EKG IMPRESSION: NORMAL
EKG IMPRESSION: NORMAL

## 2020-09-28 ENCOUNTER — HOSPITAL ENCOUNTER (INPATIENT)
Facility: MEDICAL CENTER | Age: 55
LOS: 10 days | DRG: 488 | End: 2020-10-08
Attending: EMERGENCY MEDICINE | Admitting: HOSPITALIST
Payer: MEDICAID

## 2020-09-28 ENCOUNTER — APPOINTMENT (OUTPATIENT)
Dept: RADIOLOGY | Facility: MEDICAL CENTER | Age: 55
DRG: 488 | End: 2020-09-28
Attending: EMERGENCY MEDICINE
Payer: MEDICAID

## 2020-09-28 DIAGNOSIS — L97.524 DIABETIC ULCER OF LEFT FOOT ASSOCIATED WITH TYPE 2 DIABETES MELLITUS, WITH NECROSIS OF BONE, UNSPECIFIED PART OF FOOT (HCC): ICD-10-CM

## 2020-09-28 DIAGNOSIS — E11.621 DIABETIC ULCER OF LEFT FOOT ASSOCIATED WITH TYPE 2 DIABETES MELLITUS, WITH NECROSIS OF BONE, UNSPECIFIED PART OF FOOT (HCC): ICD-10-CM

## 2020-09-28 DIAGNOSIS — M86.9 OSTEOMYELITIS OF LEFT FOOT, UNSPECIFIED TYPE (HCC): ICD-10-CM

## 2020-09-28 PROBLEM — J96.21 ACUTE ON CHRONIC RESPIRATORY FAILURE WITH HYPOXIA (HCC): Status: ACTIVE | Noted: 2020-09-28

## 2020-09-28 PROBLEM — Z89.511 HX OF RIGHT BKA (HCC): Status: ACTIVE | Noted: 2020-09-28

## 2020-09-28 PROBLEM — A41.9 SEPSIS (HCC): Status: ACTIVE | Noted: 2020-09-28

## 2020-09-28 PROBLEM — T87.43 RIGHT BKA INFECTION (HCC): Status: ACTIVE | Noted: 2020-09-28

## 2020-09-28 PROBLEM — L03.119 CELLULITIS OF HAND: Status: ACTIVE | Noted: 2020-09-28

## 2020-09-28 PROBLEM — T87.43 RIGHT BKA INFECTION (HCC): Status: RESOLVED | Noted: 2020-09-28 | Resolved: 2020-09-28

## 2020-09-28 LAB
ALBUMIN SERPL BCP-MCNC: 2.6 G/DL (ref 3.2–4.9)
ALBUMIN/GLOB SERPL: 0.6 G/DL
ALP SERPL-CCNC: 130 U/L (ref 30–99)
ALT SERPL-CCNC: 15 U/L (ref 2–50)
ANION GAP SERPL CALC-SCNC: 18 MMOL/L (ref 7–16)
APPEARANCE UR: ABNORMAL
AST SERPL-CCNC: 14 U/L (ref 12–45)
BACTERIA #/AREA URNS HPF: NEGATIVE /HPF
BASOPHILS # BLD AUTO: 0.1 % (ref 0–1.8)
BASOPHILS # BLD: 0.01 K/UL (ref 0–0.12)
BILIRUB SERPL-MCNC: 0.7 MG/DL (ref 0.1–1.5)
BILIRUB UR QL STRIP.AUTO: NEGATIVE
BUN SERPL-MCNC: 12 MG/DL (ref 8–22)
CALCIUM SERPL-MCNC: 8.9 MG/DL (ref 8.5–10.5)
CHLORIDE SERPL-SCNC: 93 MMOL/L (ref 96–112)
CO2 SERPL-SCNC: 22 MMOL/L (ref 20–33)
COLOR UR: YELLOW
COVID ORDER STATUS COVID19: NORMAL
CREAT SERPL-MCNC: 0.43 MG/DL (ref 0.5–1.4)
EOSINOPHIL # BLD AUTO: 0.01 K/UL (ref 0–0.51)
EOSINOPHIL NFR BLD: 0.1 % (ref 0–6.9)
EPI CELLS #/AREA URNS HPF: ABNORMAL /HPF
ERYTHROCYTE [DISTWIDTH] IN BLOOD BY AUTOMATED COUNT: 50.5 FL (ref 35.9–50)
EST. AVERAGE GLUCOSE BLD GHB EST-MCNC: 335 MG/DL
GLOBULIN SER CALC-MCNC: 4.3 G/DL (ref 1.9–3.5)
GLUCOSE SERPL-MCNC: 299 MG/DL (ref 65–99)
GLUCOSE UR STRIP.AUTO-MCNC: >=1000 MG/DL
HBA1C MFR BLD: 13.3 % (ref 0–5.6)
HCT VFR BLD AUTO: 35 % (ref 37–47)
HGB BLD-MCNC: 11 G/DL (ref 12–16)
HYALINE CASTS #/AREA URNS LPF: ABNORMAL /LPF
KETONES UR STRIP.AUTO-MCNC: 80 MG/DL
LACTATE BLD-SCNC: 1.7 MMOL/L (ref 0.5–2)
LEUKOCYTE ESTERASE UR QL STRIP.AUTO: NEGATIVE
LYMPHOCYTES # BLD AUTO: 1.13 K/UL (ref 1–4.8)
LYMPHOCYTES NFR BLD: 7.7 % (ref 22–41)
MCH RBC QN AUTO: 27.8 PG (ref 27–33)
MCHC RBC AUTO-ENTMCNC: 31.4 G/DL (ref 33.6–35)
MCV RBC AUTO: 88.6 FL (ref 81.4–97.8)
MICRO URNS: ABNORMAL
MONOCYTES # BLD AUTO: 1.3 K/UL (ref 0–0.85)
MONOCYTES NFR BLD AUTO: 8.9 % (ref 0–13.4)
NEUTROPHILS # BLD AUTO: 12.16 K/UL (ref 2–7.15)
NEUTROPHILS NFR BLD: 83.2 % (ref 44–72)
NITRITE UR QL STRIP.AUTO: NEGATIVE
PH UR STRIP.AUTO: 5.5 [PH] (ref 5–8)
PLATELET # BLD AUTO: 249 K/UL (ref 164–446)
PMV BLD AUTO: 12 FL (ref 9–12.9)
POTASSIUM SERPL-SCNC: 4.2 MMOL/L (ref 3.6–5.5)
PROT SERPL-MCNC: 6.9 G/DL (ref 6–8.2)
PROT UR QL STRIP: 100 MG/DL
RBC # BLD AUTO: 3.95 M/UL (ref 4.2–5.4)
RBC # URNS HPF: ABNORMAL /HPF
RBC UR QL AUTO: ABNORMAL
RENAL EPI CELLS #/AREA URNS HPF: NEGATIVE /HPF
SARS-COV-2 RNA RESP QL NAA+PROBE: NOTDETECTED
SODIUM SERPL-SCNC: 133 MMOL/L (ref 135–145)
SP GR UR STRIP.AUTO: 1.04
SPECIMEN SOURCE: NORMAL
UROBILINOGEN UR STRIP.AUTO-MCNC: 1 MG/DL
WBC # BLD AUTO: 14.6 K/UL (ref 4.8–10.8)
WBC #/AREA URNS HPF: ABNORMAL /HPF
YEAST BUDDING URNS QL: PRESENT /HPF

## 2020-09-28 PROCEDURE — 87086 URINE CULTURE/COLONY COUNT: CPT

## 2020-09-28 PROCEDURE — 96365 THER/PROPH/DIAG IV INF INIT: CPT

## 2020-09-28 PROCEDURE — 87040 BLOOD CULTURE FOR BACTERIA: CPT | Mod: 91

## 2020-09-28 PROCEDURE — 770006 HCHG ROOM/CARE - MED/SURG/GYN SEMI*

## 2020-09-28 PROCEDURE — 87205 SMEAR GRAM STAIN: CPT

## 2020-09-28 PROCEDURE — 96366 THER/PROPH/DIAG IV INF ADDON: CPT

## 2020-09-28 PROCEDURE — 87070 CULTURE OTHR SPECIMN AEROBIC: CPT

## 2020-09-28 PROCEDURE — 81001 URINALYSIS AUTO W/SCOPE: CPT

## 2020-09-28 PROCEDURE — 87186 SC STD MICRODIL/AGAR DIL: CPT | Mod: 91

## 2020-09-28 PROCEDURE — A9270 NON-COVERED ITEM OR SERVICE: HCPCS | Performed by: HOSPITALIST

## 2020-09-28 PROCEDURE — U0003 INFECTIOUS AGENT DETECTION BY NUCLEIC ACID (DNA OR RNA); SEVERE ACUTE RESPIRATORY SYNDROME CORONAVIRUS 2 (SARS-COV-2) (CORONAVIRUS DISEASE [COVID-19]), AMPLIFIED PROBE TECHNIQUE, MAKING USE OF HIGH THROUGHPUT TECHNOLOGIES AS DESCRIBED BY CMS-2020-01-R: HCPCS

## 2020-09-28 PROCEDURE — 700111 HCHG RX REV CODE 636 W/ 250 OVERRIDE (IP): Performed by: HOSPITALIST

## 2020-09-28 PROCEDURE — C9803 HOPD COVID-19 SPEC COLLECT: HCPCS | Performed by: EMERGENCY MEDICINE

## 2020-09-28 PROCEDURE — 700102 HCHG RX REV CODE 250 W/ 637 OVERRIDE(OP): Performed by: HOSPITALIST

## 2020-09-28 PROCEDURE — 80053 COMPREHEN METABOLIC PANEL: CPT

## 2020-09-28 PROCEDURE — 99223 1ST HOSP IP/OBS HIGH 75: CPT | Performed by: HOSPITALIST

## 2020-09-28 PROCEDURE — 99285 EMERGENCY DEPT VISIT HI MDM: CPT

## 2020-09-28 PROCEDURE — 83036 HEMOGLOBIN GLYCOSYLATED A1C: CPT

## 2020-09-28 PROCEDURE — 85025 COMPLETE CBC W/AUTO DIFF WBC: CPT

## 2020-09-28 PROCEDURE — 71045 X-RAY EXAM CHEST 1 VIEW: CPT

## 2020-09-28 PROCEDURE — 87077 CULTURE AEROBIC IDENTIFY: CPT | Mod: 91

## 2020-09-28 PROCEDURE — 700105 HCHG RX REV CODE 258: Performed by: HOSPITALIST

## 2020-09-28 PROCEDURE — 83605 ASSAY OF LACTIC ACID: CPT

## 2020-09-28 RX ORDER — FENTANYL 50 UG/1
1 PATCH TRANSDERMAL
Status: ON HOLD | COMMUNITY
End: 2020-10-08

## 2020-09-28 RX ORDER — ONDANSETRON 4 MG/1
4 TABLET, ORALLY DISINTEGRATING ORAL EVERY 4 HOURS PRN
Status: DISCONTINUED | OUTPATIENT
Start: 2020-09-28 | End: 2020-10-08 | Stop reason: HOSPADM

## 2020-09-28 RX ORDER — POLYETHYLENE GLYCOL 3350 17 G/17G
1 POWDER, FOR SOLUTION ORAL
Status: DISCONTINUED | OUTPATIENT
Start: 2020-09-28 | End: 2020-10-08 | Stop reason: HOSPADM

## 2020-09-28 RX ORDER — AMOXICILLIN 250 MG
2 CAPSULE ORAL 2 TIMES DAILY
Status: DISCONTINUED | OUTPATIENT
Start: 2020-09-28 | End: 2020-10-08 | Stop reason: HOSPADM

## 2020-09-28 RX ORDER — BISACODYL 10 MG
10 SUPPOSITORY, RECTAL RECTAL
Status: DISCONTINUED | OUTPATIENT
Start: 2020-09-28 | End: 2020-10-08 | Stop reason: HOSPADM

## 2020-09-28 RX ORDER — FENTANYL 50 UG/1
1 PATCH TRANSDERMAL
Status: DISCONTINUED | OUTPATIENT
Start: 2020-09-28 | End: 2020-10-04 | Stop reason: ALTCHOICE

## 2020-09-28 RX ORDER — SODIUM CHLORIDE, SODIUM LACTATE, POTASSIUM CHLORIDE, CALCIUM CHLORIDE 600; 310; 30; 20 MG/100ML; MG/100ML; MG/100ML; MG/100ML
INJECTION, SOLUTION INTRAVENOUS CONTINUOUS
Status: DISCONTINUED | OUTPATIENT
Start: 2020-09-28 | End: 2020-09-29

## 2020-09-28 RX ORDER — DEXTROSE MONOHYDRATE 25 G/50ML
50 INJECTION, SOLUTION INTRAVENOUS
Status: DISCONTINUED | OUTPATIENT
Start: 2020-09-28 | End: 2020-10-05

## 2020-09-28 RX ORDER — PROMETHAZINE HYDROCHLORIDE 25 MG/1
12.5-25 TABLET ORAL EVERY 4 HOURS PRN
Status: DISCONTINUED | OUTPATIENT
Start: 2020-09-28 | End: 2020-10-08 | Stop reason: HOSPADM

## 2020-09-28 RX ORDER — NICOTINE 21 MG/24HR
21 PATCH, TRANSDERMAL 24 HOURS TRANSDERMAL
Status: DISCONTINUED | OUTPATIENT
Start: 2020-09-29 | End: 2020-10-08 | Stop reason: HOSPADM

## 2020-09-28 RX ORDER — ACETAMINOPHEN 325 MG/1
650 TABLET ORAL EVERY 6 HOURS PRN
Status: DISCONTINUED | OUTPATIENT
Start: 2020-09-28 | End: 2020-10-08 | Stop reason: HOSPADM

## 2020-09-28 RX ORDER — GABAPENTIN 600 MG/1
600 TABLET ORAL 4 TIMES DAILY
COMMUNITY
End: 2020-09-28

## 2020-09-28 RX ORDER — PROMETHAZINE HYDROCHLORIDE 25 MG/1
12.5-25 SUPPOSITORY RECTAL EVERY 4 HOURS PRN
Status: DISCONTINUED | OUTPATIENT
Start: 2020-09-28 | End: 2020-10-08 | Stop reason: HOSPADM

## 2020-09-28 RX ORDER — PROCHLORPERAZINE EDISYLATE 5 MG/ML
5-10 INJECTION INTRAMUSCULAR; INTRAVENOUS EVERY 4 HOURS PRN
Status: DISCONTINUED | OUTPATIENT
Start: 2020-09-28 | End: 2020-10-08 | Stop reason: HOSPADM

## 2020-09-28 RX ORDER — ONDANSETRON 2 MG/ML
4 INJECTION INTRAMUSCULAR; INTRAVENOUS EVERY 4 HOURS PRN
Status: DISCONTINUED | OUTPATIENT
Start: 2020-09-28 | End: 2020-10-08 | Stop reason: HOSPADM

## 2020-09-28 RX ADMIN — FENTANYL 1 PATCH: 50 PATCH, EXTENDED RELEASE TRANSDERMAL at 23:40

## 2020-09-28 RX ADMIN — VANCOMYCIN HYDROCHLORIDE 2000 MG: 500 INJECTION, POWDER, LYOPHILIZED, FOR SOLUTION INTRAVENOUS at 22:42

## 2020-09-28 ASSESSMENT — ENCOUNTER SYMPTOMS
CHILLS: 0
ROS GI COMMENTS: DRY MOUTH
VOMITING: 0
FEVER: 0
SHORTNESS OF BREATH: 1

## 2020-09-29 ENCOUNTER — HOSPITAL ENCOUNTER (OUTPATIENT)
Dept: RADIOLOGY | Facility: MEDICAL CENTER | Age: 55
End: 2020-09-29
Payer: MEDICAID

## 2020-09-29 ENCOUNTER — ANESTHESIA (OUTPATIENT)
Dept: SURGERY | Facility: MEDICAL CENTER | Age: 55
DRG: 488 | End: 2020-09-29
Payer: MEDICAID

## 2020-09-29 ENCOUNTER — APPOINTMENT (OUTPATIENT)
Dept: RADIOLOGY | Facility: MEDICAL CENTER | Age: 55
DRG: 488 | End: 2020-09-29
Attending: NURSE PRACTITIONER
Payer: MEDICAID

## 2020-09-29 ENCOUNTER — ANESTHESIA EVENT (OUTPATIENT)
Dept: SURGERY | Facility: MEDICAL CENTER | Age: 55
DRG: 488 | End: 2020-09-29
Payer: MEDICAID

## 2020-09-29 LAB
ALBUMIN SERPL BCP-MCNC: 2.5 G/DL (ref 3.2–4.9)
ALBUMIN/GLOB SERPL: 0.6 G/DL
ALP SERPL-CCNC: 452 U/L (ref 30–99)
ALT SERPL-CCNC: 21 U/L (ref 2–50)
ANION GAP SERPL CALC-SCNC: 15 MMOL/L (ref 7–16)
APPEARANCE FLD: NORMAL
AST SERPL-CCNC: 19 U/L (ref 12–45)
BASOPHILS # BLD AUTO: 0.5 % (ref 0–1.8)
BASOPHILS # BLD: 0.07 K/UL (ref 0–0.12)
BILIRUB SERPL-MCNC: 0.7 MG/DL (ref 0.1–1.5)
BODY FLD TYPE: NORMAL
BUN SERPL-MCNC: 13 MG/DL (ref 8–22)
CALCIUM SERPL-MCNC: 8.8 MG/DL (ref 8.5–10.5)
CHLORIDE SERPL-SCNC: 95 MMOL/L (ref 96–112)
CO2 SERPL-SCNC: 21 MMOL/L (ref 20–33)
COLOR FLD: NORMAL
CREAT SERPL-MCNC: 0.47 MG/DL (ref 0.5–1.4)
EOSINOPHIL # BLD AUTO: 0.01 K/UL (ref 0–0.51)
EOSINOPHIL NFR BLD: 0.1 % (ref 0–6.9)
ERYTHROCYTE [DISTWIDTH] IN BLOOD BY AUTOMATED COUNT: 52.2 FL (ref 35.9–50)
GLOBULIN SER CALC-MCNC: 3.9 G/DL (ref 1.9–3.5)
GLUCOSE BLD-MCNC: 238 MG/DL (ref 65–99)
GLUCOSE BLD-MCNC: 252 MG/DL (ref 65–99)
GLUCOSE BLD-MCNC: 266 MG/DL (ref 65–99)
GLUCOSE BLD-MCNC: 281 MG/DL (ref 65–99)
GLUCOSE SERPL-MCNC: 317 MG/DL (ref 65–99)
GRAM STN SPEC: NORMAL
HCT VFR BLD AUTO: 31.7 % (ref 37–47)
HGB BLD-MCNC: 9.7 G/DL (ref 12–16)
IMM GRANULOCYTES # BLD AUTO: 0.2 K/UL (ref 0–0.11)
IMM GRANULOCYTES NFR BLD AUTO: 1.4 % (ref 0–0.9)
LYMPHOCYTES # BLD AUTO: 1.72 K/UL (ref 1–4.8)
LYMPHOCYTES NFR BLD: 11.8 % (ref 22–41)
LYMPHOCYTES NFR FLD: 2 %
MCH RBC QN AUTO: 27.6 PG (ref 27–33)
MCHC RBC AUTO-ENTMCNC: 30.6 G/DL (ref 33.6–35)
MCV RBC AUTO: 90.3 FL (ref 81.4–97.8)
MONOCYTES # BLD AUTO: 1.01 K/UL (ref 0–0.85)
MONOCYTES NFR BLD AUTO: 7 % (ref 0–13.4)
MONONUC CELLS NFR FLD: 1 %
NEUTROPHILS # BLD AUTO: 11.51 K/UL (ref 2–7.15)
NEUTROPHILS NFR BLD: 79.2 % (ref 44–72)
NEUTROPHILS NFR FLD: 97 %
NRBC # BLD AUTO: 0 K/UL
NRBC BLD-RTO: 0 /100 WBC
PLATELET # BLD AUTO: 232 K/UL (ref 164–446)
PMV BLD AUTO: 10.6 FL (ref 9–12.9)
POTASSIUM SERPL-SCNC: 3.9 MMOL/L (ref 3.6–5.5)
PROT SERPL-MCNC: 6.4 G/DL (ref 6–8.2)
RBC # BLD AUTO: 3.51 M/UL (ref 4.2–5.4)
RBC # FLD: NORMAL CELLS/UL
SIGNIFICANT IND 70042: NORMAL
SITE SITE: NORMAL
SODIUM SERPL-SCNC: 131 MMOL/L (ref 135–145)
SOURCE SOURCE: NORMAL
WBC # BLD AUTO: 14.5 K/UL (ref 4.8–10.8)
WBC # FLD: NORMAL CELLS/UL

## 2020-09-29 PROCEDURE — 36415 COLL VENOUS BLD VENIPUNCTURE: CPT

## 2020-09-29 PROCEDURE — 700102 HCHG RX REV CODE 250 W/ 637 OVERRIDE(OP): Performed by: HOSPITALIST

## 2020-09-29 PROCEDURE — 501838 HCHG SUTURE GENERAL: Performed by: ORTHOPAEDIC SURGERY

## 2020-09-29 PROCEDURE — 94760 N-INVAS EAR/PLS OXIMETRY 1: CPT

## 2020-09-29 PROCEDURE — 770006 HCHG ROOM/CARE - MED/SURG/GYN SEMI*

## 2020-09-29 PROCEDURE — 82962 GLUCOSE BLOOD TEST: CPT | Mod: 91

## 2020-09-29 PROCEDURE — 85025 COMPLETE CBC W/AUTO DIFF WBC: CPT

## 2020-09-29 PROCEDURE — 160038 HCHG SURGERY MINUTES - EA ADDL 1 MIN LEVEL 2: Performed by: ORTHOPAEDIC SURGERY

## 2020-09-29 PROCEDURE — A9270 NON-COVERED ITEM OR SERVICE: HCPCS | Performed by: ANESTHESIOLOGY

## 2020-09-29 PROCEDURE — 700102 HCHG RX REV CODE 250 W/ 637 OVERRIDE(OP): Performed by: ANESTHESIOLOGY

## 2020-09-29 PROCEDURE — 160036 HCHG PACU - EA ADDL 30 MINS PHASE I: Performed by: ORTHOPAEDIC SURGERY

## 2020-09-29 PROCEDURE — 87015 SPECIMEN INFECT AGNT CONCNTJ: CPT

## 2020-09-29 PROCEDURE — 500881 HCHG PACK, EXTREMITY: Performed by: ORTHOPAEDIC SURGERY

## 2020-09-29 PROCEDURE — 160035 HCHG PACU - 1ST 60 MINS PHASE I: Performed by: ORTHOPAEDIC SURGERY

## 2020-09-29 PROCEDURE — 0S9D00Z DRAINAGE OF LEFT KNEE JOINT WITH DRAINAGE DEVICE, OPEN APPROACH: ICD-10-PCS | Performed by: ORTHOPAEDIC SURGERY

## 2020-09-29 PROCEDURE — 500367 HCHG DRAIN KIT, HEMOVAC: Performed by: ORTHOPAEDIC SURGERY

## 2020-09-29 PROCEDURE — 700111 HCHG RX REV CODE 636 W/ 250 OVERRIDE (IP): Performed by: HOSPITALIST

## 2020-09-29 PROCEDURE — 700111 HCHG RX REV CODE 636 W/ 250 OVERRIDE (IP): Performed by: ANESTHESIOLOGY

## 2020-09-29 PROCEDURE — 87186 SC STD MICRODIL/AGAR DIL: CPT

## 2020-09-29 PROCEDURE — 87077 CULTURE AEROBIC IDENTIFY: CPT

## 2020-09-29 PROCEDURE — 160002 HCHG RECOVERY MINUTES (STAT): Performed by: ORTHOPAEDIC SURGERY

## 2020-09-29 PROCEDURE — 700105 HCHG RX REV CODE 258: Performed by: HOSPITALIST

## 2020-09-29 PROCEDURE — 94640 AIRWAY INHALATION TREATMENT: CPT

## 2020-09-29 PROCEDURE — A9270 NON-COVERED ITEM OR SERVICE: HCPCS | Performed by: NURSE PRACTITIONER

## 2020-09-29 PROCEDURE — 700101 HCHG RX REV CODE 250: Performed by: ANESTHESIOLOGY

## 2020-09-29 PROCEDURE — 87070 CULTURE OTHR SPECIMN AEROBIC: CPT

## 2020-09-29 PROCEDURE — 160048 HCHG OR STATISTICAL LEVEL 1-5: Performed by: ORTHOPAEDIC SURGERY

## 2020-09-29 PROCEDURE — 700105 HCHG RX REV CODE 258: Performed by: ANESTHESIOLOGY

## 2020-09-29 PROCEDURE — 87147 CULTURE TYPE IMMUNOLOGIC: CPT

## 2020-09-29 PROCEDURE — 0S9D3ZZ DRAINAGE OF LEFT KNEE JOINT, PERCUTANEOUS APPROACH: ICD-10-PCS | Performed by: ORTHOPAEDIC SURGERY

## 2020-09-29 PROCEDURE — 99222 1ST HOSP IP/OBS MODERATE 55: CPT | Performed by: NURSE PRACTITIONER

## 2020-09-29 PROCEDURE — 73630 X-RAY EXAM OF FOOT: CPT | Mod: LT

## 2020-09-29 PROCEDURE — 87205 SMEAR GRAM STAIN: CPT

## 2020-09-29 PROCEDURE — 160027 HCHG SURGERY MINUTES - 1ST 30 MINS LEVEL 2: Performed by: ORTHOPAEDIC SURGERY

## 2020-09-29 PROCEDURE — 80053 COMPREHEN METABOLIC PANEL: CPT

## 2020-09-29 PROCEDURE — 700102 HCHG RX REV CODE 250 W/ 637 OVERRIDE(OP): Performed by: NURSE PRACTITIONER

## 2020-09-29 PROCEDURE — 160009 HCHG ANES TIME/MIN: Performed by: ORTHOPAEDIC SURGERY

## 2020-09-29 PROCEDURE — 87075 CULTR BACTERIA EXCEPT BLOOD: CPT | Mod: 91

## 2020-09-29 PROCEDURE — 0QBP0ZZ EXCISION OF LEFT METATARSAL, OPEN APPROACH: ICD-10-PCS | Performed by: ORTHOPAEDIC SURGERY

## 2020-09-29 PROCEDURE — 500865 HCHG NEEDLE, SPINAL 20G/22G: Performed by: ORTHOPAEDIC SURGERY

## 2020-09-29 PROCEDURE — 89051 BODY FLUID CELL COUNT: CPT

## 2020-09-29 PROCEDURE — A6454 SELF-ADHER BAND W>=3" <5"/YD: HCPCS | Performed by: ORTHOPAEDIC SURGERY

## 2020-09-29 PROCEDURE — 99233 SBSQ HOSP IP/OBS HIGH 50: CPT | Performed by: HOSPITALIST

## 2020-09-29 RX ORDER — ALPRAZOLAM 0.25 MG/1
0.25 TABLET ORAL
Status: DISCONTINUED | OUTPATIENT
Start: 2020-09-29 | End: 2020-09-30

## 2020-09-29 RX ORDER — ALBUTEROL SULFATE 90 UG/1
2 AEROSOL, METERED RESPIRATORY (INHALATION)
Status: DISCONTINUED | OUTPATIENT
Start: 2020-09-29 | End: 2020-09-30

## 2020-09-29 RX ORDER — OXYCODONE HYDROCHLORIDE 10 MG/1
10 TABLET ORAL EVERY 4 HOURS PRN
Status: DISCONTINUED | OUTPATIENT
Start: 2020-09-29 | End: 2020-09-30

## 2020-09-29 RX ORDER — OXYCODONE HYDROCHLORIDE 5 MG/1
5 TABLET ORAL EVERY 4 HOURS PRN
Status: DISCONTINUED | OUTPATIENT
Start: 2020-09-29 | End: 2020-09-30

## 2020-09-29 RX ORDER — LIDOCAINE HYDROCHLORIDE 20 MG/ML
INJECTION, SOLUTION EPIDURAL; INFILTRATION; INTRACAUDAL; PERINEURAL PRN
Status: DISCONTINUED | OUTPATIENT
Start: 2020-09-29 | End: 2020-09-29 | Stop reason: SURG

## 2020-09-29 RX ORDER — METOCLOPRAMIDE HYDROCHLORIDE 5 MG/ML
INJECTION INTRAMUSCULAR; INTRAVENOUS PRN
Status: DISCONTINUED | OUTPATIENT
Start: 2020-09-29 | End: 2020-09-29 | Stop reason: SURG

## 2020-09-29 RX ORDER — INSULIN GLARGINE 100 [IU]/ML
0.2 INJECTION, SOLUTION SUBCUTANEOUS EVERY EVENING
Status: DISCONTINUED | OUTPATIENT
Start: 2020-09-29 | End: 2020-09-30

## 2020-09-29 RX ORDER — SODIUM CHLORIDE, SODIUM LACTATE, POTASSIUM CHLORIDE, CALCIUM CHLORIDE 600; 310; 30; 20 MG/100ML; MG/100ML; MG/100ML; MG/100ML
INJECTION, SOLUTION INTRAVENOUS
Status: DISCONTINUED | OUTPATIENT
Start: 2020-09-29 | End: 2020-09-29 | Stop reason: SURG

## 2020-09-29 RX ORDER — DIPHENHYDRAMINE HYDROCHLORIDE 50 MG/ML
12.5 INJECTION INTRAMUSCULAR; INTRAVENOUS
Status: DISCONTINUED | OUTPATIENT
Start: 2020-09-29 | End: 2020-09-29 | Stop reason: HOSPADM

## 2020-09-29 RX ORDER — HALOPERIDOL 5 MG/ML
1 INJECTION INTRAMUSCULAR
Status: DISCONTINUED | OUTPATIENT
Start: 2020-09-29 | End: 2020-09-29 | Stop reason: HOSPADM

## 2020-09-29 RX ORDER — ONDANSETRON 2 MG/ML
4 INJECTION INTRAMUSCULAR; INTRAVENOUS
Status: DISCONTINUED | OUTPATIENT
Start: 2020-09-29 | End: 2020-09-29 | Stop reason: HOSPADM

## 2020-09-29 RX ADMIN — ALBUTEROL SULFATE 2 PUFF: 90 AEROSOL, METERED RESPIRATORY (INHALATION) at 22:40

## 2020-09-29 RX ADMIN — SODIUM CHLORIDE 3 G: 900 INJECTION INTRAVENOUS at 12:34

## 2020-09-29 RX ADMIN — FENTANYL CITRATE 50 MCG: 50 INJECTION INTRAMUSCULAR; INTRAVENOUS at 21:40

## 2020-09-29 RX ADMIN — VANCOMYCIN HYDROCHLORIDE 1500 MG: 500 INJECTION, POWDER, LYOPHILIZED, FOR SOLUTION INTRAVENOUS at 12:33

## 2020-09-29 RX ADMIN — HYDROCODONE BITARTRATE AND ACETAMINOPHEN 30 ML: 7.5; 325 SOLUTION ORAL at 21:07

## 2020-09-29 RX ADMIN — ALBUTEROL SULFATE 2 PUFF: 90 AEROSOL, METERED RESPIRATORY (INHALATION) at 12:40

## 2020-09-29 RX ADMIN — INSULIN HUMAN 7 UNITS: 100 INJECTION, SOLUTION PARENTERAL at 10:06

## 2020-09-29 RX ADMIN — SODIUM CHLORIDE, POTASSIUM CHLORIDE, SODIUM LACTATE AND CALCIUM CHLORIDE: 600; 310; 30; 20 INJECTION, SOLUTION INTRAVENOUS at 09:37

## 2020-09-29 RX ADMIN — PROPOFOL 200 MG: 10 INJECTION, EMULSION INTRAVENOUS at 20:02

## 2020-09-29 RX ADMIN — SODIUM CHLORIDE 3 G: 900 INJECTION INTRAVENOUS at 05:24

## 2020-09-29 RX ADMIN — SODIUM CHLORIDE, POTASSIUM CHLORIDE, SODIUM LACTATE AND CALCIUM CHLORIDE: 600; 310; 30; 20 INJECTION, SOLUTION INTRAVENOUS at 19:57

## 2020-09-29 RX ADMIN — SODIUM CHLORIDE, POTASSIUM CHLORIDE, SODIUM LACTATE AND CALCIUM CHLORIDE: 600; 310; 30; 20 INJECTION, SOLUTION INTRAVENOUS at 01:07

## 2020-09-29 RX ADMIN — VANCOMYCIN HYDROCHLORIDE 1500 MG: 500 INJECTION, POWDER, LYOPHILIZED, FOR SOLUTION INTRAVENOUS at 22:40

## 2020-09-29 RX ADMIN — ALBUTEROL SULFATE 2 PUFF: 90 AEROSOL, METERED RESPIRATORY (INHALATION) at 09:04

## 2020-09-29 RX ADMIN — OXYCODONE HYDROCHLORIDE 10 MG: 10 TABLET ORAL at 10:33

## 2020-09-29 RX ADMIN — OXYCODONE HYDROCHLORIDE 10 MG: 10 TABLET ORAL at 05:24

## 2020-09-29 RX ADMIN — LIDOCAINE HYDROCHLORIDE 100 MG: 20 INJECTION, SOLUTION EPIDURAL; INFILTRATION; INTRACAUDAL at 20:02

## 2020-09-29 RX ADMIN — ALBUTEROL SULFATE 2 PUFF: 90 AEROSOL, METERED RESPIRATORY (INHALATION) at 15:36

## 2020-09-29 RX ADMIN — METOCLOPRAMIDE 10 MG: 5 INJECTION, SOLUTION INTRAMUSCULAR; INTRAVENOUS at 20:13

## 2020-09-29 RX ADMIN — FENTANYL CITRATE 50 MCG: 50 INJECTION INTRAMUSCULAR; INTRAVENOUS at 21:10

## 2020-09-29 RX ADMIN — SODIUM CHLORIDE 3 G: 900 INJECTION INTRAVENOUS at 01:40

## 2020-09-29 RX ADMIN — ALPRAZOLAM 0.25 MG: 0.25 TABLET ORAL at 05:24

## 2020-09-29 RX ADMIN — ALBUTEROL SULFATE 2 PUFF: 90 AEROSOL, METERED RESPIRATORY (INHALATION) at 05:24

## 2020-09-29 ASSESSMENT — LIFESTYLE VARIABLES
ON A TYPICAL DAY WHEN YOU DRINK ALCOHOL HOW MANY DRINKS DO YOU HAVE: 0
TOTAL SCORE: 0
HAVE YOU EVER FELT YOU SHOULD CUT DOWN ON YOUR DRINKING: NO
TOTAL SCORE: 0
ALCOHOL_USE: YES
AVERAGE NUMBER OF DAYS PER WEEK YOU HAVE A DRINK CONTAINING ALCOHOL: 0
EVER FELT BAD OR GUILTY ABOUT YOUR DRINKING: NO
HAVE PEOPLE ANNOYED YOU BY CRITICIZING YOUR DRINKING: NO
CONSUMPTION TOTAL: NEGATIVE
TOTAL SCORE: 0
EVER HAD A DRINK FIRST THING IN THE MORNING TO STEADY YOUR NERVES TO GET RID OF A HANGOVER: NO
HOW MANY TIMES IN THE PAST YEAR HAVE YOU HAD 5 OR MORE DRINKS IN A DAY: 0

## 2020-09-29 ASSESSMENT — COPD QUESTIONNAIRES
DURING THE PAST 4 WEEKS HOW MUCH DID YOU FEEL SHORT OF BREATH: SOME OF THE TIME
HAVE YOU SMOKED AT LEAST 100 CIGARETTES IN YOUR ENTIRE LIFE: YES
DO YOU EVER COUGH UP ANY MUCUS OR PHLEGM?: YES, A FEW DAYS A WEEK OR MONTH
COPD SCREENING SCORE: 5

## 2020-09-29 ASSESSMENT — ENCOUNTER SYMPTOMS
SHORTNESS OF BREATH: 1
CHILLS: 0
FEVER: 0
VOMITING: 0
ROS GI COMMENTS: DRY MOUTH

## 2020-09-29 ASSESSMENT — COGNITIVE AND FUNCTIONAL STATUS - GENERAL
TOILETING: A LOT
STANDING UP FROM CHAIR USING ARMS: TOTAL
CLIMB 3 TO 5 STEPS WITH RAILING: TOTAL
PERSONAL GROOMING: A LOT
HELP NEEDED FOR BATHING: TOTAL
WALKING IN HOSPITAL ROOM: TOTAL
SUGGESTED CMS G CODE MODIFIER DAILY ACTIVITY: CL
DRESSING REGULAR LOWER BODY CLOTHING: TOTAL
MOVING FROM LYING ON BACK TO SITTING ON SIDE OF FLAT BED: A LITTLE
DAILY ACTIVITIY SCORE: 11
TURNING FROM BACK TO SIDE WHILE IN FLAT BAD: A LOT
MOBILITY SCORE: 10
MOVING TO AND FROM BED TO CHAIR: A LOT
EATING MEALS: A LOT
SUGGESTED CMS G CODE MODIFIER MOBILITY: CL
DRESSING REGULAR UPPER BODY CLOTHING: A LITTLE

## 2020-09-29 ASSESSMENT — PATIENT HEALTH QUESTIONNAIRE - PHQ9
SUM OF ALL RESPONSES TO PHQ9 QUESTIONS 1 AND 2: 0
1. LITTLE INTEREST OR PLEASURE IN DOING THINGS: NOT AT ALL
2. FEELING DOWN, DEPRESSED, IRRITABLE, OR HOPELESS: NOT AT ALL

## 2020-09-29 ASSESSMENT — PAIN DESCRIPTION - PAIN TYPE
TYPE: ACUTE PAIN
TYPE: ACUTE PAIN

## 2020-09-29 NOTE — PROGRESS NOTES
· 2 RN skin check complete with Jennifer GUTIERREZ.   · Devices in place PIV L wrist, torres catheter and Oxygen mask.  · Skin assessed under devices Yes.  · Confirmed pressure ulcers found on PI in Left foot.  · New potential pressure ulcers noted on Left Foot. Wound consult placed? YEs. Photo uploaded? Yes.   · The following interventions are in place Q 2 turns, pillow in place for support.    Patient presents with several integumentary concerns. Most notably left foot presents with open wound and blackened area on lateral left foot. Documented in flowsheet. Patient has a right sided BKA. Left toes amputated. Blanchable redness underneath breasts. Dry and calloused elbows. Bilateral blanchable redness on the top of ears. Pictures taken, LDA opened and wound consult placed.

## 2020-09-29 NOTE — PROGRESS NOTES
Triage officer:  I discussed with Dr. Miller. Ms. Echevarria was sent from the ER in [place] with left foot cellulitis and xray concerning for osteomyelitis. WBC 16.  Dr. Eason to admit.

## 2020-09-29 NOTE — ED NOTES
Pt aox4, skin pale cool and dry, airway patent, rr even and unlabored, no new complaints. Pt transports on 5lpm oxymask, vss. Transports with transport team to floor.

## 2020-09-29 NOTE — ASSESSMENT & PLAN NOTE
She is chronically on 2 liters of oxygen and is now requiring 5 liters  This may be due to the 24 mg morphine, 15 mg versed and 2 mg ativan given per transfer sheet.  Supplemental oxygen ordered.  If her sats decrease, her fentanyl patch may need to be removed and possibly narcan

## 2020-09-29 NOTE — PROGRESS NOTES
Assumed care at 0700. Received report from night shift RN. Pt is asleep in bed, on 5L oxy mask. Fall precautions and appropriate signs in place. Call light and belongings within reach. Bed alarm and hourly rounding in place. Communication board updated. Will continue to monitor and anticipate pt needs.

## 2020-09-29 NOTE — ASSESSMENT & PLAN NOTE
This is Sepsis Present on admission  SIRS criteria identified on my evaluation include: Leukocytosis, with WBC greater than 12,000  Source is left foot infection  Sepsis protocol initiated  Fluid resuscitation ordered per protocol  IV antibiotics as appropriate for source of sepsis  While organ dysfunction may be noted elsewhere in this problem list or in the chart, degree of organ dysfunction does not meet CMS criteria for severe sepsis

## 2020-09-29 NOTE — PROGRESS NOTES
"Patient arrived to [facility] via transport 0130. Patient welcomed to unit and oriented to use of call light. Patient very lethargic and had to be awoken several times to complete admission profile. Patient reports 10/10 pain in left foot. MD paged to request pain management orders if appropriate. Patient denies being diabetic. Patient also report numbness and tingling in all four extremities due to \"diabetic neuropathy.\" Patient requesting an epidural. Educated on pain management options on our floor. Patient requesting to \"see a real doctor now.\" Patient reminded she saw a doctor in the ED and will be followed by the hospitalist team in the AM. Patient given no sugar juice and offered snack. 2 RN skin check completed. Transport brought patient up and stated that she had a chart but it was \"lost.\" New chart created for patient on S5 and a short time later a CD with [employer] was tubed to S5. Wound pictures taken and wound consult placed. Placed on Q 2 turns. Patient resting in bed and yells when woken up. Patient vital signs /75   Pulse (!) 114   Temp 36.2 °C (97.2 °F) (Temporal)   Resp 20   Wt 120.2 kg (265 lb)   SpO2 93%   BMI 41.49 kg/m² . Assumed patient care at 0130.     0345: Patient yelling in room. Patient asking what it takes to get food because she hasn't ate in 26 hours. Patient reminded she has only been at [facility] for 8 hours and I have only been her nurse for 2 hours. Patient states \"This is why there's conspiracy theories\" and \"I know where I have been!\" Patient also asks \"Do I have to yell to get a tray before I will get one.\" Patient educated that we do not have a kitchen available at this time of the night and I can offer a snack. Patient agreed to snack and asked for a milk. This nurse went to get patient food and patient asleep when RN returned. Patient awakened to dress foot wound and patient drank milk then fell asleep before dressing of foot was finished. Sugar " "free Jello and Applesauce left unopened on bedside table.     0515: On call APRN paged. Orders for Inhaler, Xanax and Oxycodone received. Administered. Patient placed on .     0555: Patient daughter called for update on mom's condition. Patients daughter \"Edith\" stated that her mother is \"on some pretty heavy narcs\" and asked about her behavior. Daughter is very pleasant and requests an update when a plan of care is established for her.   "

## 2020-09-29 NOTE — CARE PLAN
Problem: Skin Integrity  Goal: Risk for impaired skin integrity will decrease  Outcome: PROGRESSING AS EXPECTED  Note: Pt reminded to turn self Q2h. Will continue to monitor.      Problem: Pain Management  Goal: Pain level will decrease to patient's comfort goal  Outcome: PROGRESSING SLOWER THAN EXPECTED  Note: Pt reports 8/10 pain in LLE, pt medicated per MAR. After reassessment pt reports 8/10 pain. Will continue to monitor pt's pain.

## 2020-09-29 NOTE — H&P
Hospital Medicine History & Physical Note    Date of Service  9/28/2020    Primary Care Physician  Pcp Pt States None    Consultants  none    Code Status  Full Code    Chief Complaint  Chief Complaint   Patient presents with   • Knee Pain   • Wound Infection       History of Presenting Illness  55 y.o. female who presented 9/28/2020 with left foot infection.  Ms. Echevarria has a past medical history of diabetes mellitus, hypertension and multiple amputations apparently has been off of her diabetes medicines for at least 3 months.  She presented to the emergency room in [facility] with left foot infection and pain for the past few days it is been worsening.  She also notes 1 day of left hand pain and redness as well.  In the emergency room in [place] they gave her vancomycin and Rocephin due to a white count of 16,000 and pus draining out of the left foot.  Is also found to have a glucose of 358 with an ESR of 109.   Apparently into the sheet that was sent with the patient, she was given 24 mg of morphine, 15 mg of Versed, and 2 mg of Ativan.  This cannot be corroborated.  She is on 2 L of oxygen chronically and here is requiring 5 L.  Here, patient is a moderate historian and is quite sleepy.  We will culture the pus from the foot.  A COVID test has been ordered.  When asked, patient states she has not had any exposure to COVID to her knowledge.    Review of Systems  Review of Systems   Constitutional: Negative for chills and fever.   Respiratory: Positive for shortness of breath.    Cardiovascular: Negative for chest pain.   Gastrointestinal: Negative for vomiting.        Dry mouth   All other systems reviewed and are negative.      Past Medical History   has a past medical history of Anxiety, Bronchitis, Diabetes ([facility]), GERD (gastroesophageal reflux disease), Hypertension, and Osteomyelitis ([facility]).    Surgical History   has a past surgical history that includes hysterectomy, vaginal (04/2001);  cholecystectomy robotic (2015); incision and drainage orthopedic (Left, 2015); stump revision (Left, 2015); split thickness skin graft (Left, 2016); and knee amputation below (Right, 2017).     Family History  Father  of a heart attack     Social History   reports that she has been smoking. She started smoking about 40 years ago. She has a 17.50 pack-year smoking history. She does not have any smokeless tobacco history on file. She reports current drug use. She reports that she does not drink alcohol.    Allergies  No Known Allergies    Medications  Prior to Admission Medications   Prescriptions Last Dose Informant Patient Reported? Taking?   fentaNYL (DURAGESIC) 50 MCG/HR PATCH 72 HR 2020 at unknown  Yes No   Sig: Apply 1 Patch to skin as directed every 72 hours.      Facility-Administered Medications: None       Physical Exam  Temp:  [37.4 °C (99.4 °F)] 37.4 °C (99.4 °F)  Pulse:  [112] 112  Resp:  [18] 18  BP: (141)/(76) 141/76  SpO2:  [95 %] 95 %    Physical Exam  Vitals signs and nursing note reviewed.   Constitutional:       Comments: disheveled   HENT:      Head: Normocephalic and atraumatic.      Mouth/Throat:      Mouth: Mucous membranes are dry.      Pharynx: Oropharynx is clear.   Eyes:      General: No scleral icterus.     Conjunctiva/sclera: Conjunctivae normal.   Neck:      Musculoskeletal: Normal range of motion and neck supple.   Cardiovascular:      Rate and Rhythm: Regular rhythm. Tachycardia present.      Heart sounds: No murmur.   Pulmonary:      Effort: Pulmonary effort is normal.      Breath sounds: Normal breath sounds.   Abdominal:      General: There is no distension.   Musculoskeletal:      Comments: Right BKA  Left trans-met with large ulcer draining pus  Left hand dorsum is red and warm to touch   Skin:     General: Skin is warm and dry.   Neurological:      Mental Status: She is oriented to person, place, and time.   Psychiatric:      Comments: Sleepy,  she is compliant with exam when awake         Laboratory:      Recent Labs     09/28/20 2007   SODIUM 133*   POTASSIUM 4.2   CHLORIDE 93*   CO2 22   GLUCOSE 299*   BUN 12   CREATININE 0.43*   CALCIUM 8.9     Recent Labs     09/28/20 2007   ALTSGPT 15   ASTSGOT 14   ALKPHOSPHAT 130*   TBILIRUBIN 0.7   GLUCOSE 299*         No results for input(s): NTPROBNP in the last 72 hours.      No results for input(s): TROPONINT in the last 72 hours.    Imaging:  DX-CHEST-PORTABLE (1 VIEW)   Final Result      Enlarged cardiac silhouette and interstitial prominence suggesting pulmonary edema.            Assessment/Plan:  I anticipate this patient will require at least two midnights for appropriate medical management, necessitating inpatient admission.    * Sepsis (HCC)- (present on admission)  Assessment & Plan  This is Sepsis Present on admission  SIRS criteria identified on my evaluation include: Leukocytosis, with WBC greater than 12,000  Source is left foot infection  Sepsis protocol initiated  Fluid resuscitation ordered per protocol  IV antibiotics as appropriate for source of sepsis  While organ dysfunction may be noted elsewhere in this problem list or in the chart, degree of organ dysfunction does not meet CMS criteria for severe sepsis          Diabetic foot ulcer (HCC)- (present on admission)  Assessment & Plan  Left trans-met with large ulcer which is draining pus which will be cultured  WBC in [place] was 16 and xray was suspicious for osteomyelitis.   Limb preservation service consulted: she may be a candidate for surgical debridement  Infectious disease consult in the morning.  IV vancomycin and Unasyn   ESR in [place] was 109      Acute on chronic respiratory failure with hypoxia (HCC)- (present on admission)  Assessment & Plan  She is chronically on 2 liters of oxygen and is now requiring 5 liters  This may be due to the 24 mg morphine, 15 mg versed and 2 mg ativan given per transfer  sheet.  Supplemental oxygen ordered.  If her sats decrease, her fentanyl patch may need to be removed and possibly narcan  COVID ordered    Non compliance with medical treatment- (present on admission)  Assessment & Plan  She stopped taking all of her meds except fentanyl patch    DM2 (diabetes mellitus, type 2) (CMS-[facility])- (present on admission)  Assessment & Plan  Glucose in [place] was 358  She has not been on insulin  Diabetic diet  IV fluids  Sliding scale insulin initiated: she will likely need long-acting though wait to see if she will be NPO in the next day due to possible surgery  Glycohemoglobin ordered.    Cellulitis of hand- (present on admission)  Assessment & Plan  Left hand dorsum    Hx of right BKA (HCC)- (present on admission)  Assessment & Plan  Hx of    Tobacco abuse- (present on admission)  Assessment & Plan  Nicotine patch offered    Chronic pain- (present on admission)  Assessment & Plan  Continue fentanyl patch 50 mcg    Hypertension- (present on admission)  Assessment & Plan  Hx of  She is not taking any medications  Consider starting based on her BP measurements

## 2020-09-29 NOTE — ED NOTES
Met with pt at bedside. Pt stated that she has not taken any of her medications > 3 months or longer. All medications remain on med rec.  Pt stated that she has not been on any antibiotics at home.      Home pharmacy [facility]

## 2020-09-29 NOTE — CONSULTS
"Diabetes Nurse Specialist:   Patient with existing type 2 diabetes and DFU, HbA1c = 13.3%.  FSBG since admission 299, 281; regular insulin ss coverage ordered.  Patient was last seen by RN, YOLANDA 12/13/17- see note below.  She had been on large doses of insulin, will likely need Lantus insulin to achieve FSBG <150 fasting, discharge and education needs to be determined after antibiotics/ resolution of infection.    Breann Ventura R.N.   Diabetes Health Educator      Consults   Signed   Date of Service:  12/13/2017  6:59 PM                    []Hide copied text    []Hover for details  Diabetes education: Met with Greta regarding diabetes management. Pt has a hx of diabetes on insulin. Pt states she was just released from a care facility two ago and had a new PCP. She states her new PCP, was working on \"curing her diabetes and taking her off insulin\".   Discussed goals for blood sugars, how insulin works, need for insulin, insulin storage, shelf life and site rotation. She states she was taking lantus 78 units BID, with Regular coverage if over 200.    Pt is currently on Lantus 80 units BID with Regular sliding scale coverage ac and hs. Pt continues to have blood sugars over 200: 246 (4 units), 272 (7 units) and 324 ( 10 units).  Plan:  Pt may benefit in having a meal dose of insulin in addition to her sliding scale coverage.  Pt states she has all her insulin and supplies at home. Reminded her depending on how long she is away from home, she may need to discard the current insulin opened before admit. CDE will continue to follow. Please call [phone] if needs change.           "

## 2020-09-29 NOTE — ED TRIAGE NOTES
Pt BIB EMS from [place] with c/c of left knee pain and left foot wound/osteomyelitis . Pt non complaint with her diabetes treatment. Pt with hx of R BKA, and partial amputations to L foot. Pt given vanco, rocephin, 24mg of MS, 15mg of versed, 2mg of ativan, and insulin PTA. Pt arrives with torres in place. Pt on 5L NC, stating home o2 use is 2.5L NC

## 2020-09-29 NOTE — CONSULTS
LIMB PRESERVATION SERVICE CONSULT      REFERRED BY: Dr. Martin    DATE OF CONSULTATION: 9/29/2020    REASON FOR CONSULT: Left first MT ulcer with TMA     HISTORY OF PRESENT ILLNESS: Greta Echevarria is a 55 y.o.  with a past medical history that includes type 2 diabetes, peripheral neuropathy, hypertension, left TMA and left GSR in 2016, right BKA in 2017 admitted 9/28/2020 for Septic Knee  Cellulitis, leg.   LPS has been consulted for evaluation of left plantar first MT ulcer.     Patient reports she has had the foot ulcer for approximately 3 years or longer.  She reports that she was originally seen by physical therapy in [place] but then transferred her care to [place] within the last year as the wound was not improving per patient.  Patient did report that she was receiving debridement to the wound at [place].  She was following up with the wound care clinic in [place] until the beginning of this year.  Since then patient has been changing her own wound dressings.  She reports that she cleanses the wound with a wound cleanser and applies a honey dressing.  When she was with the wound care clinic she had alternating dressings of honey and of calcium alginate.  She reports that the wound has stayed the same since taking care of it herself.  She has not noticed any drainage or pus until yesterday when she was told.  She denies any fevers, chills, nausea, vomiting.  She has not been on antibiotics recently.      IV antibiotics were started on this admission.  Infectious diseases has  been consulted.    Xray completed at outside facility and is positive for osteomyelitis.    Ortho has not been consulted yet.    Diagnosed with diabetes 20 years ago, and is currently not managing her diabetes.  She has not taken her medications for the last 3 months.  Prior to this patient was taking metformin and insulin.  Patient does not check her blood sugars when she was checking her blood sugars she reports  that they were averaging around 300. Has had previous diabetes education.  Does have numbness to foot.  Does not check foot routinely.  Usually wears socks while in the house.  She reports that she only transfers to chair.  She was given a prosthesis and diabetic shoe from [employer] however she had lost weight and the prosthesis did not fit her leg.  She reports that she does not wear the diabetic shoe as it does not work for her.  She has never followed back up with [employer] since then.       Smoking:   reports that she has been smoking. She started smoking about 40 years ago. She has a 17.50 pack-year smoking history. She does not have any smokeless tobacco history on file.    Alcohol:   reports no history of alcohol use.    Drug:   reports current drug use.      PAST MEDICAL HISTORY:   Past Medical History:   Diagnosis Date   • Anxiety    • Bronchitis    • Diabetes (HCC)    • GERD (gastroesophageal reflux disease)    • Hypertension    • Osteomyelitis (HCC)         PAST SURGICAL HISTORY:   Past Surgical History:   Procedure Laterality Date   • KNEE AMPUTATION BELOW Right 12/14/2017    Procedure: KNEE AMPUTATION BELOW;  Surgeon: Srinivas Delgado M.D.;  Location: [facility];  Service: Orthopedics   • SPLIT THICKNESS SKIN GRAFT Left 1/7/2016    Procedure: SPLIT THICKNESS SKIN GRAFT FOOT;  Surgeon: Jerzy Mayberry M.D.;  Location: [facility];  Service:    • STUMP REVISION Left 12/24/2015    Procedure: STUMP REVISION FOOT WITH VAC DRESSING;  Surgeon: Jerzy Mayberry M.D.;  Location: [facility];  Service:    • INCISION AND DRAINAGE ORTHOPEDIC Left 12/17/2015    Procedure: INCISION AND DRAINAGE ORTHOPEDIC- Foot ;  Surgeon: Jerzy Mayberry M.D.;  Location: [facility];  Service:    • CHOLECYSTECTOMY ROBOTIC  07/2015   • HYSTERECTOMY, VAGINAL  04/2001       MEDICATIONS:   Current Facility-Administered Medications   Medication Dose   • vancomycin (VANCOCIN) 1,500 mg  in  mL IVPB  12 mg/kg   • oxyCODONE immediate-release (ROXICODONE) tablet 5 mg  5 mg    Or   • oxyCODONE immediate release (ROXICODONE) tablet 10 mg  10 mg   • ALPRAZolam (XANAX) tablet 0.25 mg  0.25 mg   • albuterol inhaler 2 Puff  2 Puff   • insulin glargine (Lantus) injection  0.2 Units/kg/day   • fentaNYL (DURAGESIC) 50 MCG/HR 1 Patch  1 Patch   • senna-docusate (PERICOLACE or SENOKOT S) 8.6-50 MG per tablet 2 Tab  2 Tab    And   • polyethylene glycol/lytes (MIRALAX) PACKET 1 Packet  1 Packet    And   • magnesium hydroxide (MILK OF MAGNESIA) suspension 30 mL  30 mL    And   • bisacodyl (DULCOLAX) suppository 10 mg  10 mg   • enoxaparin (LOVENOX) inj 40 mg  40 mg   • acetaminophen (TYLENOL) tablet 650 mg  650 mg   • ondansetron (ZOFRAN) syringe/vial injection 4 mg  4 mg   • ondansetron (ZOFRAN ODT) dispertab 4 mg  4 mg   • promethazine (PHENERGAN) tablet 12.5-25 mg  12.5-25 mg   • promethazine (PHENERGAN) suppository 12.5-25 mg  12.5-25 mg   • prochlorperazine (COMPAZINE) injection 5-10 mg  5-10 mg   • nicotine (NICODERM) 21 MG/24HR 21 mg  21 mg    And   • nicotine polacrilex (NICORETTE) 2 MG piece 2 mg  2 mg   • insulin regular (HumuLIN R,NovoLIN R) injection  3-14 Units    And   • glucose 4 g chewable tablet 16 g  16 g    And   • dextrose 50% (D50W) injection 50 mL  50 mL   • ampicillin/sulbactam (UNASYN) 3 g in  mL IVPB  3 g   • MD Alert...Vancomycin per Pharmacy         ALLERGIES:  No Known Allergies     FAMILY HISTORY: History reviewed. No pertinent family history.      REVIEW OF SYSTEMS:   Constitutional: Negative for chills, fever   Respiratory: Negative for cough and shortness of breath.    Cardiovascular:Negative for chest pain, and claudication.   Gastrointestinal: Negative for constipation, diarrhea, nausea and vomiting.   Lower extremities: positive for swelling and redness to left knee  Neurological: positive for numbness to feet and lower legs, positive to pain to left knee  All other  "systems reviewed and are negative     RESULTS:     Recent Labs     09/28/20 2007 09/29/20  1043   WBC 14.6* 14.5*   RBC 3.95* 3.51*   HEMOGLOBIN 11.0* 9.7*   HEMATOCRIT 35.0* 31.7*   MCV 88.6 90.3   MCH 27.8 27.6   MCHC 31.4* 30.6*   RDW 50.5* 52.2*   PLATELETCT 249 232   MPV 12.0 10.6     Recent Labs     09/28/20 2007 09/29/20  1043   SODIUM 133* 131*   POTASSIUM 4.2 3.9   CHLORIDE 93* 95*   CO2 22 21   GLUCOSE 299* 317*   BUN 12 13         ESR:     None this admission    CRP:       None this admission      COVID-19: Not detected this admission    X-ray: Positive for osteomyelitis on report, no image in epic    MRI: Pending    Arterial studies: None    A1c:  Lab Results   Component Value Date/Time    HBA1C 13.3 (H) 09/28/2020 08:07 PM            Microbiology:  Results     Procedure Component Value Units Date/Time    BLOOD CULTURE [[id number]] Collected: 09/28/20 2007    Order Status: Completed Specimen: Blood from Peripheral Updated: 09/29/20 0625    Narrative:      Per Hospital Policy: Only change Specimen Src: to \"Line\" if  specified by physician order.    BLOOD CULTURE [[id number]] Collected: 09/28/20 1951    Order Status: Completed Specimen: Blood from Peripheral Updated: 09/29/20 0623    Narrative:      Per Hospital Policy: Only change Specimen Src: to \"Line\" if  specified by physician order.    CULTURE WOUND W/ GRAM STAIN [[id number]] Collected: 09/28/20 2054    Order Status: Completed Specimen: Wound from Left Foot Updated: 09/28/20 2303    URINALYSIS [[id number]]  (Abnormal) Collected: 09/28/20 2115    Order Status: Completed Specimen: Urine Updated: 09/28/20 2237     Color Yellow     Character Cloudy     Specific Gravity 1.036     Ph 5.5     Glucose >=1000 mg/dL      Ketones 80 mg/dL      Protein 100 mg/dL      Bilirubin Negative     Urobilinogen, Urine 1.0     Nitrite Negative     Leukocyte Esterase Negative     Occult Blood Moderate     Micro Urine Req Microscopic    Narrative:      Indication for " culture:->Emergency Room Patient    SARS-CoV-2, PCR (In-House) [[id number]] Collected: 09/28/20 2051    Order Status: Completed Updated: 09/28/20 2205     SARS-CoV-2 Source NP Swab     SARS-CoV-2 by PCR NotDetected     Comment: [facility] providers: PLEASE REFER TO DE-ESCALATION AND RETESTING PROTOCOL  on [email or web address]  **The [facility] GeneXpert Xpress SARS-CoV-2 Test has been made available for  use under the Emergency Use Authorization (EUA) only.         Narrative:      Is patient being admitted?->Yes  Does this patient meet criteria for Rush/Cepheid per [facility]  Inpatient Workflow? (See workflow link below)->Yes  Expected turn around time?->Rush (Cepheid 2-4 hours)  Is this the patients First SARS CoV-2 test?->Unknown  Is this patient employed in healthcare?->No  Is the patient symptomatic as defined by the CDC?->Yes  Date of symptom onset?->9/28/20  Is the patient hospitalized?->No  Is the patient a resident in a congregate care setting?->No  Is the patient pregnant?->No    URINE CULTURE(NEW) [[id number]] Collected: 09/28/20 2115    Order Status: Completed Specimen: Urine Updated: 09/28/20 2205    Narrative:      Indication for culture:->Emergency Room Patient    COVID/SARS CoV-2 PCR [[id number]] Collected: 09/28/20 2051    Order Status: Completed Specimen: Respirate from Nasopharyngeal Updated: 09/28/20 2103     COVID Order Status Received     Comment: The order for SARS CoV-2 testing has been received by the  Laboratory. This result is neither positive nor negative.  Final results of testing will report in 24-48 hours, separately.         Narrative:      Is patient being admitted?->Yes  Does this patient meet criteria for Rush/Cepheid per [facility]  Inpatient Workflow? (See workflow link below)->Yes  Expected turn around time?->Rush (Cepheid 2-4 hours)  Is this the patients First SARS CoV-2 test?->Unknown  Is this patient employed in healthcare?->No  Is the patient symptomatic as defined by the CDC?->Yes  Date of  symptom onset?->9/28/20  Is the patient hospitalized?->No  Is the patient a resident in a congregate care setting?->No  Is the patient pregnant?->No           PHYSICAL EXAMINATION:     VITAL SIGNS: /87   Pulse (!) 101   Temp 36.6 °C (97.9 °F) (Temporal)   Resp 20   Wt 120.2 kg (265 lb)   SpO2 96%   BMI 41.49 kg/m²       General Appearance:  Well developed, well nourished, in no acute distress  Obese, drowsy    Lower Extremity Assessment:    Edema:   +2 nonpitting to left ankle    Pulses:  R popliteal: Palpable +1, with Doppler biphasic  L foot: +2 DP, +1 PT.  With Doppler multiphasic tones heard to DP and PT    Heart rate tachycardic    ROM dorsi/plantarflexion  Unable to assess due to significant left knee pain    Structural /mechanical changes:  Left TMA 2015  Right BKA 2017    Sensory Assessment  Left foot insensate with monofilament until knee level  Right distal stump insensate until knee    Monofilament testing with a 10 gram force: sensation intact: decreased bilaterally  Visual Inspection: Feet with maceration, ulcers, fissures.  Pedal pulses: intact bilaterally        Wound Assessment:      Left plantar first MT ulcer:  Bone easily exposed, minimal amount of slough otherwise pink nongranular tissue noted  erythema: Mild to none around wound  Drainage: Scant serosanguineous  Callus: Closed rolled edges  Odor: None  Measures: 3.5 x 3.5 x 0.4 cm with a tract to the 5 o'clock position medial aspect of 2.2 cm    Wound cleaned with NS.  Applied NS moistened Kerlix to wound bed followed by dry gauze, roll gauze.            ASSESSMENT AND PLAN:   55-year-old female with diabetes, right BKA, left TMA.  Presents with left first MT plantar ulcer.    Wound is chronic per patient, has had for several years.  Patient also presents with severe left knee pain and tenderness. Bone is exposed to the first metatarsal.  No significant purulence or cellulitis.  Do not recommend MRI. Wound culture taken.  ID has  consulted.  Recommend continuation with antibiotics.  Recommend offloading.  Patient has been noncompliant with this in the past.  Recommend I&D of left foot with TMA revision, bone biopsy and VAC.  Additionally patient will benefit from aspiration of left knee.  Orthopedics contacted, plan for surgery today.      Wound care:   -Wound care orders to be placed postop      Imaging/Labs:  -COVID-19: not detected 9/28/2020  Check arterial studies foot--DM for 20 years, tobacco abuse 40 years    Vascular status:   -palpable pedal pulses to L foot    Surgery:   -NPO since breakfast last ate around 0900  Consult Ortho,  D/W Dr. Mayberry. Plan for L knee aspiration, L TMA revision with I & D, bone biopsy and VAC placement,    Antibiotics:   -Carmen ESTEVEZ has been consulted, D/W Dr. Garcia  Wound cx pending      Weight Bearing Status:   -Heel weight bearing LLE  WBAT with prosthesis to RLE    Offloading:   -Offloading boot  when ambulating; ordered through [facility]  -Orthotic company: [employer].     PT/OT:   -consult PT/OT      Diabetes Education:   - consulted CDE and RD.     - Implications of loss of protective sensation (LOPS) discussed with patient- including increased risk for amputation.  Advised to check feet at least daily, moisturize feet, and to always wear protective foot wear.   -avoid trimming own nails. See podiatrist or certified foot and nail RN  -keep blood sugars <150 for improved wound healing      Tobacco abuse:  Risk discussed in conjunction with diabetes.  Not interested in cessation at this time.    D/W: pt, RN, Dr. Banuelos, Dr. Garcia, Dr. Mayberry      Discharge Plan:    Lives in [place]    Follow up: TBD, anticipate wound VAC therapy.        Please note that this dictation was created using voice recognition software. I have  worked with technical experts from [facility] to optimize the interface.  I have made every reasonable attempt to correct obvious errors, but there may be errors of grammar and  possibly content that I did not discover before finalizing the note.

## 2020-09-29 NOTE — CARE PLAN
Problem: Communication  Goal: The ability to communicate needs accurately and effectively will improve  Outcome: PROGRESSING AS EXPECTED  Intervention: [facility] patient and significant other/support system to call light to alert staff of needs  Flowsheets (Taken 9/29/2020 0312)  Oriented to:: All of the Following : Location of Bathroom, Visiting Policy, Unit Routine, Call Light and Bedside Controls, Bedside Rail Policy, Smoking Policy, Rights and Responsibilities, Bedside Report, and Patient Education Notebook  Note: Patient welcomed to unit. Oriented to staff and use of call light.      Problem: Safety  Goal: Will remain free from falls  Outcome: PROGRESSING AS EXPECTED  Intervention: Implement fall precautions  Flowsheets (Taken 9/29/2020 0312)  Bed Alarm: Yes - Alarm On  Environmental Precautions:   Personal Belongings, Wastebasket, Call Bell etc. in Easy Reach   Bed in Low Position   Communication Sign for Patients & Families   Mobility Assessed & Appropriate Sign Placed  Chair/Bed Strip Alarm: Yes - Alarm On  Note: Patient placed on bed alarm and educated about use of call light.

## 2020-09-29 NOTE — PROGRESS NOTES
[facility] Medicine Daily Progress Note    Date of Service  9/29/2020    Chief Complaint  55 y.o. female admitted 9/28/2020 with past medical history of diabetes mellitus, hypertension chronic hypoxic respiratory failure on 2 L oxygen  apparently has been off of her diabetes medicines for at least 3 months.  She presented to the emergency room in [facility] with left foot infection and pain for the past few days it is been worsening.  She also notes 1 day of left hand pain and redness as well.      Hospital Course    She presented with sepsis and was given vancomycin and Rocephin.  She was found to be hyperglycemic.  ESR and CRP were elevated.      Interval Problem Update  9/29: Patient states that her hand swelling and foot swelling is decreasing.  Orthopedics was consulted and will take patient to the OR for debridement and biopsy of bone.  Left knee is also swollen and she was taken for arthrocentesis.  We will continue vancomycin and Unasyn for now.    Consultants/Specialty  LPS      Code Status  Full Code    Disposition  PT OT eval    Review of Systems  Review of Systems   Constitutional: Negative for chills and fever.   Respiratory: Positive for shortness of breath.    Cardiovascular: Negative for chest pain.   Gastrointestinal: Negative for vomiting.        Dry mouth   All other systems reviewed and are negative.       Physical Exam  Temp:  [36.2 °C (97.2 °F)-37.6 °C (99.7 °F)] 36.6 °C (97.9 °F)  Pulse:  [101-122] 101  Resp:  [15-24] 20  BP: (118-143)/(71-88) 143/87  SpO2:  [90 %-98 %] 96 %    Physical Exam  Vitals signs and nursing note reviewed.   Constitutional:       General: She is not in acute distress.     Appearance: Normal appearance. She is not ill-appearing, toxic-appearing or diaphoretic.      Comments: disheveled   HENT:      Head: Normocephalic and atraumatic.      Nose: No congestion or rhinorrhea.      Mouth/Throat:      Mouth: Mucous membranes are dry.      Pharynx: Oropharynx is  clear. No oropharyngeal exudate or posterior oropharyngeal erythema.   Eyes:      General: No scleral icterus.     Conjunctiva/sclera: Conjunctivae normal.   Neck:      Musculoskeletal: Normal range of motion and neck supple. No neck rigidity or muscular tenderness.      Vascular: No carotid bruit.   Cardiovascular:      Rate and Rhythm: Regular rhythm. Tachycardia present.      Pulses: Normal pulses.      Heart sounds: Normal heart sounds. No murmur. No friction rub. No gallop.    Pulmonary:      Effort: Pulmonary effort is normal. No respiratory distress.      Breath sounds: Normal breath sounds. No stridor. No wheezing or rhonchi.   Abdominal:      General: Abdomen is flat. There is no distension.      Palpations: There is no mass.      Tenderness: There is no abdominal tenderness. There is no left CVA tenderness, guarding or rebound.      Hernia: No hernia is present.   Musculoskeletal: Normal range of motion.         General: No swelling.      Right lower leg: No edema.      Left lower leg: No edema.      Comments: Right BKA  Left trans-met with large ulcer bone exposed  Left hand dorsum is red, swollen and warm to touch   Lymphadenopathy:      Cervical: No cervical adenopathy.   Skin:     General: Skin is warm and dry.      Capillary Refill: Capillary refill takes more than 3 seconds.      Coloration: Skin is not jaundiced or pale.      Findings: No bruising or erythema.   Neurological:      Mental Status: She is alert and oriented to person, place, and time.         Fluids    Intake/Output Summary (Last 24 hours) at 9/29/2020 1336  Last data filed at 9/29/2020 0950  Gross per 24 hour   Intake 1280 ml   Output 700 ml   Net 580 ml       Laboratory  Recent Labs     09/28/20 2007 09/29/20  1043   WBC 14.6* 14.5*   RBC 3.95* 3.51*   HEMOGLOBIN 11.0* 9.7*   HEMATOCRIT 35.0* 31.7*   MCV 88.6 90.3   MCH 27.8 27.6   MCHC 31.4* 30.6*   RDW 50.5* 52.2*   PLATELETCT 249 232   MPV 12.0 10.6     Recent Labs      09/28/20 2007 09/29/20  1043   SODIUM 133* 131*   POTASSIUM 4.2 3.9   CHLORIDE 93* 95*   CO2 22 21   GLUCOSE 299* 317*   BUN 12 13   CREATININE 0.43* 0.47*   CALCIUM 8.9 8.8                   Imaging  [id number] FILM X-RAY   Final Result      [id number] FILM X-RAY   Final Result      DX-CHEST-PORTABLE (1 VIEW)   Final Result      Enlarged cardiac silhouette and interstitial prominence suggesting pulmonary edema.      DX-FOOT-COMPLETE 3+ LEFT    (Results Pending)   MR-FOOT-WITH LEFT    (Results Pending)   US-EXTREMITY ARTERY LOWER BILAT W/SHAHNAZ (COMBO)    (Results Pending)        Assessment/Plan  * Diabetic foot ulcer (HCC)- (present on admission)  Assessment & Plan  Left trans-met with large ulcer which is draining pus which will be cultured  WBC in [place] was 16 and xray was suspicious for osteomyelitis.   Limb preservation service consulted: she may be a candidate for surgical debridement.  IV vancomycin and Unasyn   ESR in [place] was 109  Infectious disease consulted    Acute on chronic respiratory failure with hypoxia (HCC)- (present on admission)  Assessment & Plan  She is chronically on 2 liters of oxygen and is now requiring 5 liters  This may be due to the 24 mg morphine, 15 mg versed and 2 mg ativan given per transfer sheet.  Supplemental oxygen ordered.  If her sats decrease, her fentanyl patch may need to be removed and possibly narcan  COVID ordered    Non compliance with medical treatment- (present on admission)  Assessment & Plan  She stopped taking all of her meds except fentanyl patch    DM2 (diabetes mellitus, type 2) (CMS-HCC)- (present on admission)  Assessment & Plan  Start on insulin sliding scale with serial Accu-Checks  Hemoglobin A1c 13.3  Hypoglycemic protocol in place  Start Lantus q. evening 24 units      Cellulitis of hand- (present on admission)  Assessment & Plan  Left hand dorsum    Hx of right BKA (HCC)- (present on admission)  Assessment & Plan  Hx  of    Sepsis (HCC)- (present on admission)  Assessment & Plan  This is Sepsis Present on admission  SIRS criteria identified on my evaluation include: Leukocytosis, with WBC greater than 12,000  Source is left foot infection  Sepsis protocol initiated  Fluid resuscitation ordered per protocol  IV antibiotics as appropriate for source of sepsis  While organ dysfunction may be noted elsewhere in this problem list or in the chart, degree of organ dysfunction does not meet CMS criteria for severe sepsis          Tobacco abuse- (present on admission)  Assessment & Plan  Nicotine patch offered    Chronic pain- (present on admission)  Assessment & Plan  Continue fentanyl patch 50 mcg    Hypertension- (present on admission)  Assessment & Plan  Hx of  She is not taking any medications  Consider starting based on her BP measurements       VTE prophylaxis: lovenox

## 2020-09-29 NOTE — PROGRESS NOTES
Pharmacy Kinetics 55 y.o. female on vancomycin day # 1 2020    Currently on Vancomycin 1500 mg iv q12hr (1100, 2300)  Provider specified end date: n/a    Indication for Treatment: diabetic foot infection    Pertinent history per medical record: Admitted on 2020 for left foot infection. Originally presented to [facility] for worsening left foot pain, also notes left hand pain and redness as well. PMH of diabetes s/p multiple amputations, has been off diabetes medications for 3 months.    Other antibiotics: Unasyn 3 g q6h    -Received 3 gm loading dose of vancomycin and 1 gm Rocephin at 0600 on  at OSH    Allergies: Patient has no known allergies.     List concerns for renal function: BMI 41.48, albumin 2.6    Pertinent cultures to date:   : blood x2: pending  : wound: pending  : urine: pending      Recent Labs     20   WBC 14.6*   NEUTSPOLYS 83.20*     Recent Labs     20   BUN 12   CREATININE 0.43*   ALBUMIN 2.6*     No intake or output data in the 24 hours ending 20 0324   /75   Pulse (!) 114   Temp 36.2 °C (97.2 °F) (Temporal)   Resp 20   Wt 120.2 kg (265 lb)   SpO2 93%  Temp (24hrs), Av.1 °C (98.8 °F), Min:36.2 °C (97.2 °F), Max:37.6 °C (99.7 °F)      A/P   1. Vancomycin dose change: new start  2. Next vancomycin level: next 24-48 hours (not ordered)  3. Goal trough: 10-15 mcg/mL  4. Comments: Unasyn and vancomycin initiated for diabetic foot infection. Some concern for accumulation with elevated BMI, will proceed with 12 mg/kg q12h dose. Pharmacy will continue to follow.    Hay Martinez, PharmD

## 2020-09-29 NOTE — PROGRESS NOTES
Order for diabetic boot has been submitted to [facility]. If any questions you can contact them at ext # [phone].

## 2020-09-29 NOTE — ASSESSMENT & PLAN NOTE
Start on insulin sliding scale with serial Accu-Checks  Hemoglobin A1c 13.3  Hypoglycemic protocol in place  10/7: Patient is having hypoglycemic episodes overnight.  I will decrease her Lantus to 80 units in the evening and keep the 100 units during the day.

## 2020-09-29 NOTE — ED PROVIDER NOTES
"ED Provider  Scribed for Jose Antonio Miller D.O. by Ellis Herndon. 9/28/2020  7:14 PM    Means of arrival: Ambulance  History obtained from: Patient  History limited by: Patient's altered mental status    CHIEF COMPLAINT  Chief Complaint   Patient presents with   • Knee Pain   • Wound Infection       HPI  Greta Echevarria is a 55 y.o. female with right BKA and diabetes who presents to the ED as a transfer from [place] for evaluation of a left foot ulcer. She is reported to have associated left knee pain, but no fever. The patient does report \"I hurt all over.\" The patient reports symptoms started one day ago, but is somnolent during exam. She did get transferred to [facility] for concerns of osteomyelitis. The patient is reported to be a non compliant diabetic and methamphetamine user. She did receive Rocephin and Vanco prior to transfer.     HPI limited secondary to patient's altered mental status.     REVIEW OF SYSTEMS  See HPI for further details.   ROS limited secondary to patient's altered mental status.     PAST MEDICAL HISTORY   has a past medical history of Anxiety, Bronchitis, Diabetes (HCC), GERD (gastroesophageal reflux disease), Hypertension, and Osteomyelitis ([facility]).    SOCIAL HISTORY  Social History     Tobacco Use   • Smoking status: Current Every Day Smoker     Packs/day: 0.50     Years: 35.00     Pack years: 17.50     Start date: 1/1/1980   Substance and Sexual Activity   • Alcohol use: No   • Drug use: Yes   • Sexual activity: None noted       SURGICAL HISTORY   has a past surgical history that includes hysterectomy, vaginal (04/2001); cholecystectomy robotic (07/2015); incision and drainage orthopedic (Left, 12/17/2015); stump revision (Left, 12/24/2015); split thickness skin graft (Left, 1/7/2016); and knee amputation below (Right, 12/14/2017).    CURRENT MEDICATIONS  Home Medications     Reviewed by Leonid Hammond on 09/28/20 at 2100  Med List Status: Complete   Medication Last Dose Status " "  atorvastatin (LIPITOR) 40 MG Tab > 3 months Active   budesonide-formoterol (SYMBICORT) 160-4.5 MCG/ACT Aerosol > 3 months Active   fentaNYL (DURAGESIC) 50 MCG/HR PATCH 72 HR 9/26/2020 Active   gabapentin (NEURONTIN) 600 MG tablet > 3 months Active   gemfibrozil (LOPID) 600 MG Tab > 3 months Active   insulin glargine (LANTUS) 100 UNIT/ML Solution > 3 months Active   insulin regular (HUMULIN R) 100 Unit/mL Solution > 3 months Active   lisinopril (PRINIVIL) 10 MG Tab > 3 months Active   metoprolol (LOPRESSOR) 25 MG Tab > 3 months Active   tiotropium (SPIRIVA) 18 MCG Cap > 3 months Active                ALLERGIES  No Known Allergies    PHYSICAL EXAM  VITAL SIGNS: /76   Pulse (!) 112   Temp 37.4 °C (99.4 °F) (Oral)   Resp 18   Wt 120.2 kg (265 lb)   SpO2 95%   BMI 41.49 kg/m²   Constitutional: Somnolent. Sleeping during exam. Not answering questions. Complains of \"all over body hurts.\"  HENT: No signs of trauma, mucous membranes are moist  Eyes: Conjunctiva normal, Non-icteric.   Neck: Normal range of motion, No tenderness, Supple.  Cardiovascular: Regular rate and rhythm, no murmurs.   Thorax & Lungs: Normal breath sounds, No respiratory distress, No wheezing, No chest tenderness.   Abdomen: Significantly obese. Soft, No tenderness, No masses, No pulsatile masses. No peritoneal signs.  : In dwelling torres catheter present.   Skin: Warm, Dry, normal color.   Extremities: Right lower extremity BKA. Left lower extremity with amputation of the toes, 1st distal metatarsal region with grade 3 decubitus ulcer that is draining purulent discharge and moderately tender, no surrounding erythema.   Musculoskeletal: Good range of motion in all major joints. No tenderness to palpation or major deformities noted.   Neurologic: Somnolent, Normal motor function, Normal sensory function, No focal deficits noted.     DIAGNOSTIC STUDIES / PROCEDURES    LABS  Results for orders placed or performed during the hospital " encounter of 09/28/20   LACTIC ACID   Result Value Ref Range    Lactic Acid 1.7 0.5 - 2.0 mmol/L   CBC WITH DIFFERENTIAL   Result Value Ref Range    WBC 14.6 (H) 4.8 - 10.8 K/uL    RBC 3.95 (L) 4.20 - 5.40 M/uL    Hemoglobin 11.0 (L) 12.0 - 16.0 g/dL    Hematocrit 35.0 (L) 37.0 - 47.0 %    MCV 88.6 81.4 - 97.8 fL    MCH 27.8 27.0 - 33.0 pg    MCHC 31.4 (L) 33.6 - 35.0 g/dL    RDW 50.5 (H) 35.9 - 50.0 fL    Platelet Count 249 164 - 446 K/uL    MPV 12.0 9.0 - 12.9 fL    Neutrophils-Polys 83.20 (H) 44.00 - 72.00 %    Lymphocytes 7.70 (L) 22.00 - 41.00 %    Monocytes 8.90 0.00 - 13.40 %    Eosinophils 0.10 0.00 - 6.90 %    Basophils 0.10 0.00 - 1.80 %    Neutrophils (Absolute) 12.16 (H) 2.00 - 7.15 K/uL    Lymphs (Absolute) 1.13 1.00 - 4.80 K/uL    Monos (Absolute) 1.30 (H) 0.00 - 0.85 K/uL    Eos (Absolute) 0.01 0.00 - 0.51 K/uL    Baso (Absolute) 0.01 0.00 - 0.12 K/uL   COMP METABOLIC PANEL   Result Value Ref Range    Sodium 133 (L) 135 - 145 mmol/L    Potassium 4.2 3.6 - 5.5 mmol/L    Chloride 93 (L) 96 - 112 mmol/L    Co2 22 20 - 33 mmol/L    Anion Gap 18.0 (H) 7.0 - 16.0    Glucose 299 (H) 65 - 99 mg/dL    Bun 12 8 - 22 mg/dL    Creatinine 0.43 (L) 0.50 - 1.40 mg/dL    Calcium 8.9 8.5 - 10.5 mg/dL    AST(SGOT) 14 12 - 45 U/L    ALT(SGPT) 15 2 - 50 U/L    Alkaline Phosphatase 130 (H) 30 - 99 U/L    Total Bilirubin 0.7 0.1 - 1.5 mg/dL    Albumin 2.6 (L) 3.2 - 4.9 g/dL    Total Protein 6.9 6.0 - 8.2 g/dL    Globulin 4.3 (H) 1.9 - 3.5 g/dL    A-G Ratio 0.6 g/dL   URINALYSIS    Specimen: Urine   Result Value Ref Range    Micro Urine Req Microscopic    COVID/SARS CoV-2 PCR    Specimen: Nasopharyngeal; Respirate   Result Value Ref Range    COVID Order Status Received    ESTIMATED GFR   Result Value Ref Range    GFR If African American >60 >60 mL/min/1.73 m 2    GFR If Non African American >60 >60 mL/min/1.73 m 2   SARS-CoV-2, PCR (In-House)   Result Value Ref Range    SARS-CoV-2 Source NP Swab     SARS-CoV-2 by PCR  NotDetected      All labs reviewed by me.    RADIOLOGY  DX-CHEST-PORTABLE (1 VIEW)   Final Result      Enlarged cardiac silhouette and interstitial prominence suggesting pulmonary edema.        The radiologist's interpretations of all radiological studies have been reviewed by me.    Films have been independently by me      COURSE  Pertinent Labs & Imaging studies reviewed. (See chart for details)    Obtained and reviewed records from [place] which reveal the patient presented due to complaints of left foot pain and temperature of 100.6 °F. She has a history of poor medical compliance and methamphetamine use. X-ray of left knee showed large joint effusion. X-ray of left foot showed osteomyelitis. Labs revealed a sedimentation rate of 109, glucose of 358, and WBC of 16.     7:14 PM - Patient seen and examined at bedside. Discussed plan of care.  Ordered for DX-Chest (1 view), Lactic Acid, COVID/SARS, CBC with differential, CMP, Urinalysis, Urine Culture, and Blood Culture x2 to evaluate her symptoms.     7:35 PM I discussed the patient's case and the above findings with Dr. Martin (Hospitalist) who will evaluate the patient for hospitalization.      MEDICAL DECISION MAKING  This is a 55 y.o. female who presents with fever reported from the transferring facility, along with osteomyelitis on the left foot, the patient is very somnolent and still has a fentanyl patch on.  At this time the patient will be admitted for further evaluation and treatment.      DISPOSITION:  Patient will be hospitalized by Dr. Martin (Hospitalist) in guarded condition.    FINAL IMPRESSION  1. Osteomyelitis of left foot, unspecified type (HCC)         Ellis SON (Sudha), am scribing for, and in the presence of, Jose Antonio Miller D.O..    Electronically signed by: Ellis Herndon (Sudha), 9/28/2020    Jose Antonio SON D.O. personally performed the services described in this documentation, as scribed by Ellis Herndon in my presence,  and it is both accurate and complete.    C.    The note accurately reflects work and decisions made by me.  Jose Antonio Miller D.O.  9/28/2020  10:25 PM

## 2020-09-29 NOTE — ASSESSMENT & PLAN NOTE
Left trans-met with large ulcer which is draining pus which will be cultured  WBC in [place] was 16 and xray was suspicious for osteomyelitis.   Limb preservation service consulted: she may be a candidate for surgical debridement.  IV vancomycin and Unasyn   Fortaz and cefazolin has been started per ID 10/2  Cefuroxime started by ID 10/5  ESR in [place] was 109  Infectious disease consulted  Wound culture positive for group A strep  Bone culture found group A strep, staph aureus, and alcaligenes faecalis.

## 2020-09-29 NOTE — CONSULTS
DATE OF SERVICE:  09/29/2020    INFECTIOUS DISEASE CONSULTATION    CHIEF COMPLAINT:  Left knee pain.    REASON FOR CONSULTATION:  Diabetic foot infection.    REFERRING PROVIDER:  Ana Banuelos MD    HISTORY OF PRESENT ILLNESS:  This is a 55-year-old poorly controlled diabetic   female with history of right BKA due to osteomyelitis in 2017, was transferred   from [facility] primarily for increased left knee joint   swelling and pain.  She was also noted to have increased drainage from her   bottom of her left foot, prompting an x-ray that showed ulceration, but could   not exclude osteomyelitis in her first to third metatarsal joint.  She also   was found to have on imaging a large left knee effusion.  She presented to   [facility] with a white count of 16,000.  This prompted transfer to   [facility] where she was found to have a white count of 14,000.    She was afebrile.  Blood cultures and wound cultures were drawn.  Because of   her diabetic foot infection, current infectious disease consultation now   prompted.  The patient has been noncompliant with her diabetic medications,   which include Lantus and metformin.  In fact, she has been off of these   medications for the past 3 months because she feels better without it.  Her   blood sugars average around 350.  She states she follows wound clinic at   [place] and has had for the past year and has had persistent drainage   from her left foot for several months now.  She denies any antibiotic usage   recently.  She denies any fevers or chills.  She was given a dose of   vancomycin and Unasyn and this has continued here at [facility].    REVIEW OF SYSTEMS:  A 14-point review of system obtained and is negative   except for HPI.    PAST MEDICAL HISTORY:  1.  Poorly controlled diabetes mellitus, history of right BKA.  2.  GERD.  3.  Hypertension.    SURGICAL HISTORY:  Hysterectomy, cholecystectomy, right BKA 12/2017.    FAMILY  HISTORY:  Father  of coronary artery disease.    SOCIAL HISTORY:  She is an active smoker for 40 years.  She lives in [place].  No alcohol abuse.    ALLERGIES:  None.    MEDICATIONS:  None.    PHYSICAL EXAMINATION:  VITAL SIGNS:  Temperature 36.6, pulse 122, respirations 20, oxygen 96% on 5   liters, blood pressure 143/87.  GENERAL:  The patient is obese.  She appears uncomfortable, but without   respiratory distress.  HENT:  Normocephalic, atraumatic.  Oral mucosa membranes dry.  EYES:  PERRLA. No scleral icterus or subconjunctival hemorrhage.  CARDIOVASCULAR:  Distant heart sounds.  Regular rate and rhythm.  S1, S2.  No   appreciable murmurs.  RESPIRATORY:  Lungs are clear to auscultation bilaterally.  GASTROINTESTINAL:  Abdomen is soft, nondistended.  MUSCULOSKELETAL:  She has increased left knee effusion and joint swelling with   tenderness to palpation, limited range of motion.  Her right BKA stump is   intact.  INTEGUMENTARY:  She has a left lateral first metatarsal ulcer about 4 cm with   gross to yellow purulent fluid that is expressible.  She also has ____ ulcer.    She has some sinus tract formation about 2 cm deep with positive probe to   bone testing.  NEUROLOGIC:  No focal deficits noted.  PSYCHIATRIC:  She is alert and oriented x3.    LABORATORY DATA:  WBC 14,000, hemoglobin 9.7, platelet count 232, neutrophils   79%.  Sodium 139, potassium 3.9, BUN is 13, creatinine 0.47.  Blood sugar is   317, alkaline phosphatase is 452.    IMAGING:  [place] left knee x-ray shows large joint effusion.  X-ray   of the foot at [place] shows ulceration, but cannot exclude   osteomyelitis in the first to third metatarsal joints.    IMPRESSION:  1.  Sepsis.  2.  Acute-on-chronic left diabetic foot infection, suspected osteomyelitis of   her first metatarsal joint.  3.  Acute left knee joint effusion, rule out septic arthritis.  4.  Poorly controlled diabetes mellitus with insulin  dependence.  5.  Prior osteomyelitis, status post right below-knee amputation in 2017.  6.  Hypertension.    RECOMMENDATION:  1.  This is a 55-year-old poorly controlled diabetic female who is   noncompliant with her medications with prior osteo requiring a right BKA,   presents to the hospital with acute diabetic foot infection with clinical   concern through osteomyelitis in her left first metatarsal joint, also with   acute left knee swelling with joint effusion concerning for septic arthritis.  2.  Recommend orthopedic consult for diagnostic arthrocentesis as well as   evaluation for debridement and possible amputation of her left foot.  3.  Pending blood cultures.  4.  Wound cultures were obtained.  5.  Agree with MRI to rule out abscess and osteomyelitis on imaging of the   left foot.  6.  Continue vancomycin and Unasyn empirically.  7.  Limb preservation service was consulted and I have discussed my plan with   them as well.  8.  Baseline sed rate, CRP ordered.    Sixty five minutes was spent with 50% face-to-face care.    Thank you for allowing me to participate in the care of this patient.  I will   continue to follow with you.  Case was discussed with Dr. Banuelos as well as   Khadijah from limb preservation service.       ____________________________________     DO DANDRE Robles / DALLIN    DD:  09/29/2020 12:36:11  DT:  09/29/2020 14:12:51    D#:  [id number]  Job#:  [id number]

## 2020-09-30 ENCOUNTER — APPOINTMENT (OUTPATIENT)
Dept: RADIOLOGY | Facility: MEDICAL CENTER | Age: 55
DRG: 488 | End: 2020-09-30
Attending: NURSE PRACTITIONER
Payer: MEDICAID

## 2020-09-30 ENCOUNTER — PATIENT OUTREACH (OUTPATIENT)
Dept: HEALTH INFORMATION MANAGEMENT | Facility: OTHER | Age: 55
End: 2020-09-30

## 2020-09-30 LAB
ALBUMIN SERPL BCP-MCNC: 2.2 G/DL (ref 3.2–4.9)
ALBUMIN/GLOB SERPL: 0.6 G/DL
ALP SERPL-CCNC: 643 U/L (ref 30–99)
ALT SERPL-CCNC: 18 U/L (ref 2–50)
ANION GAP SERPL CALC-SCNC: 16 MMOL/L (ref 7–16)
AST SERPL-CCNC: 16 U/L (ref 12–45)
BASOPHILS # BLD AUTO: 0.3 % (ref 0–1.8)
BASOPHILS # BLD: 0.03 K/UL (ref 0–0.12)
BILIRUB SERPL-MCNC: 0.9 MG/DL (ref 0.1–1.5)
BUN SERPL-MCNC: 12 MG/DL (ref 8–22)
CALCIUM SERPL-MCNC: 8.1 MG/DL (ref 8.5–10.5)
CHLORIDE SERPL-SCNC: 95 MMOL/L (ref 96–112)
CO2 SERPL-SCNC: 23 MMOL/L (ref 20–33)
CREAT SERPL-MCNC: 0.45 MG/DL (ref 0.5–1.4)
CRP SERPL HS-MCNC: 26.67 MG/DL (ref 0–0.75)
EOSINOPHIL # BLD AUTO: 0.04 K/UL (ref 0–0.51)
EOSINOPHIL NFR BLD: 0.4 % (ref 0–6.9)
ERYTHROCYTE [DISTWIDTH] IN BLOOD BY AUTOMATED COUNT: 52.8 FL (ref 35.9–50)
ERYTHROCYTE [SEDIMENTATION RATE] IN BLOOD BY WESTERGREN METHOD: >120 MM/HOUR (ref 0–30)
GLOBULIN SER CALC-MCNC: 4 G/DL (ref 1.9–3.5)
GLUCOSE BLD-MCNC: 270 MG/DL (ref 65–99)
GLUCOSE BLD-MCNC: 299 MG/DL (ref 65–99)
GLUCOSE BLD-MCNC: 336 MG/DL (ref 65–99)
GLUCOSE BLD-MCNC: 336 MG/DL (ref 65–99)
GLUCOSE BLD-MCNC: 375 MG/DL (ref 65–99)
GLUCOSE SERPL-MCNC: 311 MG/DL (ref 65–99)
GRAM STN SPEC: NORMAL
GRAM STN SPEC: NORMAL
HCT VFR BLD AUTO: 32.1 % (ref 37–47)
HGB BLD-MCNC: 9.8 G/DL (ref 12–16)
IMM GRANULOCYTES # BLD AUTO: 0.19 K/UL (ref 0–0.11)
IMM GRANULOCYTES NFR BLD AUTO: 1.8 % (ref 0–0.9)
LYMPHOCYTES # BLD AUTO: 1.8 K/UL (ref 1–4.8)
LYMPHOCYTES NFR BLD: 16.9 % (ref 22–41)
MAGNESIUM SERPL-MCNC: 1.7 MG/DL (ref 1.5–2.5)
MCH RBC QN AUTO: 27.6 PG (ref 27–33)
MCHC RBC AUTO-ENTMCNC: 30.5 G/DL (ref 33.6–35)
MCV RBC AUTO: 90.4 FL (ref 81.4–97.8)
MONOCYTES # BLD AUTO: 0.84 K/UL (ref 0–0.85)
MONOCYTES NFR BLD AUTO: 7.9 % (ref 0–13.4)
NEUTROPHILS # BLD AUTO: 7.76 K/UL (ref 2–7.15)
NEUTROPHILS NFR BLD: 72.7 % (ref 44–72)
NRBC # BLD AUTO: 0 K/UL
NRBC BLD-RTO: 0 /100 WBC
PHOSPHATE SERPL-MCNC: 2 MG/DL (ref 2.5–4.5)
PLATELET # BLD AUTO: 275 K/UL (ref 164–446)
PMV BLD AUTO: 10.6 FL (ref 9–12.9)
POTASSIUM SERPL-SCNC: 3.7 MMOL/L (ref 3.6–5.5)
PROT SERPL-MCNC: 6.2 G/DL (ref 6–8.2)
RBC # BLD AUTO: 3.55 M/UL (ref 4.2–5.4)
SIGNIFICANT IND 70042: NORMAL
SIGNIFICANT IND 70042: NORMAL
SITE SITE: NORMAL
SITE SITE: NORMAL
SODIUM SERPL-SCNC: 134 MMOL/L (ref 135–145)
SOURCE SOURCE: NORMAL
SOURCE SOURCE: NORMAL
WBC # BLD AUTO: 10.7 K/UL (ref 4.8–10.8)

## 2020-09-30 PROCEDURE — 700111 HCHG RX REV CODE 636 W/ 250 OVERRIDE (IP): Performed by: HOSPITALIST

## 2020-09-30 PROCEDURE — 97161 PT EVAL LOW COMPLEX 20 MIN: CPT

## 2020-09-30 PROCEDURE — A9270 NON-COVERED ITEM OR SERVICE: HCPCS | Performed by: HOSPITALIST

## 2020-09-30 PROCEDURE — 94760 N-INVAS EAR/PLS OXIMETRY 1: CPT

## 2020-09-30 PROCEDURE — 700111 HCHG RX REV CODE 636 W/ 250 OVERRIDE (IP): Performed by: ORTHOPAEDIC SURGERY

## 2020-09-30 PROCEDURE — 82962 GLUCOSE BLOOD TEST: CPT | Mod: 91

## 2020-09-30 PROCEDURE — 302098 PASTE RING (FLAT): Performed by: HOSPITALIST

## 2020-09-30 PROCEDURE — 80053 COMPREHEN METABOLIC PANEL: CPT

## 2020-09-30 PROCEDURE — 85025 COMPLETE CBC W/AUTO DIFF WBC: CPT

## 2020-09-30 PROCEDURE — 93922 UPR/L XTREMITY ART 2 LEVELS: CPT

## 2020-09-30 PROCEDURE — A9270 NON-COVERED ITEM OR SERVICE: HCPCS | Performed by: NURSE PRACTITIONER

## 2020-09-30 PROCEDURE — 83735 ASSAY OF MAGNESIUM: CPT

## 2020-09-30 PROCEDURE — 700105 HCHG RX REV CODE 258: Performed by: HOSPITALIST

## 2020-09-30 PROCEDURE — 99233 SBSQ HOSP IP/OBS HIGH 50: CPT | Performed by: HOSPITALIST

## 2020-09-30 PROCEDURE — 97605 NEG PRS WND THER DME<=50SQCM: CPT

## 2020-09-30 PROCEDURE — 700105 HCHG RX REV CODE 258: Performed by: INTERNAL MEDICINE

## 2020-09-30 PROCEDURE — 84100 ASSAY OF PHOSPHORUS: CPT

## 2020-09-30 PROCEDURE — 86140 C-REACTIVE PROTEIN: CPT

## 2020-09-30 PROCEDURE — 80202 ASSAY OF VANCOMYCIN: CPT

## 2020-09-30 PROCEDURE — 700102 HCHG RX REV CODE 250 W/ 637 OVERRIDE(OP): Performed by: HOSPITALIST

## 2020-09-30 PROCEDURE — 700111 HCHG RX REV CODE 636 W/ 250 OVERRIDE (IP): Performed by: NURSE PRACTITIONER

## 2020-09-30 PROCEDURE — 770006 HCHG ROOM/CARE - MED/SURG/GYN SEMI*

## 2020-09-30 PROCEDURE — 36415 COLL VENOUS BLD VENIPUNCTURE: CPT

## 2020-09-30 PROCEDURE — 94640 AIRWAY INHALATION TREATMENT: CPT

## 2020-09-30 PROCEDURE — 85652 RBC SED RATE AUTOMATED: CPT

## 2020-09-30 PROCEDURE — 700111 HCHG RX REV CODE 636 W/ 250 OVERRIDE (IP): Performed by: INTERNAL MEDICINE

## 2020-09-30 PROCEDURE — 700102 HCHG RX REV CODE 250 W/ 637 OVERRIDE(OP): Performed by: NURSE PRACTITIONER

## 2020-09-30 RX ORDER — INSULIN GLARGINE 100 [IU]/ML
30 INJECTION, SOLUTION SUBCUTANEOUS EVERY EVENING
Status: DISCONTINUED | OUTPATIENT
Start: 2020-09-30 | End: 2020-10-01

## 2020-09-30 RX ORDER — ECHINACEA PURPUREA EXTRACT 125 MG
2 TABLET ORAL
Status: DISCONTINUED | OUTPATIENT
Start: 2020-09-30 | End: 2020-10-08 | Stop reason: HOSPADM

## 2020-09-30 RX ORDER — ALBUTEROL SULFATE 90 UG/1
2 AEROSOL, METERED RESPIRATORY (INHALATION)
Status: DISCONTINUED | OUTPATIENT
Start: 2020-09-30 | End: 2020-10-08 | Stop reason: HOSPADM

## 2020-09-30 RX ORDER — OXYCODONE AND ACETAMINOPHEN 10; 325 MG/1; MG/1
1 TABLET ORAL EVERY 4 HOURS PRN
Status: DISCONTINUED | OUTPATIENT
Start: 2020-09-30 | End: 2020-10-08 | Stop reason: HOSPADM

## 2020-09-30 RX ORDER — ALPRAZOLAM 0.25 MG/1
0.25 TABLET ORAL 4 TIMES DAILY PRN
Status: DISCONTINUED | OUTPATIENT
Start: 2020-09-30 | End: 2020-10-06

## 2020-09-30 RX ORDER — MORPHINE SULFATE 4 MG/ML
2 INJECTION, SOLUTION INTRAMUSCULAR; INTRAVENOUS ONCE
Status: COMPLETED | OUTPATIENT
Start: 2020-09-30 | End: 2020-09-30

## 2020-09-30 RX ORDER — KETOROLAC TROMETHAMINE 30 MG/ML
30 INJECTION, SOLUTION INTRAMUSCULAR; INTRAVENOUS EVERY 6 HOURS PRN
Status: DISPENSED | OUTPATIENT
Start: 2020-09-30 | End: 2020-10-05

## 2020-09-30 RX ORDER — OMEPRAZOLE 20 MG/1
20 CAPSULE, DELAYED RELEASE ORAL DAILY
Status: DISCONTINUED | OUTPATIENT
Start: 2020-09-30 | End: 2020-10-08 | Stop reason: HOSPADM

## 2020-09-30 RX ORDER — ALPRAZOLAM 0.25 MG/1
0.25 TABLET ORAL 3 TIMES DAILY PRN
Status: DISCONTINUED | OUTPATIENT
Start: 2020-09-30 | End: 2020-09-30

## 2020-09-30 RX ADMIN — ALBUTEROL SULFATE 2 PUFF: 90 AEROSOL, METERED RESPIRATORY (INHALATION) at 13:02

## 2020-09-30 RX ADMIN — SODIUM CHLORIDE 3 G: 900 INJECTION INTRAVENOUS at 18:05

## 2020-09-30 RX ADMIN — ENOXAPARIN SODIUM 40 MG: 40 INJECTION SUBCUTANEOUS at 05:51

## 2020-09-30 RX ADMIN — KETOROLAC TROMETHAMINE 30 MG: 30 INJECTION, SOLUTION INTRAMUSCULAR at 14:15

## 2020-09-30 RX ADMIN — DOCUSATE SODIUM 50 MG AND SENNOSIDES 8.6 MG 2 TABLET: 8.6; 5 TABLET, FILM COATED ORAL at 18:15

## 2020-09-30 RX ADMIN — Medication 2 SPRAY: at 13:02

## 2020-09-30 RX ADMIN — INSULIN HUMAN 10 UNITS: 100 INJECTION, SOLUTION PARENTERAL at 18:09

## 2020-09-30 RX ADMIN — ALPRAZOLAM 0.25 MG: 0.25 TABLET ORAL at 18:15

## 2020-09-30 RX ADMIN — SODIUM CHLORIDE 3 G: 900 INJECTION INTRAVENOUS at 05:49

## 2020-09-30 RX ADMIN — DOCUSATE SODIUM 50 MG AND SENNOSIDES 8.6 MG 2 TABLET: 8.6; 5 TABLET, FILM COATED ORAL at 05:50

## 2020-09-30 RX ADMIN — OXYCODONE HYDROCHLORIDE AND ACETAMINOPHEN 1 TABLET: 10; 325 TABLET ORAL at 13:58

## 2020-09-30 RX ADMIN — OMEPRAZOLE 20 MG: 20 CAPSULE, DELAYED RELEASE ORAL at 11:30

## 2020-09-30 RX ADMIN — INSULIN HUMAN 10 UNITS: 100 INJECTION, SOLUTION PARENTERAL at 20:10

## 2020-09-30 RX ADMIN — NICOTINE TRANSDERMAL SYSTEM 21 MG: 21 PATCH, EXTENDED RELEASE TRANSDERMAL at 05:50

## 2020-09-30 RX ADMIN — SODIUM CHLORIDE 3 G: 900 INJECTION INTRAVENOUS at 13:01

## 2020-09-30 RX ADMIN — OXYCODONE HYDROCHLORIDE AND ACETAMINOPHEN 1 TABLET: 10; 325 TABLET ORAL at 09:50

## 2020-09-30 RX ADMIN — INSULIN GLARGINE 30 UNITS: 100 INJECTION, SOLUTION SUBCUTANEOUS at 18:10

## 2020-09-30 RX ADMIN — VANCOMYCIN HYDROCHLORIDE 1500 MG: 500 INJECTION, POWDER, LYOPHILIZED, FOR SOLUTION INTRAVENOUS at 11:30

## 2020-09-30 RX ADMIN — ONDANSETRON 4 MG: 4 TABLET, ORALLY DISINTEGRATING ORAL at 16:29

## 2020-09-30 RX ADMIN — ALBUTEROL SULFATE 2 PUFF: 90 AEROSOL, METERED RESPIRATORY (INHALATION) at 07:57

## 2020-09-30 RX ADMIN — OXYCODONE HYDROCHLORIDE AND ACETAMINOPHEN 1 TABLET: 10; 325 TABLET ORAL at 22:38

## 2020-09-30 RX ADMIN — OXYCODONE HYDROCHLORIDE AND ACETAMINOPHEN 1 TABLET: 10; 325 TABLET ORAL at 18:15

## 2020-09-30 RX ADMIN — ALPRAZOLAM 0.25 MG: 0.25 TABLET ORAL at 09:26

## 2020-09-30 RX ADMIN — OXYCODONE HYDROCHLORIDE 10 MG: 10 TABLET ORAL at 05:50

## 2020-09-30 RX ADMIN — VANCOMYCIN HYDROCHLORIDE 1500 MG: 500 INJECTION, POWDER, LYOPHILIZED, FOR SOLUTION INTRAVENOUS at 23:03

## 2020-09-30 RX ADMIN — INSULIN HUMAN 7 UNITS: 100 INJECTION, SOLUTION PARENTERAL at 13:07

## 2020-09-30 RX ADMIN — SODIUM CHLORIDE 3 G: 900 INJECTION INTRAVENOUS at 00:48

## 2020-09-30 RX ADMIN — INSULIN HUMAN 7 UNITS: 100 INJECTION, SOLUTION PARENTERAL at 09:27

## 2020-09-30 RX ADMIN — MORPHINE SULFATE 2 MG: 4 INJECTION INTRAVENOUS at 03:45

## 2020-09-30 RX ADMIN — ALBUTEROL SULFATE 2 PUFF: 90 AEROSOL, METERED RESPIRATORY (INHALATION) at 18:17

## 2020-09-30 RX ADMIN — OXYCODONE HYDROCHLORIDE 10 MG: 10 TABLET ORAL at 00:47

## 2020-09-30 SDOH — ECONOMIC STABILITY: FOOD INSECURITY: WITHIN THE PAST 12 MONTHS, THE FOOD YOU BOUGHT JUST DIDN'T LAST AND YOU DIDN'T HAVE MONEY TO GET MORE.: NEVER TRUE

## 2020-09-30 SDOH — ECONOMIC STABILITY: TRANSPORTATION INSECURITY
IN THE PAST 12 MONTHS, HAS THE LACK OF TRANSPORTATION KEPT YOU FROM MEDICAL APPOINTMENTS OR FROM GETTING MEDICATIONS?: NO

## 2020-09-30 SDOH — ECONOMIC STABILITY: TRANSPORTATION INSECURITY
IN THE PAST 12 MONTHS, HAS LACK OF TRANSPORTATION KEPT YOU FROM MEETINGS, WORK, OR FROM GETTING THINGS NEEDED FOR DAILY LIVING?: NO

## 2020-09-30 SDOH — ECONOMIC STABILITY: FOOD INSECURITY: WITHIN THE PAST 12 MONTHS, YOU WORRIED THAT YOUR FOOD WOULD RUN OUT BEFORE YOU GOT MONEY TO BUY MORE.: NEVER TRUE

## 2020-09-30 SDOH — ECONOMIC STABILITY: INCOME INSECURITY: HOW HARD IS IT FOR YOU TO PAY FOR THE VERY BASICS LIKE FOOD, HOUSING, MEDICAL CARE, AND HEATING?: SOMEWHAT HARD

## 2020-09-30 ASSESSMENT — COGNITIVE AND FUNCTIONAL STATUS - GENERAL
STANDING UP FROM CHAIR USING ARMS: A LOT
SUGGESTED CMS G CODE MODIFIER DAILY ACTIVITY: CL
MOBILITY SCORE: 10
DAILY ACTIVITIY SCORE: 13
TURNING FROM BACK TO SIDE WHILE IN FLAT BAD: A LOT
DRESSING REGULAR LOWER BODY CLOTHING: A LOT
DRESSING REGULAR UPPER BODY CLOTHING: A LOT
WALKING IN HOSPITAL ROOM: TOTAL
MOVING FROM LYING ON BACK TO SITTING ON SIDE OF FLAT BED: UNABLE
TOILETING: A LOT
MOVING TO AND FROM BED TO CHAIR: A LOT
CLIMB 3 TO 5 STEPS WITH RAILING: TOTAL
SUGGESTED CMS G CODE MODIFIER MOBILITY: CM
TURNING FROM BACK TO SIDE WHILE IN FLAT BAD: A LOT
EATING MEALS: A LITTLE
MOVING TO AND FROM BED TO CHAIR: A LOT
MOVING FROM LYING ON BACK TO SITTING ON SIDE OF FLAT BED: A LOT
WALKING IN HOSPITAL ROOM: TOTAL
CLIMB 3 TO 5 STEPS WITH RAILING: TOTAL
STANDING UP FROM CHAIR USING ARMS: TOTAL
HELP NEEDED FOR BATHING: A LOT
PERSONAL GROOMING: A LOT
SUGGESTED CMS G CODE MODIFIER MOBILITY: CL
MOBILITY SCORE: 8

## 2020-09-30 ASSESSMENT — ENCOUNTER SYMPTOMS
DIARRHEA: 0
SHORTNESS OF BREATH: 0
CONSTIPATION: 0
ROS GI COMMENTS: DRY MOUTH
SHORTNESS OF BREATH: 1
ABDOMINAL PAIN: 0
CHILLS: 0
COUGH: 0
NAUSEA: 1
FEVER: 0
VOMITING: 0

## 2020-09-30 ASSESSMENT — PAIN DESCRIPTION - PAIN TYPE
TYPE: ACUTE PAIN

## 2020-09-30 ASSESSMENT — GAIT ASSESSMENTS: GAIT LEVEL OF ASSIST: UNABLE TO PARTICIPATE

## 2020-09-30 NOTE — RESPIRATORY CARE
COPD EDUCATION by COPD CLINICAL EDUCATOR  9/30/2020 at 11:18 AM by Maribel Oscar, RRT     Patient interviewed by COPD education team. Patient refused smoking cessation education at this time.

## 2020-09-30 NOTE — DISCHARGE PLANNING
PASRR [id number]  LOC [id number]  Call placed to medicaid and they have pt's last name listed as Mike.

## 2020-09-30 NOTE — DISCHARGE PLANNING
"Anticipated Discharge Disposition:   · SNF    Action:   · LSW met with pt at bedside to complete assessment and review discharge plans. LSW reviewed SNF options with pt. Pt reported she would ideally like to be close to her home in [place] thus selected [facility] SNF as her first CHOICE. Pt also selected to have [facility], [facility] in no particular preference. If none of these facilities accept pt willing to look at facilities in [place]/[facility].   · CHOICE forms were completed and sent to [facility] ext [phone].    Barriers to Discharge:   · Accepting SNF    Plan:   · Care coordination will continue to follow up and provide assistance with discharge plans/barriers.     Care Transition Team Assessment  LSW met with pt at bedside to complete assessment and discuss discharge plans/barriers. Pt's goal is to discharge to SNF for continued rehab and ultimately return home.    Pt is a 55 year old  female who lives alone in her ground floor appt in [facility]. Pt reported her daughter, who lives out of state is her support system.  Pt indicated she is independent with her ADL and iADLs but does have DME including a wheelchair and walker for mobility and oxygen from [facility]. She is on 2 L at home. She reported she has a roll-in shower, grab bars and hand-held shower nozzle. She reported she does not drive but uses [employer] to get to medical appts and has her groceries delivered. Pt indicated her income is through Supplemental Security Income (SSI) in the amount of $506. She reported she is also supposed to receive \"a couple hundred in [facility]\" but hasn't received this in a couple of months. Pt is on subsidized housing. Pt denied any substance abuse issues but does report she smokes cigarettes and marijuana on occasion.     Information Source  Orientation : Oriented x 4  Information Given By: Patient  Informant's " Name: Greta  Who is responsible for making decisions for patient? : Patient    Readmission Evaluation  Is this a readmission?: No    Elopement Risk  Legal Hold: No  Ambulatory or Self Mobile in Wheelchair: No-Not an Elopement Risk  Elopement Risk: Not at Risk for Elopement    Interdisciplinary Discharge Planning  Primary Care Physician: None  Lives with - Patient's Self Care Capacity: Alone and Able to Care For Self  Patient or legal guardian wants to designate a caregiver: No  Housing / Facility: 1 Story Apartment / Condo  Durable Medical Equipment: Home Oxygen, Walker  DME Provider / Phone: Evangelist    Discharge Preparedness  What is your plan after discharge?: Skilled nursing facility  What are your discharge supports?: Child  Prior Functional Level: Ambulatory, Independent with Activities of Daily Living, Independent with Medication Management, Uses Walker, Uses Wheelchair  Difficulity with ADLs: None  Difficulity with IADLs: None    Functional Assesment  Prior Functional Level: Ambulatory, Independent with Activities of Daily Living, Independent with Medication Management, Uses Walker, Uses Wheelchair    Finances  Financial Barriers to Discharge: No  Prescription Coverage: Yes    Vision / Hearing Impairment  Vision Impairment : Yes  Right Eye Vision: Impaired, Wears Glasses  Left Eye Vision: Impaired, Wears Glasses    Advance Directive  Advance Directive?: None    Domestic Abuse  Have you ever been the victim of abuse or violence?: No  Physical Abuse or Sexual Abuse: No  Verbal Abuse or Emotional Abuse: No  Possible Abuse/Neglect Reported to:: Not Applicable    Psychological Assessment  History of Substance Abuse: None  History of Psychiatric Problems: No    Discharge Risks or Barriers  Discharge risks or barriers?: No PCP, Uninsured / underinsured, Lives alone, no community support, Complex medical needs  Patient risk factors: Complex medical needs, Lives alone and no community support, Noncompliance, No  PCP    Anticipated Discharge Information  Discharge Disposition: Still a Patient (30)

## 2020-09-30 NOTE — PROGRESS NOTES
C/o left knee pain  Moderate effusion  Also with unhealed left TMA with exposed bone  She needs conversion to BKA for left side but refuses  Will plan arthrocentesis of left knee and debridement of left foot, may need arthrotomy/drainage of left knee if pus  Left leg marked  Consent signed

## 2020-09-30 NOTE — PROGRESS NOTES
Received report and patient from PACU. Patient alert and oriented. Complains of pain in L knee Patient expresses concern over L hand and states no improvement since admission. Patient receiving IV antibiotics. Dressing to L knee and L foot CDI. Hemovac drain to L knee. Fall precautions in place, call light within reach, hourly rounding in place.

## 2020-09-30 NOTE — THERAPY
Physical Therapy   Initial Evaluation     Patient Name: Greta Echevarria  Age:  55 y.o., Sex:  female  Medical Record #: [id number]  Today's Date: 9/30/2020     Precautions: Non Weight Bearing Left Lower Extremity(right BKA)    Assessment    Pt w/ hx of right BKA x4 years, for which she does not wear a prosthesis.  She reports being WB dependent, w/o ambulation.  She does stand and pivot to/from the toilet.  She lives alone and uses her w/c for all mobility.  She is admitted w/ left foot infection.  Hx of TMA on the left.  S/p I&D first metatarsal on the left, as well as aspiration of her left knee.  MD recommends BKA of her left LE, however pt refuses.  She also has a hx of meth use and non compliance w/ diabetic meds.  She is agreeable to work w/ PT.  She needs mod assist to sit eob and max assist to move her LLE.  She is unable to flex her left knee due to pain, and is unable to maintain eob, also due to pain.  PT spoke w/ nsg, who will request heel weight bearing on her LLE for slide board transfers.  PT will follow and address weakness and impaired mobility.  Anticipate she will need post acute in pt therapy.    Plan    Recommend Physical Therapy 3 times per week until therapy goals are met for the following treatments:  Bed Mobility and Therapeutic Activities, transfers    DC Equipment Recommendations: Unable to determine at this time  Discharge Recommendations: Recommend post-acute placement for additional physical therapy services prior to discharge home        Objective       09/30/20 0701   Prior Living Situation   Housing / Facility 1 Story Apartment / Condo   Steps Into Home 0   Steps In Home 0   Equipment Owned Front-Wheel Walker;Wheelchair   Lives with - Patient's Self Care Capacity Alone and Able to Care For Self   Prior Level of Functional Mobility   Bed Mobility Independent   Transfer Status Independent   Ambulation   (non ambulatory, pivot to toilet only)   Distance Ambulation (Feet) 0   Wheelchair  Independent   Gait Analysis   Gait Level Of Assist Unable to Participate   Bed Mobility    Supine to Sit Moderate Assist   Sit to Supine Moderate Assist   Comments max assist for LLE   Functional Mobility   Sit to Stand Unable to Participate   Bed, Chair, Wheelchair Transfer Unable to Participate   Short Term Goals    Short Term Goal # 1 Pt to move supine to/from eob w/ spv, no rails in 6 visits to initiate oob   Short Term Goal # 2 Pt to TF bed to/from w/c w/ slide board and spv in 6 visits to return home   Short Term Goal # 3 Pt to maintain NWB status during transfers w/o cues (unless WB status changes)   Anticipated Discharge Equipment and Recommendations   DC Equipment Recommendations Unable to determine at this time   Discharge Recommendations Recommend post-acute placement for additional physical therapy services prior to discharge home

## 2020-09-30 NOTE — PROGRESS NOTES
"Pharmacy Kinetics 55 y.o. female on vancomycin day # 2 2020    Currently on Vancomycin 1500 mg iv q12hr (1100, 2300)  Provider specified end date: 2 week minimum per ID note (thru 10/12)    Indication for Treatment: SSTI - diabetic foot infection    Pertinent history per medical record: Admitted on 2020 for left foot infection.  Originally presented to [place] ER for worsening left foot pain; pt also notes left hand pain and redness.  PMH of diabetes s/p multiple amputations.  Pt has been off diabetic medications for 3 months.  ID consulted.    Other antibiotics: Unasyn 3 g IV q6h    Allergies: Patient has no known allergies.     List concerns for renal function: low albumin, DM, BMI 42    Pertinent cultures to date:   : bone (first metatarsal) NGTD  : wound (left knee effusion drainage) NGTD  : wound (left foot) POS - Beta-hemolytic Strep Grp A, moderate growth; Proteus species, moderate growth  : blood (peripheral) NGTD x2    Recent Labs     20  1043   WBC 14.6* 14.5*   NEUTSPOLYS 83.20* 79.20*     Recent Labs     20  1043   BUN 12 13   CREATININE 0.43* 0.47*   ALBUMIN 2.6* 2.5*     /80   Pulse 88   Temp 36.6 °C (97.9 °F) (Temporal)   Resp 18   Ht 1.702 m (5' 7\")   Wt 120.2 kg (265 lb)   SpO2 97%  Temp (24hrs), Av.7 °C (98 °F), Min:36.1 °C (97 °F), Max:37.2 °C (98.9 °F)      A/P   1. Vancomycin dose change: not indicated  2. Next vancomycin level:  @ 2230  3. Goal trough: 10-15 mcg/ml  4. Comments: pt at risk for accumulation given significantly elevated BMI.  Will plan to check a level tonight prior to 5th total dose.  Rounding clinical pharmacist will f/u in the AM.  Okay to continue current dosing in the meantime.  Pharmacy will continue to monitor and adjust dosing if necessary.      Iqra Landon, PharmD    "

## 2020-09-30 NOTE — ANESTHESIA QCDR
2019 [facility] Clinical Data Registry (for Quality Improvement)     Postoperative nausea/vomiting risk protocol (Adult = 18 yrs and Pediatric 3-17 yrs)- (430 and 463)  General inhalation anesthetic (NOT TIVA) with PONV risk factors: Yes  Provision of anti-emetic therapy with at least 2 different classes of agents: Yes   Patient DID NOT receive anti-emetic therapy and reason is documented in Medical Record:  N/A    Multimodal Pain Management- (477)  Non-emergent surgery AND patient age >= 18: No  Use of Multimodal Pain Management, two or more drugs and/or interventions, NOT including systemic opioids:   Exception: Documented allergy to multiple classes of analgesics:     Smoking Abstinence (404)  Patient is current smoker (cigarette, pipe, e-cig, marijuanna): No  Elective Surgery:   Abstinence instructions provided prior to day of surgery:   Patient abstained from smoking on day of surgery:     Pre-Op Beta-Blocker in Isolated CABG (44)  Isolated CABG AND patient age >= 18: No  Beta-blocker admin within 24 hours of surgical incision:   Exception:of medical reason(s) for not administering beta blocker within 24 hours prior to surgical incision (e.g., not  indicated,other medical reason):     PACU assessment of acute postoperative pain prior to Anesthesia Care End- Applies to Patients Age = 18- (ABG7)  Initial PACU pain score is which of the following: < 7/10  Patient unable to report pain score: N/A    Post-anesthetic transfer of care checklist/protocol to PACU/ICU- (426 and 427)  Upon conclusion of case, patient transferred to which of the following locations: PACU/Non-ICU  Use of transfer checklist/protocol: Yes  Exclusion: Service Performed in Patient Hospital Room (and thus did not require transfer): N/A  Unplanned admission to ICU related to anesthesia service up through end of PACU care- (MD51)  Unplanned admission to ICU (not initially anticipated at anesthesia start time): No

## 2020-09-30 NOTE — PROGRESS NOTES
Hospital Medicine Daily Progress Note    Date of Service  9/30/2020    Chief Complaint  55 y.o. female admitted 9/28/2020 with past medical history of diabetes mellitus, hypertension chronic hypoxic respiratory failure on 2 L oxygen  apparently has been off of her diabetes medicines for at least 3 months.  She presented to the emergency room in [facility] with left foot infection and pain for the past few days it is been worsening.  She also notes 1 day of left hand pain and redness as well.      Hospital Course    She presented with sepsis and was given vancomycin and Rocephin.  She was found to be hyperglycemic.  ESR and CRP were elevated.      Interval Problem Update  9/29: Patient states that her hand swelling and foot swelling is decreasing.  Orthopedics was consulted and will take patient to the OR for debridement and biopsy of bone.  Left knee is also swollen and she was taken for arthrocentesis.  We will continue vancomycin and Unasyn for now.  9/30: Patient seen and examined by me today.  I increased her oxycodone and Xanax to 4 times a day.  We will continue to monitor her mental respiratory status.  Cultures came back positive for group A strep.  Surgical cultures are pending.  Arthrocentesis cultures are pending.  Wound VAC and Hemovac was placed.  Orthopedics recommended amputation but the patient refusing.  SNF referral has been sent.  Patient continues to have uncontrolled blood sugars and have increase her Lantus to 30 units.  Consultants/Specialty  LPS      Code Status  Full Code    Disposition  PT OT eval    Review of Systems  Review of Systems   Constitutional: Negative for chills and fever.   Respiratory: Positive for shortness of breath.    Cardiovascular: Negative for chest pain.   Gastrointestinal: Negative for vomiting.        Dry mouth   All other systems reviewed and are negative.       Physical Exam  Temp:  [36.1 °C (97 °F)-37.2 °C (98.9 °F)] 36.6 °C (97.9 °F)  Pulse:  []  88  Resp:  [16-20] 18  BP: (102-157)/() 128/80  SpO2:  [89 %-100 %] 97 %    Physical Exam  Vitals signs and nursing note reviewed.   Constitutional:       General: She is not in acute distress.     Appearance: Normal appearance. She is not ill-appearing, toxic-appearing or diaphoretic.      Comments: disheveled   HENT:      Head: Normocephalic and atraumatic.      Nose: No congestion or rhinorrhea.      Mouth/Throat:      Mouth: Mucous membranes are dry.      Pharynx: Oropharynx is clear. No oropharyngeal exudate or posterior oropharyngeal erythema.   Eyes:      General: No scleral icterus.     Conjunctiva/sclera: Conjunctivae normal.   Neck:      Musculoskeletal: Normal range of motion and neck supple. No neck rigidity or muscular tenderness.      Vascular: No carotid bruit.   Cardiovascular:      Rate and Rhythm: Regular rhythm. Tachycardia present.      Pulses: Normal pulses.      Heart sounds: Normal heart sounds. No murmur. No friction rub. No gallop.    Pulmonary:      Effort: Pulmonary effort is normal. No respiratory distress.      Breath sounds: Normal breath sounds. No stridor. No wheezing or rhonchi.   Abdominal:      General: Abdomen is flat. There is no distension.      Palpations: There is no mass.      Tenderness: There is no abdominal tenderness. There is no left CVA tenderness, guarding or rebound.      Hernia: No hernia is present.   Musculoskeletal: Normal range of motion.         General: No swelling.      Right lower leg: No edema.      Left lower leg: No edema.      Comments: Right BKA  Left trans-met with large ulcer bone exposed  Left hand dorsum is red, swollen and warm to touch   Lymphadenopathy:      Cervical: No cervical adenopathy.   Skin:     General: Skin is warm and dry.      Capillary Refill: Capillary refill takes more than 3 seconds.      Coloration: Skin is not jaundiced or pale.      Findings: No bruising or erythema.   Neurological:      Mental Status: She is alert and  oriented to person, place, and time.         Fluids    Intake/Output Summary (Last 24 hours) at 9/30/2020 1516  Last data filed at 9/30/2020 1100  Gross per 24 hour   Intake 770 ml   Output 1250 ml   Net -480 ml       Laboratory  Recent Labs     09/28/20 2007 09/29/20  1043 09/30/20  1027   WBC 14.6* 14.5* 10.7   RBC 3.95* 3.51* 3.55*   HEMOGLOBIN 11.0* 9.7* 9.8*   HEMATOCRIT 35.0* 31.7* 32.1*   MCV 88.6 90.3 90.4   MCH 27.8 27.6 27.6   MCHC 31.4* 30.6* 30.5*   RDW 50.5* 52.2* 52.8*   PLATELETCT 249 232 275   MPV 12.0 10.6 10.6     Recent Labs     09/28/20 2007 09/29/20  1043 09/30/20  1027   SODIUM 133* 131* 134*   POTASSIUM 4.2 3.9 3.7   CHLORIDE 93* 95* 95*   CO2 22 21 23   GLUCOSE 299* 317* 311*   BUN 12 13 12   CREATININE 0.43* 0.47* 0.45*   CALCIUM 8.9 8.8 8.1*                   Imaging  US-SHAHNAZ SINGLE LEVEL BILAT   Final Result      DX-FOOT-COMPLETE 3+ LEFT   Final Result      1.  There is diffuse left forefoot swelling with a large ulceration along the medial aspect of the remaining forefoot. There is no plain film evidence of underlying osteomyelitis.   2.  There is postoperative change consistent with amputation at the level of the mid and proximal metatarsals.      [id number] FILM X-RAY   Final Result      [id number] FILM X-RAY   Final Result      DX-CHEST-PORTABLE (1 VIEW)   Final Result      Enlarged cardiac silhouette and interstitial prominence suggesting pulmonary edema.           Assessment/Plan  * Diabetic foot ulcer (HCC)- (present on admission)  Assessment & Plan  Left trans-met with large ulcer which is draining pus which will be cultured  WBC in [place] was 16 and xray was suspicious for osteomyelitis.   Limb preservation service consulted: she may be a candidate for surgical debridement.  IV vancomycin and Unasyn   ESR in [place] was 109  Infectious disease consulted  Wound culture positive for group A strep    Acute on chronic respiratory failure with hypoxia  ([facility])- (present on admission)  Assessment & Plan  She is chronically on 2 liters of oxygen and is now requiring 5 liters  This may be due to the 24 mg morphine, 15 mg versed and 2 mg ativan given per transfer sheet.  Supplemental oxygen ordered.  If her sats decrease, her fentanyl patch may need to be removed and possibly narcan  COVID ordered    Non compliance with medical treatment- (present on admission)  Assessment & Plan  She stopped taking all of her meds except fentanyl patch    DM2 (diabetes mellitus, type 2) (CMS-[facility])- (present on admission)  Assessment & Plan  Start on insulin sliding scale with serial Accu-Checks  Hemoglobin A1c 13.3  Hypoglycemic protocol in place  Start Lantus increase to 30U      Cellulitis of hand- (present on admission)  Assessment & Plan  Left hand dorsum    Hx of right BKA ([facility])- (present on admission)  Assessment & Plan  Hx of    Sepsis ([facility])- (present on admission)  Assessment & Plan  This is Sepsis Present on admission  SIRS criteria identified on my evaluation include: Leukocytosis, with WBC greater than 12,000  Source is left foot infection  Sepsis protocol initiated  Fluid resuscitation ordered per protocol  IV antibiotics as appropriate for source of sepsis  While organ dysfunction may be noted elsewhere in this problem list or in the chart, degree of organ dysfunction does not meet CMS criteria for severe sepsis          Tobacco abuse- (present on admission)  Assessment & Plan  Nicotine patch offered    Chronic pain- (present on admission)  Assessment & Plan  Continue fentanyl patch 50 mcg    Hypertension- (present on admission)  Assessment & Plan  Controlled        VTE prophylaxis: lovenox

## 2020-09-30 NOTE — ANESTHESIA TIME REPORT
Anesthesia Start and Stop Event Times     Date Time Event    9/29/2020 1953 Ready for Procedure     1957 Anesthesia Start     2044 Anesthesia Stop        Responsible Staff  09/29/20    Name Role Begin End    Joseph Veliz M.D. Anesth 1957 2044        Preop Diagnosis (Free Text):  Pre-op Diagnosis     Left foot wound         Preop Diagnosis (Codes):    Post op Diagnosis  Septic joint of left knee joint (HCC)      Premium Reason  A. 3PM - 7AM    Comments:

## 2020-09-30 NOTE — THERAPY
Missed Therapy     Patient Name: Greta Echevarria  Age:  55 y.o., Sex:  female  Medical Record #: [id number]  Today's Date: 9/30/2020    Discussed missed therapy with RN    OT consult rec'd. Attempted to see for OT lisa, pt refused. Pt resistant to education on importance of OOB/EOB activity; perseverative on pain. Will reattempt as appropriate/able.

## 2020-09-30 NOTE — ANESTHESIA PROCEDURE NOTES
Airway    Date/Time: 9/29/2020 8:02 PM  Performed by: Joseph Veliz M.D.  Authorized by: Joseph Veliz M.D.     Location:  OR  Urgency:  Elective  Indications for Airway Management:  Anesthesia      Spontaneous Ventilation: absent    Sedation Level:  Deep  Preoxygenated: Yes    Final Airway Type:  Supraglottic airway  Final Supraglottic Airway:  Standard LMA    SGA Size:  4  Number of Attempts at Approach:  1

## 2020-09-30 NOTE — DISCHARGE PLANNING
Received Choice form at 1405  Agency/Facility Name: [facility], [facility], [facility], [facility], [facility], [facility], [facility], [facility], [place], [facility]  Referral sent per Choice form @1417    Agency/Facility Name: [facility]   Outcome: Per Epic response, referral declined due to Medicaid.     Agency/Facility Name: [facility]   Outcome: Per Epic, referral declined due to facility is unable to take new patients at this time.     Agency/Facility Name: [facility]   Outcome: Per Epic, referral declined due to Medicaid.     Agency/Facility Name: [place]   Outcome: Per Epic response, referral declined due to Medicaid.     Agency/Facility Name: [facility]  Outcome: Per Epic, referral declined due to no [facility] beds available.     Agency/Facility Name: [facility]   Outcome: Per Epic, referral declined due to Medicaid.

## 2020-09-30 NOTE — PROGRESS NOTES
Infectious Disease Progress Note    Author: Georgi Braun M.D. Date & Time created: 9/30/2020  11:08 AM     Vancomycin 9/28-present Day#2  Unasyn 9/29-present Day #1    9/30: She does not want to be here. She refuses MRI and says it is her knee that is her problem and she does not want any surgery for her foot.    Interval History:  Past 24 hrs reviewed with RN.    Labs Reviewed, Medications Reviewed, Radiology Reviewed, Wound Reviewed, Fluids Reviewed and GI Nutrition Reviewed    Review of Systems:  Review of Systems   Constitutional: Negative for chills and fever.   Respiratory: Negative for cough and shortness of breath.    Cardiovascular: Negative for chest pain.   Gastrointestinal: Positive for nausea. Negative for abdominal pain, constipation, diarrhea and vomiting (slight).   Genitourinary: Negative for dysuria.   Musculoskeletal:        Left knee still sore.   Skin: Negative for itching.       Physical Exam:  Physical Exam  Vitals signs and nursing note reviewed.   Constitutional:       General: She is not in acute distress.     Appearance: She is obese. She is not ill-appearing, toxic-appearing or diaphoretic.   HENT:      Head: Normocephalic and atraumatic.   Cardiovascular:      Rate and Rhythm: Normal rate and regular rhythm.      Heart sounds: No murmur. No gallop.    Pulmonary:      Effort: Pulmonary effort is normal. No respiratory distress.      Breath sounds: Normal breath sounds. No wheezing or rhonchi.   Abdominal:      General: Abdomen is flat. There is no distension.      Palpations: Abdomen is soft.      Tenderness: There is no abdominal tenderness. There is no guarding.   Musculoskeletal:      Comments: Veri Flow VAC on left foot. Knee with ACE.   Skin:     General: Skin is warm and dry.   Neurological:      Mental Status: She is alert and oriented to person, place, and time.   Psychiatric:         Mood and Affect: Affect is angry (slight feels no one is teling her anythng).       Behavior: Behavior is cooperative.         Judgment: Judgment is inappropriate.         Labs:  Recent Results (from the past 24 hour(s))   ACCU-CHEK GLUCOSE    Collection Time: 09/29/20 12:44 PM   Result Value Ref Range    Glucose - Accu-Ck 266 (H) 65 - 99 mg/dL   ACCU-CHEK GLUCOSE    Collection Time: 09/29/20  5:09 PM   Result Value Ref Range    Glucose - Accu-Ck 238 (H) 65 - 99 mg/dL   Fluid Cell Count    Collection Time: 09/29/20  8:15 PM   Result Value Ref Range    Fluid Type Synovial     Color-Body Fluid Brown     Character-Body Fluid Cloudy     Total RBC Count 05812 cells/uL    Total WBC 860307 cells/uL    Polys 97 %    Lymphs 2 %    Mononuclear Cells - Fluid 1 %   GRAM STAIN    Collection Time: 09/29/20  8:15 PM    Specimen: Wound   Result Value Ref Range    Significant Indicator .     Source WND     Site Left Knee Effusion Drainage     Gram Stain Result Many WBCs.  Rare Gram positive cocci.      GRAM STAIN    Collection Time: 09/29/20  8:17 PM    Specimen: Bone   Result Value Ref Range    Significant Indicator .     Source BONE     Site First Metatarsal     Gram Stain Result Rare WBCs.  No organisms seen.      ACCU-CHEK GLUCOSE    Collection Time: 09/29/20 10:45 PM   Result Value Ref Range    Glucose - Accu-Ck 252 (H) 65 - 99 mg/dL   ACCU-CHEK GLUCOSE    Collection Time: 09/30/20  8:56 AM   Result Value Ref Range    Glucose - Accu-Ck 270 (H) 65 - 99 mg/dL   CBC WITH DIFFERENTIAL    Collection Time: 09/30/20 10:27 AM   Result Value Ref Range    WBC 10.7 4.8 - 10.8 K/uL    RBC 3.55 (L) 4.20 - 5.40 M/uL    Hemoglobin 9.8 (L) 12.0 - 16.0 g/dL    Hematocrit 32.1 (L) 37.0 - 47.0 %    MCV 90.4 81.4 - 97.8 fL    MCH 27.6 27.0 - 33.0 pg    MCHC 30.5 (L) 33.6 - 35.0 g/dL    RDW 52.8 (H) 35.9 - 50.0 fL    Platelet Count 275 164 - 446 K/uL    MPV 10.6 9.0 - 12.9 fL    Neutrophils-Polys 72.70 (H) 44.00 - 72.00 %    Lymphocytes 16.90 (L) 22.00 - 41.00 %    Monocytes 7.90 0.00 - 13.40 %    Eosinophils 0.40 0.00 - 6.90 %     "Basophils 0.30 0.00 - 1.80 %    Immature Granulocytes 1.80 (H) 0.00 - 0.90 %    Nucleated RBC 0.00 /100 WBC    Neutrophils (Absolute) 7.76 (H) 2.00 - 7.15 K/uL    Lymphs (Absolute) 1.80 1.00 - 4.80 K/uL    Monos (Absolute) 0.84 0.00 - 0.85 K/uL    Eos (Absolute) 0.04 0.00 - 0.51 K/uL    Baso (Absolute) 0.03 0.00 - 0.12 K/uL    Immature Granulocytes (abs) 0.19 (H) 0.00 - 0.11 K/uL    NRBC (Absolute) 0.00 K/uL     Results     Procedure Component Value Units Date/Time    GRAM STAIN [[id number]] Collected: 09/29/20 2017    Order Status: Completed Specimen: Bone Updated: 09/30/20 1052     Significant Indicator .     Source BONE     Site First Metatarsal     Gram Stain Result Rare WBCs.  No organisms seen.      Narrative:      Surgery Specimen    GRAM STAIN [[id number]] Collected: 09/29/20 2015    Order Status: Completed Specimen: Wound Updated: 09/30/20 1051     Significant Indicator .     Source WND     Site Left Knee Effusion Drainage     Gram Stain Result Many WBCs.  Rare Gram positive cocci.      Narrative:      Surgery Specimen    BLOOD CULTURE [[id number]] Collected: 09/28/20 2007    Order Status: Completed Specimen: Blood from Peripheral Updated: 09/30/20 0737     Significant Indicator NEG     Source BLD     Site PERIPHERAL     Culture Result No Growth  Note: Blood cultures are incubated for 5 days and  are monitored continuously.Positive blood cultures  are called to the RN and reported as soon as  they are identified.      Narrative:      Per Hospital Policy: Only change Specimen Src: to \"Line\" if  specified by physician order.  No site indicated    BLOOD CULTURE [[id number]] Collected: 09/28/20 1951    Order Status: Completed Specimen: Blood from Peripheral Updated: 09/30/20 0737     Significant Indicator NEG     Source BLD     Site PERIPHERAL     Culture Result No Growth  Note: Blood cultures are incubated for 5 days and  are monitored continuously.Positive blood cultures  are called to the RN and reported as soon " "as  they are identified.      Narrative:      Per Hospital Policy: Only change Specimen Src: to \"Line\" if  specified by physician order.  No site indicated    Anaerobic Culture [[id number]] Collected: 09/29/20 2017    Order Status: Completed Specimen: Other Updated: 09/30/20 0317    CULTURE TISSUE W/ GRM STAIN [[id number]] Collected: 09/29/20 2017    Order Status: Completed Specimen: Other Updated: 09/30/20 0317    CULTURE WOUND W/ GRAM STAIN [[id number]] Collected: 09/29/20 2015    Order Status: Completed Specimen: Other Updated: 09/30/20 0315    Anaerobic Culture [[id number]] Collected: 09/29/20 2015    Order Status: Completed Specimen: Other Updated: 09/30/20 0315    GRAM STAIN [[id number]] Collected: 09/28/20 2054    Order Status: Completed Specimen: Wound Updated: 09/29/20 1701     Significant Indicator .     Source WND     Site LEFT FOOT     Gram Stain Result Rare WBCs.  Moderate Gram positive cocci.      CULTURE WOUND W/ GRAM STAIN [[id number]] Collected: 09/28/20 2054    Order Status: Completed Specimen: Wound from Left Foot Updated: 09/28/20 2303    URINALYSIS [[id number]]  (Abnormal) Collected: 09/28/20 2115    Order Status: Completed Specimen: Urine Updated: 09/28/20 2237     Color Yellow     Character Cloudy     Specific Gravity 1.036     Ph 5.5     Glucose >=1000 mg/dL      Ketones 80 mg/dL      Protein 100 mg/dL      Bilirubin Negative     Urobilinogen, Urine 1.0     Nitrite Negative     Leukocyte Esterase Negative     Occult Blood Moderate     Micro Urine Req Microscopic    Narrative:      Indication for culture:->Emergency Room Patient    SARS-CoV-2, PCR (In-House) [[id number]] Collected: 09/28/20 2051    Order Status: Completed Updated: 09/28/20 2205     SARS-CoV-2 Source NP Swab     SARS-CoV-2 by PCR NotDetected     Comment: [facility] providers: PLEASE REFER TO DE-ESCALATION AND RETESTING PROTOCOL  on [email or web address]  **The 27 bards GeneXpert Xpress SARS-CoV-2 Test has been made available for  use under the " Emergency Use Authorization (EUA) only.         Narrative:      Is patient being admitted?->Yes  Does this patient meet criteria for Rush/Cepheid per [facility]  Inpatient Workflow? (See workflow link below)->Yes  Expected turn around time?->Rush (Cepheid 2-4 hours)  Is this the patients First SARS CoV-2 test?->Unknown  Is this patient employed in healthcare?->No  Is the patient symptomatic as defined by the CDC?->Yes  Date of symptom onset?->20  Is the patient hospitalized?->No  Is the patient a resident in a congregate care setting?->No  Is the patient pregnant?->No    URINE CULTURE(NEW) [[id number]] Collected: 20    Order Status: Completed Specimen: Urine Updated: 20    Narrative:      Indication for culture:->Emergency Room Patient    COVID/SARS CoV-2 PCR [[id number]] Collected: 20    Order Status: Completed Specimen: Respirate from Nasopharyngeal Updated: 20     COVID Order Status Received     Comment: The order for SARS CoV-2 testing has been received by the  Laboratory. This result is neither positive nor negative.  Final results of testing will report in 24-48 hours, separately.         Narrative:      Is patient being admitted?->Yes  Does this patient meet criteria for Rush/Cepheid per [facility]  Inpatient Workflow? (See workflow link below)->Yes  Expected turn around time?->Rush (Cepheid 2-4 hours)  Is this the patients First SARS CoV-2 test?->Unknown  Is this patient employed in healthcare?->No  Is the patient symptomatic as defined by the CDC?->Yes  Date of symptom onset?->20  Is the patient hospitalized?->No  Is the patient a resident in a congregate care setting?->No  Is the patient pregnant?->No        Hemodynamics:  Temp (24hrs), Av.7 °C (98 °F), Min:36.1 °C (97 °F), Max:37.2 °C (98.9 °F)  Temperature: 36.6 °C (97.9 °F)  Pulse  Av  Min: 86  Max: 122   Blood Pressure: 128/80     Peripheral IV 20 18 G Left Forearm (Active)   Site Assessment  Clean;Dry;Intact 09/30/20 0100   Dressing Type Transparent 09/30/20 0100   Line Status Saline locked 09/30/20 0100   Dressing Status Clean;Dry;Intact 09/30/20 0100   Dressing Intervention N/A 09/30/20 0100   NEXT Primary Tubing Change  09/29/20 09/29/20 1033   NEXT Secondary Tubing Change  09/29/20 09/29/20 1033   Infiltration Grading ([facility], [facility]) 0 09/30/20 0100   Phlebitis Scale ([facility] Only) 0 09/30/20 0100       Peripheral IV 09/29/20 20 G Right Forearm (Active)   Site Assessment Clean;Dry;Intact 09/30/20 0100   Dressing Type Transparent 09/30/20 0100   Line Status Infusing 09/30/20 0100   Dressing Status Clean;Dry;Intact 09/30/20 0100   Dressing Intervention N/A 09/30/20 0100   Date Primary Tubing Changed 09/29/20 09/29/20 1033   Date Secondary Tubing Changed 09/29/20 09/29/20 1033   Infiltration Grading ([facility], [facility]) 0 09/30/20 0100   Phlebitis Scale ([facility] Only) 0 09/30/20 0100     Wound:  @WOUNDLDA(4)@     Fluids:  Intake/Output       09/28/20 0700 - 09/29/20 0659 09/29/20 0700 - 09/30/20 0659 09/30/20 0700 - 10/01/20 0659      6069-8822 1050-4925 Total 3755-6010 4393-4024 Total 7575-5234 5748-4088 Total       Intake    P.O.  --  -- --  480  270 750  --  -- --    P.O. -- -- -- 480 270 750 -- -- --    I.V.  --  600 600  --  250 250  --  -- --    Volume (mL) (lactated ringers infusion) -- 600 600 -- -- -- -- -- --    Volume (mL) (Lactated Ringers) -- -- -- -- 250 250 -- -- --    IV Piggyback  --  200 200  --  250 250  --  -- --    Volume (mL) (vancomycin (VANCOCIN) 1,500 mg in  mL IVPB) -- -- -- -- 250 250 -- -- --    Volume (mL) (ampicillin/sulbactam (UNASYN) 3 g in  mL IVPB) -- 200 200 -- -- -- -- -- --    Total Intake -- 800 800  -- -- --       Output    Urine  --  700 700  --  650 650  --  -- --    Output (mL) (Urethral Catheter Latex 16 Fr.) -- 700 700 -- 650 650 -- -- --    Total Output -- 700 700 -- 650 650 -- -- --       Net I/O     -- 100 100 480 120 600 -- -- --              GI/Nutrition:  Orders Placed This Encounter   Procedures   • Diet Order Diabetic     Standing Status:   Standing     Number of Occurrences:   1     Order Specific Question:   Diet:     Answer:   Diabetic [3]     Medications:  Current Facility-Administered Medications   Medication Last Dose   • oxyCODONE-acetaminophen (PERCOCET-10)  MG per tablet 1 Tab 1 Tab at 09/30/20 0950   • sodium chloride (OCEAN) 0.65 % nasal spray 2 Spray     • ALPRAZolam (XANAX) tablet 0.25 mg     • ketorolac (TORADOL) injection 30 mg     • omeprazole (PRILOSEC) capsule 20 mg     • vancomycin (VANCOCIN) 1,500 mg in  mL IVPB Stopped at 09/30/20 0040   • albuterol inhaler 2 Puff 2 Puff at 09/30/20 0757   • insulin glargine (Lantus) injection     • enoxaparin (LOVENOX) inj 40 mg 40 mg at 09/30/20 0551   • fentaNYL (DURAGESIC) 50 MCG/HR 1 Patch 1 Patch at 09/28/20 2340   • senna-docusate (PERICOLACE or SENOKOT S) 8.6-50 MG per tablet 2 Tab 2 Tab at 09/30/20 0550    And   • polyethylene glycol/lytes (MIRALAX) PACKET 1 Packet      And   • magnesium hydroxide (MILK OF MAGNESIA) suspension 30 mL      And   • bisacodyl (DULCOLAX) suppository 10 mg     • acetaminophen (TYLENOL) tablet 650 mg     • ondansetron (ZOFRAN) syringe/vial injection 4 mg     • ondansetron (ZOFRAN ODT) dispertab 4 mg     • promethazine (PHENERGAN) tablet 12.5-25 mg     • promethazine (PHENERGAN) suppository 12.5-25 mg     • prochlorperazine (COMPAZINE) injection 5-10 mg     • nicotine (NICODERM) 21 MG/24HR 21 mg 21 mg at 09/30/20 0550    And   • nicotine polacrilex (NICORETTE) 2 MG piece 2 mg     • insulin regular (HumuLIN R,NovoLIN R) injection 7 Units at 09/30/20 0927    And   • glucose 4 g chewable tablet 16 g      And   • dextrose 50% (D50W) injection 50 mL     • ampicillin/sulbactam (UNASYN) 3 g in  mL IVPB Stopped at 09/30/20 0619   • MD Alert...Vancomycin per Pharmacy       Medical Decision Making, by Problem:  Active Hospital Problems    Diagnosis   •  *Diabetic foot ulcer (HCC) [E11.621, L97.509]   • Acute on chronic respiratory failure with hypoxia ([facility]) [J96.21]   • Non compliance with medical treatment [Z91.19]   • DM2 (diabetes mellitus, type 2) (CMS-HCC) [E11.9]   • Chronic pain [G89.29]   • Hypertension [I10]   • Tobacco abuse [Z72.0]   • Sepsis (HCC) [A41.9]   • Hx of right BKA ([facility]) [Z89.511]   • Cellulitis of hand [L03.119]     IMPRESSION:  1.  Sepsis.  2.  Acute-on-chronic left diabetic foot infection, suspected osteomyelitis of   her first metatarsal joint.  3.  Acute left knee joint effusion, rule out septic arthritis.  4.  Poorly controlled diabetes mellitus with insulin dependence.  5.  Prior osteomyelitis, status post right below-knee amputation in 2017.  6.  Hypertension.     RECOMMENDATION:  1.  Recommend orthopedic consult for diagnostic arthrocentesis as well as   evaluation for debridement and possible amputation of her left foot.  2.  F/U blood culture NG at 48 hrs.  3.  Wound cultures F/U sensitivities.  4.  Agree with MRI to rule out abscess and osteomyelitis on imaging of the   left foot - refused by patient.  5.  Continue vancomycin and Unasyn empirically.  6.  Limb preservation service was consulted and I have discussed my plan with   them as well.  7.  Baseline CRP reviewed ESR - pending.    She does not want to be here. She refuses MRI and says it is her knee that is her problem and she does not want any surgery for her foot. She is growing 3 organisms from her foot and knee -  CNS, Gp. A Streptococcus and a Proteus species. Even if we ignore treating any osteomyelitis in her foot (based on the limitations she has put on our treatments) she will need 2 weeks minimum IV antibiotics. She will need a SNF and she prefers one in [place] closer to home. Case and treatment reviewed with patient, micro, PT, CM and RN > 40+ min on floor with face to face > 60% and 100% in direct patient care/counseling and D/C planning today.

## 2020-09-30 NOTE — PROGRESS NOTES
Community Health Worker Intake  • Social determinates of health intake complete.   • Identified financial barriers.  • Contact information provided to Greta Echevarria.  • Accepted Meds-To-Beds.   • Inpatient assessment completed.    CHW Morgan met with pt bedside to introduce CCM services. Pt accepted. Pt reports PCP is Michael Jordan at [facility]. No f/u appmnt yet. Pt declined for this CHW to schedule f/u. Pt prefers to do it herself. Educated pt on the importance of f/ups with PCP. Pt reports no transportation issues getting to appmnts. Pt uses [facility]. Pt indicated financial barriers. Pt reports currently on SSI. Pt reports there are food fay at [facility] she can go to when needed. Pt reports she feels confident in managing her health. Pt reports no other needs at this time.      Plan: Follow up call after d/c.

## 2020-09-30 NOTE — CONSULTS
DATE OF SERVICE:  09/29/2020    REQUESTING PHYSICIAN:  Dr. Martin.    CHIEF COMPLAINT:  Left knee pain.    HISTORY OF PRESENT ILLNESS:  The patient is 55 years old.  She is diabetic.    She was admitted to the hospital last night with pain in her left knee.  She   has had a previous transmetatarsal amputation as well that has not healed.    Orthopedic consultation was requested.    ALLERGIES:  None.    MEDICATIONS:  Duragesic patch.    PAST MEDICAL HISTORY:  Diabetes mellitus, hypertension, anxiety, GERD,   coronary artery disease.    PAST SURGICAL HISTORY:  Hysterectomy, cholecystectomy, right BKA, left   transmetatarsal amputation.    SOCIAL HISTORY:  The patient smokes.  She does not drink alcohol.  Apparently   lives in [place].    FAMILY HISTORY:  Positive for heart disease.    REVIEW OF SYSTEMS:  No loss of consciousness, nausea, vomiting, diarrhea,   constipation, polyuria, dysuria, fevers, chills, weight loss, weight gain,   abdominal pain, chest pain or shortness of breath.    PHYSICAL EXAMINATION:  GENERAL:  Patient is in no acute distress, lying in her bed.  VITAL SIGNS: Her blood pressure is 122/81, heart rate 110, respirations 16,   temperature 98.9.  HEENT:  Normocephalic, atraumatic.  NECK:  Supple, nontender.  CHEST AND ABDOMEN:  Nontender.  No labored breathing.  EXTREMITIES:  The right lower extremity has a healed BKA.  Upper extremities   without deformity.  Left lower extremity shows moderate sized effusion.  She   has pain with range of motion.  There is a wound on the plantar aspect of the   foot medially with exposed metatarsal.    LABORATORY DATA:  Include white blood cell count of 14,500, hematocrit 31.7%,   platelet count 232,000.  Sodium 131, potassium 3.9, creatinine 0.47.  AST and   ALT are normal.    ASSESSMENT:  1.  Left knee effusion.  2.  Unhealed transmetatarsal amputation.  3.  Diabetes mellitus.    PLAN:  Patient needs a BKA on the left, but she is refusing.  This is not    going to heal with revision amputation.  We will just debride the foot and   aspirate the knee.  If there is grace pus, we will washout the knee.  If not,   we will follow up on the cultures.       ____________________________________     MD KYM CHAVEZ / DALLIN    DD:  09/29/2020 20:01:00  DT:  09/29/2020 23:29:51    D#:  [id number]  Job#:  [id number]

## 2020-09-30 NOTE — CARE PLAN
Problem: Safety  Goal: Will remain free from falls  Outcome: PROGRESSING AS EXPECTED  All fall precautions in place.      Problem: Pain Management  Goal: Pain level will decrease to patient's comfort goal  Outcome: PROGRESSING SLOWER THAN EXPECTED  Patient rates pain 10/10. Administer meds per MAR. Offered ice and heat pack, patient refuses anything except medication.

## 2020-09-30 NOTE — ANESTHESIA POSTPROCEDURE EVALUATION
Patient: Greta Echevarria    Procedure Summary     Date: 09/29/20 Room / Location: [place] [address] / SURGERY [facility]    Anesthesia Start: 1957 Anesthesia Stop: 2044    Procedures:       IRRIGATION AND DEBRIDEMENT, WOUND (Left Foot)      ASPIRATION, BONE MARROW-KNEE (Left Knee) Diagnosis: (Left foot wound )    Surgeon: Jerzy Mayberry M.D. Responsible Provider: Joseph Veliz M.D.    Anesthesia Type: general ASA Status: 3 - Emergent          Final Anesthesia Type: general  Last vitals  BP   Blood Pressure: 135/89    Temp   36.8 °C (98.2 °F)    Pulse   Pulse: (!) 113   Resp   19    SpO2   96 %      Anesthesia Post Evaluation    Patient location during evaluation: PACU  Patient participation: complete - patient participated  Level of consciousness: awake and alert    Airway patency: patent  Anesthetic complications: no  Cardiovascular status: hemodynamically stable  Respiratory status: acceptable  Hydration status: euvolemic    PONV: none           Nurse Pain Score: 8 (NPRS)

## 2020-09-30 NOTE — OP REPORT
DATE OF SERVICE:  09/29/2020    PREOPERATIVE DIAGNOSES:    1.  Osteomyelitis, left first metatarsal.    2.  Persistent wound, left transmetatarsal amputation stump.    3.  Left knee effusion -- rule out septic arthritis.    4.  Diabetes mellitus.      POSTOPERATIVE DIAGNOSES:    1.  Osteomyelitis, left first metatarsal.    2.  Persistent wound, left transmetatarsal amputation stump.    3.  Septic arthritis of left knee.    4.  Diabetes mellitus.      PROCEDURES:    1.  Excisional debridement of residual left first metatarsal.    2.  Arthrocentesis, left knee.    3.  Arthrotomy and drainage, left knee.      SURGEON:  Jerzy Mayberry MD     ASSISTANT:  Isaiah Ogden DO    ANESTHESIOLOGIST:  Joseph Veliz MD     ANESTHETIC:  General.      ESTIMATED BLOOD LOSS:  5 mL.      INDICATIONS:  The patient is a 55-year-old diabetic woman who mostly is   complaining of pain in her left knee.  She has a large effusion.  She has   elevated white count.  She also has history of transmetatarsal amputation that   is not healed.  There is exposed residual metatarsal of the first toe, the   large wound plantar medially.  I do not think she has a salvage option for her   foot; however, she is refusing transtibial amputation.  I recommended   debridement of the toe with cultures and then antibiotics as needed with wound   care.  Again, emphasized the problems with avoidance of the transtibial   amputation, which is really the only way to cure this infection in her foot.      With regards to the left knee, I recommended arthrocentesis and if there is   grace pus, arthrotomy and drainage.  If not, observation for the synovial   fluid labs.  She understands and wished to proceed.      DESCRIPTION OF PROCEDURE:  The patient was identified in the preoperative   holding area.  Left leg was marked.  She was taken to the operating room where   general anesthetic was administered.  Intravenous antibiotics were given.    She was left on her  bed.  The left lower extremity was prepped and draped in   sterile fashion.  Timeout was held to confirm the patient identity and correct   surgical site.      We removed approximately 1 cm of exposed residual metatarsal at the tip.    There is no pus.  This is the first metatarsal.  This is basically within the   wound.  This was sent for culture.  The wound was then irrigated.  It was   packed open with wet dressing and covered with dry gauze.      An 18-gauge needle was inserted into the left knee.  A 30 mL of grace pus was   aspirated.  This fluid was sent to the lab for cell count, Gram stain and   culture.  Given the pus that was present, we proceeded with arthrotomy and   drainage as we discussed with the patient.  A longitudinal incision was made   anteromedially, this was carried down through the subcutaneous tissue to the   capsule.  A median parapatellar arthrotomy was performed.  Additional large   amount of pus was expressed.  We then irrigated the knee joint.  A Hemovac   drain was placed into the knee and then the capsule was repaired with 0   Vicryl.  Skin was closed with 2-0 Vicryl and 3-0 nylon.  Dressings were   applied.  Patient was extubated and taken to recovery room in stable   condition.      POSTOPERATIVE PLAN:    1.  Intravenous antibiotics and follow cultures.    2.  Wound care for left foot.    3.  Nonweightbearing, left foot.    4.  Recommended transtibial amputation for left leg.    5.  Ongoing preoperative medical management per hospitalist.       ____________________________________     MD KYM CHAVEZ / DALLIN    DD:  09/29/2020 20:52:34  DT:  09/29/2020 21:02:52    D#:  [id number]  Job#:  [id number]

## 2020-09-30 NOTE — PROGRESS NOTES
Assumed care at 0700, report received from night shift RN.  Pt is laying in bed, eyes closed with no obvious s/sx of distress; receiving 5L o2 via NC. Bed is locked and in the lowest position. Call light and belongings within reach. Whiteboard updated with RN and CNA numbers

## 2020-09-30 NOTE — PROGRESS NOTES
DATE OF SERVICE: 9/30/2020    TIME:  12:35 p.m.    The patient is sleeping, but woke up to talk to me.  She still complains of   pain in the left knee.  There is a VAC dressing on her foot.      Her blood pressure is 128/80, heart rate 86, respirations 18, temperature   97.9.      Her white blood cell count today 10,700, hematocrit 32.1%, platelet count   275,000.  Sodium 134, potassium 3.7, creatinine 0.45.      The synovial fluid from the operating room yesterday shows 135,000 white   cells, 97% of these are polys.  The left metatarsal bone culture shows beta   hemolytic group A strep and Proteus.  There are gram-positive cocci in the   knee fluid.      ASSESSMENT:    1.  Chronic osteomyelitis and wound, left foot.    2.  Septic arthritis, left knee.      PLAN:  Intravenous antibiotics.  Nonweightbearing left lower extremity.  VAC   therapy for the foot.  I do not think she is going to heal.  There is not   really anymore salvage procedures for the foot.  She needs either to manage   this with wound care or transtibial amputation, which is the only definitive   way to clear the infected bone.      With regards to the knee, we will leave the Hemovac drain until Friday.  She   can do range of motion starting then.  She will need DVT prophylaxis.       ____________________________________     MD KYM CHAVEZ / DALLIN    DD:  09/30/2020 13:54:55  DT:  09/30/2020 14:01:05    D#:  [id number]  Job#:  [id number]

## 2020-09-30 NOTE — OR NURSING
Pt calm and sleeping at this time. Otherwise, AA/Ox4. VSS. Dressing to left knee and foot, CDI. Pt reports moderate pain. Pain medications given. Ice pack applied to left knee. LLE elevated on two pillows. Pt denies nausea or vomiting.    Report given to BRENDA Leung.    Pt's daughter, Edith, updated regarding plan of care.    Pt via bed, accompanied by transport, was transferred to [facility] at 2213.

## 2020-09-30 NOTE — ANESTHESIA PREPROCEDURE EVALUATION
Relevant Problems   PULMONARY   (+) COPD with acute exacerbation (HCC)      NEURO   (+) Headache      CARDIAC   (+) Hypertension      ENDO   (+) DM2 (diabetes mellitus, type 2) (CMS-HCC)   (+) Poorly controlled type 2 diabetes mellitus with neuropathy ([facility])       Physical Exam    Airway   Mallampati: II  TM distance: >3 FB  Neck ROM: full       Cardiovascular - normal exam  Rhythm: regular  Rate: normal  (-) murmur     Dental - normal exam           Pulmonary - normal exam  Breath sounds clear to auscultation     Abdominal    Neurological - normal exam             Septic knee      Anesthesia Plan    ASA 3- EMERGENT   ASA physical status 3 criteria: diabetes - poorly controlledASA physical status emergent criteria: other (comment)    Plan - general       Airway plan will be LMA        Induction: intravenous      Pertinent diagnostic labs and testing reviewed    Informed Consent:    Anesthetic plan and risks discussed with patient.

## 2020-09-30 NOTE — WOUND TEAM
[facility]  Inpatient Services  Initial Wound and Skin Care Evaluation    Admission Date: 9/28/2020     Last order of IP CONSULT TO WOUND CARE was found on 9/30/2020 from Hospital Encounter on 9/28/2020       HPI, PMH, SH: Reviewed    Unit where seen by Wound Team: S531/02     WOUND CONSULT/FOLLOW UP RELATED TO:  Vac placement to chronic L TMA    Self Report / Pain Level:  Premedicated with PO Oxy by RN.     OBJECTIVE:  Pt lying in bed, leg elevated on pillow. Dressing in place CDI    WOUND TYPE, LOCATION, CHARACTERISTICS (Pressure Injuries: location, stage, POA or date identified)    Negative Pressure Wound Therapy 09/30/20 Diabetic foot ulcer Foot Left (Active)   NPWT Pump Mode / Pressure Setting Intermittent;125 mmHg 09/30/20    Dressing Type Small;Black Foam (Veraflo)    Number of Foam Pieces Used 2    Canister Changed No    NEXT Dressing Change/Treatment Date 10/02/20    VAC VeraFlo Irrigant Normal Saline    VAC VeraFlo Soak Time (mins) 5    VAC VeraFlo Instill Volume (ml) 8    VAC VeraFlo - Therapy Time (hrs) 1    VAC VeraFlo Pressure (mm/Hg) Intermittent;125 mmHg          Wound 09/29/20 Diabetic Ulcer Foot Left Full Thickness w/ Vac (Active)       09/30/20   Site Assessment Pale;Pink;Tan;Non-healing 09/30/20    Periwound Assessment Clean;Dry;Intact    Margins Attached edges;Defined edges; Epibole    Closure Secondary intention    Drainage Amount Scant    Drainage Description Sanguineous;Serosanguineous    Treatments Cleansed;Site care;CSWD - Conservative Sharp Wound Debridement    Wound Cleansing Approved Wound Cleanser    Periwound Protectant Paste Ring;Benzoin;Drape    Dressing Cleansing/Solutions Normal Saline    Dressing Options Wound Vac    Dressing Changed New    Dressing Status Clean;Dry;Intact    Dressing Change/Treatment Frequency Monday, Wednesday, Friday, and As Needed    NEXT Dressing Change/Treatment Date 10/02/20    NEXT Weekly Photo (Inpatient Only) 10/07/20    Non-staged Wound  Description Full thickness    Wound Length (cm) 3.5 cm    Wound Width (cm) 4 cm    Wound Depth (cm) 1.5 cm    Wound Surface Area (cm^2) 14 cm^2    Wound Volume (cm^3) 21 cm^3    Shape Irregular    Wound Odor None    Exposed Structures Bone;Tendon           Vascular:    SHAHNAZ:   No results found.      Lab Values:    Lab Results   Component Value Date/Time    WBC 14.5 (H) 09/29/2020 10:43 AM    RBC 3.51 (L) 09/29/2020 10:43 AM    HEMOGLOBIN 9.7 (L) 09/29/2020 10:43 AM    HEMATOCRIT 31.7 (L) 09/29/2020 10:43 AM    CREACTPROT 38.84 (H) 12/10/2017 10:59 AM    SEDRATEWES 109 (H) 12/10/2017 10:59 AM    HBA1C 13.3 (H) 09/28/2020 08:07 PM        Culture:   Obtained  IN OR 9/29  Culture Results show:  No results found for this or any previous visit (from the past 720 hour(s)).      INTERVENTIONS BY WOUND TEAM:  Previous dressing removed, wound cleansed with wound cleanser and gauze. Photos and measurements taken. Dry callused skin removed from danica wound with curette, scant bleeding noted. Epibole to 75% of danica wound, LPS APRN to debride wound edge. Danica wound prepped with benzoin and paste ring. Black Veraflo foam to wound bed, button applied and sealed with drape. Veraflo therapy with NS initiated instilling 8 ml for 5 minutes every 1 hour. Pt tolerated well.     Interdisciplinary consultation: Patient, Bedside RN, LPS APRN Arian Ashley    EVALUATION: Vac Veraflo therapy to manage bio burden, protect exposed underlying structures and encourage granulation tissue formation.    Goals: Steady decrease in wound area and depth weekly.    NURSING PLAN OF CARE ORDERS (X):    Dressing changes: See Dressing Care orders: X  Skin care: See Skin Care orders: X  Rectal tube care: See Rectal Tube Care orders:   Other orders:    RSKIN:   CURRENTLY IN PLACE (X), APPLIED THIS VISIT (A), ORDERED (O):    Q shift Bam:    Q shift pressure point assessments:    Pressure redistribution mattress   X         Low Airloss          Bariatric MRAY          Bariatric foam           Heel float boots     Heel Silicone dressing        Float Heels off Bed with Pillows               Barrier wipes         Barrier Cream         Barrier paste          Sacral silicone dressing         Silicone O2 tubing         Anchorfast         Cannula fixation Device (Tender )          Gray Foam Ear protectors           Trach with Optifoam split foam                 Waffle cushion        Waffle Overlay  O       Rectal tube or BMS    Purwick/Condom Cath          Antifungal tx      Interdry          Reposition q 2 hours        Up to chair        Ambulate      PT/OT        Dietician        Diabetes Education      PO  X   TF     TPN     NPO   # days   Other        WOUND TEAM PLAN OF CARE    Dressing changes by wound team:          Follow up 1-2 times weekly:               Follow up 3 times weekly:                NPWT change 3 times weekly:   X  Follow up as needed:       Other (explain):     Anticipated discharge plans:  Will need follow up wound care, placed on Veraflo 9/30  LTACH:        SNF/Rehab:                  Home Care:           Outpatient Wound Center:            Self Care:

## 2020-10-01 LAB
ALBUMIN SERPL BCP-MCNC: 2.1 G/DL (ref 3.2–4.9)
ALBUMIN/GLOB SERPL: 0.5 G/DL
ALP SERPL-CCNC: 779 U/L (ref 30–99)
ALT SERPL-CCNC: 18 U/L (ref 2–50)
ANION GAP SERPL CALC-SCNC: 10 MMOL/L (ref 7–16)
AST SERPL-CCNC: 10 U/L (ref 12–45)
BACTERIA UR CULT: NORMAL
BASOPHILS # BLD AUTO: 0.4 % (ref 0–1.8)
BASOPHILS # BLD: 0.04 K/UL (ref 0–0.12)
BILIRUB SERPL-MCNC: 0.5 MG/DL (ref 0.1–1.5)
BUN SERPL-MCNC: 20 MG/DL (ref 8–22)
CALCIUM SERPL-MCNC: 8.2 MG/DL (ref 8.5–10.5)
CHLORIDE SERPL-SCNC: 100 MMOL/L (ref 96–112)
CO2 SERPL-SCNC: 26 MMOL/L (ref 20–33)
CREAT SERPL-MCNC: 0.48 MG/DL (ref 0.5–1.4)
EOSINOPHIL # BLD AUTO: 0.08 K/UL (ref 0–0.51)
EOSINOPHIL NFR BLD: 0.8 % (ref 0–6.9)
ERYTHROCYTE [DISTWIDTH] IN BLOOD BY AUTOMATED COUNT: 52.7 FL (ref 35.9–50)
GLOBULIN SER CALC-MCNC: 4.1 G/DL (ref 1.9–3.5)
GLUCOSE BLD-MCNC: 274 MG/DL (ref 65–99)
GLUCOSE BLD-MCNC: 304 MG/DL (ref 65–99)
GLUCOSE BLD-MCNC: 320 MG/DL (ref 65–99)
GLUCOSE BLD-MCNC: 395 MG/DL (ref 65–99)
GLUCOSE SERPL-MCNC: 316 MG/DL (ref 65–99)
HCT VFR BLD AUTO: 31.4 % (ref 37–47)
HGB BLD-MCNC: 9.5 G/DL (ref 12–16)
IMM GRANULOCYTES # BLD AUTO: 0.28 K/UL (ref 0–0.11)
IMM GRANULOCYTES NFR BLD AUTO: 2.7 % (ref 0–0.9)
LYMPHOCYTES # BLD AUTO: 1.89 K/UL (ref 1–4.8)
LYMPHOCYTES NFR BLD: 18.5 % (ref 22–41)
MCH RBC QN AUTO: 27.1 PG (ref 27–33)
MCHC RBC AUTO-ENTMCNC: 30.3 G/DL (ref 33.6–35)
MCV RBC AUTO: 89.7 FL (ref 81.4–97.8)
MONOCYTES # BLD AUTO: 0.92 K/UL (ref 0–0.85)
MONOCYTES NFR BLD AUTO: 9 % (ref 0–13.4)
NEUTROPHILS # BLD AUTO: 7.02 K/UL (ref 2–7.15)
NEUTROPHILS NFR BLD: 68.6 % (ref 44–72)
NRBC # BLD AUTO: 0 K/UL
NRBC BLD-RTO: 0 /100 WBC
PLATELET # BLD AUTO: 293 K/UL (ref 164–446)
PMV BLD AUTO: 10.6 FL (ref 9–12.9)
POTASSIUM SERPL-SCNC: 3.6 MMOL/L (ref 3.6–5.5)
PROT SERPL-MCNC: 6.2 G/DL (ref 6–8.2)
RBC # BLD AUTO: 3.5 M/UL (ref 4.2–5.4)
SIGNIFICANT IND 70042: NORMAL
SITE SITE: NORMAL
SODIUM SERPL-SCNC: 136 MMOL/L (ref 135–145)
SOURCE SOURCE: NORMAL
VANCOMYCIN TROUGH SERPL-MCNC: 9.3 UG/ML (ref 10–20)
WBC # BLD AUTO: 10.2 K/UL (ref 4.8–10.8)

## 2020-10-01 PROCEDURE — 85025 COMPLETE CBC W/AUTO DIFF WBC: CPT

## 2020-10-01 PROCEDURE — 700102 HCHG RX REV CODE 250 W/ 637 OVERRIDE(OP): Performed by: HOSPITALIST

## 2020-10-01 PROCEDURE — 700111 HCHG RX REV CODE 636 W/ 250 OVERRIDE (IP): Performed by: ORTHOPAEDIC SURGERY

## 2020-10-01 PROCEDURE — 36415 COLL VENOUS BLD VENIPUNCTURE: CPT

## 2020-10-01 PROCEDURE — 700111 HCHG RX REV CODE 636 W/ 250 OVERRIDE (IP): Performed by: INTERNAL MEDICINE

## 2020-10-01 PROCEDURE — 770006 HCHG ROOM/CARE - MED/SURG/GYN SEMI*

## 2020-10-01 PROCEDURE — 700111 HCHG RX REV CODE 636 W/ 250 OVERRIDE (IP): Performed by: HOSPITALIST

## 2020-10-01 PROCEDURE — 97535 SELF CARE MNGMENT TRAINING: CPT | Mod: CQ

## 2020-10-01 PROCEDURE — A9270 NON-COVERED ITEM OR SERVICE: HCPCS | Performed by: HOSPITALIST

## 2020-10-01 PROCEDURE — 80053 COMPREHEN METABOLIC PANEL: CPT

## 2020-10-01 PROCEDURE — 700105 HCHG RX REV CODE 258: Performed by: HOSPITALIST

## 2020-10-01 PROCEDURE — 99233 SBSQ HOSP IP/OBS HIGH 50: CPT | Performed by: HOSPITALIST

## 2020-10-01 PROCEDURE — 700105 HCHG RX REV CODE 258: Performed by: INTERNAL MEDICINE

## 2020-10-01 PROCEDURE — 82962 GLUCOSE BLOOD TEST: CPT | Mod: 91

## 2020-10-01 RX ORDER — INSULIN GLARGINE 100 [IU]/ML
35 INJECTION, SOLUTION SUBCUTANEOUS EVERY EVENING
Status: DISCONTINUED | OUTPATIENT
Start: 2020-10-01 | End: 2020-10-01

## 2020-10-01 RX ORDER — INSULIN GLARGINE 100 [IU]/ML
50 INJECTION, SOLUTION SUBCUTANEOUS 2 TIMES DAILY
Status: DISCONTINUED | OUTPATIENT
Start: 2020-10-01 | End: 2020-10-02

## 2020-10-01 RX ADMIN — INSULIN HUMAN 12 UNITS: 100 INJECTION, SOLUTION PARENTERAL at 20:28

## 2020-10-01 RX ADMIN — OXYCODONE HYDROCHLORIDE AND ACETAMINOPHEN 1 TABLET: 10; 325 TABLET ORAL at 12:07

## 2020-10-01 RX ADMIN — ALPRAZOLAM 0.25 MG: 0.25 TABLET ORAL at 01:01

## 2020-10-01 RX ADMIN — OXYCODONE HYDROCHLORIDE AND ACETAMINOPHEN 1 TABLET: 10; 325 TABLET ORAL at 17:33

## 2020-10-01 RX ADMIN — OXYCODONE HYDROCHLORIDE AND ACETAMINOPHEN 1 TABLET: 10; 325 TABLET ORAL at 02:39

## 2020-10-01 RX ADMIN — KETOROLAC TROMETHAMINE 30 MG: 30 INJECTION, SOLUTION INTRAMUSCULAR at 17:34

## 2020-10-01 RX ADMIN — INSULIN HUMAN 10 UNITS: 100 INJECTION, SOLUTION PARENTERAL at 17:35

## 2020-10-01 RX ADMIN — OXYCODONE HYDROCHLORIDE AND ACETAMINOPHEN 1 TABLET: 10; 325 TABLET ORAL at 07:32

## 2020-10-01 RX ADMIN — ALPRAZOLAM 0.25 MG: 0.25 TABLET ORAL at 07:32

## 2020-10-01 RX ADMIN — FENTANYL 1 PATCH: 50 PATCH, EXTENDED RELEASE TRANSDERMAL at 20:32

## 2020-10-01 RX ADMIN — DOCUSATE SODIUM 50 MG AND SENNOSIDES 8.6 MG 2 TABLET: 8.6; 5 TABLET, FILM COATED ORAL at 17:33

## 2020-10-01 RX ADMIN — INSULIN HUMAN 10 UNITS: 100 INJECTION, SOLUTION PARENTERAL at 12:14

## 2020-10-01 RX ADMIN — INSULIN GLARGINE 50 UNITS: 100 INJECTION, SOLUTION SUBCUTANEOUS at 17:35

## 2020-10-01 RX ADMIN — INSULIN HUMAN 7 UNITS: 100 INJECTION, SOLUTION PARENTERAL at 08:35

## 2020-10-01 RX ADMIN — VANCOMYCIN HYDROCHLORIDE 1500 MG: 500 INJECTION, POWDER, LYOPHILIZED, FOR SOLUTION INTRAVENOUS at 12:07

## 2020-10-01 RX ADMIN — ENOXAPARIN SODIUM 40 MG: 40 INJECTION SUBCUTANEOUS at 05:03

## 2020-10-01 RX ADMIN — SODIUM CHLORIDE 3 G: 900 INJECTION INTRAVENOUS at 05:03

## 2020-10-01 RX ADMIN — OXYCODONE HYDROCHLORIDE AND ACETAMINOPHEN 1 TABLET: 10; 325 TABLET ORAL at 22:25

## 2020-10-01 RX ADMIN — DOCUSATE SODIUM 50 MG AND SENNOSIDES 8.6 MG 2 TABLET: 8.6; 5 TABLET, FILM COATED ORAL at 05:04

## 2020-10-01 RX ADMIN — ALPRAZOLAM 0.25 MG: 0.25 TABLET ORAL at 12:07

## 2020-10-01 RX ADMIN — VANCOMYCIN HYDROCHLORIDE 1750 MG: 500 INJECTION, POWDER, LYOPHILIZED, FOR SOLUTION INTRAVENOUS at 21:44

## 2020-10-01 RX ADMIN — KETOROLAC TROMETHAMINE 30 MG: 30 INJECTION, SOLUTION INTRAMUSCULAR at 11:24

## 2020-10-01 RX ADMIN — OMEPRAZOLE 20 MG: 20 CAPSULE, DELAYED RELEASE ORAL at 05:04

## 2020-10-01 RX ADMIN — NICOTINE TRANSDERMAL SYSTEM 21 MG: 21 PATCH, EXTENDED RELEASE TRANSDERMAL at 05:04

## 2020-10-01 RX ADMIN — KETOROLAC TROMETHAMINE 30 MG: 30 INJECTION, SOLUTION INTRAMUSCULAR at 22:25

## 2020-10-01 RX ADMIN — SODIUM CHLORIDE 3 G: 900 INJECTION INTRAVENOUS at 00:12

## 2020-10-01 RX ADMIN — SODIUM CHLORIDE 3 G: 900 INJECTION INTRAVENOUS at 11:17

## 2020-10-01 RX ADMIN — ONDANSETRON 4 MG: 4 TABLET, ORALLY DISINTEGRATING ORAL at 22:10

## 2020-10-01 RX ADMIN — SODIUM CHLORIDE 3 G: 900 INJECTION INTRAVENOUS at 17:36

## 2020-10-01 ASSESSMENT — PAIN DESCRIPTION - PAIN TYPE
TYPE: ACUTE PAIN

## 2020-10-01 ASSESSMENT — ENCOUNTER SYMPTOMS
ROS GI COMMENTS: DRY MOUTH
SHORTNESS OF BREATH: 1
VOMITING: 0
FEVER: 0
CHILLS: 0

## 2020-10-01 NOTE — THERAPY
"Missed Therapy     Patient Name: Greta Echevarria  Age:  55 y.o., Sex:  female  Medical Record #: [id number]  Today's Date: 10/1/2020    Discussed missed therapy with RN    Attempted to see pt x2 for OT eval. Pt initially agreeable, but eating lunch, returned and pt then stated she was not willing d/t severe pain with movement. Repeated \"I can't,\" but unwilling to attempt. Continued edu on participation/importance of OOB/EOB activity and return home; refused edu. Will attempt 1 more time, then will d/c OT order.   "

## 2020-10-01 NOTE — DISCHARGE PLANNING
Received Choice form at [phone]  Agency/Facility Name: [facility]   Referral sent per Choice form @ 0045

## 2020-10-01 NOTE — DISCHARGE PLANNING
Agency/Facility Name: [facility]   Outcome: Left voicemail for admissions regarding referral. Requested call back.     Agency/Facility Name: [facility]   Spoke To: LISA Gambino   Outcome: Per Maki, referral has not been reviewed. Maki will call [facility] back when referral has been reviewed.     Agency/Facility Name: [facility]   Spoke To:    Outcome: Admissions out of office. [facility] provided contact information and requested admissions contact [facility] regarding referral.     Agency/Facility Name: [facility]   Outcome: Left voicemail for admissions regarding referral. Requested call back.

## 2020-10-01 NOTE — PROGRESS NOTES
"Santa from [facility] called at 1259 to report that the wound culture from the left foot grew \"group A strep\". Dr. Banuelos notified. No new orders  "

## 2020-10-01 NOTE — PROGRESS NOTES
Hospital Medicine Daily Progress Note    Date of Service  10/1/2020    Chief Complaint  55 y.o. female admitted 9/28/2020 with past medical history of diabetes mellitus, hypertension chronic hypoxic respiratory failure on 2 L oxygen  apparently has been off of her diabetes medicines for at least 3 months.  She presented to the emergency room in [facility] with left foot infection and pain for the past few days it is been worsening.  She also notes 1 day of left hand pain and redness as well.      Hospital Course    She presented with sepsis and was given vancomycin and Rocephin.  She was found to be hyperglycemic.  ESR and CRP were elevated.      Interval Problem Update  9/29: Patient states that her hand swelling and foot swelling is decreasing.  Orthopedics was consulted and will take patient to the OR for debridement and biopsy of bone.  Left knee is also swollen and she was taken for arthrocentesis.  We will continue vancomycin and Unasyn for now.  9/30: Patient seen and examined by me today.  I increased her oxycodone and Xanax to 4 times a day.  We will continue to monitor her mental respiratory status.  Cultures came back positive for group A strep.  Surgical cultures are pending.  Arthrocentesis cultures are pending.  Wound VAC and Hemovac was placed.  Orthopedics recommended amputation but the patient refusing.  SNF referral has been sent.  Patient continues to have uncontrolled blood sugars and have increase her Lantus to 30 units.  10/1: Surgical cultures still pending.  Orthopedics recommended amputation but the patient is refusing.  I discussed this with infectious disease who recommended 6 weeks of antibiotics.  She has a wound VAC and will try to find placement.  Although, patient states that she will lose her housing if she goes to a skilled nursing facility for 6 weeks.  Consultants/Specialty  LPS      Code Status  Full Code    Disposition  PT OT eval    Review of Systems  Review of  Systems   Constitutional: Negative for chills and fever.   Respiratory: Positive for shortness of breath.    Cardiovascular: Negative for chest pain.   Gastrointestinal: Negative for vomiting.        Dry mouth   All other systems reviewed and are negative.       Physical Exam  Temp:  [36.2 °C (97.2 °F)-36.7 °C (98 °F)] 36.4 °C (97.6 °F)  Pulse:  [] 100  Resp:  [18] 18  BP: (120-138)/(65-85) 136/85  SpO2:  [94 %-97 %] 97 %    Physical Exam  Vitals signs and nursing note reviewed.   Constitutional:       General: She is not in acute distress.     Appearance: Normal appearance. She is not ill-appearing, toxic-appearing or diaphoretic.      Comments: disheveled   HENT:      Head: Normocephalic and atraumatic.      Nose: No congestion or rhinorrhea.      Mouth/Throat:      Mouth: Mucous membranes are dry.      Pharynx: Oropharynx is clear. No oropharyngeal exudate or posterior oropharyngeal erythema.   Eyes:      General: No scleral icterus.     Conjunctiva/sclera: Conjunctivae normal.   Neck:      Musculoskeletal: Normal range of motion and neck supple. No neck rigidity or muscular tenderness.      Vascular: No carotid bruit.   Cardiovascular:      Rate and Rhythm: Regular rhythm. Tachycardia present.      Pulses: Normal pulses.      Heart sounds: Normal heart sounds. No murmur. No friction rub. No gallop.    Pulmonary:      Effort: Pulmonary effort is normal. No respiratory distress.      Breath sounds: Normal breath sounds. No stridor. No wheezing or rhonchi.   Abdominal:      General: Abdomen is flat. There is no distension.      Palpations: There is no mass.      Tenderness: There is no abdominal tenderness. There is no left CVA tenderness, guarding or rebound.      Hernia: No hernia is present.   Musculoskeletal: Normal range of motion.         General: No swelling.      Right lower leg: No edema.      Left lower leg: No edema.      Comments: Right BKA  Left trans-met with large ulcer bone exposed  Left hand  dorsum is red, swollen and warm to touch   Lymphadenopathy:      Cervical: No cervical adenopathy.   Skin:     General: Skin is warm and dry.      Capillary Refill: Capillary refill takes more than 3 seconds.      Coloration: Skin is not jaundiced or pale.      Findings: No bruising or erythema.   Neurological:      Mental Status: She is alert and oriented to person, place, and time.         Fluids    Intake/Output Summary (Last 24 hours) at 10/1/2020 1138  Last data filed at 10/1/2020 0734  Gross per 24 hour   Intake 960 ml   Output 1860 ml   Net -900 ml       Laboratory  Recent Labs     09/29/20  1043 09/30/20  1027 10/01/20  0049   WBC 14.5* 10.7 10.2   RBC 3.51* 3.55* 3.50*   HEMOGLOBIN 9.7* 9.8* 9.5*   HEMATOCRIT 31.7* 32.1* 31.4*   MCV 90.3 90.4 89.7   MCH 27.6 27.6 27.1   MCHC 30.6* 30.5* 30.3*   RDW 52.2* 52.8* 52.7*   PLATELETCT 232 275 293   MPV 10.6 10.6 10.6     Recent Labs     09/29/20  1043 09/30/20  1027 10/01/20  0049   SODIUM 131* 134* 136   POTASSIUM 3.9 3.7 3.6   CHLORIDE 95* 95* 100   CO2 21 23 26   GLUCOSE 317* 311* 316*   BUN 13 12 20   CREATININE 0.47* 0.45* 0.48*   CALCIUM 8.8 8.1* 8.2*                   Imaging  US-SHAHNAZ SINGLE LEVEL BILAT   Final Result      DX-FOOT-COMPLETE 3+ LEFT   Final Result      1.  There is diffuse left forefoot swelling with a large ulceration along the medial aspect of the remaining forefoot. There is no plain film evidence of underlying osteomyelitis.   2.  There is postoperative change consistent with amputation at the level of the mid and proximal metatarsals.      [id number] FILM X-RAY   Final Result      [id number] FILM X-RAY   Final Result      DX-CHEST-PORTABLE (1 VIEW)   Final Result      Enlarged cardiac silhouette and interstitial prominence suggesting pulmonary edema.           Assessment/Plan  * Diabetic foot ulcer (HCC)- (present on admission)  Assessment & Plan  Left trans-met with large ulcer which is draining pus which will be  cultured  WBC in [place] was 16 and xray was suspicious for osteomyelitis.   Limb preservation service consulted: she may be a candidate for surgical debridement.  IV vancomycin and Unasyn   ESR in [place] was 109  Infectious disease consulted  Wound culture positive for group A strep    Acute on chronic respiratory failure with hypoxia (HCC)- (present on admission)  Assessment & Plan  She is chronically on 2 liters of oxygen and is now requiring 5 liters  This may be due to the 24 mg morphine, 15 mg versed and 2 mg ativan given per transfer sheet.  Supplemental oxygen ordered.  If her sats decrease, her fentanyl patch may need to be removed and possibly narcan      Non compliance with medical treatment- (present on admission)  Assessment & Plan  She stopped taking all of her meds except fentanyl patch    DM2 (diabetes mellitus, type 2) (CMS-HCC)- (present on admission)  Assessment & Plan  Start on insulin sliding scale with serial Accu-Checks  Hemoglobin A1c 13.3  Hypoglycemic protocol in place  Start Lantus increase to 50U twice daily       Cellulitis of hand- (present on admission)  Assessment & Plan  Left hand dorsum    Hx of right BKA ([facility])- (present on admission)  Assessment & Plan  Hx of    Sepsis ([facility])- (present on admission)  Assessment & Plan  This is Sepsis Present on admission  SIRS criteria identified on my evaluation include: Leukocytosis, with WBC greater than 12,000  Source is left foot infection  Sepsis protocol initiated  Fluid resuscitation ordered per protocol  IV antibiotics as appropriate for source of sepsis  While organ dysfunction may be noted elsewhere in this problem list or in the chart, degree of organ dysfunction does not meet CMS criteria for severe sepsis          Tobacco abuse- (present on admission)  Assessment & Plan  Nicotine patch offered    Chronic pain- (present on admission)  Assessment & Plan  Continue fentanyl patch 50 mcg    Hypertension- (present on  admission)  Assessment & Plan  Controlled        VTE prophylaxis: lovenox

## 2020-10-01 NOTE — CARE PLAN
Problem: Communication  Goal: The ability to communicate needs accurately and effectively will improve  Outcome: PROGRESSING AS EXPECTED  Note: Educated patient to use call light when she is needing help. Call light within reach. Verbalized understanding.      Problem: Safety  Goal: Will remain free from falls  Outcome: PROGRESSING AS EXPECTED  Note: Patients bed in lowest and locked position, call light and side table within reach. Non-skid socks on and educated to use call light when she would like to get up. Verbalized understanding.       Problem: Knowledge Deficit  Goal: Knowledge of disease process/condition, treatment plan, diagnostic tests, and medications will improve  Outcome: PROGRESSING AS EXPECTED  Note: Patient compliant with finger stick checks as well as insulin given. Verbalized understanding of the need.      Problem: Skin Integrity  Goal: Risk for impaired skin integrity will decrease  Outcome: PROGRESSING AS EXPECTED  Note: Turning patient, pillows in place for support and positioning, lines out of the way of pressure points.

## 2020-10-01 NOTE — PROGRESS NOTES
"Assumed pt care at 1900 from day RN. Aware of fall risk status. VS /84   Pulse 98   Temp 36.2 °C (97.2 °F) (Temporal)   Resp 18   Ht 1.702 m (5' 7\") Comment: previous admit  Wt 120.2 kg (265 lb)   SpO2 94%   BMI 41.50 kg/m² . Assessment complete. Discussed plan of care with patient.     "

## 2020-10-01 NOTE — PROGRESS NOTES
"Pharmacy Kinetics 55 y.o. female on vancomycin day # 4 10/1/2020    Currently on Vancomycin 1500 mg iv q12hr  Provider specified end date: minimum of 6 weeks of therapy if evidence of MRSA per ID    Indication for Treatment: SSTI - diabetic foot infection     Pertinent history per medical record: Admitted on 2020 for left foot infection.  Originally presented to [facility] for worsening left foot pain; pt also notes left hand pain and redness.  PMH of diabetes s/p multiple amputations.  Pt has been off diabetic medications for 3 months.  ID consulted.     Other antibiotics: Unasyn 3 g IV q6h     Allergies: Patient has no known allergies.      List concerns for renal function: low albumin, DM, BMI 42     Pertinent cultures to date:   : bone (first metatarsal) - Beta-hemolytic Strep Grp A, staph aureus   : wound (left knee effusion drainage) many rare GPC  : urine - Negative   : wound (left foot) - Beta-hemolytic Strep Grp A, Morganella morganii (s) Unasyn   : blood (peripheral) NGTD x2    Recent Labs     20  1043 20  1027 10/01/20  0049   WBC 14.6* 14.5* 10.7 10.2   NEUTSPOLYS 83.20* 79.20* 72.70* 68.60     Recent Labs     20  1043 20  1027 10/01/20  0049   BUN 12 13 12 20   CREATININE 0.43* 0.47* 0.45* 0.48*   ALBUMIN 2.6* 2.5* 2.2* 2.1*     Recent Labs     20  2222   [facility]*       Intake/Output Summary (Last 24 hours) at 10/1/2020 1404  Last data filed at 10/1/2020 0734  Gross per 24 hour   Intake 480 ml   Output 1860 ml   Net -1380 ml      /85   Pulse 100   Temp 36.4 °C (97.6 °F) (Temporal)   Resp 18   Ht 1.702 m (5' 7\")   Wt 120.2 kg (265 lb)   SpO2 97%  Temp (24hrs), Av.4 °C (97.5 °F), Min:36.2 °C (97.2 °F), Max:36.7 °C (98 °F)      A/P   1. Vancomycin dose change: yes, increase vancomycin to 1750 mg IV q12h   2. Next vancomycin level: 10/4 prior to 0800 dose (not ordered)  3. Goal trough: 10-15 " mcg/mL  4. Comments: Vancomycin trough prior to 5th total dose was slightly sub-therapeutic. Bone from 1st metatarsal (+) staph aureus, sensitivities pending. Will increase vancomycin to ~ 15 mg/kg IV q12h to obtain trough level within goal range.  If staph aureus results MRSA will obtain trough in ~ 4 days with a duration of therapy 6 weeks per ID recommendations. Renal indices are relatively stable and pt remains afebrile with improving leukocytosis. Pharmacy eill continue to monitor.     Leslie Wood, PharmD, BCOP

## 2020-10-01 NOTE — DISCHARGE PLANNING
Anticipated Discharge Disposition:   · SNF vs LTACH    Action:   · Reviewed pt's case in IDT rounds. Pt is currently on Veraflow wound vac and abx. Needs placement at SNF for ongoing care however many facilities in local and rural areas have declined due to pt's insurance. Provider inquired if referral to LTACH could be placed to determine if LTACH would be able to accept pt.   · LSW met with pt at bedside, pt is agreeable to referral. Of the two [facility] facilities in the area only one takes Medicaid[facility]. Pt agreeable for referral to be sent to [facility].   · CHOICE form completed and faxed to [facility] ext [phone]    Barriers to Discharge:   · Accepting facility    Plan:   · Care coordination will continue to follow up and provide assistance with discharge plans/barriers.

## 2020-10-01 NOTE — PROGRESS NOTES
Infectious Disease Daily Progress Note    Date of Service  10/1/2020    Chief Complaint  Vancomycin 9/28-present Day#3  Unasyn 9/29-present Day #2     9/30: She does not want to be here. She refuses MRI and says it is her knee that is her problem and she does not want any surgery for her foot.  10/1: L knee synovial fluid growing GPC. Patient still refusing BKA. CM looking into SNF     Review of Systems  Review of Systems   Musculoskeletal: Positive for joint pain.   All other systems reviewed and are negative.       Physical Exam  Temp:  [36.2 °C (97.2 °F)-36.7 °C (98 °F)] 36.4 °C (97.6 °F)  Pulse:  [] 100  Resp:  [18] 18  BP: (120-138)/(65-85) 136/85  SpO2:  [94 %-97 %] 97 %    Physical Exam  Constitutional:       Appearance: Normal appearance. She is obese.   HENT:      Head: Normocephalic.   Cardiovascular:      Rate and Rhythm: Normal rate and regular rhythm.      Heart sounds: No murmur.   Pulmonary:      Effort: No respiratory distress.      Breath sounds: Normal breath sounds. No wheezing.   Abdominal:      General: Abdomen is flat. Bowel sounds are normal.   Skin:     Comments: L knee with ACE wrap and hemovac. L 1st MTP VAC    Neurological:      Mental Status: She is alert.         Fluids    Intake/Output Summary (Last 24 hours) at 10/1/2020 0911  Last data filed at 10/1/2020 0503  Gross per 24 hour   Intake 1440 ml   Output 2060 ml   Net -620 ml       Laboratory  Recent Labs     09/29/20  1043 09/30/20  1027 10/01/20  0049   WBC 14.5* 10.7 10.2   RBC 3.51* 3.55* 3.50*   HEMOGLOBIN 9.7* 9.8* 9.5*   HEMATOCRIT 31.7* 32.1* 31.4*   MCV 90.3 90.4 89.7   MCH 27.6 27.6 27.1   MCHC 30.6* 30.5* 30.3*   RDW 52.2* 52.8* 52.7*   PLATELETCT 232 275 293   MPV 10.6 10.6 10.6     Recent Labs     09/29/20  1043 09/30/20  1027 10/01/20  0049   SODIUM 131* 134* 136   POTASSIUM 3.9 3.7 3.6   CHLORIDE 95* 95* 100   CO2 21 23 26   GLUCOSE 317* 311* 316*   BUN 13 12 20   CREATININE 0.47* 0.45* 0.48*   CALCIUM 8.8 8.1*  8.2*                 IMPRESSION:  - Sepsis.  - Acute on chronic L 1st MTP osteomyelitis s/p I+D 9/29 - Group A strep, Morganella  - Acute L knee septic arthritis s/p washout 9/29 - GPCs  - Poorly controlled diabetes mellitus with insulin dependence.  - Prior osteomyelitis, status post right below-knee amputation in 2017.  - Hypertension.     RECOMMENDATION:  - GPCs in patients synovial culture of L knee. Continue Vanco and Unasyn for now. Will stop Vancomycin if no evidence of MRSA  - Dr Pak recommending BKA for osteo of her foot. Patient refusing. As a result, will need minimum of 6 week therapy.   - Continue to monitor her cultures  - PICC line tomorrow if blood cultures remain no growth  - Continue wound VAC  CM looking into SNF at [facility]    35 min spent with 50% face to face care  Thank you for this consult.

## 2020-10-01 NOTE — THERAPY
Missed Therapy     Patient Name: Greta Echevarria  Age:  55 y.o., Sex:  female  Medical Record #: [id number]  Today's Date: 10/1/2020    Attempted therapy session 3x this pm. First attempt pt reporting she is just eating lunch but willing to try after. Returned for second time and pt stating still not finished with lunch asking therapy to return in 10 minutes. Last attempt pt was pre medicated prior to arrival. Pt now declining any activity stating she hasn't had enough pain meds and she will just scream if she moves. Educated on benefits of therapy and risks of immobility. Pt continued to defer activity at this time. Will follow as able.

## 2020-10-01 NOTE — PROGRESS NOTES
Report received from BRENDA Le at 0700. Assumed care, assessment complete. Pt is A & O x 4.  Pt complains fof 9/10 pain, medicated per MAR, iced water provided. Fall precautions and appropriate signs in place. Pt oriented to unit routine, call light/phone system and CNA and RN extension number provided. Pt educated regarding fall precautions. Bed alarm in use and locked in lowest position. Pt denies any additional needs at this time. Call light within reach.

## 2020-10-02 ENCOUNTER — APPOINTMENT (OUTPATIENT)
Dept: RADIOLOGY | Facility: MEDICAL CENTER | Age: 55
DRG: 488 | End: 2020-10-02
Attending: HOSPITALIST
Payer: MEDICAID

## 2020-10-02 LAB
ALBUMIN SERPL BCP-MCNC: 2.2 G/DL (ref 3.2–4.9)
ALBUMIN/GLOB SERPL: 0.5 G/DL
ALP SERPL-CCNC: 721 U/L (ref 30–99)
ALT SERPL-CCNC: 16 U/L (ref 2–50)
ANION GAP SERPL CALC-SCNC: 10 MMOL/L (ref 7–16)
ANISOCYTOSIS BLD QL SMEAR: ABNORMAL
AST SERPL-CCNC: 11 U/L (ref 12–45)
BASO STIPL BLD QL SMEAR: NORMAL
BASOPHILS # BLD AUTO: 0.6 % (ref 0–1.8)
BASOPHILS # BLD: 0.06 K/UL (ref 0–0.12)
BILIRUB SERPL-MCNC: 0.4 MG/DL (ref 0.1–1.5)
BUN SERPL-MCNC: 18 MG/DL (ref 8–22)
BURR CELLS BLD QL SMEAR: NORMAL
CALCIUM SERPL-MCNC: 7.9 MG/DL (ref 8.5–10.5)
CHLORIDE SERPL-SCNC: 100 MMOL/L (ref 96–112)
CO2 SERPL-SCNC: 26 MMOL/L (ref 20–33)
COMMENT 1642: NORMAL
CREAT SERPL-MCNC: 0.43 MG/DL (ref 0.5–1.4)
EOSINOPHIL # BLD AUTO: 0.09 K/UL (ref 0–0.51)
EOSINOPHIL NFR BLD: 0.9 % (ref 0–6.9)
ERYTHROCYTE [DISTWIDTH] IN BLOOD BY AUTOMATED COUNT: 53.4 FL (ref 35.9–50)
GLOBULIN SER CALC-MCNC: 4.1 G/DL (ref 1.9–3.5)
GLUCOSE BLD-MCNC: 214 MG/DL (ref 65–99)
GLUCOSE BLD-MCNC: 269 MG/DL (ref 65–99)
GLUCOSE BLD-MCNC: 297 MG/DL (ref 65–99)
GLUCOSE BLD-MCNC: 303 MG/DL (ref 65–99)
GLUCOSE SERPL-MCNC: 306 MG/DL (ref 65–99)
HCT VFR BLD AUTO: 33.1 % (ref 37–47)
HGB BLD-MCNC: 9.9 G/DL (ref 12–16)
IMM GRANULOCYTES # BLD AUTO: 0.43 K/UL (ref 0–0.11)
IMM GRANULOCYTES NFR BLD AUTO: 4.1 % (ref 0–0.9)
LYMPHOCYTES # BLD AUTO: 2.12 K/UL (ref 1–4.8)
LYMPHOCYTES NFR BLD: 20.1 % (ref 22–41)
MACROCYTES BLD QL SMEAR: ABNORMAL
MCH RBC QN AUTO: 27.3 PG (ref 27–33)
MCHC RBC AUTO-ENTMCNC: 29.9 G/DL (ref 33.6–35)
MCV RBC AUTO: 91.4 FL (ref 81.4–97.8)
MONOCYTES # BLD AUTO: 0.82 K/UL (ref 0–0.85)
MONOCYTES NFR BLD AUTO: 7.8 % (ref 0–13.4)
MORPHOLOGY BLD-IMP: NORMAL
NEUTROPHILS # BLD AUTO: 7.03 K/UL (ref 2–7.15)
NEUTROPHILS NFR BLD: 66.5 % (ref 44–72)
NRBC # BLD AUTO: 0.02 K/UL
NRBC BLD-RTO: 0.2 /100 WBC
OVALOCYTES BLD QL SMEAR: NORMAL
PLATELET # BLD AUTO: 340 K/UL (ref 164–446)
PLATELET BLD QL SMEAR: NORMAL
PMV BLD AUTO: 10.7 FL (ref 9–12.9)
POIKILOCYTOSIS BLD QL SMEAR: NORMAL
POLYCHROMASIA BLD QL SMEAR: NORMAL
POTASSIUM SERPL-SCNC: 3.9 MMOL/L (ref 3.6–5.5)
PROT SERPL-MCNC: 6.3 G/DL (ref 6–8.2)
RBC # BLD AUTO: 3.62 M/UL (ref 4.2–5.4)
RBC BLD AUTO: PRESENT
SODIUM SERPL-SCNC: 136 MMOL/L (ref 135–145)
WBC # BLD AUTO: 10.6 K/UL (ref 4.8–10.8)

## 2020-10-02 PROCEDURE — 700102 HCHG RX REV CODE 250 W/ 637 OVERRIDE(OP): Performed by: HOSPITALIST

## 2020-10-02 PROCEDURE — 302098 PASTE RING (FLAT): Performed by: HOSPITALIST

## 2020-10-02 PROCEDURE — 94640 AIRWAY INHALATION TREATMENT: CPT

## 2020-10-02 PROCEDURE — C1751 CATH, INF, PER/CENT/MIDLINE: HCPCS

## 2020-10-02 PROCEDURE — 700111 HCHG RX REV CODE 636 W/ 250 OVERRIDE (IP): Performed by: INTERNAL MEDICINE

## 2020-10-02 PROCEDURE — 700101 HCHG RX REV CODE 250: Performed by: HOSPITALIST

## 2020-10-02 PROCEDURE — A9270 NON-COVERED ITEM OR SERVICE: HCPCS | Performed by: HOSPITALIST

## 2020-10-02 PROCEDURE — 97166 OT EVAL MOD COMPLEX 45 MIN: CPT

## 2020-10-02 PROCEDURE — 85025 COMPLETE CBC W/AUTO DIFF WBC: CPT

## 2020-10-02 PROCEDURE — 94760 N-INVAS EAR/PLS OXIMETRY 1: CPT

## 2020-10-02 PROCEDURE — 700105 HCHG RX REV CODE 258: Performed by: INTERNAL MEDICINE

## 2020-10-02 PROCEDURE — 700105 HCHG RX REV CODE 258: Performed by: HOSPITALIST

## 2020-10-02 PROCEDURE — 97605 NEG PRS WND THER DME<=50SQCM: CPT

## 2020-10-02 PROCEDURE — 700111 HCHG RX REV CODE 636 W/ 250 OVERRIDE (IP): Performed by: HOSPITALIST

## 2020-10-02 PROCEDURE — 770006 HCHG ROOM/CARE - MED/SURG/GYN SEMI*

## 2020-10-02 PROCEDURE — 97535 SELF CARE MNGMENT TRAINING: CPT

## 2020-10-02 PROCEDURE — 02HV33Z INSERTION OF INFUSION DEVICE INTO SUPERIOR VENA CAVA, PERCUTANEOUS APPROACH: ICD-10-PCS | Performed by: HOSPITALIST

## 2020-10-02 PROCEDURE — 36415 COLL VENOUS BLD VENIPUNCTURE: CPT

## 2020-10-02 PROCEDURE — 700111 HCHG RX REV CODE 636 W/ 250 OVERRIDE (IP): Performed by: ORTHOPAEDIC SURGERY

## 2020-10-02 PROCEDURE — 82962 GLUCOSE BLOOD TEST: CPT

## 2020-10-02 PROCEDURE — B548ZZA ULTRASONOGRAPHY OF SUPERIOR VENA CAVA, GUIDANCE: ICD-10-PCS | Performed by: HOSPITALIST

## 2020-10-02 PROCEDURE — 99232 SBSQ HOSP IP/OBS MODERATE 35: CPT | Performed by: HOSPITALIST

## 2020-10-02 PROCEDURE — 80053 COMPREHEN METABOLIC PANEL: CPT

## 2020-10-02 RX ORDER — INSULIN GLARGINE 100 [IU]/ML
60 INJECTION, SOLUTION SUBCUTANEOUS 2 TIMES DAILY
Status: DISCONTINUED | OUTPATIENT
Start: 2020-10-02 | End: 2020-10-03

## 2020-10-02 RX ORDER — CEFAZOLIN SODIUM 2 G/100ML
2 INJECTION, SOLUTION INTRAVENOUS EVERY 8 HOURS
Status: DISCONTINUED | OUTPATIENT
Start: 2020-10-02 | End: 2020-10-03

## 2020-10-02 RX ADMIN — INSULIN GLARGINE 60 UNITS: 100 INJECTION, SOLUTION SUBCUTANEOUS at 18:10

## 2020-10-02 RX ADMIN — INSULIN HUMAN 10 UNITS: 100 INJECTION, SOLUTION PARENTERAL at 21:32

## 2020-10-02 RX ADMIN — INSULIN HUMAN 7 UNITS: 100 INJECTION, SOLUTION PARENTERAL at 13:49

## 2020-10-02 RX ADMIN — ALBUTEROL SULFATE 2.5 MG: 2.5 SOLUTION RESPIRATORY (INHALATION) at 15:03

## 2020-10-02 RX ADMIN — ONDANSETRON 4 MG: 4 TABLET, ORALLY DISINTEGRATING ORAL at 04:14

## 2020-10-02 RX ADMIN — SODIUM CHLORIDE 3 G: 900 INJECTION INTRAVENOUS at 00:15

## 2020-10-02 RX ADMIN — CEFTAZIDIME 2000 MG: 1 INJECTION, POWDER, FOR SOLUTION INTRAMUSCULAR; INTRAVENOUS at 14:40

## 2020-10-02 RX ADMIN — Medication 2 SPRAY: at 08:51

## 2020-10-02 RX ADMIN — CEFTAZIDIME 2000 MG: 1 INJECTION, POWDER, FOR SOLUTION INTRAMUSCULAR; INTRAVENOUS at 22:34

## 2020-10-02 RX ADMIN — ONDANSETRON 4 MG: 2 INJECTION INTRAMUSCULAR; INTRAVENOUS at 17:54

## 2020-10-02 RX ADMIN — OXYCODONE HYDROCHLORIDE AND ACETAMINOPHEN 1 TABLET: 10; 325 TABLET ORAL at 21:32

## 2020-10-02 RX ADMIN — NICOTINE TRANSDERMAL SYSTEM 21 MG: 21 PATCH, EXTENDED RELEASE TRANSDERMAL at 05:37

## 2020-10-02 RX ADMIN — VANCOMYCIN HYDROCHLORIDE 1750 MG: 500 INJECTION, POWDER, LYOPHILIZED, FOR SOLUTION INTRAVENOUS at 08:35

## 2020-10-02 RX ADMIN — OXYCODONE HYDROCHLORIDE AND ACETAMINOPHEN 1 TABLET: 10; 325 TABLET ORAL at 12:41

## 2020-10-02 RX ADMIN — INSULIN GLARGINE 50 UNITS: 100 INJECTION, SOLUTION SUBCUTANEOUS at 08:50

## 2020-10-02 RX ADMIN — INSULIN HUMAN 4 UNITS: 100 INJECTION, SOLUTION PARENTERAL at 08:47

## 2020-10-02 RX ADMIN — KETOROLAC TROMETHAMINE 30 MG: 30 INJECTION, SOLUTION INTRAMUSCULAR at 11:15

## 2020-10-02 RX ADMIN — OXYCODONE HYDROCHLORIDE AND ACETAMINOPHEN 1 TABLET: 10; 325 TABLET ORAL at 04:14

## 2020-10-02 RX ADMIN — DOCUSATE SODIUM 50 MG AND SENNOSIDES 8.6 MG 2 TABLET: 8.6; 5 TABLET, FILM COATED ORAL at 17:00

## 2020-10-02 RX ADMIN — KETOROLAC TROMETHAMINE 30 MG: 30 INJECTION, SOLUTION INTRAMUSCULAR at 17:36

## 2020-10-02 RX ADMIN — OXYCODONE HYDROCHLORIDE AND ACETAMINOPHEN 1 TABLET: 10; 325 TABLET ORAL at 16:58

## 2020-10-02 RX ADMIN — PROMETHAZINE HYDROCHLORIDE 25 MG: 25 TABLET ORAL at 19:27

## 2020-10-02 RX ADMIN — DOCUSATE SODIUM 50 MG AND SENNOSIDES 8.6 MG 2 TABLET: 8.6; 5 TABLET, FILM COATED ORAL at 05:37

## 2020-10-02 RX ADMIN — INSULIN HUMAN 7 UNITS: 100 INJECTION, SOLUTION PARENTERAL at 18:09

## 2020-10-02 RX ADMIN — ALPRAZOLAM 0.25 MG: 0.25 TABLET ORAL at 09:46

## 2020-10-02 RX ADMIN — ENOXAPARIN SODIUM 40 MG: 40 INJECTION SUBCUTANEOUS at 05:37

## 2020-10-02 RX ADMIN — OXYCODONE HYDROCHLORIDE AND ACETAMINOPHEN 1 TABLET: 10; 325 TABLET ORAL at 08:34

## 2020-10-02 RX ADMIN — BISACODYL 10 MG: 10 SUPPOSITORY RECTAL at 21:32

## 2020-10-02 RX ADMIN — KETOROLAC TROMETHAMINE 30 MG: 30 INJECTION, SOLUTION INTRAMUSCULAR at 04:14

## 2020-10-02 RX ADMIN — OMEPRAZOLE 20 MG: 20 CAPSULE, DELAYED RELEASE ORAL at 05:37

## 2020-10-02 RX ADMIN — SODIUM CHLORIDE 3 G: 900 INJECTION INTRAVENOUS at 05:42

## 2020-10-02 RX ADMIN — SODIUM CHLORIDE 3 G: 900 INJECTION INTRAVENOUS at 11:15

## 2020-10-02 RX ADMIN — CEFAZOLIN SODIUM 2 G: 2 INJECTION, SOLUTION INTRAVENOUS at 21:45

## 2020-10-02 ASSESSMENT — ENCOUNTER SYMPTOMS
CHILLS: 0
ROS GI COMMENTS: DRY MOUTH
NAUSEA: 0
ABDOMINAL PAIN: 0
DIARRHEA: 0
FEVER: 0
SHORTNESS OF BREATH: 1
CONSTIPATION: 0
VOMITING: 0
COUGH: 0

## 2020-10-02 ASSESSMENT — COGNITIVE AND FUNCTIONAL STATUS - GENERAL
DRESSING REGULAR UPPER BODY CLOTHING: A LITTLE
PERSONAL GROOMING: A LITTLE
SUGGESTED CMS G CODE MODIFIER DAILY ACTIVITY: CK
DAILY ACTIVITIY SCORE: 16
TOILETING: A LOT
HELP NEEDED FOR BATHING: A LOT
DRESSING REGULAR LOWER BODY CLOTHING: A LITTLE
EATING MEALS: A LITTLE

## 2020-10-02 ASSESSMENT — PAIN DESCRIPTION - PAIN TYPE
TYPE: ACUTE PAIN

## 2020-10-02 ASSESSMENT — ACTIVITIES OF DAILY LIVING (ADL): TOILETING: INDEPENDENT

## 2020-10-02 NOTE — CARE PLAN
Problem: Communication  Goal: The ability to communicate needs accurately and effectively will improve  Outcome: PROGRESSING AS EXPECTED     Problem: Safety  Goal: Will remain free from injury  Outcome: PROGRESSING AS EXPECTED  Intervention: Provide assistance with mobility  Flowsheets (Taken 10/2/2020 1210)  Assistance: Assistance of Two or More     Problem: Safety  Goal: Will remain free from injury  Outcome: PROGRESSING AS EXPECTED  Intervention: Provide assistance with mobility  Flowsheets (Taken 10/2/2020 1210)  Assistance: Assistance of Two or More

## 2020-10-02 NOTE — PROGRESS NOTES
Report received from dayshift RN. Assumed care at 1900, assessment complete. Pt is A & O x 4.  Pt states she is having 9/10 pain. Patient also states she is frustrated with her day.. Fall precautions and appropriate signs in place. Pt oriented to unit routine, call light/phone system and RN extension number provided. Pt educated regarding fall precautions. Bed alarm in use. Pt denies any additional needs at this time. Call light within reach.

## 2020-10-02 NOTE — DISCHARGE PLANNING
Anticipated Discharge Disposition:   · SNF vs [facility]ACH     Action:   · Incoming call from [facility], Jessica Parra [phone] ext [phone] regarding referral. She reported facility maybe able to accept pt to their nursing home side. LSW notified her that pt is on a [place] wound vac. After discussing with their DON they believe they will be able to accomodate. However she reported with pt on Medicaid FFS she may be responsible for patient liability, if in facility over 30 days, which is her income minus $35 as dictated by Medicaid. She reported that if on SSI (which pt is) Medicaid may waive but she can not guarantee.  She would want to make sure pt is aware of this and indicated she would like to meet with pt on Monday 10/05/2020.  Barriers to Discharge:   · Accepting facility     Plan:   · Care coordination will continue to follow up and provide assistance as needed.

## 2020-10-02 NOTE — PROGRESS NOTES
Hospital Medicine Daily Progress Note    Date of Service  10/2/2020    Chief Complaint  55 y.o. female admitted 9/28/2020 with past medical history of diabetes mellitus, hypertension chronic hypoxic respiratory failure on 2 L oxygen  apparently has been off of her diabetes medicines for at least 3 months.  She presented to the emergency room in [facility] with left foot infection and pain for the past few days it is been worsening.  She also notes 1 day of left hand pain and redness as well.      Hospital Course    She presented with sepsis and was given vancomycin and Rocephin.  She was found to be hyperglycemic.  ESR and CRP were elevated.      Interval Problem Update  9/29: Patient states that her hand swelling and foot swelling is decreasing.  Orthopedics was consulted and will take patient to the OR for debridement and biopsy of bone.  Left knee is also swollen and she was taken for arthrocentesis.  We will continue vancomycin and Unasyn for now.  9/30: Patient seen and examined by me today.  I increased her oxycodone and Xanax to 4 times a day.  We will continue to monitor her mental respiratory status.  Cultures came back positive for group A strep.  Surgical cultures are pending.  Arthrocentesis cultures are pending.  Wound VAC and Hemovac was placed.  Orthopedics recommended amputation but the patient refusing.  SNF referral has been sent.  Patient continues to have uncontrolled blood sugars and have increase her Lantus to 30 units.  10/1: Surgical cultures still pending.  Orthopedics recommended amputation but the patient is refusing.  I discussed this with infectious disease who recommended 6 weeks of antibiotics.  She has a wound VAC and will try to find placement.  Although, patient states that she will lose her housing if she goes to a skilled nursing facility for 6 weeks.  10/2: No new complaints per patient today.  I have ordered a PICC line.  We will continue to find placement for 6  weeks of antibiotics.  Antibiotics were changed to Fortaz and cefazolin by ID today.  Consultants/Specialty  LPS      Code Status  Full Code    Disposition  SNF vs [facility]    Review of Systems  Review of Systems   Constitutional: Negative for chills and fever.   Respiratory: Positive for shortness of breath.    Cardiovascular: Negative for chest pain.   Gastrointestinal: Negative for vomiting.        Dry mouth   All other systems reviewed and are negative.       Physical Exam  Temp:  [36.1 °C (97 °F)-36.5 °C (97.7 °F)] 36.3 °C (97.3 °F)  Pulse:  [] 91  Resp:  [15-19] 15  BP: (107-132)/(67-87) 132/82  SpO2:  [92 %-97 %] 96 %    Physical Exam  Vitals signs and nursing note reviewed.   Constitutional:       General: She is not in acute distress.     Appearance: Normal appearance. She is not ill-appearing, toxic-appearing or diaphoretic.      Comments: disheveled   HENT:      Head: Normocephalic and atraumatic.      Nose: No congestion or rhinorrhea.      Mouth/Throat:      Mouth: Mucous membranes are dry.      Pharynx: Oropharynx is clear. No oropharyngeal exudate or posterior oropharyngeal erythema.   Eyes:      General: No scleral icterus.     Conjunctiva/sclera: Conjunctivae normal.   Neck:      Musculoskeletal: Normal range of motion and neck supple. No neck rigidity or muscular tenderness.      Vascular: No carotid bruit.   Cardiovascular:      Rate and Rhythm: Regular rhythm. Tachycardia present.      Pulses: Normal pulses.      Heart sounds: Normal heart sounds. No murmur. No friction rub. No gallop.    Pulmonary:      Effort: Pulmonary effort is normal. No respiratory distress.      Breath sounds: Normal breath sounds. No stridor. No wheezing or rhonchi.   Abdominal:      General: Abdomen is flat. There is no distension.      Palpations: There is no mass.      Tenderness: There is no abdominal tenderness. There is no left CVA tenderness, guarding or rebound.      Hernia: No hernia is present.    Musculoskeletal: Normal range of motion.         General: No swelling.      Right lower leg: No edema.      Left lower leg: No edema.      Comments: Right BKA  Left trans-met with large ulcer bone exposed  Left hand dorsum is red, swollen and warm to touch   Lymphadenopathy:      Cervical: No cervical adenopathy.   Skin:     General: Skin is warm and dry.      Capillary Refill: Capillary refill takes more than 3 seconds.      Coloration: Skin is not jaundiced or pale.      Findings: No bruising or erythema.   Neurological:      Mental Status: She is alert and oriented to person, place, and time.         Fluids    Intake/Output Summary (Last 24 hours) at 10/2/2020 1446  Last data filed at 10/2/2020 1000  Gross per 24 hour   Intake 480 ml   Output 1685 ml   Net -1205 ml       Laboratory  Recent Labs     09/30/20  1027 10/01/20  0049 10/02/20  0222   WBC 10.7 10.2 10.6   RBC 3.55* 3.50* 3.62*   HEMOGLOBIN 9.8* 9.5* 9.9*   HEMATOCRIT 32.1* 31.4* 33.1*   MCV 90.4 89.7 91.4   MCH 27.6 27.1 27.3   MCHC 30.5* 30.3* 29.9*   RDW 52.8* 52.7* 53.4*   PLATELETCT 275 293 340   MPV 10.6 10.6 10.7     Recent Labs     09/30/20  1027 10/01/20  0049 10/02/20  0222   SODIUM 134* 136 136   POTASSIUM 3.7 3.6 3.9   CHLORIDE 95* 100 100   CO2 23 26 26   GLUCOSE 311* 316* 306*   BUN 12 20 18   CREATININE 0.45* 0.48* 0.43*   CALCIUM 8.1* 8.2* 7.9*                   Imaging  US-SHAHNAZ SINGLE LEVEL BILAT   Final Result      DX-FOOT-COMPLETE 3+ LEFT   Final Result      1.  There is diffuse left forefoot swelling with a large ulceration along the medial aspect of the remaining forefoot. There is no plain film evidence of underlying osteomyelitis.   2.  There is postoperative change consistent with amputation at the level of the mid and proximal metatarsals.      PX-IFUCZES-UEOODHP FILM X-RAY   Final Result      DR-ALMIXPO-FNUKKZT FILM X-RAY   Final Result      DX-CHEST-PORTABLE (1 VIEW)   Final Result      Enlarged cardiac silhouette and  interstitial prominence suggesting pulmonary edema.      IR-PICC LINE PLACEMENT W/ GUIDANCE > AGE 5    (Results Pending)        Assessment/Plan  * Diabetic foot ulcer (HCC)- (present on admission)  Assessment & Plan  Left trans-met with large ulcer which is draining pus which will be cultured  WBC in [place] was 16 and xray was suspicious for osteomyelitis.   Limb preservation service consulted: she may be a candidate for surgical debridement.  IV vancomycin and Unasyn   Fortaz and cefazolin has been started per ID 10/2  ESR in [place] was 109  Infectious disease consulted  Wound culture positive for group A strep    Acute on chronic respiratory failure with hypoxia (HCC)- (present on admission)  Assessment & Plan  She is chronically on 2 liters of oxygen and is now requiring 5 liters  This may be due to the 24 mg morphine, 15 mg versed and 2 mg ativan given per transfer sheet.  Supplemental oxygen ordered.  If her sats decrease, her fentanyl patch may need to be removed and possibly narcan      Non compliance with medical treatment- (present on admission)  Assessment & Plan  She stopped taking all of her meds except fentanyl patch    DM2 (diabetes mellitus, type 2) (CMS-[facility])- (present on admission)  Assessment & Plan  Start on insulin sliding scale with serial Accu-Checks  Hemoglobin A1c 13.3  Hypoglycemic protocol in place  Start Lantus increase to 60U twice daily       Cellulitis of hand- (present on admission)  Assessment & Plan  Left hand dorsum    Hx of right BKA (HCC)- (present on admission)  Assessment & Plan  Hx of    Sepsis ([facility])- (present on admission)  Assessment & Plan  This is Sepsis Present on admission  SIRS criteria identified on my evaluation include: Leukocytosis, with WBC greater than 12,000  Source is left foot infection  Sepsis protocol initiated  Fluid resuscitation ordered per protocol  IV antibiotics as appropriate for source of sepsis  While organ dysfunction may be noted  elsewhere in this problem list or in the chart, degree of organ dysfunction does not meet CMS criteria for severe sepsis          Tobacco abuse- (present on admission)  Assessment & Plan  Nicotine patch offered    Chronic pain- (present on admission)  Assessment & Plan  Continue fentanyl patch 50 mcg    Hypertension- (present on admission)  Assessment & Plan  Controlled        VTE prophylaxis: lovenox

## 2020-10-02 NOTE — CARE PLAN
Problem: Communication  Goal: The ability to communicate needs accurately and effectively will improve  Outcome: PROGRESSING AS EXPECTED  Note: Patient is able to effectively communicate her needs     Problem: Skin Integrity  Goal: Risk for impaired skin integrity will decrease  Outcome: PROGRESSING SLOWER THAN EXPECTED  Note: Patient has been refusing repositioning.

## 2020-10-02 NOTE — PROGRESS NOTES
Infectious Disease Progress Note    Author: Georgi Braun M.D. Date & Time created: 10/2/2020  12:00 PM     Vancomycin 9/28-present Day #4  Unasyn 9/29-present Day #3     9/30: She does not want to be here. She refuses MRI and says it is her knee that is her problem and she does not want any surgery for her foot.  10/1: L knee synovial fluid growing GPC. Patient still refusing BKA. CM looking into SNF  10/2 : Antibiotics adjusted to cefazolin and ceftazidime since pharmacy requests no ceftaroline. Second GNR now identified as oxidase positive  So could be Pseudomonas.    Interval History:  Past 24 hrs reviewed with RN    Labs Reviewed, Medications Reviewed, Radiology Reviewed, Wound Reviewed, Fluids Reviewed and GI Nutrition Reviewed    Review of Systems:  Review of Systems   Constitutional: Negative for chills and fever.   Respiratory: Positive for shortness of breath. Negative for cough.    Cardiovascular: Negative for chest pain.   Gastrointestinal: Negative for abdominal pain, constipation, diarrhea, nausea and vomiting.   Genitourinary: Negative for dysuria.   Musculoskeletal:        Knee pain decreasing.   Skin: Negative for itching.       Physical Exam:  Physical Exam  Constitutional:       General: She is not in acute distress.     Appearance: Normal appearance. She is obese. She is not ill-appearing, toxic-appearing or diaphoretic.   HENT:      Head: Normocephalic and atraumatic.   Cardiovascular:      Rate and Rhythm: Normal rate and regular rhythm.   Pulmonary:      Effort: Respiratory distress (some SOB with pursing of her lips) present.      Breath sounds: No wheezing or rhonchi.   Abdominal:      General: There is no distension.      Palpations: Abdomen is soft.      Tenderness: There is no abdominal tenderness. There is no guarding.   Musculoskeletal:      Comments: Left knee clearly less pain with passive ROM. Her left foot wound was seen with good granulation still a large defect.   Skin:   General: Skin is warm and dry.   Neurological:      Mental Status: She is alert and oriented to person, place, and time.   Psychiatric:         Mood and Affect: Mood normal.         Behavior: Behavior normal.         Labs:  Recent Results (from the past 24 hour(s))   ACCU-CHEK GLUCOSE    Collection Time: 10/01/20 12:09 PM   Result Value Ref Range    Glucose - Accu-Ck 304 (H) 65 - 99 mg/dL   ACCU-CHEK GLUCOSE    Collection Time: 10/01/20  5:33 PM   Result Value Ref Range    Glucose - Accu-Ck 320 (H) 65 - 99 mg/dL   ACCU-CHEK GLUCOSE    Collection Time: 10/01/20  8:29 PM   Result Value Ref Range    Glucose - Accu-Ck 395 (H) 65 - 99 mg/dL   CBC WITH DIFFERENTIAL    Collection Time: 10/02/20  2:22 AM   Result Value Ref Range    WBC 10.6 4.8 - 10.8 K/uL    RBC 3.62 (L) 4.20 - 5.40 M/uL    Hemoglobin 9.9 (L) 12.0 - 16.0 g/dL    Hematocrit 33.1 (L) 37.0 - 47.0 %    MCV 91.4 81.4 - 97.8 fL    MCH 27.3 27.0 - 33.0 pg    MCHC 29.9 (L) 33.6 - 35.0 g/dL    RDW 53.4 (H) 35.9 - 50.0 fL    Platelet Count 340 164 - 446 K/uL    MPV 10.7 9.0 - 12.9 fL    Neutrophils-Polys 66.50 44.00 - 72.00 %    Lymphocytes 20.10 (L) 22.00 - 41.00 %    Monocytes 7.80 0.00 - 13.40 %    Eosinophils 0.90 0.00 - 6.90 %    Basophils 0.60 0.00 - 1.80 %    Immature Granulocytes 4.10 (H) 0.00 - 0.90 %    Nucleated RBC 0.20 /100 WBC    Neutrophils (Absolute) 7.03 2.00 - 7.15 K/uL    Lymphs (Absolute) 2.12 1.00 - 4.80 K/uL    Monos (Absolute) 0.82 0.00 - 0.85 K/uL    Eos (Absolute) 0.09 0.00 - 0.51 K/uL    Baso (Absolute) 0.06 0.00 - 0.12 K/uL    Immature Granulocytes (abs) 0.43 (H) 0.00 - 0.11 K/uL    NRBC (Absolute) 0.02 K/uL    Anisocytosis 1+     Macrocytosis 1+    Comp Metabolic Panel    Collection Time: 10/02/20  2:22 AM   Result Value Ref Range    Sodium 136 135 - 145 mmol/L    Potassium 3.9 3.6 - 5.5 mmol/L    Chloride 100 96 - 112 mmol/L    Co2 26 20 - 33 mmol/L    Anion Gap 10.0 7.0 - 16.0    Glucose 306 (H) 65 - 99 mg/dL    Bun 18 8 - 22 mg/dL     Creatinine 0.43 (L) 0.50 - 1.40 mg/dL    Calcium 7.9 (L) 8.5 - 10.5 mg/dL    AST(SGOT) 11 (L) 12 - 45 U/L    ALT(SGPT) 16 2 - 50 U/L    Alkaline Phosphatase 721 (H) 30 - 99 U/L    Total Bilirubin 0.4 0.1 - 1.5 mg/dL    Albumin 2.2 (L) 3.2 - 4.9 g/dL    Total Protein 6.3 6.0 - 8.2 g/dL    Globulin 4.1 (H) 1.9 - 3.5 g/dL    A-G Ratio 0.5 g/dL   ESTIMATED GFR    Collection Time: 10/02/20  2:22 AM   Result Value Ref Range    GFR If African American >60 >60 mL/min/1.73 m 2    GFR If Non African American >60 >60 mL/min/1.73 m 2   PERIPHERAL SMEAR REVIEW    Collection Time: 10/02/20  2:22 AM   Result Value Ref Range    Peripheral Smear Review see below    PLATELET ESTIMATE    Collection Time: 10/02/20  2:22 AM   Result Value Ref Range    Plt Estimation Normal    MORPHOLOGY    Collection Time: 10/02/20  2:22 AM   Result Value Ref Range    RBC Morphology Present     Polychromia 1+     Poikilocytosis 1+     Ovalocytes 1+     Echinocytes 1+     Basophilic Stippling Few    DIFFERENTIAL COMMENT    Collection Time: 10/02/20  2:22 AM   Result Value Ref Range    Comments-Diff see below    ACCU-CHEK GLUCOSE    Collection Time: 10/02/20  8:34 AM   Result Value Ref Range    Glucose - Accu-Ck 214 (H) 65 - 99 mg/dL     Results     Procedure Component Value Units Date/Time    CULTURE TISSUE W/ GRM STAIN [[id number]]  (Abnormal)  (Susceptibility) Collected: 09/29/20 2017    Order Status: Completed Specimen: Bone Updated: 10/02/20 1039     Significant Indicator POS     Source BONE     Site First Metatarsal     Culture Result -     Gram Stain Result Rare WBCs.  No organisms seen.       Culture Result Beta Hemolytic Streptococcus group A  Light growth        Staphylococcus aureus  Rare growth      Narrative:      CALL  Mckeon  MS5 tel. [phone],  CALLED  MS5 tel. [phone] 10/01/2020, 13:19, RB PERF. RESULTS CALLED  TO:[id number] RN group A  Surgery Specimen    Susceptibility     Staphylococcus aureus (2)     Antibiotic Interpretation Microscan  Method Status    Azithromycin Sensitive <=2 mcg/mL SLICK Preliminary    Clindamycin Sensitive <=0.5 mcg/mL SLICK Preliminary    Cefazolin Sensitive <=8 mcg/mL SLICK Preliminary    Ceftaroline Sensitive <=0.5 mcg/mL SLICK Preliminary    Daptomycin Sensitive <=1 mcg/mL SLICK Preliminary    Ampicillin/sulbactam Sensitive <=8/4 mcg/mL SLICK Preliminary    Erythromycin Sensitive <=0.25 mcg/mL SLICK Preliminary    Vancomycin Sensitive 1 mcg/mL SLICK Preliminary    Oxacillin Sensitive <=0.25 mcg/mL SLICK Preliminary    Penicillin Resistant >8 mcg/mL SLICK Preliminary    Pip/Tazobactam Sensitive <=4 mcg/mL SLICK Preliminary    Trimeth/Sulfa Sensitive <=0.5/9.5 mcg/mL SLICK Preliminary    Tetracycline Sensitive <=4 mcg/mL SLICK Preliminary                   Anaerobic Culture [[id number]] Collected: 09/29/20 2017    Order Status: Completed Specimen: Bone Updated: 10/02/20 1039     Significant Indicator NEG     Source BONE     Site First Metatarsal     Culture Result Culture in progress.    Narrative:      CALL  Mckeon  MS5 tel. [phone],  CALLED  MS5 tel. [phone] 10/01/2020, 13:19, RB PERF. RESULTS CALLED  TO:[phone] RN group A  Surgery Specimen    CULTURE WOUND W/ GRAM STAIN [[id number]] Collected: 09/29/20 2015    Order Status: Completed Specimen: Wound Updated: 10/02/20 1030     Significant Indicator NEG     Source WND     Site Left Knee Effusion Drainage     Culture Result No growth at 48 hours.     Gram Stain Result Many WBCs.  Rare Gram positive cocci.      Narrative:      Surgery Specimen    Anaerobic Culture [[id number]] Collected: 09/29/20 2015    Order Status: Completed Specimen: Wound Updated: 10/02/20 1030     Significant Indicator NEG     Source WND     Site Left Knee Effusion Drainage     Culture Result Culture in progress.    Narrative:      Surgery Specimen    CULTURE WOUND W/ GRAM STAIN [[id number]]  (Abnormal)  (Susceptibility) Collected: 09/28/20 2054    Order Status: Completed Specimen: Wound from Left Foot Updated: 10/01/20 0853      Significant Indicator POS     Source WND     Site LEFT FOOT     Culture Result -     Gram Stain Result Rare WBCs.  Moderate Gram positive cocci.       Culture Result Beta Hemolytic Streptococcus group A  Moderate growth        Morganella morganii  Moderate growth        Gram negative merlyn  Light growth      Narrative:      CALL  Mckeon  MS5 tel. [phone],  CALLED  MS5 tel. [phone] 09/30/2020, 13:00, RB PERF. RESULTS CALLED  TO:[id number], RN    Susceptibility     Morganella morganii (2)     Antibiotic Interpretation Microscan Method Status    Ampicillin Resistant >16 mcg/mL SLICK Preliminary    Ceftriaxone Sensitive <=1 mcg/mL SLICK Preliminary    Ceftazidime Sensitive <=1 mcg/mL SLICK Preliminary    Cefotaxime Sensitive <=2 mcg/mL SLICK Preliminary    Cefazolin Resistant >16 mcg/mL SLICK Preliminary    Ciprofloxacin Sensitive <=1 mcg/mL SLICK Preliminary    Ampicillin/sulbactam Sensitive <=8/4 mcg/mL SLICK Preliminary    Cefepime Sensitive <=2 mcg/mL SLICK Preliminary    Tobramycin Sensitive <=4 mcg/mL SLICK Preliminary    Cefotetan Sensitive <=16 mcg/mL SLICK Preliminary    Ertapenem Sensitive <=0.5 mcg/mL SLICK Preliminary    Gentamicin Sensitive <=4 mcg/mL SLICK Preliminary    Pip/Tazobactam Sensitive <=16 mcg/mL SLICK Preliminary    Trimeth/Sulfa Sensitive <=2/38 mcg/mL SLICK Preliminary                   URINE CULTURE(NEW) [[id number]] Collected: 09/28/20 2115    Order Status: Completed Specimen: Urine Updated: 10/01/20 0838     Significant Indicator NEG     Source UR     Site -     Culture Result Mixed skin roland 10-50,000 cfu/mL    Narrative:      Indication for culture:->Emergency Room Patient  Indication for culture:->Emergency Room Patient    GRAM STAIN [[id number]] Collected: 09/29/20 2017    Order Status: Completed Specimen: Bone Updated: 09/30/20 1052     Significant Indicator .     Source BONE     Site First Metatarsal     Gram Stain Result Rare WBCs.  No organisms seen.      Narrative:      Surgery Specimen    GRAM STAIN [[id number]]  "Collected: 09/29/20 2015    Order Status: Completed Specimen: Wound Updated: 09/30/20 1051     Significant Indicator .     Source WND     Site Left Knee Effusion Drainage     Gram Stain Result Many WBCs.  Rare Gram positive cocci.      Narrative:      Surgery Specimen    BLOOD CULTURE [[id number]] Collected: 09/28/20 2007    Order Status: Completed Specimen: Blood from Peripheral Updated: 09/30/20 0737     Significant Indicator NEG     Source BLD     Site PERIPHERAL     Culture Result No Growth  Note: Blood cultures are incubated for 5 days and  are monitored continuously.Positive blood cultures  are called to the RN and reported as soon as  they are identified.      Narrative:      Per Hospital Policy: Only change Specimen Src: to \"Line\" if  specified by physician order.  No site indicated    BLOOD CULTURE [[id number]] Collected: 09/28/20 1951    Order Status: Completed Specimen: Blood from Peripheral Updated: 09/30/20 0737     Significant Indicator NEG     Source BLD     Site PERIPHERAL     Culture Result No Growth  Note: Blood cultures are incubated for 5 days and  are monitored continuously.Positive blood cultures  are called to the RN and reported as soon as  they are identified.      Narrative:      Per Hospital Policy: Only change Specimen Src: to \"Line\" if  specified by physician order.  No site indicated    GRAM STAIN [[id number]] Collected: 09/28/20 2054    Order Status: Completed Specimen: Wound Updated: 09/29/20 1701     Significant Indicator .     Source WND     Site LEFT FOOT     Gram Stain Result Rare WBCs.  Moderate Gram positive cocci.      URINALYSIS [[id number]]  (Abnormal) Collected: 09/28/20 2115    Order Status: Completed Specimen: Urine Updated: 09/28/20 2237     Color Yellow     Character Cloudy     Specific Gravity 1.036     Ph 5.5     Glucose >=1000 mg/dL      Ketones 80 mg/dL      Protein 100 mg/dL      Bilirubin Negative     Urobilinogen, Urine 1.0     Nitrite Negative     Leukocyte Esterase " Negative     Occult Blood Moderate     Micro Urine Req Microscopic    Narrative:      Indication for culture:->Emergency Room Patient    SARS-CoV-2, PCR (In-House) [[id number]] Collected: 09/28/20 2051    Order Status: Completed Updated: 09/28/20 2205     SARS-CoV-2 Source NP Swab     SARS-CoV-2 by PCR NotDetected     Comment: [facility] providers: PLEASE REFER TO DE-ESCALATION AND RETESTING PROTOCOL  on [email or web address]  **The [facility] GeneXpert Xpress SARS-CoV-2 Test has been made available for  use under the Emergency Use Authorization (EUA) only.         Narrative:      Is patient being admitted?->Yes  Does this patient meet criteria for Rush/Cepheid per [facility]  Inpatient Workflow? (See workflow link below)->Yes  Expected turn around time?->Rush (Cepheid 2-4 hours)  Is this the patients First SARS CoV-2 test?->Unknown  Is this patient employed in healthcare?->No  Is the patient symptomatic as defined by the CDC?->Yes  Date of symptom onset?->9/28/20  Is the patient hospitalized?->No  Is the patient a resident in a [facility] care setting?->No  Is the patient pregnant?->No    COVID/SARS CoV-2 PCR [[id number]] Collected: 09/28/20 2051    Order Status: Completed Specimen: Respirate from Nasopharyngeal Updated: 09/28/20 2103     COVID Order Status Received     Comment: The order for SARS CoV-2 testing has been received by the  Laboratory. This result is neither positive nor negative.  Final results of testing will report in 24-48 hours, separately.         Narrative:      Is patient being admitted?->Yes  Does this patient meet criteria for Rush/Cepheid per [facility]  Inpatient Workflow? (See workflow link below)->Yes  Expected turn around time?->Rush (Cepheid 2-4 hours)  Is this the patients First SARS CoV-2 test?->Unknown  Is this patient employed in healthcare?->No  Is the patient symptomatic as defined by the CDC?->Yes  Date of symptom onset?->9/28/20  Is the patient hospitalized?->No  Is the patient a resident in a  congregate care setting?->No  Is the patient pregnant?->No        Hemodynamics:  Temp (24hrs), Av.3 °C (97.4 °F), Min:36.1 °C (97 °F), Max:36.5 °C (97.7 °F)  Temperature: 36.3 °C (97.3 °F)  Pulse  Av.4  Min: 70  Max: 122   Blood Pressure: 132/82     Peripheral IV 10/02/20 20 G Anterior;Left Forearm (Active)   Site Assessment Clean;Dry;Intact 10/02/20 0100   Dressing Type Transparent 10/02/20 0100   Line Status Blood return noted;Flushed;Scrubbed the hub prior to access;Saline locked 10/02/20 0100   Dressing Status Clean;Dry;Intact 10/02/20 0100   Dressing Intervention Initial dressing 10/02/20 0100   Date Primary Tubing Changed 10/02/20 10/02/20 0100   Date Secondary Tubing Changed 10/02/20 10/02/20 0100   NEXT Primary Tubing Change  10/03/20 10/02/20 0100   NEXT Secondary Tubing Change  10/03/20 10/02/20 0100   Infiltration Grading ([facility]) 0 10/02/20 0100   Phlebitis Scale ([facility] Only) 0 10/02/20 0100     Wound:  @WOUNDLDA(4)@     Fluids:  Intake/Output       20 0700 - 10/01/20 0659 10/01/20 0700 - 10/02/20 0659 10/02/20 0700 - 10/03/20 0659      9850-2347 9072-4010 Total  5597-8895 Total 6305-2196 0241-6148 Total       Intake    P.O.  960  480 1440  240  240 480  240  -- 240    P.O.  240 240 480 240 -- 240    Total Intake  240 240 480 240 -- 240       Output    Urine  600  1400 2000  1275  800 2075  --  -- --    Number of Times Voided -- -- -- 1 x -- 1 x -- -- --    Urine Void (mL) -- 1000 1000  -- -- --    Output (mL) (Urethral Catheter Latex 16 Fr.)  1075 -- 1075 -- -- --    Drains  50  10 60  10  -- 10  --  -- --    Output (mL) (Closed/Suction Drain 1 Left Knee Hemovac 10 Fr.) 50 10 60 10 -- 10 -- -- --    Total Output 650 1410 2060 0631 571 2467 -- -- --       Net I/O     310 -930 -620 -104 -560 -0607 240 -- 240           GI/Nutrition:  Orders Placed This Encounter   Procedures   • Diet Order Diabetic     Standing Status:    Standing     Number of Occurrences:   1     Order Specific Question:   Diet:     Answer:   Diabetic [3]     Medications:  Current Facility-Administered Medications   Medication Last Dose   • insulin glargine (Lantus) injection 50 Units at 10/02/20 0850   • vancomycin (VANCOCIN) 1,750 mg in  mL IVPB Stopped at 10/02/20 1035   • oxyCODONE-acetaminophen (PERCOCET-10)  MG per tablet 1 Tab 1 Tab at 10/02/20 0834   • sodium chloride (OCEAN) 0.65 % nasal spray 2 Spray 2 Spray at 10/02/20 0851   • ALPRAZolam (XANAX) tablet 0.25 mg 0.25 mg at 10/02/20 0946   • ketorolac (TORADOL) injection 30 mg 30 mg at 10/02/20 1115   • omeprazole (PRILOSEC) capsule 20 mg 20 mg at 10/02/20 0537   • albuterol inhaler 2 Puff     • enoxaparin (LOVENOX) inj 40 mg 40 mg at 10/02/20 0537   • fentaNYL (DURAGESIC) 50 MCG/HR 1 Patch 1 Patch at 10/01/20 2032   • senna-docusate (PERICOLACE or SENOKOT S) 8.6-50 MG per tablet 2 Tab 2 Tab at 10/02/20 0537    And   • polyethylene glycol/lytes (MIRALAX) PACKET 1 Packet      And   • magnesium hydroxide (MILK OF MAGNESIA) suspension 30 mL      And   • bisacodyl (DULCOLAX) suppository 10 mg     • acetaminophen (TYLENOL) tablet 650 mg     • ondansetron (ZOFRAN) syringe/vial injection 4 mg     • ondansetron (ZOFRAN ODT) dispertab 4 mg 4 mg at 10/02/20 0414   • promethazine (PHENERGAN) tablet 12.5-25 mg     • promethazine (PHENERGAN) suppository 12.5-25 mg     • prochlorperazine (COMPAZINE) injection 5-10 mg     • nicotine (NICODERM) 21 MG/24HR 21 mg 21 mg at 10/02/20 0537    And   • nicotine polacrilex (NICORETTE) 2 MG piece 2 mg     • insulin regular (HumuLIN R,NovoLIN R) injection 4 Units at 10/02/20 0847    And   • glucose 4 g chewable tablet 16 g      And   • dextrose 50% (D50W) injection 50 mL     • ampicillin/sulbactam (UNASYN) 3 g in  mL IVPB 3 g at 10/02/20 1115   • MD Alert...Vancomycin per Pharmacy       Medical Decision Making, by Problem:  Active Hospital Problems    Diagnosis   •  *Diabetic foot ulcer (HCC) [E11.621, L97.509]   • Acute on chronic respiratory failure with hypoxia (HCC) [J96.21]   • Non compliance with medical treatment [Z91.19]   • DM2 (diabetes mellitus, type 2) (CMS-HCC) [E11.9]   • Chronic pain [G89.29]   • Hypertension [I10]   • Tobacco abuse [Z72.0]   • Sepsis (HCC) [A41.9]   • Hx of right BKA ([facility]) [Z89.511]   • Cellulitis of hand [L03.119]     IMPRESSION:  - Sepsis.  - Acute on chronic L 1st MTP osteomyelitis s/p I+D 9/29 - Group A strep, Morganella  - Acute L knee septic arthritis s/p washout 9/29 - GPCs  - Poorly controlled diabetes mellitus with insulin dependence.  - Prior osteomyelitis, status post right below-knee amputation in 2017.  - Hypertension.     RECOMMENDATION:  - GPCs in patients synovial culture of L knee. Continue Vanco and Unasyn for now. Will stop Vancomycin if no evidence of MRSA  - Dr Pak recommending BKA for osteo of her foot. Patient refusing. As a result, will need minimum of 6 week therapy.   - Continue to monitor her cultures  - PICC line tomorrow if blood cultures remain no growth  - Continue wound VAC  CM looking into SNF at [facility]     She does not want any surgery for her foot. She is growing 3 organisms from her foot and knee -  MSSA, Gp. A Streptococcus, M. morganii  And second oxidase positive GNR. Even if we ignore treating any osteomyelitis in her foot (based on the limitations she has put on our treatments) she will need 2 weeks minimum IV antibiotics. If we want to treat probable osteomyelitis then she needs 6 weeks of IV antibiotics. Suspect that the organism on her knee Gram stain that is not growing is her GpA Streptococcus. She will need a SNF and she prefers one in [place] closer to home. She is feeling SOB and would like a breathing treatment Dr. Banuelos informed. Case and treatment reviewed with patient, micro, PT,Wound Team, pharmacy and RN > 40+ min on floor with face to face > 60% and 100% in direct patient  care/counseling and antibiotic changes today.

## 2020-10-02 NOTE — DISCHARGE PLANNING
[facility]   Spoke To: Admissions   Outcome: Referral is pending review.     Agency/Facility Name: [facility]   Spoke To: Reception   Outcome: Per Reception, admissions unavailable. [facility] provided contact information and requested call back.     Agency/Facility Name: [facility]   Outcome: Left voicemail for admissions regarding referral. Requested call back.     @[id number]  [facility]   Spoke To: Admissions  Outcome: Per admissions, referral was not received, [facility] re-sent referral per request.     @[id number]  Agency/Facility Name: [facility]   Spoke To: Kobi   Outcome: Per Kobi, facility is currently at capacity. Will still review referral.

## 2020-10-02 NOTE — PROGRESS NOTES
"Pharmacy Kinetics 55 y.o. female on vancomycin day # 5   10/2/2020    Currently on Vancomycin 1750 mg iv q12hr (082000)  Provider specified end date: TBD    Indication for Treatment: SSTI    Pertinent history per medical record: Admitted on 2020 for septic knee. 54 y/o f admitted 2020 w/ PMHx of diabetes mellitus, hypertension chronic hypoxic respiratory failure on 2 L oxygen apparently has been off of her diabetes medicines for at least 3 months.  She presented to the emergency room in [facility] with left foot infection and pain for the past few days it is been worsening.      Other antibiotics: Unasyn 3g iv q6hr     Allergies: Patient has no known allergies.     List concerns for renal function: obesity BMI>40    Pertinent cultures to date:   20:PBCx2:NGTD  20:Lt Ft,WND:Beta Hemolytic Streptococcus group A, Morganella morganii, Gram negative merlyn  20:Lt Knee Effusion,WND:Rare Gram positive cocci.   20:First Metatarsal,Bone:Beta Hemolytic Streptococcus group A, Staphylococcus aureus      MRSA nares swab if pneumonia is a concern (ordered/positive/negative/n-a): NA    Recent Labs     20  1043 20  1027 10/01/20  0049 10/02/20  0222   WBC 14.5* 10.7 10.2 10.6   NEUTSPOLYS 79.20* 72.70* 68.60 66.50     Recent Labs     20  1043 20  1027 10/01/20  0049 10/02/20  0222   BUN 13 12 20 18   CREATININE 0.47* 0.45* 0.48* 0.43*   ALBUMIN 2.5* 2.2* 2.1* 2.2*     Recent Labs     20  2222   VANCOTROUGH 9.3*       Intake/Output Summary (Last 24 hours) at 10/2/2020 0926  Last data filed at 10/2/2020 0600  Gross per 24 hour   Intake 480 ml   Output 1685 ml   Net -1205 ml      /82   Pulse 91   Temp 36.3 °C (97.3 °F) (Oral)   Resp 15   Ht 1.702 m (5' 7\")   Wt 120.2 kg (265 lb)   SpO2 97%  Temp (24hrs), Av.3 °C (97.4 °F), Min:36.1 °C (97 °F), Max:36.5 °C (97.7 °F)      A/P   1. Vancomycin dose change: No change  2. Next vancomycin level: " "10/04/20@0800  3. Goal trough: 10-15 mcg/mL   4. Comments: No leukocytosis, afebrile. Cx listed above , following for C&S. Renal indices remain stable , vancomycin level ordered. Continue current regimen.      Rodolfo Acharya PharmD BCPS     Update:    A/P   5. Vancomycin dose change: No change   6. Next vancomycin level: 10/03/20@0800  7. Goal trough: 18-22 mcg/mL   8. Comments: Tr level increased per MD Braun. Instructions per ID: \"Keep vancomycin trough between 18-22 and dosing interval </= 24 hrs\". Tr revised and re-ordered 10/03.      Rodolfo Acharya PharmD BCPS                           "

## 2020-10-02 NOTE — PROGRESS NOTES
Assumed care at 0700. A/o x 4, denies sob, chest pain, n/v. Remains on 5L NC. Tolerating current diet. Long in place and intact. Uses bedpan if needed. Wound vac going per orders. Pain managed per MAR. Safety maintained, call light within reach. See flow sheet for details.

## 2020-10-02 NOTE — WOUND TEAM
[facility]  Inpatient Services   Wound and Skin Care Progress    Admission Date: 9/28/2020     Last order of IP CONSULT TO WOUND CARE was found on 9/30/2020 from Hospital Encounter on 9/28/2020       HPI, PMH, SH: Reviewed    Unit where seen by Wound Team: S531/02     WOUND CONSULT/FOLLOW UP RELATED TO:  Vac placement to chronic L TMA    Self Report / Pain Level:  Premedicated with PO Oxy by RN.     OBJECTIVE:  Pt lying in bed, leg elevated on pillow. Dressing in place CDI    WOUND TYPE, LOCATION, CHARACTERISTICS (Pressure Injuries: location, stage, POA or date identified)  Negative Pressure Wound Therapy 09/30/20 Diabetic foot ulcer Foot Left (Active)   NPWT Pump Mode / Pressure Setting Ulta;125 mmHg    Dressing Type Small;Black Foam (Veraflo)    Number of Foam Pieces Used 2    Canister Changed No    NEXT Dressing Change/Treatment Date 10/05/20    VAC VeraFlo Irrigant Normal Saline    VAC VeraFlo Soak Time (mins) 5    VAC VeraFlo Instill Volume (ml) 8    VAC VeraFlo - Therapy Time (hrs) 1    VAC VeraFlo Pressure (mm/Hg) Intermittent;125 mmHg      Wound 09/29/20 Diabetic Ulcer Foot Left Full Thickness w/ Vac (Active)       09/30/20 1100   Site Assessment Red;Granulation tissue;Non-healing ]   Periwound Assessment Dry    Margins Attached edges    Closure Secondary intention    Drainage Amount Scant    Drainage Description Serosanguineous    Treatments Cleansed;Site care    Wound Cleansing Approved Wound Cleanser    Periwound Protectant Paste Ring;Benzoin;Drape    Dressing Cleansing/Solutions Normal Saline    Dressing Options Wound Vac    Dressing Changed Changed    Dressing Status Intact    Dressing Change/Treatment Frequency Monday, Wednesday, Friday, and As Needed    NEXT Dressing Change/Treatment Date 10/05/20    NEXT Weekly Photo (Inpatient Only) 10/07/20    Non-staged Wound Description Full thickness    Wound Length (cm) 3.5 cm    Wound Width (cm) 4 cm    Wound Depth (cm) 1.5 cm    Wound Surface  Area (cm^2) 14 cm^2    Wound Volume (cm^3) 21 cm^3    Shape circular    Wound Odor None    Exposed Structures None      Vascular:    SHAHNAZ:   No results found.      Lab Values:    Lab Results   Component Value Date/Time    WBC 10.6 10/02/2020 02:22 AM    RBC 3.62 (L) 10/02/2020 02:22 AM    HEMOGLOBIN 9.9 (L) 10/02/2020 02:22 AM    HEMATOCRIT 33.1 (L) 10/02/2020 02:22 AM    CREACTPROT 26.67 (H) 09/30/2020 10:27 AM    SEDRATEWES >120 (H) 09/30/2020 10:27 AM    HBA1C 13.3 (H) 09/28/2020 08:07 PM        Culture:   Obtained  IN OR 9/29  Culture Results show:  Significant Indicator POSPositive (POS) P    Source BONE P    Site First Metatarsal P    Culture Result -Abnormal  P    Gram Stain Result Rare WBCs.   No organisms seen.    Culture Result Abnormal   Beta Hemolytic Streptococcus group A   Light growth   P      Culture Result Abnormal   Staphylococcus aureus   Rare growth               INTERVENTIONS BY WOUND TEAM:  Dressing removed. Wound and periwound cleansed with cleanser and gauze. Benzoin and paste ring to periwound. 1 pc of foam to wound bed. 2nd pc used as button for tracpad. Drape and tracpad to foam. No change in vac setting, refer to LDA for details.    Interdisciplinary consultation: Patient, Henrry GUTIERREZ, Dr. Braun    EVALUATION: Wound bed clean and fully granulated. Responding to NPWT. ID at bedside to visualize wound.    Goals: Steady decrease in wound area and depth weekly.    NURSING PLAN OF CARE ORDERS (X):    Dressing changes: See Dressing Care orders: X  Skin care: See Skin Care orders: X  Rectal tube care: See Rectal Tube Care orders:   Other orders:    WOUND TEAM PLAN OF CARE    Dressing changes by wound team:          Follow up 1-2 times weekly:               Follow up 3 times weekly:                NPWT change 3 times weekly:   X  Follow up as needed:       Other (explain):     Anticipated discharge plans:    LTACH:    X vs SNF  Will need ongoing wound care.  SNF/Rehab:                  Home Care:            Outpatient Wound Center:            Self Care:

## 2020-10-02 NOTE — THERAPY
"Occupational Therapy   Initial Evaluation     Patient Name: Greta Echevarria  Age:  55 y.o., Sex:  female  Medical Record #: [id number]  Today's Date: 10/2/2020     Precautions  Precautions: Fall Risk, Non Weight Bearing Left Lower Extremity  Comments: R BKA, wound vac. Req max encouragement    Assessment  Patient is 55 y.o. female admitted for worsening L foot infection, sepsis of knee, and L hand cellulitis. Ortho recommended LLE amputation, but pt refused. S/p I&D and revision TMA of L foot, and L knee aspiration. PMHx of R BKA, L TMA, noncompliant DMII, neuropathy, HTN, smoker, and chronic respiratory failure. Req max encouragement to participate. Sup>sit and seated g/h with min A. Encouraged to continue to sit EOB for meals and grooming. Reports had caregiver, but states she quit last week. Will likely req assist with setup of a new caregiver at d/c. Currently limited by decreased functional mobility, activity tolerance, cognition, AROM, balance, adherence to precautions, and pain which are currently affecting pt's ability to complete ADLs/IADLs at baseline. Will continue to follow.     Plan    Recommend Occupational Therapy 2 times per week until therapy goals are met for the following treatments:  Adaptive Equipment, Neuro Re-Education / Balance, Self Care/Activities of Daily Living, Therapeutic Activities and Therapeutic Exercises.    DC Equipment Recommendations: Unable to determine at this time  Discharge Recommendations: Recommend post-acute placement for additional occupational therapy services prior to discharge home(will likely need new caregiver after d/c)     Subjective    \"Oh, this isn't as bad as I thought it would be.\"     Objective     10/02/20 0947   Prior Living Situation   Prior Services Meals on Wheels;Skilled Home Health Services  (reports used to have caregiver, but she \"quit\")   Housing / Facility 1 Story Apartment / Condo   Steps Into Home 0   Bathroom Set up Walk In Shower;Shower Chair " "  Equipment Owned Front-Wheel Walker;Wheelchair;Tub / Shower Seat   Lives with - Patient's Self Care Capacity Attendant / Paid Care Giver;Alone and Unable to Care For Self   Comments Reports had caregiver, but she \"quit last week\". Unclear if has a replacement   Prior Level of ADL Function   Self Feeding Independent   Grooming / Hygiene Independent   Bathing Requires Assist   Dressing Independent   Toileting Independent   Prior Level of IADL Function   Medication Management Requires Assist  (non compliant with meds)   Laundry Requires Assist   Kitchen Mobility Requires Assist   Finances Independent   Home Management Requires Assist   Shopping Requires Assist   Prior Level Of Mobility Independent With Device in Home  (reports completed SPtxfs I'ly with FWW to w/c and toilet)   History of Falls   Date of Last Fall   (no reports of falls)   Pain 0 - 10 Group   Location Knee   Location Orientation Left   Therapist Pain Assessment During Activity;Post Activity;Nurse Notified  (not quantified)   Cognition    Cognition / Consciousness X   Level of Consciousness Alert   Safety Awareness Impaired   Attention Impaired   Comments initially resistant, but with extra time and max encouragement able to participate. Loquatious after pain meds given; appeared \"loopy\" 2/2 pain meds   Passive ROM Upper Body   Passive ROM Upper Body X   Comments limited by pain   Active ROM Upper Body   Active ROM Upper Body  X   Dominant Hand Left   ROM Lt Hand Moderate Impaired   Comments L hand limited by edema and pain; unclear etiology   Strength Upper Body   Upper Body Strength  X   Comments limited in L hand   Coordination Upper Body   Coordination X   Fine Motor Coordination limited in L (dominant) hand   Comments reports unable to press buttons to change the channel on TV   Balance Assessment   Sitting Balance (Static) Fair   Sitting Balance (Dynamic) Fair   Weight Shift Sitting Fair   Comments able to use BUE for ADLs   Bed Mobility  "   Supine to Sit Minimal Assist  (with HOB elevated)   Sit to Supine Minimal Assist  (for LLE)   Scooting Supervised  (in bed and EOB)   ADL Assessment   Eating Minimal Assist  (for BUE tasks)   Grooming Minimal Assist;Seated  (shaving at EOB and wiping face)   Lower Body Dressing   (deferred d/t wound vac on LLE and R BKA)   Toileting   (declined)   Comments g/h req assist for thoroughness and lmtd L hand strength AROM   Functional Mobility   Sit to Stand Unable to Participate   Mobility EOB only; NWB LLE and R BKA. Declined to try SB   Comments limited d/t WB and participation   Visual Perception   Visual Perception  WDL   Edema / Skin Assessment   Edema / Skin  Not Assessed   Activity Tolerance   Sitting Edge of Bed 30 min   Comments limited by fatigue   Patient / Family Goals   Patient / Family Goal #1 to not be in pain   Short Term Goals   Short Term Goal # 1 will complete LB dressing with SPV   Short Term Goal # 2 will complete seated g/h with SPV   Short Term Goal # 3 will complete SB txf to BSC with SPV   Anticipated Discharge Equipment and Recommendations   DC Equipment Recommendations Unable to determine at this time   Discharge Recommendations Recommend post-acute placement for additional occupational therapy services prior to discharge home  (will likely need new caregiver after d/c)

## 2020-10-03 LAB
ALBUMIN SERPL BCP-MCNC: 2.3 G/DL (ref 3.2–4.9)
ALBUMIN/GLOB SERPL: 0.6 G/DL
ALP SERPL-CCNC: 663 U/L (ref 30–99)
ALT SERPL-CCNC: 17 U/L (ref 2–50)
ANION GAP SERPL CALC-SCNC: 8 MMOL/L (ref 7–16)
AST SERPL-CCNC: 16 U/L (ref 12–45)
BACTERIA WND AEROBE CULT: NORMAL
BASOPHILS # BLD AUTO: 0.4 % (ref 0–1.8)
BASOPHILS # BLD: 0.05 K/UL (ref 0–0.12)
BILIRUB SERPL-MCNC: 0.4 MG/DL (ref 0.1–1.5)
BUN SERPL-MCNC: 11 MG/DL (ref 8–22)
CALCIUM SERPL-MCNC: 7.7 MG/DL (ref 8.5–10.5)
CHLORIDE SERPL-SCNC: 103 MMOL/L (ref 96–112)
CO2 SERPL-SCNC: 29 MMOL/L (ref 20–33)
CREAT SERPL-MCNC: 0.3 MG/DL (ref 0.5–1.4)
EOSINOPHIL # BLD AUTO: 0.08 K/UL (ref 0–0.51)
EOSINOPHIL NFR BLD: 0.7 % (ref 0–6.9)
ERYTHROCYTE [DISTWIDTH] IN BLOOD BY AUTOMATED COUNT: 53.4 FL (ref 35.9–50)
GLOBULIN SER CALC-MCNC: 4.1 G/DL (ref 1.9–3.5)
GLUCOSE BLD-MCNC: 170 MG/DL (ref 65–99)
GLUCOSE BLD-MCNC: 172 MG/DL (ref 65–99)
GLUCOSE BLD-MCNC: 188 MG/DL (ref 65–99)
GLUCOSE BLD-MCNC: 99 MG/DL (ref 65–99)
GLUCOSE SERPL-MCNC: 261 MG/DL (ref 65–99)
GRAM STN SPEC: NORMAL
HCT VFR BLD AUTO: 33.4 % (ref 37–47)
HGB BLD-MCNC: 10 G/DL (ref 12–16)
IMM GRANULOCYTES # BLD AUTO: 0.5 K/UL (ref 0–0.11)
IMM GRANULOCYTES NFR BLD AUTO: 4.3 % (ref 0–0.9)
LYMPHOCYTES # BLD AUTO: 1.46 K/UL (ref 1–4.8)
LYMPHOCYTES NFR BLD: 12.5 % (ref 22–41)
MCH RBC QN AUTO: 27.2 PG (ref 27–33)
MCHC RBC AUTO-ENTMCNC: 29.9 G/DL (ref 33.6–35)
MCV RBC AUTO: 90.8 FL (ref 81.4–97.8)
MONOCYTES # BLD AUTO: 0.79 K/UL (ref 0–0.85)
MONOCYTES NFR BLD AUTO: 6.8 % (ref 0–13.4)
NEUTROPHILS # BLD AUTO: 8.79 K/UL (ref 2–7.15)
NEUTROPHILS NFR BLD: 75.3 % (ref 44–72)
NRBC # BLD AUTO: 0 K/UL
NRBC BLD-RTO: 0 /100 WBC
PLATELET # BLD AUTO: 330 K/UL (ref 164–446)
PMV BLD AUTO: 10.6 FL (ref 9–12.9)
POTASSIUM SERPL-SCNC: 4.1 MMOL/L (ref 3.6–5.5)
PROT SERPL-MCNC: 6.4 G/DL (ref 6–8.2)
RBC # BLD AUTO: 3.68 M/UL (ref 4.2–5.4)
SIGNIFICANT IND 70042: NORMAL
SITE SITE: NORMAL
SODIUM SERPL-SCNC: 140 MMOL/L (ref 135–145)
SOURCE SOURCE: NORMAL
WBC # BLD AUTO: 11.7 K/UL (ref 4.8–10.8)

## 2020-10-03 PROCEDURE — 700111 HCHG RX REV CODE 636 W/ 250 OVERRIDE (IP): Performed by: INTERNAL MEDICINE

## 2020-10-03 PROCEDURE — 80053 COMPREHEN METABOLIC PANEL: CPT

## 2020-10-03 PROCEDURE — A9270 NON-COVERED ITEM OR SERVICE: HCPCS | Performed by: HOSPITALIST

## 2020-10-03 PROCEDURE — 99232 SBSQ HOSP IP/OBS MODERATE 35: CPT | Performed by: HOSPITALIST

## 2020-10-03 PROCEDURE — 700111 HCHG RX REV CODE 636 W/ 250 OVERRIDE (IP): Performed by: HOSPITALIST

## 2020-10-03 PROCEDURE — 700102 HCHG RX REV CODE 250 W/ 637 OVERRIDE(OP): Performed by: HOSPITALIST

## 2020-10-03 PROCEDURE — 700105 HCHG RX REV CODE 258: Performed by: INTERNAL MEDICINE

## 2020-10-03 PROCEDURE — 700111 HCHG RX REV CODE 636 W/ 250 OVERRIDE (IP): Performed by: ORTHOPAEDIC SURGERY

## 2020-10-03 PROCEDURE — 770006 HCHG ROOM/CARE - MED/SURG/GYN SEMI*

## 2020-10-03 PROCEDURE — 85025 COMPLETE CBC W/AUTO DIFF WBC: CPT

## 2020-10-03 PROCEDURE — 82962 GLUCOSE BLOOD TEST: CPT | Mod: 91

## 2020-10-03 RX ORDER — CYCLOBENZAPRINE HCL 10 MG
5 TABLET ORAL 3 TIMES DAILY PRN
Status: DISCONTINUED | OUTPATIENT
Start: 2020-10-03 | End: 2020-10-08 | Stop reason: HOSPADM

## 2020-10-03 RX ORDER — INSULIN GLARGINE 100 [IU]/ML
70 INJECTION, SOLUTION SUBCUTANEOUS 2 TIMES DAILY
Status: DISCONTINUED | OUTPATIENT
Start: 2020-10-03 | End: 2020-10-03

## 2020-10-03 RX ORDER — INSULIN GLARGINE 100 [IU]/ML
100 INJECTION, SOLUTION SUBCUTANEOUS 2 TIMES DAILY
Status: DISCONTINUED | OUTPATIENT
Start: 2020-10-03 | End: 2020-10-07

## 2020-10-03 RX ADMIN — ALBUTEROL SULFATE 2 PUFF: 90 AEROSOL, METERED RESPIRATORY (INHALATION) at 16:27

## 2020-10-03 RX ADMIN — OXYCODONE HYDROCHLORIDE AND ACETAMINOPHEN 1 TABLET: 10; 325 TABLET ORAL at 09:30

## 2020-10-03 RX ADMIN — OMEPRAZOLE 20 MG: 20 CAPSULE, DELAYED RELEASE ORAL at 05:10

## 2020-10-03 RX ADMIN — INSULIN HUMAN 3 UNITS: 100 INJECTION, SOLUTION PARENTERAL at 09:38

## 2020-10-03 RX ADMIN — SODIUM CHLORIDE 3 G: 900 INJECTION INTRAVENOUS at 12:21

## 2020-10-03 RX ADMIN — CEFAZOLIN SODIUM 2 G: 2 INJECTION, SOLUTION INTRAVENOUS at 05:58

## 2020-10-03 RX ADMIN — CYCLOBENZAPRINE 5 MG: 10 TABLET, FILM COATED ORAL at 12:22

## 2020-10-03 RX ADMIN — ONDANSETRON 4 MG: 2 INJECTION INTRAMUSCULAR; INTRAVENOUS at 09:30

## 2020-10-03 RX ADMIN — KETOROLAC TROMETHAMINE 30 MG: 30 INJECTION, SOLUTION INTRAMUSCULAR at 08:49

## 2020-10-03 RX ADMIN — NICOTINE TRANSDERMAL SYSTEM 21 MG: 21 PATCH, EXTENDED RELEASE TRANSDERMAL at 05:10

## 2020-10-03 RX ADMIN — KETOROLAC TROMETHAMINE 30 MG: 30 INJECTION, SOLUTION INTRAMUSCULAR at 21:48

## 2020-10-03 RX ADMIN — SODIUM CHLORIDE 3 G: 900 INJECTION INTRAVENOUS at 18:37

## 2020-10-03 RX ADMIN — CEFTAZIDIME 2000 MG: 1 INJECTION, POWDER, FOR SOLUTION INTRAMUSCULAR; INTRAVENOUS at 05:04

## 2020-10-03 RX ADMIN — OXYCODONE HYDROCHLORIDE AND ACETAMINOPHEN 1 TABLET: 10; 325 TABLET ORAL at 16:23

## 2020-10-03 RX ADMIN — OXYCODONE HYDROCHLORIDE AND ACETAMINOPHEN 1 TABLET: 10; 325 TABLET ORAL at 21:27

## 2020-10-03 RX ADMIN — OXYCODONE HYDROCHLORIDE AND ACETAMINOPHEN 1 TABLET: 10; 325 TABLET ORAL at 05:09

## 2020-10-03 RX ADMIN — INSULIN HUMAN 3 UNITS: 100 INJECTION, SOLUTION PARENTERAL at 21:21

## 2020-10-03 RX ADMIN — INSULIN HUMAN 3 UNITS: 100 INJECTION, SOLUTION PARENTERAL at 12:45

## 2020-10-03 RX ADMIN — ENOXAPARIN SODIUM 40 MG: 40 INJECTION SUBCUTANEOUS at 05:10

## 2020-10-03 RX ADMIN — ALPRAZOLAM 0.25 MG: 0.25 TABLET ORAL at 18:41

## 2020-10-03 ASSESSMENT — ENCOUNTER SYMPTOMS
ROS GI COMMENTS: DRY MOUTH
SHORTNESS OF BREATH: 1
VOMITING: 0
CHILLS: 0
FEVER: 0

## 2020-10-03 ASSESSMENT — PAIN DESCRIPTION - PAIN TYPE
TYPE: CHRONIC PAIN

## 2020-10-03 ASSESSMENT — FIBROSIS 4 INDEX: FIB4 SCORE: 0.64

## 2020-10-03 NOTE — CARE PLAN
Problem: Communication  Goal: The ability to communicate needs accurately and effectively will improve  Outcome: PROGRESSING AS EXPECTED  Note: Pt is able to effectively communicate her needs. Patients questions answered by RN     Problem: Safety  Goal: Will remain free from falls  Outcome: PROGRESSING AS EXPECTED  Note: Pt uses call light appropriately. Patient has been educated on fall precautions. Patient bed is locked and in lowest position. Pt call light within reach.

## 2020-10-03 NOTE — PROGRESS NOTES
Infectious Disease Daily Progress Note    Date of Service  10/3/2020    Chief Complaint    Unasyn 9/29-present    Vancomycin 9/28-10/2  Ceftazadime 10/2  Cefazolin 10/2     9/30: She does not want to be here. She refuses MRI and says it is her knee that is her problem and she does not want any surgery for her foot.  10/1: L knee synovial fluid growing GPC. Patient still refusing BKA. CM looking into SNF  10/2 : Antibiotics adjusted to cefazolin and ceftazidime since pharmacy requests no ceftaroline. Second GNR now identified as oxidase positive  So could be Pseudomonas.  10/3: Endorses L knee pain. Pending SNF placements. PICC line placed yesterday    Review of Systems  Review of Systems   Musculoskeletal: Positive for joint pain.        Physical Exam  Temp:  [36.3 °C (97.3 °F)-36.6 °C (97.9 °F)] 36.6 °C (97.9 °F)  Pulse:  [] 60  Resp:  [15-18] 16  BP: (125-135)/(72-82) 135/81  SpO2:  [94 %-97 %] 95 %    Physical Exam  Constitutional:       Appearance: Normal appearance. She is obese. More alert and awake today  HENT:      Head: Normocephalic.   Cardiovascular:      Rate and Rhythm: Normal rate and regular rhythm.      Heart sounds: No murmur.   Pulmonary:      Effort: No respiratory distress.      Breath sounds: Normal breath sounds. No wheezing.   Abdominal:      General: Abdomen is flat. Bowel sounds are normal.   Skin:     Comments: L knee with ACE wrap and hemovac. L 1st MTP VAC    Neurological:      Mental Status: She is alert.   Fluids    Intake/Output Summary (Last 24 hours) at 10/3/2020 0628  Last data filed at 10/3/2020 0627  Gross per 24 hour   Intake 480 ml   Output 1480 ml   Net -1000 ml       Laboratory  Recent Labs     10/01/20  0049 10/02/20  0222 10/03/20  0505   WBC 10.2 10.6 11.7*   RBC 3.50* 3.62* 3.68*   HEMOGLOBIN 9.5* 9.9* 10.0*   HEMATOCRIT 31.4* 33.1* 33.4*   MCV 89.7 91.4 90.8   MCH 27.1 27.3 27.2   MCHC 30.3* 29.9* 29.9*   RDW 52.7* 53.4* 53.4*   PLATELETCT 293 340 330   MPV 10.6  10.7 10.6     Recent Labs     10/01/20  0049 10/02/20  0222 10/03/20  0505   SODIUM 136 136 140   POTASSIUM 3.6 3.9 4.1   CHLORIDE 100 100 103   CO2 26 26 29   GLUCOSE 316* 306* 261*   BUN 20 18 11   CREATININE 0.48* 0.43* 0.30*   CALCIUM 8.2* 7.9* 7.7*                    IMPRESSION:  - Sepsis- resolved  - Acute on chronic L 1st MTP osteomyelitis s/p I+D 9/29      Non surgical cultures 9/28 - Group A strep, Morganella, Group D Enterococcus, GNR     Surgical cultures 9/29 - Group A strep, MSSA  - Acute L knee septic arthritis s/p washout 9/29 - GPCs  - Poorly controlled diabetes mellitus with insulin dependence.  - Prior osteomyelitis, status post right below-knee amputation in 2017.  - Hypertension.     RECOMMENDATION:  - Treating her surgivcal cultures growing Group A strep and MSSA. Non surgical cultures usually colonization. Unasyn will address all her organisms. Unless GNR is pseudomonas.   - stop ceftazadime and cefazolin  - switch back to Unasyn  - continue to follow her L knee synovial cultures  - 4 weeks antibiotics for septic knee arthritis. But because she has osteomyelitis of her 1st MTP, will require 6 weeks. 6 week target date from surgery 11/10/20  - PICC line in place 10/2  - Dr Pak recommending BKA for osteo of her foot. Patient refusing. As a result, will need minimum of 6 week therapy.     CM looking into SNF options    45 min spent with 50% face to face care  Thank you for this consult.

## 2020-10-03 NOTE — PROGRESS NOTES
Report received from MARIS Engle RN. Assumed care at 1900, assessment complete. Pt is A & O x 4.  Pt denies having any pain at this time. Pt is complaining of nausea, RN assessed and medicated pt. Fall precautions and appropriate signs in place. Pt oriented to unit routine, call light/phone system and RN extension number provided. Pt educated regarding fall precautions. Bed alarm in use. Pt denies any additional needs at this time. Call light within reach.

## 2020-10-03 NOTE — PROGRESS NOTES
Hospital Medicine Daily Progress Note    Date of Service  10/3/2020    Chief Complaint  55 y.o. female admitted 9/28/2020 with past medical history of diabetes mellitus, hypertension chronic hypoxic respiratory failure on 2 L oxygen  apparently has been off of her diabetes medicines for at least 3 months.  She presented to the emergency room in [facility] with left foot infection and pain for the past few days it is been worsening.  She also notes 1 day of left hand pain and redness as well.      Hospital Course    She presented with sepsis and was given vancomycin and Rocephin.  She was found to be hyperglycemic.  ESR and CRP were elevated.      Interval Problem Update  9/29: Patient states that her hand swelling and foot swelling is decreasing.  Orthopedics was consulted and will take patient to the OR for debridement and biopsy of bone.  Left knee is also swollen and she was taken for arthrocentesis.  We will continue vancomycin and Unasyn for now.  9/30: Patient seen and examined by me today.  I increased her oxycodone and Xanax to 4 times a day.  We will continue to monitor her mental respiratory status.  Cultures came back positive for group A strep.  Surgical cultures are pending.  Arthrocentesis cultures are pending.  Wound VAC and Hemovac was placed.  Orthopedics recommended amputation but the patient refusing.  SNF referral has been sent.  Patient continues to have uncontrolled blood sugars and have increase her Lantus to 30 units.  10/1: Surgical cultures still pending.  Orthopedics recommended amputation but the patient is refusing.  I discussed this with infectious disease who recommended 6 weeks of antibiotics.  She has a wound VAC and will try to find placement.  Although, patient states that she will lose her housing if she goes to a skilled nursing facility for 6 weeks.  10/2: No new complaints per patient today.  I have ordered a PICC line.  We will continue to find placement for 6  weeks of antibiotics.  Antibiotics were changed to Fortaz and cefazolin by ID today.  10/3: Patient complaining of back and rectal pain.  She was given enema and had multiple bowel movements afterwards.  LTAC referral has been sent and waiting for acceptance.  PICC line was placed yesterday.  Increase Lantus 100 twice daily.  Consultants/Specialty  LPS      Code Status  Full Code    Disposition  SNF vs Ltach    Review of Systems  Review of Systems   Constitutional: Negative for chills and fever.   Respiratory: Positive for shortness of breath.    Cardiovascular: Negative for chest pain.   Gastrointestinal: Negative for vomiting.        Dry mouth   All other systems reviewed and are negative.       Physical Exam  Temp:  [36.2 °C (97.1 °F)-36.6 °C (97.9 °F)] 36.2 °C (97.1 °F)  Pulse:  [] 100  Resp:  [16-18] 18  BP: (125-150)/(72-93) 150/93  SpO2:  [94 %-97 %] 95 %    Physical Exam  Vitals signs and nursing note reviewed.   Constitutional:       General: She is not in acute distress.     Appearance: Normal appearance. She is not ill-appearing, toxic-appearing or diaphoretic.      Comments: disheveled   HENT:      Head: Normocephalic and atraumatic.      Nose: No congestion or rhinorrhea.      Mouth/Throat:      Mouth: Mucous membranes are dry.      Pharynx: Oropharynx is clear. No oropharyngeal exudate or posterior oropharyngeal erythema.   Eyes:      General: No scleral icterus.     Conjunctiva/sclera: Conjunctivae normal.   Neck:      Musculoskeletal: Normal range of motion and neck supple. No neck rigidity or muscular tenderness.      Vascular: No carotid bruit.   Cardiovascular:      Rate and Rhythm: Regular rhythm. Tachycardia present.      Pulses: Normal pulses.      Heart sounds: Normal heart sounds. No murmur. No friction rub. No gallop.    Pulmonary:      Effort: Pulmonary effort is normal. No respiratory distress.      Breath sounds: Normal breath sounds. No stridor. No wheezing or rhonchi.   Abdominal:       General: Abdomen is flat. There is no distension.      Palpations: There is no mass.      Tenderness: There is no abdominal tenderness. There is no left CVA tenderness, guarding or rebound.      Hernia: No hernia is present.   Musculoskeletal: Normal range of motion.         General: No swelling.      Right lower leg: No edema.      Left lower leg: No edema.      Comments: Right BKA  Left trans-met with large ulcer bone exposed  Left hand dorsum is red, swollen and warm to touch   Lymphadenopathy:      Cervical: No cervical adenopathy.   Skin:     General: Skin is warm and dry.      Capillary Refill: Capillary refill takes more than 3 seconds.      Coloration: Skin is not jaundiced or pale.      Findings: No bruising or erythema.   Neurological:      Mental Status: She is alert and oriented to person, place, and time.         Fluids    Intake/Output Summary (Last 24 hours) at 10/3/2020 1132  Last data filed at 10/3/2020 0627  Gross per 24 hour   Intake 240 ml   Output 1480 ml   Net -1240 ml       Laboratory  Recent Labs     10/01/20  0049 10/02/20  0222 10/03/20  0505   WBC 10.2 10.6 11.7*   RBC 3.50* 3.62* 3.68*   HEMOGLOBIN 9.5* 9.9* 10.0*   HEMATOCRIT 31.4* 33.1* 33.4*   MCV 89.7 91.4 90.8   MCH 27.1 27.3 27.2   MCHC 30.3* 29.9* 29.9*   RDW 52.7* 53.4* 53.4*   PLATELETCT 293 340 330   MPV 10.6 10.7 10.6     Recent Labs     10/01/20  0049 10/02/20  0222 10/03/20  0505   SODIUM 136 136 140   POTASSIUM 3.6 3.9 4.1   CHLORIDE 100 100 103   CO2 26 26 29   GLUCOSE 316* 306* 261*   BUN 20 18 11   CREATININE 0.48* 0.43* 0.30*   CALCIUM 8.2* 7.9* 7.7*                   Imaging  US-SHAHNAZ SINGLE LEVEL BILAT   Final Result      DX-FOOT-COMPLETE 3+ LEFT   Final Result      1.  There is diffuse left forefoot swelling with a large ulceration along the medial aspect of the remaining forefoot. There is no plain film evidence of underlying osteomyelitis.   2.  There is postoperative change consistent with amputation at the  level of the mid and proximal metatarsals.      [id number] FILM X-RAY   Final Result      [id number] FILM X-RAY   Final Result      DX-CHEST-PORTABLE (1 VIEW)   Final Result      Enlarged cardiac silhouette and interstitial prominence suggesting pulmonary edema.      IR-PICC LINE PLACEMENT W/ GUIDANCE > AGE 5    (Results Pending)        Assessment/Plan  * Diabetic foot ulcer (HCC)- (present on admission)  Assessment & Plan  Left trans-met with large ulcer which is draining pus which will be cultured  WBC in [place] was 16 and xray was suspicious for osteomyelitis.   Limb preservation service consulted: she may be a candidate for surgical debridement.  IV vancomycin and Unasyn   Fortaz and cefazolin has been started per ID 10/2  ESR in [place] was 109  Infectious disease consulted  Wound culture positive for group A strep    Acute on chronic respiratory failure with hypoxia (HCC)- (present on admission)  Assessment & Plan  She is chronically on 2 liters of oxygen and is now requiring 5 liters  This may be due to the 24 mg morphine, 15 mg versed and 2 mg ativan given per transfer sheet.  Supplemental oxygen ordered.  If her sats decrease, her fentanyl patch may need to be removed and possibly narcan      Non compliance with medical treatment- (present on admission)  Assessment & Plan  She stopped taking all of her meds except fentanyl patch    DM2 (diabetes mellitus, type 2) (CMS-[facility])- (present on admission)  Assessment & Plan  Start on insulin sliding scale with serial Accu-Checks  Hemoglobin A1c 13.3  Hypoglycemic protocol in place  Start Lantus increase to 100U twice daily       Cellulitis of hand- (present on admission)  Assessment & Plan  Left hand dorsum    Hx of right BKA (HCC)- (present on admission)  Assessment & Plan  Hx of    Sepsis ([facility])- (present on admission)  Assessment & Plan  This is Sepsis Present on admission  SIRS criteria identified on my evaluation include:  Leukocytosis, with WBC greater than 12,000  Source is left foot infection  Sepsis protocol initiated  Fluid resuscitation ordered per protocol  IV antibiotics as appropriate for source of sepsis  While organ dysfunction may be noted elsewhere in this problem list or in the chart, degree of organ dysfunction does not meet CMS criteria for severe sepsis          Tobacco abuse- (present on admission)  Assessment & Plan  Nicotine patch offered    Chronic pain- (present on admission)  Assessment & Plan  Continue fentanyl patch 50 mcg    Hypertension- (present on admission)  Assessment & Plan  Controlled        VTE prophylaxis: lovenox

## 2020-10-03 NOTE — PROGRESS NOTES
Chart reviewed. Assessment completed.  Pt is A&Ox4   Reports severe back pain, medicated with Toradol PRN   FELDER, CMS intact, denies numbness and tingling   Pt reports that she is bed bound and unable to mobilize. Educated to call for assistance. Bed alarm on for safety   On 4L O2 NC, mild SOB, denies chest pain; plan to wean to baseline of 2L O2   Normoactive BS x 4. Tolerating diet. Denies N&V   + flatus, LBM 10/3. Pt is incontinent of stool   + void via Long catheter, draining vaishali colored urine. Pt encouraged to consume fluids.  (L) foot veraflo wound vac in place, suction intact   (L) knee hemovac in place, no drainage noted, suction intact   LUE PICC TKO, dressing intact   LFA PIV SL   Updated on POC. Belongings and call light within reach. All needs met at this time.

## 2020-10-03 NOTE — PROGRESS NOTES
Assumed care of patient at 2355. Per Purvi GUTIERREZ, patient has had complaints of constipation, suppository and fleet enema provided to patient. During bedside report, patient on bedpan having BM. Bed locked and in the lowest position, bed alarm on. Wound vac in place on foot and drain in place to knee. Dressings clean, dry and intact.

## 2020-10-03 NOTE — PROCEDURES
Vascular Access Team     Date of Insertion: 10/2/20  Arm Circumference: 34  Internal length: 47  External Length: Hub  Vein Occupancy %: 24   Reason for PICC: Antibiotic therapy  Labs: WBC 10.6, , BUN 18, Cr 0.43, GFR >60, INR NA     Consents confirmed, vessel patency confirmed with ultrasound. Risks and benefits of procedure explained to patient and education regarding central line associated bloodstream infections provided. Questions answered.      PICC placed in LUE per licensed provider order with ultrasound guidance.  4 Fr, Single lumen PICC placed in Bacilic vein after 1 attempt(s). 2 mL of 1% lidocaine injected intradermally, 21 gauge microintroducer needle and modified Seldinger technique used. 47 cm catheter inserted with good blood return. Secured at Hub. Each lumen flushed without resistance with 10 mL 0.9% normal saline. PICC line secured with Biopatch and Tegaderm.     PICC tip placement location is confirmed by nurse to be in the Superior Vena Cava (SVC) utilizing 3CG technology. PICC line is appropriate for use at this time. Patient tolerated procedure well, without complications.  Patient condition relayed to unit RN or ordering physician via this post procedure note in the EMR.      Ultrasound images uploaded to PACS and viewable in the EMR - yes  Ultrasound imaged printed and placed in paper chart - no     BARD Power PICC ref # [id number], Lot # [id number], Expiration Date 6/30/21

## 2020-10-03 NOTE — CARE PLAN
Problem: Safety  Goal: Will remain free from injury  Outcome: PROGRESSING AS EXPECTED  Goal: Will remain free from falls  Outcome: PROGRESSING AS EXPECTED     Problem: Infection  Goal: Will remain free from infection  Outcome: PROGRESSING AS EXPECTED   IV abx in place

## 2020-10-04 LAB
BACTERIA BLD CULT: NORMAL
BACTERIA BLD CULT: NORMAL
BACTERIA SPEC ANAEROBE CULT: NORMAL
BACTERIA SPEC ANAEROBE CULT: NORMAL
BACTERIA TISS AEROBE CULT: ABNORMAL
BACTERIA WND AEROBE CULT: ABNORMAL
GLUCOSE BLD-MCNC: 144 MG/DL (ref 65–99)
GLUCOSE BLD-MCNC: 150 MG/DL (ref 65–99)
GLUCOSE BLD-MCNC: 226 MG/DL (ref 65–99)
GRAM STN SPEC: ABNORMAL
GRAM STN SPEC: ABNORMAL
SIGNIFICANT IND 70042: ABNORMAL
SIGNIFICANT IND 70042: ABNORMAL
SIGNIFICANT IND 70042: NORMAL
SITE SITE: ABNORMAL
SITE SITE: ABNORMAL
SITE SITE: NORMAL
SOURCE SOURCE: ABNORMAL
SOURCE SOURCE: ABNORMAL
SOURCE SOURCE: NORMAL

## 2020-10-04 PROCEDURE — 99232 SBSQ HOSP IP/OBS MODERATE 35: CPT | Performed by: HOSPITALIST

## 2020-10-04 PROCEDURE — 82962 GLUCOSE BLOOD TEST: CPT

## 2020-10-04 PROCEDURE — 700105 HCHG RX REV CODE 258: Performed by: INTERNAL MEDICINE

## 2020-10-04 PROCEDURE — 770006 HCHG ROOM/CARE - MED/SURG/GYN SEMI*

## 2020-10-04 PROCEDURE — 700111 HCHG RX REV CODE 636 W/ 250 OVERRIDE (IP): Performed by: HOSPITALIST

## 2020-10-04 PROCEDURE — 700111 HCHG RX REV CODE 636 W/ 250 OVERRIDE (IP): Performed by: ORTHOPAEDIC SURGERY

## 2020-10-04 PROCEDURE — A9270 NON-COVERED ITEM OR SERVICE: HCPCS | Performed by: HOSPITALIST

## 2020-10-04 PROCEDURE — 700102 HCHG RX REV CODE 250 W/ 637 OVERRIDE(OP): Performed by: HOSPITALIST

## 2020-10-04 PROCEDURE — 700111 HCHG RX REV CODE 636 W/ 250 OVERRIDE (IP): Performed by: INTERNAL MEDICINE

## 2020-10-04 RX ORDER — FENTANYL 75 UG/1
1 PATCH TRANSDERMAL
Status: DISCONTINUED | OUTPATIENT
Start: 2020-10-04 | End: 2020-10-08 | Stop reason: HOSPADM

## 2020-10-04 RX ORDER — FENTANYL 50 UG/1
1 PATCH TRANSDERMAL ONCE
Status: DISCONTINUED | OUTPATIENT
Start: 2020-10-04 | End: 2020-10-04 | Stop reason: ALTCHOICE

## 2020-10-04 RX ORDER — MORPHINE SULFATE 15 MG/1
15 TABLET, FILM COATED, EXTENDED RELEASE ORAL EVERY 12 HOURS
Status: DISCONTINUED | OUTPATIENT
Start: 2020-10-04 | End: 2020-10-04 | Stop reason: ALTCHOICE

## 2020-10-04 RX ADMIN — FENTANYL 1 PATCH: 50 PATCH, EXTENDED RELEASE TRANSDERMAL at 02:58

## 2020-10-04 RX ADMIN — SODIUM CHLORIDE 3 G: 900 INJECTION INTRAVENOUS at 05:26

## 2020-10-04 RX ADMIN — CYCLOBENZAPRINE 5 MG: 10 TABLET, FILM COATED ORAL at 17:25

## 2020-10-04 RX ADMIN — OXYCODONE HYDROCHLORIDE AND ACETAMINOPHEN 1 TABLET: 10; 325 TABLET ORAL at 10:09

## 2020-10-04 RX ADMIN — ALBUTEROL SULFATE 2 PUFF: 90 AEROSOL, METERED RESPIRATORY (INHALATION) at 05:42

## 2020-10-04 RX ADMIN — SODIUM CHLORIDE 3 G: 900 INJECTION INTRAVENOUS at 17:25

## 2020-10-04 RX ADMIN — INSULIN GLARGINE 100 UNITS: 100 INJECTION, SOLUTION SUBCUTANEOUS at 18:13

## 2020-10-04 RX ADMIN — SODIUM CHLORIDE 3 G: 900 INJECTION INTRAVENOUS at 00:36

## 2020-10-04 RX ADMIN — ALPRAZOLAM 0.25 MG: 0.25 TABLET ORAL at 11:39

## 2020-10-04 RX ADMIN — ALPRAZOLAM 0.25 MG: 0.25 TABLET ORAL at 21:35

## 2020-10-04 RX ADMIN — OXYCODONE HYDROCHLORIDE AND ACETAMINOPHEN 1 TABLET: 10; 325 TABLET ORAL at 14:47

## 2020-10-04 RX ADMIN — OXYCODONE HYDROCHLORIDE AND ACETAMINOPHEN 1 TABLET: 10; 325 TABLET ORAL at 21:30

## 2020-10-04 RX ADMIN — OMEPRAZOLE 20 MG: 20 CAPSULE, DELAYED RELEASE ORAL at 05:26

## 2020-10-04 RX ADMIN — ALPRAZOLAM 0.25 MG: 0.25 TABLET ORAL at 05:48

## 2020-10-04 RX ADMIN — DOCUSATE SODIUM 50 MG AND SENNOSIDES 8.6 MG 2 TABLET: 8.6; 5 TABLET, FILM COATED ORAL at 17:25

## 2020-10-04 RX ADMIN — FENTANYL 1 PATCH: 75 PATCH, EXTENDED RELEASE TRANSDERMAL at 13:12

## 2020-10-04 RX ADMIN — NICOTINE TRANSDERMAL SYSTEM 21 MG: 21 PATCH, EXTENDED RELEASE TRANSDERMAL at 05:25

## 2020-10-04 RX ADMIN — KETOROLAC TROMETHAMINE 30 MG: 30 INJECTION, SOLUTION INTRAMUSCULAR at 14:47

## 2020-10-04 RX ADMIN — INSULIN GLARGINE 100 UNITS: 100 INJECTION, SOLUTION SUBCUTANEOUS at 10:05

## 2020-10-04 RX ADMIN — KETOROLAC TROMETHAMINE 30 MG: 30 INJECTION, SOLUTION INTRAMUSCULAR at 08:26

## 2020-10-04 RX ADMIN — INSULIN HUMAN 4 UNITS: 100 INJECTION, SOLUTION PARENTERAL at 18:13

## 2020-10-04 RX ADMIN — ENOXAPARIN SODIUM 40 MG: 40 INJECTION SUBCUTANEOUS at 05:26

## 2020-10-04 RX ADMIN — CYCLOBENZAPRINE 5 MG: 10 TABLET, FILM COATED ORAL at 11:39

## 2020-10-04 RX ADMIN — DOCUSATE SODIUM 50 MG AND SENNOSIDES 8.6 MG 2 TABLET: 8.6; 5 TABLET, FILM COATED ORAL at 05:25

## 2020-10-04 RX ADMIN — OXYCODONE HYDROCHLORIDE AND ACETAMINOPHEN 1 TABLET: 10; 325 TABLET ORAL at 05:33

## 2020-10-04 RX ADMIN — SODIUM CHLORIDE 3 G: 900 INJECTION INTRAVENOUS at 11:40

## 2020-10-04 ASSESSMENT — PAIN DESCRIPTION - PAIN TYPE
TYPE: CHRONIC PAIN

## 2020-10-04 ASSESSMENT — ENCOUNTER SYMPTOMS
CHILLS: 0
SHORTNESS OF BREATH: 1
FEVER: 0
VOMITING: 0
ROS GI COMMENTS: DRY MOUTH

## 2020-10-04 NOTE — PROGRESS NOTES
C/o ongoing knee pain  Swelling improved  WBC 11 yesterday  D/c drain  Continue ABX  VAC for left foot

## 2020-10-04 NOTE — PROGRESS NOTES
Infectious Disease Daily Progress Note    Date of Service  10/4/2020    Chief Complaint  Unasyn 9/29-present     Vancomycin 9/28-10/2  Ceftazadime 10/2  Cefazolin 10/2     9/30: She does not want to be here. She refuses MRI and says it is her knee that is her problem and she does not want any surgery for her foot.  10/1: L knee synovial fluid growing GPC. Patient still refusing BKA. CM looking into SNF  10/2 : Antibiotics adjusted to cefazolin and ceftazidime since pharmacy requests no ceftaroline. Second GNR now identified as oxidase positive  So could be Pseudomonas.  10/3: Endorses L knee pain. Pending SNF placements. PICC line placed yesterday  10/4: L knee pain. No other issues.     Review of Systems  Review of Systems   Musculoskeletal: Positive for joint pain.        Physical Exam  Temp:  [36.2 °C (97.1 °F)-36.6 °C (97.8 °F)] 36.5 °C (97.7 °F)  Pulse:  [] 96  Resp:  [14-18] 18  BP: (137-156)/(77-93) 156/85  SpO2:  [95 %-97 %] 96 %    Physical Exam  Constitutional:       Appearance: asleep this morning  HENT:      Head: Normocephalic.   Cardiovascular:      Rate and Rhythm: Normal rate and regular rhythm.      Heart sounds: No murmur.   Pulmonary:      Effort: No respiratory distress.      Breath sounds: Normal breath sounds. No wheezing.   Abdominal:      General: Abdomen is flat. Bowel sounds are normal.   Skin:     Comments: L knee with ACE wrap and hemovac. L 1st MTP VAC    Neurological:      Mental Status: She is alert.   Fluids    Intake/Output Summary (Last 24 hours) at 10/4/2020 0649  Last data filed at 10/4/2020 0500  Gross per 24 hour   Intake 240 ml   Output 1000 ml   Net -760 ml       Laboratory  Recent Labs     10/02/20  0222 10/03/20  0505   WBC 10.6 11.7*   RBC 3.62* 3.68*   HEMOGLOBIN 9.9* 10.0*   HEMATOCRIT 33.1* 33.4*   MCV 91.4 90.8   MCH 27.3 27.2   MCHC 29.9* 29.9*   RDW 53.4* 53.4*   PLATELETCT 340 330   MPV 10.7 10.6     Recent Labs     10/02/20  0222 10/03/20  0505   SODIUM 136  140   POTASSIUM 3.9 4.1   CHLORIDE 100 103   CO2 26 29   GLUCOSE 306* 261*   BUN 18 11   CREATININE 0.43* 0.30*   CALCIUM 7.9* 7.7*                   IMPRESSION:  - Sepsis- resolved  - Acute on chronic L 1st MTP osteomyelitis s/p I+D 9/29      Non surgical cultures 9/28 - Group A strep, Morganella, Group D Enterococcus, GNR (colonizations)     Surgical cultures 9/29 - Group A strep, MSSA  - Acute L knee septic arthritis s/p washout 9/29 - GPCs  - Poorly controlled diabetes mellitus with insulin dependence.  - Prior osteomyelitis, status post right below-knee amputation in 2017.  - Hypertension.     RECOMMENDATION:  - Treating her surgicalcultures growing Group A strep and MSSA. Non surgical cultures gram stains only showing GPC's with polymicrobial growth suggestive of colonization. Unasyn will address all her organisms.   - Continue Unasyn x 6 weeks. Target date 11/10.   - continue to follow her L knee synovial cultures. Gram stain GPC's pending growth.   - PICC line in place 10/2  - Dr Pak recommending BKA for osteo of her foot. Patient refusing. As a result, will need minimum of 6 week therapy.      CM looking into SNF options     35 min spent with 50% face to face care  Thank you for this consult.

## 2020-10-04 NOTE — PROGRESS NOTES
Hospital Medicine Daily Progress Note    Date of Service  10/4/2020    Chief Complaint  55 y.o. female admitted 9/28/2020 with past medical history of diabetes mellitus, hypertension chronic hypoxic respiratory failure on 2 L oxygen  apparently has been off of her diabetes medicines for at least 3 months.  She presented to the emergency room in [facility] with left foot infection and pain for the past few days it is been worsening.  She also notes 1 day of left hand pain and redness as well.      Hospital Course    She presented with sepsis and was given vancomycin and Rocephin.  She was found to be hyperglycemic.  ESR and CRP were elevated.      Interval Problem Update  9/29: Patient states that her hand swelling and foot swelling is decreasing.  Orthopedics was consulted and will take patient to the OR for debridement and biopsy of bone.  Left knee is also swollen and she was taken for arthrocentesis.  We will continue vancomycin and Unasyn for now.  9/30: Patient seen and examined by me today.  I increased her oxycodone and Xanax to 4 times a day.  We will continue to monitor her mental respiratory status.  Cultures came back positive for group A strep.  Surgical cultures are pending.  Arthrocentesis cultures are pending.  Wound VAC and Hemovac was placed.  Orthopedics recommended amputation but the patient refusing.  SNF referral has been sent.  Patient continues to have uncontrolled blood sugars and have increase her Lantus to 30 units.  10/1: Surgical cultures still pending.  Orthopedics recommended amputation but the patient is refusing.  I discussed this with infectious disease who recommended 6 weeks of antibiotics.  She has a wound VAC and will try to find placement.  Although, patient states that she will lose her housing if she goes to a skilled nursing facility for 6 weeks.  10/2: No new complaints per patient today.  I have ordered a PICC line.  We will continue to find placement for 6  weeks of antibiotics.  Antibiotics were changed to Fortaz and cefazolin by ID today.  10/3: Patient complaining of back and rectal pain.  She was given enema and had multiple bowel movements afterwards.  [facility] referral has been sent and waiting for acceptance.  PICC line was placed yesterday.  Increase Lantus 100 twice daily.  10/4: Blood glucose is better controlled with increase in lantus. Hemovac removed today by ortho. Continue antibiotics and find placement.   Consultants/Specialty  LPS      Code Status  Full Code    Disposition  SNF vs [facility]    Review of Systems  Review of Systems   Constitutional: Negative for chills and fever.   Respiratory: Positive for shortness of breath.    Cardiovascular: Negative for chest pain.   Gastrointestinal: Negative for vomiting.        Dry mouth   All other systems reviewed and are negative.       Physical Exam  Temp:  [36.2 °C (97.1 °F)-36.6 °C (97.8 °F)] 36.2 °C (97.1 °F)  Pulse:  [] 98  Resp:  [14-18] 18  BP: (136-156)/(72-85) 136/72  SpO2:  [96 %-97 %] 97 %    Physical Exam  Vitals signs and nursing note reviewed.   Constitutional:       General: She is not in acute distress.     Appearance: Normal appearance. She is not ill-appearing, toxic-appearing or diaphoretic.      Comments: disheveled   HENT:      Head: Normocephalic and atraumatic.      Nose: No congestion or rhinorrhea.      Mouth/Throat:      Mouth: Mucous membranes are dry.      Pharynx: Oropharynx is clear. No oropharyngeal exudate or posterior oropharyngeal erythema.   Eyes:      General: No scleral icterus.     Conjunctiva/sclera: Conjunctivae normal.   Neck:      Musculoskeletal: Normal range of motion and neck supple. No neck rigidity or muscular tenderness.      Vascular: No carotid bruit.   Cardiovascular:      Rate and Rhythm: Regular rhythm. Tachycardia present.      Pulses: Normal pulses.      Heart sounds: Normal heart sounds. No murmur. No friction rub. No gallop.    Pulmonary:      Effort:  Pulmonary effort is normal. No respiratory distress.      Breath sounds: Normal breath sounds. No stridor. No wheezing or rhonchi.   Abdominal:      General: Abdomen is flat. There is no distension.      Palpations: There is no mass.      Tenderness: There is no abdominal tenderness. There is no left CVA tenderness, guarding or rebound.      Hernia: No hernia is present.   Musculoskeletal: Normal range of motion.         General: No swelling.      Right lower leg: No edema.      Left lower leg: No edema.      Comments: Right BKA  Left trans-met with large ulcer bone exposed  Left hand dorsum is red, swollen and warm to touch   Lymphadenopathy:      Cervical: No cervical adenopathy.   Skin:     General: Skin is warm and dry.      Capillary Refill: Capillary refill takes more than 3 seconds.      Coloration: Skin is not jaundiced or pale.      Findings: No bruising or erythema.   Neurological:      Mental Status: She is alert and oriented to person, place, and time.         Fluids    Intake/Output Summary (Last 24 hours) at 10/4/2020 1422  Last data filed at 10/4/2020 1000  Gross per 24 hour   Intake 340 ml   Output 1000 ml   Net -660 ml       Laboratory  Recent Labs     10/02/20  0222 10/03/20  0505   WBC 10.6 11.7*   RBC 3.62* 3.68*   HEMOGLOBIN 9.9* 10.0*   HEMATOCRIT 33.1* 33.4*   MCV 91.4 90.8   MCH 27.3 27.2   MCHC 29.9* 29.9*   RDW 53.4* 53.4*   PLATELETCT 340 330   MPV 10.7 10.6     Recent Labs     10/02/20  0222 10/03/20  0505   SODIUM 136 140   POTASSIUM 3.9 4.1   CHLORIDE 100 103   CO2 26 29   GLUCOSE 306* 261*   BUN 18 11   CREATININE 0.43* 0.30*   CALCIUM 7.9* 7.7*                   Imaging  US-SHAHNAZ SINGLE LEVEL BILAT   Final Result      DX-FOOT-COMPLETE 3+ LEFT   Final Result      1.  There is diffuse left forefoot swelling with a large ulceration along the medial aspect of the remaining forefoot. There is no plain film evidence of underlying osteomyelitis.   2.  There is postoperative change consistent  with amputation at the level of the mid and proximal metatarsals.      [id number] FILM X-RAY   Final Result      MF-[id number] FILM X-RAY   Final Result      DX-CHEST-PORTABLE (1 VIEW)   Final Result      Enlarged cardiac silhouette and interstitial prominence suggesting pulmonary edema.      IR-PICC LINE PLACEMENT W/ GUIDANCE > AGE 5    (Results Pending)        Assessment/Plan  * Diabetic foot ulcer (HCC)- (present on admission)  Assessment & Plan  Left trans-met with large ulcer which is draining pus which will be cultured  WBC in [place] was 16 and xray was suspicious for osteomyelitis.   Limb preservation service consulted: she may be a candidate for surgical debridement.  IV vancomycin and Unasyn   Fortaz and cefazolin has been started per ID 10/2  ESR in [place] was 109  Infectious disease consulted  Wound culture positive for group A strep    Acute on chronic respiratory failure with hypoxia (HCC)- (present on admission)  Assessment & Plan  She is chronically on 2 liters of oxygen and is now requiring 5 liters  This may be due to the 24 mg morphine, 15 mg versed and 2 mg ativan given per transfer sheet.  Supplemental oxygen ordered.  If her sats decrease, her fentanyl patch may need to be removed and possibly narcan      Non compliance with medical treatment- (present on admission)  Assessment & Plan  She stopped taking all of her meds except fentanyl patch    DM2 (diabetes mellitus, type 2) (CMS-[facility])- (present on admission)  Assessment & Plan  Start on insulin sliding scale with serial Accu-Checks  Hemoglobin A1c 13.3  Hypoglycemic protocol in place  Start Lantus increase to 100U twice daily       Cellulitis of hand- (present on admission)  Assessment & Plan  Left hand dorsum    Hx of right BKA (HCC)- (present on admission)  Assessment & Plan  Hx of    Sepsis (HCC)- (present on admission)  Assessment & Plan  This is Sepsis Present on admission  SIRS criteria identified on my  evaluation include: Leukocytosis, with WBC greater than 12,000  Source is left foot infection  Sepsis protocol initiated  Fluid resuscitation ordered per protocol  IV antibiotics as appropriate for source of sepsis  While organ dysfunction may be noted elsewhere in this problem list or in the chart, degree of organ dysfunction does not meet CMS criteria for severe sepsis          Tobacco abuse- (present on admission)  Assessment & Plan  Nicotine patch offered    Chronic pain- (present on admission)  Assessment & Plan  Continue fentanyl patch 50 mcg    Hypertension- (present on admission)  Assessment & Plan  Controlled        VTE prophylaxis: lovenox

## 2020-10-04 NOTE — CARE PLAN
Problem: Bowel/Gastric:  Goal: Normal bowel function is maintained or improved  Outcome: PROGRESSING AS EXPECTED  Note: Patient is having a normal bowel pattern after intervention.      Problem: Knowledge Deficit  Goal: Knowledge of disease process/condition, treatment plan, diagnostic tests, and medications will improve  Outcome: PROGRESSING AS EXPECTED  Note: Patient is aware of her treatment course.

## 2020-10-04 NOTE — PROGRESS NOTES
Bedside report received.  Assessment complete.  A&O x 4. Patient calls appropriately.  Patient mobilizes with max  assist. Bed alarm on.   Patient has 4/10 pain. Declines medication at this time.  Denies N&V. Tolerating diet.  L foot veraflo wound vac in place.  L knee hemovac in place, no drainage noted.  LUE PICC TKO.  + void, + flatus, + BM.  Patient denies SOB.  SCD's refused/ R AKA.  Review plan with of care with patient. Call light and personal belongings with in reach. Hourly rounding in place. All needs met at this time.

## 2020-10-04 NOTE — PROGRESS NOTES
Report received from dayshift RN Assumed care, assessment complete. Pt is A & O x 4.  Pt denies having any pain at this time. Fall precautions and appropriate signs in place. Pt oriented to unit routine, call light/phone system and RN extension number provided. Pt educated regarding fall precautions. Bed alarm in use. Pt denies any additional needs at this time. Call light within reach.

## 2020-10-05 LAB
GLUCOSE BLD-MCNC: 105 MG/DL (ref 65–99)
GLUCOSE BLD-MCNC: 161 MG/DL (ref 65–99)
GLUCOSE BLD-MCNC: 165 MG/DL (ref 65–99)
GLUCOSE BLD-MCNC: 205 MG/DL (ref 65–99)
GLUCOSE BLD-MCNC: 218 MG/DL (ref 65–99)
GLUCOSE BLD-MCNC: 65 MG/DL (ref 65–99)

## 2020-10-05 PROCEDURE — 700111 HCHG RX REV CODE 636 W/ 250 OVERRIDE (IP): Performed by: INTERNAL MEDICINE

## 2020-10-05 PROCEDURE — 700105 HCHG RX REV CODE 258: Performed by: INTERNAL MEDICINE

## 2020-10-05 PROCEDURE — 700102 HCHG RX REV CODE 250 W/ 637 OVERRIDE(OP): Performed by: HOSPITALIST

## 2020-10-05 PROCEDURE — 700101 HCHG RX REV CODE 250: Performed by: HOSPITALIST

## 2020-10-05 PROCEDURE — 97605 NEG PRS WND THER DME<=50SQCM: CPT

## 2020-10-05 PROCEDURE — 700111 HCHG RX REV CODE 636 W/ 250 OVERRIDE (IP): Performed by: ORTHOPAEDIC SURGERY

## 2020-10-05 PROCEDURE — 82962 GLUCOSE BLOOD TEST: CPT | Mod: 91

## 2020-10-05 PROCEDURE — 94640 AIRWAY INHALATION TREATMENT: CPT

## 2020-10-05 PROCEDURE — 770006 HCHG ROOM/CARE - MED/SURG/GYN SEMI*

## 2020-10-05 PROCEDURE — 700111 HCHG RX REV CODE 636 W/ 250 OVERRIDE (IP): Performed by: HOSPITALIST

## 2020-10-05 PROCEDURE — A9270 NON-COVERED ITEM OR SERVICE: HCPCS | Performed by: HOSPITALIST

## 2020-10-05 PROCEDURE — 99232 SBSQ HOSP IP/OBS MODERATE 35: CPT | Performed by: HOSPITALIST

## 2020-10-05 PROCEDURE — 99232 SBSQ HOSP IP/OBS MODERATE 35: CPT | Performed by: NURSE PRACTITIONER

## 2020-10-05 RX ORDER — KETOROLAC TROMETHAMINE 30 MG/ML
30 INJECTION, SOLUTION INTRAMUSCULAR; INTRAVENOUS ONCE
Status: COMPLETED | OUTPATIENT
Start: 2020-10-06 | End: 2020-10-06

## 2020-10-05 RX ORDER — DEXTROSE MONOHYDRATE 25 G/50ML
50 INJECTION, SOLUTION INTRAVENOUS
Status: DISCONTINUED | OUTPATIENT
Start: 2020-10-05 | End: 2020-10-08 | Stop reason: HOSPADM

## 2020-10-05 RX ADMIN — KETOROLAC TROMETHAMINE 30 MG: 30 INJECTION, SOLUTION INTRAMUSCULAR at 08:40

## 2020-10-05 RX ADMIN — OXYCODONE HYDROCHLORIDE AND ACETAMINOPHEN 1 TABLET: 10; 325 TABLET ORAL at 05:41

## 2020-10-05 RX ADMIN — OXYCODONE HYDROCHLORIDE AND ACETAMINOPHEN 1 TABLET: 10; 325 TABLET ORAL at 18:03

## 2020-10-05 RX ADMIN — CYCLOBENZAPRINE 5 MG: 10 TABLET, FILM COATED ORAL at 17:24

## 2020-10-05 RX ADMIN — ALBUTEROL SULFATE 2.5 MG: 2.5 SOLUTION RESPIRATORY (INHALATION) at 12:29

## 2020-10-05 RX ADMIN — INSULIN GLARGINE 100 UNITS: 100 INJECTION, SOLUTION SUBCUTANEOUS at 09:52

## 2020-10-05 RX ADMIN — PROCHLORPERAZINE EDISYLATE 10 MG: 5 INJECTION INTRAMUSCULAR; INTRAVENOUS at 13:27

## 2020-10-05 RX ADMIN — INSULIN GLARGINE 100 UNITS: 100 INJECTION, SOLUTION SUBCUTANEOUS at 17:59

## 2020-10-05 RX ADMIN — OXYCODONE HYDROCHLORIDE AND ACETAMINOPHEN 1 TABLET: 10; 325 TABLET ORAL at 13:20

## 2020-10-05 RX ADMIN — KETOROLAC TROMETHAMINE 30 MG: 30 INJECTION, SOLUTION INTRAMUSCULAR at 00:06

## 2020-10-05 RX ADMIN — ALPRAZOLAM 0.25 MG: 0.25 TABLET ORAL at 17:18

## 2020-10-05 RX ADMIN — SODIUM CHLORIDE 3 G: 900 INJECTION INTRAVENOUS at 00:19

## 2020-10-05 RX ADMIN — ALBUTEROL SULFATE 2 PUFF: 90 AEROSOL, METERED RESPIRATORY (INHALATION) at 17:56

## 2020-10-05 RX ADMIN — ENOXAPARIN SODIUM 40 MG: 40 INJECTION SUBCUTANEOUS at 05:41

## 2020-10-05 RX ADMIN — SODIUM CHLORIDE 1.5 G: 900 INJECTION INTRAVENOUS at 13:20

## 2020-10-05 RX ADMIN — SODIUM CHLORIDE 3 G: 900 INJECTION INTRAVENOUS at 05:41

## 2020-10-05 RX ADMIN — DOCUSATE SODIUM 50 MG AND SENNOSIDES 8.6 MG 2 TABLET: 8.6; 5 TABLET, FILM COATED ORAL at 05:41

## 2020-10-05 RX ADMIN — INSULIN HUMAN 4 UNITS: 100 INJECTION, SOLUTION PARENTERAL at 00:03

## 2020-10-05 RX ADMIN — OMEPRAZOLE 20 MG: 20 CAPSULE, DELAYED RELEASE ORAL at 05:41

## 2020-10-05 RX ADMIN — OXYCODONE HYDROCHLORIDE AND ACETAMINOPHEN 1 TABLET: 10; 325 TABLET ORAL at 22:20

## 2020-10-05 RX ADMIN — SODIUM CHLORIDE 1.5 G: 900 INJECTION INTRAVENOUS at 22:20

## 2020-10-05 RX ADMIN — ALPRAZOLAM 0.25 MG: 0.25 TABLET ORAL at 13:54

## 2020-10-05 ASSESSMENT — PAIN DESCRIPTION - PAIN TYPE
TYPE: CHRONIC PAIN

## 2020-10-05 ASSESSMENT — ENCOUNTER SYMPTOMS
ROS GI COMMENTS: DRY MOUTH
VOMITING: 0
SHORTNESS OF BREATH: 1
CHILLS: 0
FEVER: 0

## 2020-10-05 NOTE — DISCHARGE PLANNING
Agency/Facility Name: [facility]   Outcome: Left voicemail for Jessica in admissions regarding referral. Requested call back.     Agency/Facility Name: [facility]   Spoke To: Jessica   Outcome: Referral pending review.    [id number]  Agency/Facility Name: [facility]   Spoke To: Jessica   Outcome: Per Jessica,  has not reviewed referral yet. Per Jessica, will call [facility] tomorrow morning.

## 2020-10-05 NOTE — CARE PLAN
Problem: Safety  Goal: Will remain free from falls  Outcome: PROGRESSING AS EXPECTED  Note: Moderate risk, appropriate fall precautions in place. Patient educated and verbalizes understanding.      Problem: Pain Management  Goal: Pain level will decrease to patient's comfort goal  Outcome: PROGRESSING AS EXPECTED  Note: Patient states pain is much more tolerable and is relived with PRN meds.

## 2020-10-05 NOTE — CARE PLAN
Problem: Safety  Goal: Will remain free from falls  Outcome: PROGRESSING AS EXPECTED  Intervention: Assess risk factors for falls  Note: All fall precautions in place at this time     Problem: Discharge Barriers/Planning  Goal: Patient's continuum of care needs will be met  Outcome: PROGRESSING AS EXPECTED  Intervention: Assess potential discharge barriers on admission and throughout hospital stay  Flowsheets (Taken 10/5/2020 0900)  Does Admitting Nurse Feel This Could be a Complex Discharge?: No  Note: Need placement for long term antibiotics     Problem: Discharge Barriers/Planning  Goal: Patient's continuum of care needs will be met  Outcome: PROGRESSING AS EXPECTED  Intervention: Assess potential discharge barriers on admission and throughout hospital stay  Flowsheets (Taken 10/5/2020 0900)  Does Admitting Nurse Feel This Could be a Complex Discharge?: No  Note: Need placement for long term antibiotics

## 2020-10-05 NOTE — PROGRESS NOTES
Infectious Disease Daily Progress Note    Date of Service  10/5/2020    Chief Complaint  Cefuroxime 1.5 Q8 hr 10/5-present    Vancomycin 9/28-10/2  Ceftazadime 10/2  Cefazolin 10/2  Unasyn 9/29-10/5     9/30: She does not want to be here. She refuses MRI and says it is her knee that is her problem and she does not want any surgery for her foot.  10/1: L knee synovial fluid growing GPC. Patient still refusing BKA. CM looking into SNF  10/2 : Antibiotics adjusted to cefazolin and ceftazidime since pharmacy requests no ceftaroline. Second GNR now identified as oxidase positive  So could be Pseudomonas.  10/3: Endorses L knee pain. Pending SNF placements. PICC line placed yesterday  10/4: L knee pain. No other issues.   10/5: L knee cultures still pending GPC. Additional growth of Alcaligines on metatarsal joint. Knee pain 9 of 10 today. Pending SNF       Review of Systems  Review of Systems   Musculoskeletal: Positive for joint pain.        Physical Exam  Temp:  [36.2 °C (97.1 °F)-36.7 °C (98 °F)] 36.2 °C (97.1 °F)  Pulse:  [93-96] 94  Resp:  [17-19] 18  BP: (112-132)/(53-86) 112/65  SpO2:  [95 %-98 %] 98 %    Physical Exam  Physical Exam  Constitutional:       Appearance: alert and comfortable  HENT:      Head: Normocephalic.   Cardiovascular:      Rate and Rhythm: Normal rate and regular rhythm.      Heart sounds: No murmur.   Pulmonary:      Effort: No respiratory distress.      Breath sounds: Normal breath sounds. No wheezing.   Abdominal:      General: Abdomen is flat. Bowel sounds are normal.   Skin:     Comments: L knee with ACE wrap and hemovac. L 1st MTP VAC    Neurological:      Mental Status: She is alert.   Fluids    Intake/Output Summary (Last 24 hours) at 10/5/2020 0830  Last data filed at 10/5/2020 0054  Gross per 24 hour   Intake 580 ml   Output 800 ml   Net -220 ml       Laboratory  Recent Labs     10/03/20  0505   WBC 11.7*   RBC 3.68*   HEMOGLOBIN 10.0*   HEMATOCRIT 33.4*   MCV 90.8   MCH 27.2   MCHC  29.9*   RDW 53.4*   PLATELETCT 330   MPV 10.6     Recent Labs     10/03/20  0505   SODIUM 140   POTASSIUM 4.1   CHLORIDE 103   CO2 29   GLUCOSE 261*   BUN 11   CREATININE 0.30*   CALCIUM 7.7*              IMPRESSION:  - Sepsis- resolved  - Acute on chronic L 1st MTP osteomyelitis s/p I+D 9/29      Non surgical cultures 9/28 - Group A strep, Morganella, Group D Enterococcus, GNR (colonizations)     Surgical cultures 9/29 - Group A strep, MSSA, Alcaligines faecalis  - Acute L knee septic arthritis s/p washout 9/29 - GPCs  - Poorly controlled diabetes mellitus with insulin dependence.  - Prior osteomyelitis, status post right below-knee amputation in 2017.  - Hypertension.     RECOMMENDATION:  - Additoinal growth of alcaligines. Will change to Cefuroxime  - stop unasyn  - Treating her surgicalcultures growing Group A strep and MSSA. Non surgical cultures gram stains only showing GPC's with polymicrobial growth suggestive of colonization. Cefuroxime will address all her organisms.   - Continue Cefuroxime 1.5 Q8hrs x 6 weeks. Target date 11/10.   - continue to follow her L knee synovial cultures. Gram stain GPC's pending growth.    - PICC line in place 10/2  - Dr Pak recommending BKA for osteo of her foot. Patient refusing. As a result, will need minimum of 6 week therapy.   - weekly labs CBC, CMP, ESR, CRP  [facility] office available for questions or advice on her treatment [phone]     CM looking into SNF options. Possible [facility] per CM     35 min spent with 50% face to face care. Case discussed with RN and CM  Thank you for this consult.

## 2020-10-05 NOTE — PROGRESS NOTES
Assumed care at 0700 after report received from night RN. Pt A/Ox4, on 4L oxygen (RA in pt baseline). Pt with melissa for retention, pain 9/10 medicated per JOANNA DAY PICC WNL, LFA PIV WNL. Blood glucose 65 this AM, snack provided MD andino aware. Will recheck glucose after rbeakfast and then admin Lantus. Sliding scale modified by MD. Wound on place left foot, WNL. Left leg wrapped with ace bandage.     Md notified of worsening swelling of left hand and finger at end of shift

## 2020-10-05 NOTE — PROGRESS NOTES
Hospital Medicine Daily Progress Note    Date of Service  10/5/2020    Chief Complaint  55 y.o. female admitted 9/28/2020 with past medical history of diabetes mellitus, hypertension chronic hypoxic respiratory failure on 2 L oxygen  apparently has been off of her diabetes medicines for at least 3 months.  She presented to the emergency room in [facility] with left foot infection and pain for the past few days it is been worsening.  She also notes 1 day of left hand pain and redness as well.      Hospital Course    She presented with sepsis and was given vancomycin and Rocephin.  She was found to be hyperglycemic.  ESR and CRP were elevated.      Interval Problem Update  9/29: Patient states that her hand swelling and foot swelling is decreasing.  Orthopedics was consulted and will take patient to the OR for debridement and biopsy of bone.  Left knee is also swollen and she was taken for arthrocentesis.  We will continue vancomycin and Unasyn for now.  9/30: Patient seen and examined by me today.  I increased her oxycodone and Xanax to 4 times a day.  We will continue to monitor her mental respiratory status.  Cultures came back positive for group A strep.  Surgical cultures are pending.  Arthrocentesis cultures are pending.  Wound VAC and Hemovac was placed.  Orthopedics recommended amputation but the patient refusing.  SNF referral has been sent.  Patient continues to have uncontrolled blood sugars and have increase her Lantus to 30 units.  10/1: Surgical cultures still pending.  Orthopedics recommended amputation but the patient is refusing.  I discussed this with infectious disease who recommended 6 weeks of antibiotics.  She has a wound VAC and will try to find placement.  Although, patient states that she will lose her housing if she goes to a skilled nursing facility for 6 weeks.  10/2: No new complaints per patient today.  I have ordered a PICC line.  We will continue to find placement for 6  weeks of antibiotics.  Antibiotics were changed to Fortaz and cefazolin by ID today.  10/3: Patient complaining of back and rectal pain.  She was given enema and had multiple bowel movements afterwards.  [facility] referral has been sent and waiting for acceptance.  PICC line was placed yesterday.  Increase Lantus 100 twice daily.  10/4: Blood glucose is better controlled with increase in lantus. Hemovac removed today by ortho. Continue antibiotics and find placement.  10/5: Patient states that her pain is much better controlled today after increasing fentanyl to 75 mcg.  Continue to find placement.  Bone cultures found group A strep, staph aureus, and alcaligenes faecalis.  Patient had a hypoglycemic event overnight and I lowered her sliding scale.  Consultants/Specialty  LPS      Code Status  Full Code    Disposition  SNF vs [facility]    Review of Systems  Review of Systems   Constitutional: Negative for chills and fever.   Respiratory: Positive for shortness of breath.    Cardiovascular: Negative for chest pain.   Gastrointestinal: Negative for vomiting.        Dry mouth   All other systems reviewed and are negative.       Physical Exam  Temp:  [36.2 °C (97.1 °F)-36.7 °C (98 °F)] 36.2 °C (97.1 °F)  Pulse:  [93-96] 94  Resp:  [17-19] 18  BP: (112-132)/(53-86) 112/65  SpO2:  [95 %-98 %] 98 %    Physical Exam  Vitals signs and nursing note reviewed.   Constitutional:       General: She is not in acute distress.     Appearance: Normal appearance. She is not ill-appearing, toxic-appearing or diaphoretic.      Comments: disheveled   HENT:      Head: Normocephalic and atraumatic.      Nose: No congestion or rhinorrhea.      Mouth/Throat:      Mouth: Mucous membranes are dry.      Pharynx: Oropharynx is clear. No oropharyngeal exudate or posterior oropharyngeal erythema.   Eyes:      General: No scleral icterus.     Conjunctiva/sclera: Conjunctivae normal.   Neck:      Musculoskeletal: Normal range of motion and neck supple. No  neck rigidity or muscular tenderness.      Vascular: No carotid bruit.   Cardiovascular:      Rate and Rhythm: Regular rhythm. Tachycardia present.      Pulses: Normal pulses.      Heart sounds: Normal heart sounds. No murmur. No friction rub. No gallop.    Pulmonary:      Effort: Pulmonary effort is normal. No respiratory distress.      Breath sounds: Normal breath sounds. No stridor. No wheezing or rhonchi.   Abdominal:      General: Abdomen is flat. There is no distension.      Palpations: There is no mass.      Tenderness: There is no abdominal tenderness. There is no left CVA tenderness, guarding or rebound.      Hernia: No hernia is present.   Musculoskeletal: Normal range of motion.         General: No swelling.      Right lower leg: No edema.      Left lower leg: No edema.      Comments: Right BKA  Left trans-met with large ulcer bone exposed  Left hand dorsum is red, swollen and warm to touch   Lymphadenopathy:      Cervical: No cervical adenopathy.   Skin:     General: Skin is warm and dry.      Capillary Refill: Capillary refill takes more than 3 seconds.      Coloration: Skin is not jaundiced or pale.      Findings: No bruising or erythema.   Neurological:      Mental Status: She is alert and oriented to person, place, and time.         Fluids    Intake/Output Summary (Last 24 hours) at 10/5/2020 1043  Last data filed at 10/5/2020 0054  Gross per 24 hour   Intake 340 ml   Output 800 ml   Net -460 ml       Laboratory  Recent Labs     10/03/20  0505   WBC 11.7*   RBC 3.68*   HEMOGLOBIN 10.0*   HEMATOCRIT 33.4*   MCV 90.8   MCH 27.2   MCHC 29.9*   RDW 53.4*   PLATELETCT 330   MPV 10.6     Recent Labs     10/03/20  0505   SODIUM 140   POTASSIUM 4.1   CHLORIDE 103   CO2 29   GLUCOSE 261*   BUN 11   CREATININE 0.30*   CALCIUM 7.7*                   Imaging  US-SHAHNAZ SINGLE LEVEL BILAT   Final Result      DX-FOOT-COMPLETE 3+ LEFT   Final Result      1.  There is diffuse left forefoot swelling with a large  ulceration along the medial aspect of the remaining forefoot. There is no plain film evidence of underlying osteomyelitis.   2.  There is postoperative change consistent with amputation at the level of the mid and proximal metatarsals.      [id number] FILM X-RAY   Final Result      [id number] FILM X-RAY   Final Result      DX-CHEST-PORTABLE (1 VIEW)   Final Result      Enlarged cardiac silhouette and interstitial prominence suggesting pulmonary edema.      IR-PICC LINE PLACEMENT W/ GUIDANCE > AGE 5    (Results Pending)        Assessment/Plan  * Diabetic foot ulcer (HCC)- (present on admission)  Assessment & Plan  Left trans-met with large ulcer which is draining pus which will be cultured  WBC in [place] was 16 and xray was suspicious for osteomyelitis.   Limb preservation service consulted: she may be a candidate for surgical debridement.  IV vancomycin and Unasyn   Fortaz and cefazolin has been started per ID 10/2  ESR in [place] was 109  Infectious disease consulted  Wound culture positive for group A strep  Bone culture found group A strep, staph aureus, and alcaligenes faecalis.    Acute on chronic respiratory failure with hypoxia (HCC)- (present on admission)  Assessment & Plan  She is chronically on 2 liters of oxygen and is now requiring 5 liters  This may be due to the 24 mg morphine, 15 mg versed and 2 mg ativan given per transfer sheet.  Supplemental oxygen ordered.  If her sats decrease, her fentanyl patch may need to be removed and possibly narcan      Non compliance with medical treatment- (present on admission)  Assessment & Plan  She stopped taking all of her meds except fentanyl patch    DM2 (diabetes mellitus, type 2) (CMS-HCC)- (present on admission)  Assessment & Plan  Start on insulin sliding scale with serial Accu-Checks  Hemoglobin A1c 13.3  Hypoglycemic protocol in place  Start Lantus increase to 100U twice daily       Cellulitis of hand- (present on  admission)  Assessment & Plan  Left hand dorsum    Hx of right BKA (HCC)- (present on admission)  Assessment & Plan  Hx of    Sepsis (HCC)- (present on admission)  Assessment & Plan  This is Sepsis Present on admission  SIRS criteria identified on my evaluation include: Leukocytosis, with WBC greater than 12,000  Source is left foot infection  Sepsis protocol initiated  Fluid resuscitation ordered per protocol  IV antibiotics as appropriate for source of sepsis  While organ dysfunction may be noted elsewhere in this problem list or in the chart, degree of organ dysfunction does not meet CMS criteria for severe sepsis          Tobacco abuse- (present on admission)  Assessment & Plan  Nicotine patch offered    Chronic pain- (present on admission)  Assessment & Plan  Continue fentanyl patch 75 mcg home dose is 50mcg    Hypertension- (present on admission)  Assessment & Plan  Controlled        VTE prophylaxis: lovenox

## 2020-10-05 NOTE — PROGRESS NOTES
Assumed care at 1900 after receiving report from day shift RN. Patient alert and oriented. Complains of moderate pain in L knee that is relived with PRN pain medication. Patient denies additional needs. Bed alarm on, call light within reach, hourly rounding in place.

## 2020-10-05 NOTE — WOUND TEAM
[facility]  Inpatient Services   Wound and Skin Care Progress    Admission Date: 9/28/2020     Last order of IP CONSULT TO WOUND CARE was found on 9/30/2020 from Hospital Encounter on 9/28/2020       HPI, PMH, SH: Reviewed    Unit where seen by Wound Team: [id number]/02     WOUND CONSULT/FOLLOW UP RELATED TO: Scheduled Negative Pressure Wound Therapy (NPWT) dressing change.    Self Report / Pain Level:  Premedicated with PO Oxy by RN.     OBJECTIVE:  Pt lying in bed, leg elevated on pillow. Dressing in place CDI    WOUND TYPE, LOCATION, CHARACTERISTICS (Pressure Injuries: location, stage, POA or date identified)    Negative Pressure Wound Therapy 09/30/20 Diabetic foot ulcer Foot Left (Active)   NPWT Pump Mode / Pressure Setting Ulta 10/05/20 0930   Dressing Type Black Foam (Veraflo);Small 10/05/20 0930   Number of Foam Pieces Used 2 10/05/20 0930   Canister Changed No 10/05/20 0930   NEXT Dressing Change/Treatment Date 10/07/20 10/05/20 0930   VAC VeraFlo Irrigant Normal Saline 10/05/20 0930   VAC VeraFlo Soak Time (mins) 5 10/05/20 0930   VAC VeraFlo Instill Volume (ml) 8 10/05/20 0930   VAC VeraFlo - Therapy Time (hrs) 1 10/05/20 0930   VAC VeraFlo Pressure (mm/Hg) Continuous;125 mmHg 10/05/20 0930           Wound 09/29/20 Diabetic Ulcer Foot Left Full Thickness w/ Vac (Active)   Wound Image   10/05/20 0930   Site Assessment Red 10/05/20 0930   Periwound Assessment Callused;Maceration 10/05/20 0930   Margins Defined edges;Unattached edges 10/05/20 0930   Closure Secondary intention 10/05/20 0930   Drainage Amount Small 10/05/20 0930   Drainage Description Serosanguineous 10/05/20 0930   Treatments Cleansed;Site care 10/05/20 0930   Wound Cleansing Approved Wound Cleanser 10/05/20 0930   Periwound Protectant Skin Protectant Wipes to Periwound;Paste Ring;Drape 10/05/20 0930   Dressing Cleansing/Solutions Normal Saline 10/05/20 0930   Dressing Options Wound Vac 10/05/20 0930   Dressing Changed Changed  10/05/20 0930   Dressing Status Clean;Dry;Intact 10/05/20 0930   Dressing Change/Treatment Frequency Monday, Wednesday, Friday, and As Needed 10/05/20 0930   NEXT Dressing Change/Treatment Date 10/07/20 10/05/20 0930   NEXT Weekly Photo (Inpatient Only) 10/12/20 10/05/20 0930   Non-staged Wound Description Full thickness 10/05/20 0930   Wound Length (cm) 2.2 cm 10/05/20 0930   Wound Width (cm) 2.9 cm 10/05/20 0930   Wound Depth (cm) 1.1 cm 10/05/20 0930   Wound Surface Area (cm^2) 6.38 cm^2 10/05/20 0930   Wound Volume (cm^3) 7.02 cm^3 10/05/20 0930   Post-Procedure Length (cm) 2.4 cm 10/05/20 0930   Post-Procedure Width (cm) 2.4 cm 10/05/20 0930   Post-Procedure Depth (cm) 0.9 cm 10/05/20 0930   Post-Procedure Surface Area (cm^2) 5.76 cm^2 10/05/20 0930   Post-Procedure Volume (cm^3) 5.18 cm^3 10/05/20 0930   Wound Healing % 67 10/05/20 0930   Wound Bed Granulation (%) 100 % 10/05/20 0930   Wound Bed Epithelium (%) 0 % 10/05/20 0930   Wound Bed Slough (%) 0 % 10/05/20 0930   Wound Bed Eschar (%) 0 % 10/05/20 0930   Tunneling (cm) 1 cm 10/05/20 0930   Undermining (cm) 0.4 cm 10/05/20 0930   Undermining of Wound, 1st Location From 11 o'clock;To 12 o'clock 10/05/20 0930   Shape irregularly round 10/05/20 0930   Wound Odor None 10/05/20 0930   Pulses N/A 10/05/20 0930   Exposed Structures None 10/05/20 0930           Wound 10/05/20 Full Thickness Wound Foot;MTH 5 Lateral Left eschar (Active)   Wound Image   10/05/20 0930   Site Assessment Black 10/05/20 0930   Periwound Assessment Intact 10/05/20 0930   Margins Attached edges;Defined edges 10/05/20 0930   Closure Open to air 10/05/20 0930   Drainage Amount None 10/05/20 0930   Treatments Site care 10/05/20 0930   Wound Cleansing Not Applicable 10/05/20 0930   Periwound Protectant Not Applicable 10/05/20 0930   Dressing Cleansing/Solutions Not Applicable 10/05/20 0930   Dressing Options Open to Air 10/05/20 0930   Dressing Status Open to Air 10/05/20 0930   NEXT Weekly  Photo (Inpatient Only) 10/07/20 10/05/20 0930   Non-staged Wound Description Full thickness 10/05/20 0930   Wound Length (cm) 1.8 cm 10/05/20 0930   Wound Width (cm) 2.1 cm 10/05/20 0930   Wound Surface Area (cm^2) 3.78 cm^2 10/05/20 0930   Wound Bed Granulation (%) 0 % 10/05/20 0930   Wound Bed Epithelium (%) 0 % 10/05/20 0930   Wound Bed Slough (%) 0 % 10/05/20 0930   Wound Bed Eschar (%) 100 % 10/05/20 0930   Shape round 10/05/20 0930   Wound Odor None 10/05/20 0930   Pulses Left;1+;DP 10/05/20 0930   Exposed Structures None 10/05/20 0930          Vascular:    SHAHNAZ:   No results found.      Lab Values:    Lab Results   Component Value Date/Time    WBC 11.7 (H) 10/03/2020 05:05 AM    RBC 3.68 (L) 10/03/2020 05:05 AM    HEMOGLOBIN 10.0 (L) 10/03/2020 05:05 AM    HEMATOCRIT 33.4 (L) 10/03/2020 05:05 AM    CREACTPROT 26.67 (H) 09/30/2020 10:27 AM    SEDRATEWES >120 (H) 09/30/2020 10:27 AM    HBA1C 13.3 (H) 09/28/2020 08:07 PM        Culture:   Obtained  IN OR 9/29  Culture Results show:  Significant Indicator POSPositive (POS) P    Source BONE P    Site First Metatarsal P    Culture Result -Abnormal  P    Gram Stain Result Rare WBCs.   No organisms seen.    Culture Result Abnormal   Beta Hemolytic Streptococcus group A   Light growth   P      Culture Result Abnormal   Staphylococcus aureus   Rare growth               INTERVENTIONS BY WOUND TEAM:  Dressing removed. Wound and periwound cleansed with cleanser and gauze. Limb Preservation Service APRN in to see patient, please read her note. No Sing and paste ring to periwound. 1 pc of foam to wound bed, then drape applied over that. Hole cut in drape then 2nd pc used as button for tracpad. Trac pad applied to button. Dressing seal Achieved. No change in vac setting, refer to LDA for details.    Interdisciplinary consultation: Patient, RN LPS APRN    EVALUATION: Wound bed clean and fully granulated. Responding to NPWT..    Goals: Steady decrease in wound area and depth  weekly.    NURSING PLAN OF CARE ORDERS (X):    Dressing changes: See Dressing Care orders: X  Skin care: See Skin Care orders: X  Rectal tube care: See Rectal Tube Care orders:   Other orders:    WOUND TEAM PLAN OF CARE    Dressing changes by wound team:          Follow up 1-2 times weekly:               Follow up 3 times weekly:                NPWT change 3 times weekly:   X  Follow up as needed:       Other (explain):     Anticipated discharge plans:    [facility]:    X vs SNF  Will need ongoing wound care.  SNF/Rehab:                  Home Care:           Outpatient Wound Center:            Self Care:

## 2020-10-05 NOTE — PROGRESS NOTES
LIMB PRESERVATION SERVICE   POST SURGICAL PROGRESS NOTE      HPI:  Greta Echevarria is a 55 y.o.  with a past medical history that includes type 2 diabetes, peripheral neuropathy, hypertension, left TMA and left GSR in 2016, right BKA in 2017 admitted 9/28/2020 for Septic Knee  Cellulitis, leg.   LPS has been consulted for evaluation of left plantar first MT ulcer.      Patient reports she has had the foot ulcer for approximately 3 years or longer.  She reports that she was originally seen by physical therapy in [place] but then transferred her care to [place] within the last year as the wound was not improving per patient.  Patient did report that she was receiving debridement to the wound at [place].  She was following up with the wound care clinic in [place] until the beginning of this year.  Since then patient has been changing her own wound dressings.  She reports that she cleanses the wound with a wound cleanser and applies a honey dressing.  When she was with the wound care clinic she had alternating dressings of honey and of calcium alginate.  She reports that the wound has stayed the same since taking care of it herself.  She has not noticed any drainage or pus until yesterday when she was told.  She denies any fevers, chills, nausea, vomiting.  She has not been on antibiotics recently.    SURGERY DATE: 9/29/2020 with Dr. Hampton  PROCEDURE:   1.  Excisional debridement of residual left first metatarsal.    2.  Arthrocentesis, left knee.    3.  Arthrotomy and drainage, left knee.      INTERVAL HISTORY:  10/5/2020: POD #6.  Patient seen with Candice wound care RN during VAC change.  Patient reports some nausea but otherwise denies fever, chills, vomiting, diarrhea, chest pain, shortness of breath.  Patient reports pain is well controlled.        PERTINENT LPS RESULTS:      9/30/2020 10:27   Sed Rate Westergren >120 (H)        9/30/2020 10:27   Stat C-Reactive Protein 26.67 (H)        "      COVID-19: Not detected 9/28/2020    OR cultures from 9/29/2020: Positive for beta-hemolytic Streptococcus group A, MSSA, Alcaligenes faecalis      SURGICAL SITE EXAM:      /65   Pulse 94   Temp 36.2 °C (97.1 °F) (Temporal)   Resp 18   Ht 1.702 m (5' 7\") Comment: previous admit  Wt 125 kg (275 lb 9.2 oz)   SpO2 98%   BMI 43.16 kg/m²     Pedal Pulses: Left palpable DP/PT pulses, palpable bilateral popliteal pulses  Sensation: Decreased in the foot  Surgical foot warm     Left plantar foot  Full-thickness ulcer to left plantar foot  Wound bed is pink with granular tissue minimal yellow slough.  There is undermining at the 11-12 o'clock position 0.4cm  Mild periwound maceration to proximal aspect of wound  Mild edema but no erythema, active drainage, odor.    Left plantar foot pre-debridement  2.2cmx 2.9cm x 1.1cm          PROCEDURE: Excisional debridement of left plantar foot wound  -Scissors, forceps, curette used to debride wound bed and periwound callus.  Excisional debridement was performed to remove devitalized tissue until healthy, bleeding tissue was visualized.   Entire surface of wound, 6.38cm², debrided  Tissue debrided into the subcutaneous layer.  Periwound maceration debrided to skin level  -Bleeding controlled with manual pressure  -Wound care completed per orders, by Candice Wound Care RN      Left lateral foot post debridement  2.4cm x 2.4cm x 0.9cm        Left lateral fifth MTH  DTI noted to left lateral fifth MTH.  Tissue is dry and intactwithout bogginess, fluctuance, drainage.     Left lateral fifth MTH        Left knee incision  Left knee incision well approximated with sutures.  Very scant erythema likely due to mild inflammation postoperatively.  Mild-moderate edema to left knee but improved from last assessment on 9/29/2020.  No incisional necrosis, drainage, odor    There is a small pinpoint opening to the proximal lateral side of left knee of previous drain site  No " surrounding erythema, edema, drainage, odor.      Left knee incision          Wound care: Continue vera flow wound VAC to left plantar foot.  Wound treat and manage/change X3/week.  Left lateral foot: Offload with Mepilex.  Change every 72 hours or PRN  Left knee incision: Adhesive foam change every 72 hours or PRN.   Left knee puncture site: Gauze and Hypafix tape.  Change every 72 hours or PRN.       DIABETES MANAGEMENT:    Blood glucose:   Results from last 7 days   Lab Units 10/05/20  0951 10/05/20  0831 10/05/20  0002 10/04/20  1813 10/04/20  1315 10/04/20  0829 10/03/20  2121 10/03/20  1839   ACCU CHECK GLUCOSE 788 mg/dL 105* 65 218* 226* 150* 144* 170* 99     A1c:   Lab Results   Component Value Date/Time    HBA1C 13.3 (H) 09/28/2020 08:07 PM            INFECTION MANAGEMENT:    Results from last 7 days   Lab Units 10/03/20  0505 10/02/20  0222 10/01/20  0049 09/30/20  1027 09/29/20  1043 09/28/20 2007   WBC 1501 K/uL 11.7* 10.6 10.2 10.7 14.5* 14.6*   PLATELET COUNT 1518 K/uL 330 340 293 275 232 249     Wound culture results:   Results     Procedure Component Value Units Date/Time    Anaerobic Culture [[id number]] Collected: 09/29/20 2017    Order Status: Completed Specimen: Bone Updated: 10/04/20 1920     Significant Indicator NEG     Source BONE     Site First Metatarsal     Culture Result No Anaerobes isolated.    Narrative:      CALL  Mckeon  MS5 tel. [phone],  CALLED  MS5 tel. [phone] 10/01/2020, 13:19, RB PERF. RESULTS CALLED  TO:[id number] RN group A  Surgery Specimen    CULTURE TISSUE W/ GRM STAIN [[id number]]  (Abnormal)  (Susceptibility) Collected: 09/29/20 2017    Order Status: Completed Specimen: Bone Updated: 10/04/20 1920     Significant Indicator POS     Source BONE     Site First Metatarsal     Culture Result -     Gram Stain Result Rare WBCs.  No organisms seen.       Culture Result Beta Hemolytic Streptococcus group A  Light growth        Staphylococcus aureus  Rare growth        Alcaligenes  faecalis  Rare growth      Narrative:      CALL  Mckeon  MS5 tel. [phone],  CALLED  MS5 tel. [phone] 10/01/2020, 13:19, RB PERF. RESULTS CALLED  TO:[id number] RN group A  Surgery Specimen    Susceptibility     Staphylococcus aureus (2)     Antibiotic Interpretation Microscan Method Status    Azithromycin Sensitive <=2 mcg/mL SLICK Final    Clindamycin Sensitive <=0.5 mcg/mL SLICK Final    Cefazolin Sensitive <=8 mcg/mL SLICK Final    Ceftaroline Sensitive <=0.5 mcg/mL SLICK Final    Daptomycin Sensitive <=1 mcg/mL SLICK Final    Erythromycin Sensitive <=0.25 mcg/mL SLICK Final    Oxacillin Sensitive <=0.25 mcg/mL SLICK Final    Penicillin Resistant >8 mcg/mL SLICK Final    Pip/Tazobactam Sensitive <=4 mcg/mL SLICK Final    Ampicillin/sulbactam Sensitive <=8/4 mcg/mL SLICK Final    Trimeth/Sulfa Sensitive <=0.5/9.5 mcg/mL SLICK Final    Tetracycline Sensitive <=4 mcg/mL SLICK Final    Vancomycin Sensitive 1 mcg/mL SLICK Final          Alcaligenes faecalis (3)     Antibiotic Interpretation Microscan Method Status    Pip/Tazobactam Sensitive <=16 mcg/mL SLICK Final    Trimeth/Sulfa Sensitive <=2/38 mcg/mL SLICK Final    Tobramycin Sensitive <=4 mcg/mL SLICK Final    Amikacin Sensitive <=16 mcg/mL SLICK Final    Ceftriaxone Sensitive <=1 mcg/mL SLICK Final    Ceftazidime Sensitive 4 mcg/mL SLICK Final    Cefotaxime Sensitive <=2 mcg/mL SLICK Final    Ciprofloxacin Intermediate 2 mcg/mL SLICK Final    Cefepime Sensitive 8 mcg/mL SLICK Final    Gentamicin Sensitive <=4 mcg/mL SLICK Final                   CULTURE WOUND W/ GRAM STAIN [[id number]]  (Abnormal)  (Susceptibility) Collected: 09/28/20 2054    Order Status: Completed Specimen: Wound from Left Foot Updated: 10/04/20 1908     Significant Indicator POS     Source WND     Site LEFT FOOT     Culture Result -     Gram Stain Result Rare WBCs.  Moderate Gram positive cocci.       Culture Result Beta Hemolytic Streptococcus group A  Moderate growth        Morganella morganii  Moderate growth        Alcaligenes  faecalis  Light growth        Enterococcus faecalis  Light growth        Diphtheroids  Light growth      Narrative:      CALL  Mckeon  MS5 tel. [phone],  CALLED  MS5 tel. [phone] 09/30/2020, 13:00, RB PERF. RESULTS CALLED  TO:[id number], RN    Susceptibility     Morganella morganii (2)     Antibiotic Interpretation Microscan Method Status    Ceftriaxone Sensitive <=1 mcg/mL SLICK Final    Ceftazidime Sensitive <=1 mcg/mL SLICK Final    Cefotaxime Sensitive <=2 mcg/mL SLICK Final    Cefazolin Resistant >16 mcg/mL SLICK Final    Ciprofloxacin Sensitive <=1 mcg/mL SLICK Final    Cefepime Sensitive <=2 mcg/mL SLICK Final    Cefotetan Sensitive <=16 mcg/mL SLICK Final    Ertapenem Sensitive <=0.5 mcg/mL SLICK Final    Ampicillin Resistant >16 mcg/mL SLICK Final    Gentamicin Sensitive <=4 mcg/mL SLICK Final    Pip/Tazobactam Sensitive <=16 mcg/mL SLICK Final    Trimeth/Sulfa Sensitive <=2/38 mcg/mL SLICK Final    Tobramycin Sensitive <=4 mcg/mL SLICK Final          Alcaligenes faecalis (3)     Antibiotic Interpretation Microscan Method Status    Ceftriaxone Sensitive 2 mcg/mL SLICK Final    Ceftazidime Sensitive 4 mcg/mL SLICK Final    Cefotaxime Sensitive 8 mcg/mL SLICK Final    Ciprofloxacin Intermediate 2 mcg/mL SLICK Final    Cefepime Sensitive 8 mcg/mL SLICK Final    Gentamicin Sensitive <=4 mcg/mL SLICK Final    Pip/Tazobactam Sensitive <=16 mcg/mL SLICK Final    Trimeth/Sulfa Sensitive <=2/38 mcg/mL SLICK Final    Tobramycin Sensitive <=4 mcg/mL SLICK Final    Amikacin Sensitive <=16 mcg/mL SLICK Final          Enterococcus faecalis (4)     Antibiotic Interpretation Microscan Method Status    Ampicillin Sensitive <=2 mcg/mL SLICK Final    Vancomycin Sensitive 1 mcg/mL SLICK Final    Daptomycin Sensitive <=1 mcg/mL SLICK Final    Gent Synergy Sensitive <=500 mcg/mL SLICK Final    Penicillin Sensitive 2 mcg/mL SLICK Final                   CULTURE WOUND W/ GRAM STAIN [[id number]] Collected: 09/29/20 2015    Order Status: Completed Specimen: Wound Updated: 10/04/20 8369      "Significant Indicator NEG     Source WND     Site Left Knee Effusion Drainage     Culture Result No growth at 72 hours.     Gram Stain Result Many WBCs.  Rare Gram positive cocci.      Narrative:      Surgery Specimen    Anaerobic Culture [[id number]] Collected: 09/29/20 2015    Order Status: Completed Specimen: Wound Updated: 10/04/20 1708     Significant Indicator NEG     Source WND     Site Left Knee Effusion Drainage     Culture Result No Anaerobes isolated.    Narrative:      Surgery Specimen    BLOOD CULTURE [[id number]] Collected: 09/28/20 1951    Order Status: Completed Specimen: Blood from Peripheral Updated: 10/04/20 0700     Significant Indicator NEG     Source BLD     Site PERIPHERAL     Culture Result No growth after 5 days of incubation.    Narrative:      Per Hospital Policy: Only change Specimen Src: to \"Line\" if  specified by physician order.  No site indicated    BLOOD CULTURE [[id number]] Collected: 09/28/20 2007    Order Status: Completed Specimen: Blood from Peripheral Updated: 10/04/20 0700     Significant Indicator NEG     Source BLD     Site PERIPHERAL     Culture Result No growth after 5 days of incubation.    Narrative:      Per Hospital Policy: Only change Specimen Src: to \"Line\" if  specified by physician order.  No site indicated    URINE CULTURE(NEW) [[id number]] Collected: 09/28/20 2115    Order Status: Completed Specimen: Urine Updated: 10/01/20 0838     Significant Indicator NEG     Source UR     Site -     Culture Result Mixed skin roland 10-50,000 cfu/mL    Narrative:      Indication for culture:->Emergency Room Patient  Indication for culture:->Emergency Room Patient    GRAM STAIN [[id number]] Collected: 09/29/20 2017    Order Status: Completed Specimen: Bone Updated: 09/30/20 1052     Significant Indicator .     Source BONE     Site First Metatarsal     Gram Stain Result Rare WBCs.  No organisms seen.      Narrative:      Surgery Specimen    GRAM STAIN [[id number]] Collected: 09/29/20 " 2015    Order Status: Completed Specimen: Wound Updated: 09/30/20 1051     Significant Indicator .     Source WND     Site Left Knee Effusion Drainage     Gram Stain Result Many WBCs.  Rare Gram positive cocci.      Narrative:      Surgery Specimen    GRAM STAIN [[id number]] Collected: 09/28/20 2054    Order Status: Completed Specimen: Wound Updated: 09/29/20 1701     Significant Indicator .     Source WND     Site LEFT FOOT     Gram Stain Result Rare WBCs.  Moderate Gram positive cocci.      URINALYSIS [[id number]]  (Abnormal) Collected: 09/28/20 2115    Order Status: Completed Specimen: Urine Updated: 09/28/20 2237     Color Yellow     Character Cloudy     Specific Gravity 1.036     Ph 5.5     Glucose >=1000 mg/dL      Ketones 80 mg/dL      Protein 100 mg/dL      Bilirubin Negative     Urobilinogen, Urine 1.0     Nitrite Negative     Leukocyte Esterase Negative     Occult Blood Moderate     Micro Urine Req Microscopic    Narrative:      Indication for culture:->Emergency Room Patient    SARS-CoV-2, PCR (In-House) [[id number]] Collected: 09/28/20 2051    Order Status: Completed Updated: 09/28/20 2205     SARS-CoV-2 Source NP Swab     SARS-CoV-2 by PCR NotDetected     Comment: [facility] providers: PLEASE REFER TO DE-ESCALATION AND RETESTING PROTOCOL  on [email or web address]  **The [employer] GeneXpert Xpress SARS-CoV-2 Test has been made available for  use under the Emergency Use Authorization (EUA) only.         Narrative:      Is patient being admitted?->Yes  Does this patient meet criteria for Rush/Cepheid per [facility]  Inpatient Workflow? (See workflow link below)->Yes  Expected turn around time?->Rush (Cepheid 2-4 hours)  Is this the patients First SARS CoV-2 test?->Unknown  Is this patient employed in healthcare?->No  Is the patient symptomatic as defined by the CDC?->Yes  Date of symptom onset?->9/28/20  Is the patient hospitalized?->No  Is the patient a resident in a congregate care setting?->No  Is the patient  pregnant?->No    COVID/SARS CoV-2 PCR [[id number]] Collected: 09/28/20 2051    Order Status: Completed Specimen: Respirate from Nasopharyngeal Updated: 09/28/20 2103     COVID Order Status Received     Comment: The order for SARS CoV-2 testing has been received by the  Laboratory. This result is neither positive nor negative.  Final results of testing will report in 24-48 hours, separately.         Narrative:      Is patient being admitted?->Yes  Does this patient meet criteria for Rush/Cepheid per [facility]  Inpatient Workflow? (See workflow link below)->Yes  Expected turn around time?->Rush (Cepheid 2-4 hours)  Is this the patients First SARS CoV-2 test?->Unknown  Is this patient employed in healthcare?->No  Is the patient symptomatic as defined by the CDC?->Yes  Date of symptom onset?->9/28/20  Is the patient hospitalized?->No  Is the patient a resident in a congregate care setting?->No  Is the patient pregnant?->No               ASSESSMENT/PLAN:   POD #6 S/P Excisional debridement of residual left first metatarsal, Arthrocentesis, left knee, Arthrotomy and drainage, left knee with Dr. Mayberry.  Left lateral foot wound has pink budding tissue and appears to be healing with wound VAC therapy.  Excisional debridement done to remove slough.  DTI noted to left lateral fifth MTH.  Tissue is intact and does not appear infected at this time.  Left knee surgical incision is well approximated without signs of infection.  Hemovac from left knee was previously removed.  Previous site has no drainage, erythema, edema.  Recommend continuing wound VAC therapy, antibiotics per infectious disease recommendations, offloading.    Wound care:   - wound care orders updated for nursing  Continue vera flow wound VAC to left plantar foot.  Wound treat and manage/change X3/week.  Left lateral foot: Offload with Mepilex.  Change every 72 hours or PRN  Left knee incision: Adhesive foam change every 72 hours or PRN.   Left knee puncture site:  Gauze and Hypafix tape.  Change every 72 hours or PRN.     Vascular status:   -Palpable left DP/PT pulses, palpable bilateral popliteal pulses    Antibiotics:   -ID: Carmen ID involved, patient on cefuroxime 3 times daily.  End date 11/10/2020    Plan to return to [facility].:   -no further surgeries planned at this time  -Patient is recommended to have left BKA but patient refused.    Weight Bearing Status:   -Non Weight bearing LLE    Offloading:   -Patient refuses Prevalon boot to left foot, Mepilex placed to left heel as well as left lateral foot.  Float L LE on pillow  -Orthotic company: At the present    PT/OT :   -involved, last seen 10/2/2020    Diabetes Education:   -involved, last seen on 10/1/2020    - Implications of loss of protective sensation (LOPS) discussed with patient- including increased risk for amputation.  Advised to check feet at least daily, moisturize feet, and to always wear protective foot wear.   -avoid trimming own nails. See podiatrist or certified foot and nail RN  -keep blood sugars <150 for improved wound healing          DISCHARGE PLAN:    Disposition: Patient pending placement in SNF versus LTAC    Follow-up: With orthopedics, sutures to be removed approximately 3 weeks post-op      Discussed with: pt, RN, and Candice wound care RN      Please note that this dictation was created using voice recognition software. I have  worked with technical experts from [employer] to optimize the interface.  I have made every reasonable attempt to correct obvious errors, but there may be errors of grammar and possibly content that I did not discover before finalizing the note.      Catherine Escudero, A.P.R.N.    If any questions or concerns, please call [phone]

## 2020-10-06 PROBLEM — F41.0 PANIC ATTACKS: Status: ACTIVE | Noted: 2020-10-06

## 2020-10-06 LAB
ALBUMIN SERPL BCP-MCNC: 2.4 G/DL (ref 3.2–4.9)
ALBUMIN/GLOB SERPL: 0.6 G/DL
ALP SERPL-CCNC: 383 U/L (ref 30–99)
ALT SERPL-CCNC: 10 U/L (ref 2–50)
ANION GAP SERPL CALC-SCNC: 8 MMOL/L (ref 7–16)
AST SERPL-CCNC: 8 U/L (ref 12–45)
BASOPHILS # BLD AUTO: 0.4 % (ref 0–1.8)
BASOPHILS # BLD: 0.04 K/UL (ref 0–0.12)
BILIRUB SERPL-MCNC: 0.4 MG/DL (ref 0.1–1.5)
BUN SERPL-MCNC: 9 MG/DL (ref 8–22)
CALCIUM SERPL-MCNC: 8.2 MG/DL (ref 8.5–10.5)
CHLORIDE SERPL-SCNC: 101 MMOL/L (ref 96–112)
CO2 SERPL-SCNC: 30 MMOL/L (ref 20–33)
CREAT SERPL-MCNC: 0.38 MG/DL (ref 0.5–1.4)
EOSINOPHIL # BLD AUTO: 0.12 K/UL (ref 0–0.51)
EOSINOPHIL NFR BLD: 1.1 % (ref 0–6.9)
ERYTHROCYTE [DISTWIDTH] IN BLOOD BY AUTOMATED COUNT: 55.5 FL (ref 35.9–50)
GLOBULIN SER CALC-MCNC: 4.2 G/DL (ref 1.9–3.5)
GLUCOSE BLD-MCNC: 101 MG/DL (ref 65–99)
GLUCOSE BLD-MCNC: 66 MG/DL (ref 65–99)
GLUCOSE BLD-MCNC: 66 MG/DL (ref 65–99)
GLUCOSE BLD-MCNC: 73 MG/DL (ref 65–99)
GLUCOSE BLD-MCNC: 84 MG/DL (ref 65–99)
GLUCOSE BLD-MCNC: 98 MG/DL (ref 65–99)
GLUCOSE SERPL-MCNC: 171 MG/DL (ref 65–99)
HCT VFR BLD AUTO: 31.4 % (ref 37–47)
HGB BLD-MCNC: 9.3 G/DL (ref 12–16)
IMM GRANULOCYTES # BLD AUTO: 0.27 K/UL (ref 0–0.11)
IMM GRANULOCYTES NFR BLD AUTO: 2.6 % (ref 0–0.9)
LYMPHOCYTES # BLD AUTO: 1.97 K/UL (ref 1–4.8)
LYMPHOCYTES NFR BLD: 18.6 % (ref 22–41)
MCH RBC QN AUTO: 27.5 PG (ref 27–33)
MCHC RBC AUTO-ENTMCNC: 29.6 G/DL (ref 33.6–35)
MCV RBC AUTO: 92.9 FL (ref 81.4–97.8)
MONOCYTES # BLD AUTO: 0.73 K/UL (ref 0–0.85)
MONOCYTES NFR BLD AUTO: 6.9 % (ref 0–13.4)
MORPHOLOGY BLD-IMP: NORMAL
NEUTROPHILS # BLD AUTO: 7.45 K/UL (ref 2–7.15)
NEUTROPHILS NFR BLD: 70.4 % (ref 44–72)
NRBC # BLD AUTO: 0 K/UL
NRBC BLD-RTO: 0 /100 WBC
PLATELET # BLD AUTO: 365 K/UL (ref 164–446)
PMV BLD AUTO: 10.2 FL (ref 9–12.9)
POTASSIUM SERPL-SCNC: 4 MMOL/L (ref 3.6–5.5)
PROT SERPL-MCNC: 6.6 G/DL (ref 6–8.2)
RBC # BLD AUTO: 3.38 M/UL (ref 4.2–5.4)
SODIUM SERPL-SCNC: 139 MMOL/L (ref 135–145)
WBC # BLD AUTO: 10.6 K/UL (ref 4.8–10.8)

## 2020-10-06 PROCEDURE — 700111 HCHG RX REV CODE 636 W/ 250 OVERRIDE (IP): Performed by: ORTHOPAEDIC SURGERY

## 2020-10-06 PROCEDURE — 700105 HCHG RX REV CODE 258: Performed by: INTERNAL MEDICINE

## 2020-10-06 PROCEDURE — A9270 NON-COVERED ITEM OR SERVICE: HCPCS | Performed by: HOSPITALIST

## 2020-10-06 PROCEDURE — 99233 SBSQ HOSP IP/OBS HIGH 50: CPT | Performed by: HOSPITALIST

## 2020-10-06 PROCEDURE — 770006 HCHG ROOM/CARE - MED/SURG/GYN SEMI*

## 2020-10-06 PROCEDURE — 85025 COMPLETE CBC W/AUTO DIFF WBC: CPT

## 2020-10-06 PROCEDURE — 700102 HCHG RX REV CODE 250 W/ 637 OVERRIDE(OP): Performed by: HOSPITALIST

## 2020-10-06 PROCEDURE — 700111 HCHG RX REV CODE 636 W/ 250 OVERRIDE (IP): Performed by: NURSE PRACTITIONER

## 2020-10-06 PROCEDURE — 80053 COMPREHEN METABOLIC PANEL: CPT

## 2020-10-06 PROCEDURE — 82962 GLUCOSE BLOOD TEST: CPT | Mod: 91

## 2020-10-06 PROCEDURE — 97530 THERAPEUTIC ACTIVITIES: CPT

## 2020-10-06 PROCEDURE — 700111 HCHG RX REV CODE 636 W/ 250 OVERRIDE (IP): Performed by: INTERNAL MEDICINE

## 2020-10-06 PROCEDURE — 36415 COLL VENOUS BLD VENIPUNCTURE: CPT

## 2020-10-06 RX ORDER — ALPRAZOLAM 0.5 MG/1
0.5 TABLET ORAL ONCE
Status: COMPLETED | OUTPATIENT
Start: 2020-10-06 | End: 2020-10-06

## 2020-10-06 RX ORDER — LORAZEPAM 0.5 MG/1
0.5 TABLET ORAL EVERY 4 HOURS PRN
Status: DISCONTINUED | OUTPATIENT
Start: 2020-10-06 | End: 2020-10-08 | Stop reason: HOSPADM

## 2020-10-06 RX ADMIN — Medication 16 G: at 22:45

## 2020-10-06 RX ADMIN — INSULIN GLARGINE 100 UNITS: 100 INJECTION, SOLUTION SUBCUTANEOUS at 08:54

## 2020-10-06 RX ADMIN — OXYCODONE HYDROCHLORIDE AND ACETAMINOPHEN 1 TABLET: 10; 325 TABLET ORAL at 17:55

## 2020-10-06 RX ADMIN — OMEPRAZOLE 20 MG: 20 CAPSULE, DELAYED RELEASE ORAL at 04:48

## 2020-10-06 RX ADMIN — DOCUSATE SODIUM 50 MG AND SENNOSIDES 8.6 MG 2 TABLET: 8.6; 5 TABLET, FILM COATED ORAL at 17:55

## 2020-10-06 RX ADMIN — LORAZEPAM 0.5 MG: 0.5 TABLET ORAL at 13:06

## 2020-10-06 RX ADMIN — ENOXAPARIN SODIUM 40 MG: 40 INJECTION SUBCUTANEOUS at 04:48

## 2020-10-06 RX ADMIN — ALPRAZOLAM 0.25 MG: 0.25 TABLET ORAL at 04:52

## 2020-10-06 RX ADMIN — OXYCODONE HYDROCHLORIDE AND ACETAMINOPHEN 1 TABLET: 10; 325 TABLET ORAL at 13:06

## 2020-10-06 RX ADMIN — CYCLOBENZAPRINE 5 MG: 10 TABLET, FILM COATED ORAL at 17:56

## 2020-10-06 RX ADMIN — OXYCODONE HYDROCHLORIDE AND ACETAMINOPHEN 1 TABLET: 10; 325 TABLET ORAL at 08:47

## 2020-10-06 RX ADMIN — ALBUTEROL SULFATE 2 PUFF: 90 AEROSOL, METERED RESPIRATORY (INHALATION) at 08:51

## 2020-10-06 RX ADMIN — LORAZEPAM 0.5 MG: 0.5 TABLET ORAL at 17:56

## 2020-10-06 RX ADMIN — ALPRAZOLAM 0.5 MG: 0.5 TABLET ORAL at 10:11

## 2020-10-06 RX ADMIN — SODIUM CHLORIDE 1.5 G: 900 INJECTION INTRAVENOUS at 13:07

## 2020-10-06 RX ADMIN — SODIUM CHLORIDE 1.5 G: 900 INJECTION INTRAVENOUS at 04:52

## 2020-10-06 RX ADMIN — ALPRAZOLAM 0.25 MG: 0.25 TABLET ORAL at 11:42

## 2020-10-06 RX ADMIN — SODIUM CHLORIDE 1.5 G: 900 INJECTION INTRAVENOUS at 22:04

## 2020-10-06 RX ADMIN — INSULIN GLARGINE 100 UNITS: 100 INJECTION, SOLUTION SUBCUTANEOUS at 18:03

## 2020-10-06 RX ADMIN — KETOROLAC TROMETHAMINE 30 MG: 30 INJECTION, SOLUTION INTRAMUSCULAR; INTRAVENOUS at 00:23

## 2020-10-06 RX ADMIN — OXYCODONE HYDROCHLORIDE AND ACETAMINOPHEN 1 TABLET: 10; 325 TABLET ORAL at 04:48

## 2020-10-06 RX ADMIN — CYCLOBENZAPRINE 5 MG: 10 TABLET, FILM COATED ORAL at 11:43

## 2020-10-06 ASSESSMENT — COGNITIVE AND FUNCTIONAL STATUS - GENERAL
MOVING TO AND FROM BED TO CHAIR: A LOT
STANDING UP FROM CHAIR USING ARMS: TOTAL
CLIMB 3 TO 5 STEPS WITH RAILING: TOTAL
SUGGESTED CMS G CODE MODIFIER MOBILITY: CM
WALKING IN HOSPITAL ROOM: TOTAL
MOBILITY SCORE: 9
TURNING FROM BACK TO SIDE WHILE IN FLAT BAD: A LOT
MOVING FROM LYING ON BACK TO SITTING ON SIDE OF FLAT BED: A LOT

## 2020-10-06 ASSESSMENT — PAIN DESCRIPTION - PAIN TYPE
TYPE: CHRONIC PAIN
TYPE: ACUTE PAIN;CHRONIC PAIN
TYPE: CHRONIC PAIN;ACUTE PAIN
TYPE: CHRONIC PAIN
TYPE: ACUTE PAIN;CHRONIC PAIN
TYPE: ACUTE PAIN;CHRONIC PAIN

## 2020-10-06 ASSESSMENT — ENCOUNTER SYMPTOMS
VOMITING: 0
ROS GI COMMENTS: DRY MOUTH
SHORTNESS OF BREATH: 1
FEVER: 0
CHILLS: 0

## 2020-10-06 ASSESSMENT — GAIT ASSESSMENTS: GAIT LEVEL OF ASSIST: UNABLE TO PARTICIPATE

## 2020-10-06 NOTE — PROGRESS NOTES
Hospital Medicine Daily Progress Note    Date of Service  10/6/2020    Chief Complaint  55 y.o. female admitted 9/28/2020 with past medical history of diabetes mellitus, hypertension chronic hypoxic respiratory failure on 2 L oxygen  apparently has been off of her diabetes medicines for at least 3 months.  She presented to the emergency room in [facility] with left foot infection and pain for the past few days it is been worsening.  She also notes 1 day of left hand pain and redness as well.      Hospital Course    She presented with sepsis and was given vancomycin and Rocephin.  She was found to be hyperglycemic.  ESR and CRP were elevated.      Interval Problem Update  9/29: Patient states that her hand swelling and foot swelling is decreasing.  Orthopedics was consulted and will take patient to the OR for debridement and biopsy of bone.  Left knee is also swollen and she was taken for arthrocentesis.  We will continue vancomycin and Unasyn for now.  9/30: Patient seen and examined by me today.  I increased her oxycodone and Xanax to 4 times a day.  We will continue to monitor her mental respiratory status.  Cultures came back positive for group A strep.  Surgical cultures are pending.  Arthrocentesis cultures are pending.  Wound VAC and Hemovac was placed.  Orthopedics recommended amputation but the patient refusing.  SNF referral has been sent.  Patient continues to have uncontrolled blood sugars and have increase her Lantus to 30 units.  10/1: Surgical cultures still pending.  Orthopedics recommended amputation but the patient is refusing.  I discussed this with infectious disease who recommended 6 weeks of antibiotics.  She has a wound VAC and will try to find placement.  Although, patient states that she will lose her housing if she goes to a skilled nursing facility for 6 weeks.  10/2: No new complaints per patient today.  I have ordered a PICC line.  We will continue to find placement for 6  weeks of antibiotics.  Antibiotics were changed to Fortaz and cefazolin by ID today.  10/3: Patient complaining of back and rectal pain.  She was given enema and had multiple bowel movements afterwards.  [facility] referral has been sent and waiting for acceptance.  PICC line was placed yesterday.  Increase Lantus 100 twice daily.  10/4: Blood glucose is better controlled with increase in lantus. Hemovac removed today by ortho. Continue antibiotics and find placement.  10/5: Patient states that her pain is much better controlled today after increasing fentanyl to 75 mcg.  Continue to find placement.  Bone cultures found group A strep, staph aureus, and alcaligenes faecalis.  Patient had a hypoglycemic event overnight and I lowered her sliding scale.  10/6: Patient was found to be tachycardic either secondary to hypoglycemia versus panic attacks.  I changed her Xanax to Ativan.  She also complains about left hand swelling and infectious disease changed her antibiotics to cefuroxime.  We will continue to find placement today.  Consultants/Specialty  LPS      Code Status  Full Code    Disposition  SNF vs [facility]    Review of Systems  Review of Systems   Constitutional: Negative for chills and fever.   Respiratory: Positive for shortness of breath.    Cardiovascular: Negative for chest pain.   Gastrointestinal: Negative for vomiting.        Dry mouth   All other systems reviewed and are negative.       Physical Exam  Temp:  [36.1 °C (97 °F)-37.2 °C (98.9 °F)] 36.1 °C (97 °F)  Pulse:  [] 105  Resp:  [16-20] 16  BP: (130-150)/(89-98) 130/92  SpO2:  [93 %-98 %] 93 %    Physical Exam  Vitals signs and nursing note reviewed.   Constitutional:       General: She is not in acute distress.     Appearance: Normal appearance. She is not ill-appearing, toxic-appearing or diaphoretic.      Comments: disheveled   HENT:      Head: Normocephalic and atraumatic.      Nose: No congestion or rhinorrhea.      Mouth/Throat:      Mouth:  Mucous membranes are dry.      Pharynx: Oropharynx is clear. No oropharyngeal exudate or posterior oropharyngeal erythema.   Eyes:      General: No scleral icterus.     Conjunctiva/sclera: Conjunctivae normal.   Neck:      Musculoskeletal: Normal range of motion and neck supple. No neck rigidity or muscular tenderness.      Vascular: No carotid bruit.   Cardiovascular:      Rate and Rhythm: Regular rhythm. Tachycardia present.      Pulses: Normal pulses.      Heart sounds: Normal heart sounds. No murmur. No friction rub. No gallop.    Pulmonary:      Effort: Pulmonary effort is normal. No respiratory distress.      Breath sounds: Normal breath sounds. No stridor. No wheezing or rhonchi.   Abdominal:      General: Abdomen is flat. There is no distension.      Palpations: There is no mass.      Tenderness: There is no abdominal tenderness. There is no left CVA tenderness, guarding or rebound.      Hernia: No hernia is present.   Musculoskeletal: Normal range of motion.         General: No swelling.      Right lower leg: No edema.      Left lower leg: No edema.      Comments: Right BKA  Left trans-met with large ulcer bone exposed  Left hand dorsum is red, swollen and warm to touch   Lymphadenopathy:      Cervical: No cervical adenopathy.   Skin:     General: Skin is warm and dry.      Capillary Refill: Capillary refill takes more than 3 seconds.      Coloration: Skin is not jaundiced or pale.      Findings: No bruising or erythema.   Neurological:      Mental Status: She is alert and oriented to person, place, and time.         Fluids    Intake/Output Summary (Last 24 hours) at 10/6/2020 1157  Last data filed at 10/6/2020 1100  Gross per 24 hour   Intake 680 ml   Output 1600 ml   Net -920 ml       Laboratory  Recent Labs     10/06/20  0321   WBC 10.6   RBC 3.38*   HEMOGLOBIN 9.3*   HEMATOCRIT 31.4*   MCV 92.9   MCH 27.5   MCHC 29.6*   RDW 55.5*   PLATELETCT 365   MPV 10.2     Recent Labs     10/06/20  0321   SODIUM  139   POTASSIUM 4.0   CHLORIDE 101   CO2 30   GLUCOSE 171*   BUN 9   CREATININE 0.38*   CALCIUM 8.2*                   Imaging  US-SHAHNAZ SINGLE LEVEL BILAT   Final Result      DX-FOOT-COMPLETE 3+ LEFT   Final Result      1.  There is diffuse left forefoot swelling with a large ulceration along the medial aspect of the remaining forefoot. There is no plain film evidence of underlying osteomyelitis.   2.  There is postoperative change consistent with amputation at the level of the mid and proximal metatarsals.      [id number] FILM X-RAY   Final Result      [id number] FILM X-RAY   Final Result      DX-CHEST-PORTABLE (1 VIEW)   Final Result      Enlarged cardiac silhouette and interstitial prominence suggesting pulmonary edema.      IR-PICC LINE PLACEMENT W/ GUIDANCE > AGE 5    (Results Pending)        Assessment/Plan  * Diabetic foot ulcer (HCC)- (present on admission)  Assessment & Plan  Left trans-met with large ulcer which is draining pus which will be cultured  WBC in [place] was 16 and xray was suspicious for osteomyelitis.   Limb preservation service consulted: she may be a candidate for surgical debridement.  IV vancomycin and Unasyn   Fortaz and cefazolin has been started per ID 10/2  Cefuroxime started by ID 10/5  ESR in [place] was 109  Infectious disease consulted  Wound culture positive for group A strep  Bone culture found group A strep, staph aureus, and alcaligenes faecalis.    Acute on chronic respiratory failure with hypoxia (HCC)- (present on admission)  Assessment & Plan  She is chronically on 2 liters of oxygen and is now requiring 5 liters  This may be due to the 24 mg morphine, 15 mg versed and 2 mg ativan given per transfer sheet.  Supplemental oxygen ordered.  If her sats decrease, her fentanyl patch may need to be removed and possibly narcan      Non compliance with medical treatment- (present on admission)  Assessment & Plan  She stopped taking all of her meds  except fentanyl patch    DM2 (diabetes mellitus, type 2) (CMS-HCC)- (present on admission)  Assessment & Plan  Start on insulin sliding scale with serial Accu-Checks  Hemoglobin A1c 13.3  Hypoglycemic protocol in place  Start Lantus increase to 100U twice daily       Panic attacks  Assessment & Plan  Continue ativan PRN    Cellulitis of hand- (present on admission)  Assessment & Plan  Left hand dorsum    Hx of right BKA (HCC)- (present on admission)  Assessment & Plan  Hx of    Sepsis ([facility])- (present on admission)  Assessment & Plan  This is Sepsis Present on admission  SIRS criteria identified on my evaluation include: Leukocytosis, with WBC greater than 12,000  Source is left foot infection  Sepsis protocol initiated  Fluid resuscitation ordered per protocol  IV antibiotics as appropriate for source of sepsis  While organ dysfunction may be noted elsewhere in this problem list or in the chart, degree of organ dysfunction does not meet CMS criteria for severe sepsis          Tobacco abuse- (present on admission)  Assessment & Plan  Nicotine patch offered    Chronic pain- (present on admission)  Assessment & Plan  Continue fentanyl patch 75 mcg home dose is 50mcg    Hypertension- (present on admission)  Assessment & Plan  Controlled        VTE prophylaxis: lovenox

## 2020-10-06 NOTE — PROGRESS NOTES
"Assumed care at 0700 after report received from night RN. Pt A/Ox4, on 4L oxygen (RA in pt baseline). Pt with torres for retention, pain 9/10 medicated per MAR, JOANNA PICC WNL, LFA PIV WNL. Blood glucose 73 this AM MD made aware. Wound vac on  left foot, WNL. Left leg wrapped with ace bandage. Pt complaining of worsening swelling in left hand today, showed MD at bedside during rounding. Swelling does seem worse than yesterday. Pt states \"it's going to split open\". MD said he would inform infectious disease of hand swelling  "

## 2020-10-06 NOTE — PROGRESS NOTES
Still c/o left knee pain  AVSS  Swelling decreased  Incision dry  WBC normal  Check ESR/CRP tomorrow  ABX  Knee ROM  VAC for left foot  NWB LLE

## 2020-10-06 NOTE — DISCHARGE PLANNING
Agency/Facility Name: [facility]   Spoke To: Jessica   Outcome: Per Yecenia, referral accepted. Will submit for insurance auth. Per Jessica, auth should be received by tomorrow. Will keep [facility] updated if auth is received earlier.   LSW notified.

## 2020-10-06 NOTE — PROGRESS NOTES
Assumed care at 1900 after receiving report from day shift RN. Patient alert and oriented. Complains of high pain level in L knee. Patient is very anxious and restless. Admin PRN meds as appropriate. Call light within reach, hourly rounding in place.

## 2020-10-06 NOTE — THERAPY
"Physical Therapy   Daily Treatment     Patient Name: Greta Echevarria  Age:  55 y.o., Sex:  female  Medical Record #: [id number]  Today's Date: 10/6/2020     Precautions: (P) Fall Risk, Non Weight Bearing Left Lower Extremity    Assessment    Pt seen for PT treatment session. Initially resistant to mobility as she reported \"not being able to breathe due to her anxiety attack and the ativan not working as she expected.\"   With time and discussion pt was ultimately agreeable to sit EOB in attempts to cool off and improve deeper breath. Pt educated the goal for PT session would be to practice seated slide board transfer to  or BSC due to NWB L LE status and wound vac. Pt refuses to perform seated slide boards stating \"none of you are big enough to catch me when I fall.\" Educated pt there is the option of using a mechanical (mariano) lift if she were to fall; however, that is never the purpose of mobility. Pt expressed desire to use the Mariano lift over seated slide board at this time as \"I need more time to recover from what is going on.\" Educated pt that mariano transfers are not a skilled PT service and Nursing staff can assist with that anytime, RN was made aware of pt's request. Pt was able to sit EOB with Min A to complete bed mobility for safety. Maintained seated balance EOB very well, no need for physical assist, x20 min.       Plan    At this time, PT will be on hold until pt feels ready to practice seated slide board transfers or her L LE WBing status changes. Nursing to assist pt's mobility with Mariano lift as needed to BSC or chair.   Please REORDER PT when pt is agreeable to the above.       DC Equipment Recommendations: (P) Unable to determine at this time  Discharge Recommendations: (P) Recommend post-acute placement for additional physical therapy services prior to discharge home       10/06/20 1508   Precautions   Precautions Fall Risk;Non Weight Bearing Left Lower Extremity   Comments R BKA, wound vac L LE "   Vitals   O2 (LPM) 4   O2 Delivery Device Silicone Nasal Cannula   Vitals Comments Feels like she cannot breathe, feels the ativan did the opposite   Pain 0 - 10 Group   Therapist Pain Assessment During Activity;Nurse Notified  (L LE pain not rated)   Cognition    Level of Consciousness Alert   Comments pt requires Max encouragement to participate with therapy. Is not intersted in seated slide board transfers at this time.   Active ROM Lower Body    Active ROM Lower Body  WDL   Comments through available limbs   Strength Lower Body   Lower Body Strength  X   Comments generalized weakness due to pain and immobility. able to assist LE in/out of bed   Balance   Sitting Balance (Static) Fair +   Sitting Balance (Dynamic) Fair   Weight Shift Sitting Fair   Skilled Intervention Verbal Cuing   Comments seated EOB with waffle cushion fully inflated   Gait Analysis   Gait Level Of Assist Unable to Participate   Weight Bearing Status NWB L LE, R BKA   Bed Mobility    Supine to Sit Minimal Assist   Sit to Supine Minimal Assist   Scooting Minimal Assist   Skilled Intervention Verbal Cuing   Comments most assist for safety   Functional Mobility   Sit to Stand Unable to Participate   Bed, Chair, Wheelchair Transfer Refused   Comments pt refusing to perform seated slide board transfers at this time. would prefer to use keesha lift for mobility   Short Term Goals    Short Term Goal # 1 Pt to move supine to/from eob w/ spv, no rails in 6 visits to initiate oob   Goal Outcome # 1 goal not met   Short Term Goal # 2 Pt to TF bed to/from w/c w/ slide board and spv in 6 visits to return home   Goal Outcome # 2 Goal not met   Short Term Goal # 3 Pt to maintain NWB status during transfers w/o cues (unless WB status changes)   Goal Outcome # 3 Goal not met   Education Group   Role of Physical Therapist Patient Response Patient;Acceptance;Explanation;Verbal Demonstration   Weight Bearing Status Patient Response  Patient;Acceptance;Explanation;Demonstration;Verbal Demonstration   Additional Comments pt expressed understanding of acute PT role and scope. made aware that keesha lift is not a skilled therapy need and Nursing can utilize such equipment as needed   Anticipated Discharge Equipment and Recommendations   DC Equipment Recommendations Unable to determine at this time   Discharge Recommendations Recommend post-acute placement for additional physical therapy services prior to discharge home

## 2020-10-06 NOTE — CARE PLAN
Problem: Safety  Goal: Will remain free from falls  Outcome: PROGRESSING AS EXPECTED  Note: Appropriate fall precautions in place.      Problem: Pain Management  Goal: Pain level will decrease to patient's comfort goal  Outcome: PROGRESSING SLOWER THAN EXPECTED  Note: Patient rates pain 8-10/10. Patient very upset that Toradol was d/c'd and complained of 10/10 pain. One time dose received.

## 2020-10-06 NOTE — PROGRESS NOTES
Infectious Disease Daily Progress Note    Date of Service  10/6/2020    Chief Complaint      Hospital Course  Cefuroxime 1.5 Q8 hr 10/5-present     Vancomycin 9/28-10/2  Ceftazadime 10/2  Cefazolin 10/2  Unasyn 9/29-10/5     9/30: She does not want to be here. She refuses MRI and says it is her knee that is her problem and she does not want any surgery for her foot.  10/1: L knee synovial fluid growing GPC. Patient still refusing BKA. CM looking into SNF  10/2 : Antibiotics adjusted to cefazolin and ceftazidime since pharmacy requests no ceftaroline. Second GNR now identified as oxidase positive  So could be Pseudomonas.  10/3: Endorses L knee pain. Pending SNF placements. PICC line placed yesterday  10/4: L knee pain. No other issues.   10/5: L knee cultures still pending GPC. Additional growth of Alcaligines on metatarsal joint. Knee pain 9 of 10 today. Pending SNF  10/6: Pain scale better at 7 of 10. Possible [facility]?      Review of Systems  Review of Systems   Musculoskeletal: Positive for joint pain.        Physical Exam  Temp:  [36.1 °C (97 °F)-37.2 °C (98.9 °F)] 36.1 °C (97 °F)  Pulse:  [] 105  Resp:  [16-20] 16  BP: (130-150)/(89-98) 130/92  SpO2:  [93 %-98 %] 93 %    Physical Exam  Constitutional:       Appearance: alert and comfortable. Better spirits today  HENT:      Head: Normocephalic.   Cardiovascular:      Rate and Rhythm: Normal rate and regular rhythm.      Heart sounds: No murmur.   Pulmonary:      Effort: No respiratory distress.      Breath sounds: Normal breath sounds. No wheezing.   Abdominal:      General: Abdomen is flat. Bowel sounds are normal.   Skin:     Comments: L knee with ACE wrap and hemovac. L 1st MTP VAC    Neurological:      Mental Status: She is alert.   Fluids    Intake/Output Summary (Last 24 hours) at 10/6/2020 0825  Last data filed at 10/6/2020 0200  Gross per 24 hour   Intake 440 ml   Output 1600 ml   Net -1160 ml       Laboratory  Recent Labs     10/06/20  0321    WBC 10.6   RBC 3.38*   HEMOGLOBIN 9.3*   HEMATOCRIT 31.4*   MCV 92.9   MCH 27.5   MCHC 29.6*   RDW 55.5*   PLATELETCT 365   MPV 10.2     Recent Labs     10/06/20  0321   SODIUM 139   POTASSIUM 4.0   CHLORIDE 101   CO2 30   GLUCOSE 171*   BUN 9   CREATININE 0.38*   CALCIUM 8.2*                           IMPRESSION:  - Sepsis- resolved  - Acute on chronic L 1st MTP osteomyelitis s/p I+D 9/29      Non surgical cultures 9/28 - Group A strep, Morganella, Group D Enterococcus, GNR (colonizations)     Surgical cultures 9/29 - Group A strep, MSSA, Alcaligines faecalis  - Acute L knee septic arthritis s/p washout 9/29 - GPCs  - Poorly controlled diabetes mellitus with insulin dependence.  - Prior osteomyelitis, status post right below-knee amputation in 2017.  - Hypertension.     RECOMMENDATION:    - Treating her surgicalcultures growing Group A strep and MSSA. Non surgical cultures gram stains only showing GPC's with polymicrobial growth suggestive of colonization. Cefuroxime will address all her organisms.   - Continue Cefuroxime 1.5 Q8hrs x 6 weeks. Target date 11/10.   - continue to follow her L knee synovial cultures. Gram stain GPC's pending growth.    - PICC line in place 10/2  - Dr Pak recommending BKA for osteo of her foot. Patient refusing. As a result, will need minimum of 6 week therapy.   - weekly labs CBC, CMP, ESR, CRP  [facility] office available for questions or advice on her treatment [phone]     VICTORIA looking into SNF options. [facility]     30 min spent with 50% face to face care. Case discussed with RN and CM  Thank you for this consult.

## 2020-10-06 NOTE — CARE PLAN
Problem: Safety  Goal: Will remain free from injury  Outcome: PROGRESSING AS EXPECTED  Goal: Will remain free from falls  Outcome: PROGRESSING AS EXPECTED     Problem: Discharge Barriers/Planning  Goal: Patient's continuum of care needs will be met  Outcome: PROGRESSING AS EXPECTED  Intervention: Assess potential discharge barriers on admission and throughout hospital stay  Note: Awaiting auth to mindy barrientos. Referral accepted

## 2020-10-07 LAB
CRP SERPL HS-MCNC: 5.2 MG/DL (ref 0–0.75)
ERYTHROCYTE [SEDIMENTATION RATE] IN BLOOD BY WESTERGREN METHOD: >120 MM/HOUR (ref 0–30)
GLUCOSE BLD-MCNC: 102 MG/DL (ref 65–99)
GLUCOSE BLD-MCNC: 105 MG/DL (ref 65–99)
GLUCOSE BLD-MCNC: 208 MG/DL (ref 65–99)
GLUCOSE BLD-MCNC: 49 MG/DL (ref 65–99)
GLUCOSE BLD-MCNC: 49 MG/DL (ref 65–99)
GLUCOSE BLD-MCNC: 82 MG/DL (ref 65–99)
GLUCOSE BLD-MCNC: 89 MG/DL (ref 65–99)
PROCALCITONIN SERPL-MCNC: <0.05 NG/ML

## 2020-10-07 PROCEDURE — 700111 HCHG RX REV CODE 636 W/ 250 OVERRIDE (IP): Performed by: HOSPITALIST

## 2020-10-07 PROCEDURE — 700111 HCHG RX REV CODE 636 W/ 250 OVERRIDE (IP): Performed by: ORTHOPAEDIC SURGERY

## 2020-10-07 PROCEDURE — A9270 NON-COVERED ITEM OR SERVICE: HCPCS | Performed by: HOSPITALIST

## 2020-10-07 PROCEDURE — 85652 RBC SED RATE AUTOMATED: CPT

## 2020-10-07 PROCEDURE — 302098 PASTE RING (FLAT): Performed by: HOSPITALIST

## 2020-10-07 PROCEDURE — 700105 HCHG RX REV CODE 258: Performed by: INTERNAL MEDICINE

## 2020-10-07 PROCEDURE — 97605 NEG PRS WND THER DME<=50SQCM: CPT

## 2020-10-07 PROCEDURE — 700111 HCHG RX REV CODE 636 W/ 250 OVERRIDE (IP): Performed by: INTERNAL MEDICINE

## 2020-10-07 PROCEDURE — 770006 HCHG ROOM/CARE - MED/SURG/GYN SEMI*

## 2020-10-07 PROCEDURE — 99232 SBSQ HOSP IP/OBS MODERATE 35: CPT | Performed by: HOSPITALIST

## 2020-10-07 PROCEDURE — 700102 HCHG RX REV CODE 250 W/ 637 OVERRIDE(OP): Performed by: HOSPITALIST

## 2020-10-07 PROCEDURE — 82962 GLUCOSE BLOOD TEST: CPT | Mod: 91

## 2020-10-07 PROCEDURE — 84145 PROCALCITONIN (PCT): CPT

## 2020-10-07 PROCEDURE — 86140 C-REACTIVE PROTEIN: CPT

## 2020-10-07 RX ORDER — INSULIN GLARGINE 100 [IU]/ML
60 INJECTION, SOLUTION SUBCUTANEOUS EVERY EVENING
Status: DISCONTINUED | OUTPATIENT
Start: 2020-10-07 | End: 2020-10-08 | Stop reason: HOSPADM

## 2020-10-07 RX ORDER — INSULIN GLARGINE 100 [IU]/ML
80 INJECTION, SOLUTION SUBCUTANEOUS EVERY EVENING
Status: DISCONTINUED | OUTPATIENT
Start: 2020-10-07 | End: 2020-10-07

## 2020-10-07 RX ORDER — INSULIN GLARGINE 100 [IU]/ML
100 INJECTION, SOLUTION SUBCUTANEOUS
Status: DISCONTINUED | OUTPATIENT
Start: 2020-10-08 | End: 2020-10-07

## 2020-10-07 RX ORDER — IBUPROFEN 800 MG/1
400 TABLET ORAL 4 TIMES DAILY PRN
Status: DISCONTINUED | OUTPATIENT
Start: 2020-10-07 | End: 2020-10-08 | Stop reason: HOSPADM

## 2020-10-07 RX ORDER — INSULIN GLARGINE 100 [IU]/ML
60 INJECTION, SOLUTION SUBCUTANEOUS
Status: DISCONTINUED | OUTPATIENT
Start: 2020-10-08 | End: 2020-10-08 | Stop reason: HOSPADM

## 2020-10-07 RX ORDER — MORPHINE SULFATE 4 MG/ML
2 INJECTION, SOLUTION INTRAMUSCULAR; INTRAVENOUS ONCE
Status: COMPLETED | OUTPATIENT
Start: 2020-10-07 | End: 2020-10-07

## 2020-10-07 RX ADMIN — LORAZEPAM 0.5 MG: 0.5 TABLET ORAL at 17:41

## 2020-10-07 RX ADMIN — ENOXAPARIN SODIUM 40 MG: 40 INJECTION SUBCUTANEOUS at 04:48

## 2020-10-07 RX ADMIN — SODIUM CHLORIDE 1.5 G: 900 INJECTION INTRAVENOUS at 13:48

## 2020-10-07 RX ADMIN — FENTANYL 1 PATCH: 75 PATCH, EXTENDED RELEASE TRANSDERMAL at 13:40

## 2020-10-07 RX ADMIN — CYCLOBENZAPRINE 5 MG: 10 TABLET, FILM COATED ORAL at 07:52

## 2020-10-07 RX ADMIN — OXYCODONE HYDROCHLORIDE AND ACETAMINOPHEN 1 TABLET: 10; 325 TABLET ORAL at 07:51

## 2020-10-07 RX ADMIN — CYCLOBENZAPRINE 5 MG: 10 TABLET, FILM COATED ORAL at 17:41

## 2020-10-07 RX ADMIN — OMEPRAZOLE 20 MG: 20 CAPSULE, DELAYED RELEASE ORAL at 04:49

## 2020-10-07 RX ADMIN — MORPHINE SULFATE 2 MG: 4 INJECTION INTRAVENOUS at 10:29

## 2020-10-07 RX ADMIN — INSULIN GLARGINE 60 UNITS: 100 INJECTION, SOLUTION SUBCUTANEOUS at 18:32

## 2020-10-07 RX ADMIN — OXYCODONE HYDROCHLORIDE AND ACETAMINOPHEN 1 TABLET: 10; 325 TABLET ORAL at 12:24

## 2020-10-07 RX ADMIN — LORAZEPAM 0.5 MG: 0.5 TABLET ORAL at 07:52

## 2020-10-07 RX ADMIN — LORAZEPAM 0.5 MG: 0.5 TABLET ORAL at 22:03

## 2020-10-07 RX ADMIN — OXYCODONE HYDROCHLORIDE AND ACETAMINOPHEN 1 TABLET: 10; 325 TABLET ORAL at 17:41

## 2020-10-07 RX ADMIN — OXYCODONE HYDROCHLORIDE AND ACETAMINOPHEN 1 TABLET: 10; 325 TABLET ORAL at 03:36

## 2020-10-07 RX ADMIN — LORAZEPAM 0.5 MG: 0.5 TABLET ORAL at 12:24

## 2020-10-07 RX ADMIN — DOCUSATE SODIUM 50 MG AND SENNOSIDES 8.6 MG 2 TABLET: 8.6; 5 TABLET, FILM COATED ORAL at 17:41

## 2020-10-07 RX ADMIN — SODIUM CHLORIDE 1.5 G: 900 INJECTION INTRAVENOUS at 22:45

## 2020-10-07 RX ADMIN — INSULIN GLARGINE 100 UNITS: 100 INJECTION, SOLUTION SUBCUTANEOUS at 09:35

## 2020-10-07 RX ADMIN — OXYCODONE HYDROCHLORIDE AND ACETAMINOPHEN 1 TABLET: 10; 325 TABLET ORAL at 22:03

## 2020-10-07 RX ADMIN — SODIUM CHLORIDE 1.5 G: 900 INJECTION INTRAVENOUS at 06:24

## 2020-10-07 RX ADMIN — DOCUSATE SODIUM 50 MG AND SENNOSIDES 8.6 MG 2 TABLET: 8.6; 5 TABLET, FILM COATED ORAL at 04:49

## 2020-10-07 RX ADMIN — LORAZEPAM 0.5 MG: 0.5 TABLET ORAL at 03:37

## 2020-10-07 ASSESSMENT — ENCOUNTER SYMPTOMS
COUGH: 0
CHILLS: 0
ABDOMINAL PAIN: 0
SHORTNESS OF BREATH: 1
DIARRHEA: 0
VOMITING: 0
NAUSEA: 0
SHORTNESS OF BREATH: 0
ROS GI COMMENTS: DRY MOUTH
FEVER: 0

## 2020-10-07 NOTE — WOUND TEAM
[facility]  Inpatient Services   Wound and Skin Care Progress Note    Admission Date: 9/28/2020     Last order of IP CONSULT TO WOUND CARE was found on 9/30/2020 from Hospital Encounter on 9/28/2020       HPI, PMH, SH: Reviewed    Unit where seen by Wound Team: [id number]/02     WOUND CONSULT/FOLLOW UP RELATED TO: Scheduled Vac dressing change.    Self Report / Pain Level:  Premedicated with PO Oxy by RN, slept through change no s/s of pain.     OBJECTIVE:  Pt lying in bed, leg elevated on pillow. Dressing in place CDI.    WOUND TYPE, LOCATION, CHARACTERISTICS (Pressure Injuries: location, stage, POA or date identified)    Negative Pressure Wound Therapy 09/30/20 Diabetic foot ulcer Foot Left (Active)   NPWT Pump Mode / Pressure Setting Intermittent;125 mmHg 10/07/20   Dressing Type Small;Black Foam (Veraflo)    Number of Foam Pieces Used 2    Canister Changed No    NEXT Dressing Change/Treatment Date 10/09/20    VAC VeraFlo Irrigant Normal Saline    VAC VeraFlo Soak Time (mins) 5    VAC VeraFlo Instill Volume (ml) 6    VAC VeraFlo - Therapy Time (hrs) 1    VAC VeraFlo Pressure (mm/Hg) Intermittent;125 mmHg       Wound 09/29/20 Diabetic Ulcer Foot Left Full Thickness w/ Vac (Active)   Wound Image   10/05/20    Site Assessment Pink;Pale;Granulation tissue 10/07/20    Periwound Assessment Intact;Maceration    Margins Epibole (rolled edges);Defined edges    Closure Secondary intention    Drainage Amount Scant    Drainage Description Serosanguineous    Treatments Cleansed;Site care    Wound Cleansing Approved Wound Cleanser    Periwound Protectant Benzoin;Drape;Paste Ring    Dressing Cleansing/Solutions Normal Saline    Dressing Options Wound Vac    Dressing Changed Changed    Dressing Status Clean;Dry;Intact    Dressing Change/Treatment Frequency Monday, Wednesday, Friday, and As Needed    NEXT Dressing Change/Treatment Date 10/09/20    NEXT Weekly Photo (Inpatient Only) 10/12/20    Non-staged Wound  Description Full thickness    Wound Length (cm) 2.2 cm 10/05/20   Wound Width (cm) 2.9 cm    Wound Depth (cm) 1.1 cm    Wound Surface Area (cm^2) 6.38 cm^2    Wound Volume (cm^3) 7.02 cm^3    Post-Procedure Length (cm) 2.4 cm    Post-Procedure Width (cm) 2.4 cm    Post-Procedure Depth (cm) 0.9 cm    Post-Procedure Surface Area (cm^2) 5.76 cm^2    Post-Procedure Volume (cm^3) 5.18 cm^3    Wound Healing % 67    Wound Bed Granulation (%) 100 %    Wound Bed Epithelium (%) 0 %    Wound Bed Slough (%) 0 %    Wound Bed Eschar (%) 0 %    Tunneling (cm) 1 cm    Undermining (cm) 0.4 cm    Undermining of Wound, 1st Location From 11 o'clock;To 12 o'clock    Shape Irregular 10/07/20   Wound Odor None    Pulses N/A    Exposed Structures None           Vascular:    SHAHNAZ:   No results found.      Lab Values:    Lab Results   Component Value Date/Time    WBC 10.6 10/06/2020 03:21 AM    RBC 3.38 (L) 10/06/2020 03:21 AM    HEMOGLOBIN 9.3 (L) 10/06/2020 03:21 AM    HEMATOCRIT 31.4 (L) 10/06/2020 03:21 AM    CREACTPROT 5.20 (H) 10/07/2020 03:40 AM    SEDRATEWES >120 (H) 10/07/2020 03:40 AM    HBA1C 13.3 (H) 09/28/2020 08:07 PM        Culture:   Obtained  IN OR 9/29  Culture Results show:  Significant Indicator POSPositive (POS) P    Source BONE P    Site First Metatarsal P    Culture Result -Abnormal  P    Gram Stain Result Rare WBCs.   No organisms seen.    Culture Result Abnormal   Beta Hemolytic Streptococcus group A   Light growth   P      Culture Result Abnormal   Staphylococcus aureus   Rare growth             INTERVENTIONS BY WOUND TEAM:    Previous dressing removed, wound cleansed with wound cleanser and gauze. Slight maceration noted to epibole edge of wound. Danica wound prepped with benzoin and paste ring. Black Veraflo foam to wound bed, button applied and sealed with drape. Veraflo therapy with NS initiated instilling 6 ml for 5 minutes every 1 hour. Pt tolerated well, slept through change.  Eschar to lateral foot remains dry,  stable and intact. Heel Mepilex in place to heel, foot floated on pillow as pt will allow.    Epibole to richard wound, would benefit from debridement will discuss with LPS.       Interdisciplinary consultation: Patient, Bedside RN Andry, [employer] Nursing Student, AMRITA APRN    EVALUATION: Wound bed clean and fully granulated. Responding to vac therapy. 10/7 fluid decreased to 6ml r/t less depth to wound and maceration to richard wound.    Goals: Steady decrease in wound area and depth weekly.    NURSING PLAN OF CARE ORDERS (X):    Dressing changes: See Dressing Care orders: X  Skin care: See Skin Care orders: X  Rectal tube care: See Rectal Tube Care orders:   Other orders:    WOUND TEAM PLAN OF CARE    Dressing changes by wound team:          Follow up 1-2 times weekly:               Follow up 3 times weekly:                NPWT change 3 times weekly:   X  Follow up as needed:       Other (explain):     Anticipated discharge plans:    [facility]ACH:    X vs SNF  Will need ongoing wound care.  SNF/Rehab:                  Home Care:           Outpatient Wound Center:            Self Care:

## 2020-10-07 NOTE — PROGRESS NOTES
Hospital Medicine Daily Progress Note    Date of Service  10/7/2020    Chief Complaint  55 y.o. female admitted 9/28/2020 with past medical history of diabetes mellitus, hypertension chronic hypoxic respiratory failure on 2 L oxygen  apparently has been off of her diabetes medicines for at least 3 months.  She presented to the emergency room in [facility] with left foot infection and pain for the past few days it is been worsening.  She also notes 1 day of left hand pain and redness as well.      Hospital Course    She presented with sepsis and was given vancomycin and Rocephin.  She was found to be hyperglycemic.  ESR and CRP were elevated.      Interval Problem Update  9/29: Patient states that her hand swelling and foot swelling is decreasing.  Orthopedics was consulted and will take patient to the OR for debridement and biopsy of bone.  Left knee is also swollen and she was taken for arthrocentesis.  We will continue vancomycin and Unasyn for now.  9/30: Patient seen and examined by me today.  I increased her oxycodone and Xanax to 4 times a day.  We will continue to monitor her mental respiratory status.  Cultures came back positive for group A strep.  Surgical cultures are pending.  Arthrocentesis cultures are pending.  Wound VAC and Hemovac was placed.  Orthopedics recommended amputation but the patient refusing.  SNF referral has been sent.  Patient continues to have uncontrolled blood sugars and have increase her Lantus to 30 units.  10/1: Surgical cultures still pending.  Orthopedics recommended amputation but the patient is refusing.  I discussed this with infectious disease who recommended 6 weeks of antibiotics.  She has a wound VAC and will try to find placement.  Although, patient states that she will lose her housing if she goes to a skilled nursing facility for 6 weeks.  10/2: No new complaints per patient today.  I have ordered a PICC line.  We will continue to find placement for 6  weeks of antibiotics.  Antibiotics were changed to Fortaz and cefazolin by ID today.  10/3: Patient complaining of back and rectal pain.  She was given enema and had multiple bowel movements afterwards.  [facility] referral has been sent and waiting for acceptance.  PICC line was placed yesterday.  Increase Lantus 100 twice daily.  10/4: Blood glucose is better controlled with increase in lantus. Hemovac removed today by ortho. Continue antibiotics and find placement.  10/5: Patient states that her pain is much better controlled today after increasing fentanyl to 75 mcg.  Continue to find placement.  Bone cultures found group A strep, staph aureus, and alcaligenes faecalis.  Patient had a hypoglycemic event overnight and I lowered her sliding scale.  10/6: Patient was found to be tachycardic either secondary to hypoglycemia versus panic attacks.  I changed her Xanax to Ativan.  She also complains about left hand swelling and infectious disease changed her antibiotics to cefuroxime.  We will continue to find placement today.  10/7: Patient continues to be tachycardic and tachypneic.  Patient does not have leg swelling, increased oxygen requirements indicating pulmonary embolism.  She denies fevers, chills to indicate sepsis.  Ativan was ordered for panic attacks which did not improve her tachycardia.  Tachycardia may be due to pain and I will order additional pain medications.  Increased pain may be due to stopping Toradol.  I gave the patient 1 dose of morphine and added ibuprofen.  Consultants/Specialty  LPS      Code Status  Full Code    Disposition  SNF vs [facility]    Review of Systems  Review of Systems   Constitutional: Negative for chills and fever.   Respiratory: Positive for shortness of breath.    Cardiovascular: Negative for chest pain.   Gastrointestinal: Negative for vomiting.        Dry mouth   All other systems reviewed and are negative.       Physical Exam  Temp:  [36.4 °C (97.6 °F)-37.1 °C (98.7 °F)] 36.8 °C  (98.2 °F)  Pulse:  [100-115] 107  Resp:  [16-21] 21  BP: (132-169)/(86-99) 132/88  SpO2:  [90 %-95 %] 94 %    Physical Exam  Vitals signs and nursing note reviewed.   Constitutional:       General: She is not in acute distress.     Appearance: Normal appearance. She is not ill-appearing, toxic-appearing or diaphoretic.      Comments: disheveled   HENT:      Head: Normocephalic and atraumatic.      Nose: No congestion or rhinorrhea.      Mouth/Throat:      Mouth: Mucous membranes are dry.      Pharynx: Oropharynx is clear. No oropharyngeal exudate or posterior oropharyngeal erythema.   Eyes:      General: No scleral icterus.     Conjunctiva/sclera: Conjunctivae normal.   Neck:      Musculoskeletal: Normal range of motion and neck supple. No neck rigidity or muscular tenderness.      Vascular: No carotid bruit.   Cardiovascular:      Rate and Rhythm: Regular rhythm. Tachycardia present.      Pulses: Normal pulses.      Heart sounds: Normal heart sounds. No murmur. No friction rub. No gallop.    Pulmonary:      Effort: Pulmonary effort is normal. No respiratory distress.      Breath sounds: Normal breath sounds. No stridor. No wheezing or rhonchi.   Abdominal:      General: Abdomen is flat. There is no distension.      Palpations: There is no mass.      Tenderness: There is no abdominal tenderness. There is no left CVA tenderness, guarding or rebound.      Hernia: No hernia is present.   Musculoskeletal: Normal range of motion.         General: No swelling.      Right lower leg: No edema.      Left lower leg: No edema.      Comments: Right BKA  Left trans-met with large ulcer bone exposed  Left hand dorsum is red, swollen and warm to touch   Lymphadenopathy:      Cervical: No cervical adenopathy.   Skin:     General: Skin is warm and dry.      Capillary Refill: Capillary refill takes more than 3 seconds.      Coloration: Skin is not jaundiced or pale.      Findings: No bruising or erythema.   Neurological:      Mental  Status: She is alert and oriented to person, place, and time.         Fluids    Intake/Output Summary (Last 24 hours) at 10/7/2020 1158  Last data filed at 10/7/2020 1009  Gross per 24 hour   Intake 400 ml   Output 1800 ml   Net -1400 ml       Laboratory  Recent Labs     10/06/20  0321   WBC 10.6   RBC 3.38*   HEMOGLOBIN 9.3*   HEMATOCRIT 31.4*   MCV 92.9   MCH 27.5   MCHC 29.6*   RDW 55.5*   PLATELETCT 365   MPV 10.2     Recent Labs     10/06/20  0321   SODIUM 139   POTASSIUM 4.0   CHLORIDE 101   CO2 30   GLUCOSE 171*   BUN 9   CREATININE 0.38*   CALCIUM 8.2*                   Imaging  IR-PICC LINE PLACEMENT W/ GUIDANCE > AGE 5   Final Result                  Ultrasound-guided PICC placement performed by qualified nursing staff as    above.          US-SHAHNAZ SINGLE LEVEL BILAT   Final Result      DX-FOOT-COMPLETE 3+ LEFT   Final Result      1.  There is diffuse left forefoot swelling with a large ulceration along the medial aspect of the remaining forefoot. There is no plain film evidence of underlying osteomyelitis.   2.  There is postoperative change consistent with amputation at the level of the mid and proximal metatarsals.      [id number] FILM X-RAY   Final Result      [id number] FILM X-RAY   Final Result      DX-CHEST-PORTABLE (1 VIEW)   Final Result      Enlarged cardiac silhouette and interstitial prominence suggesting pulmonary edema.           Assessment/Plan  * Diabetic foot ulcer (HCC)- (present on admission)  Assessment & Plan  Left trans-met with large ulcer which is draining pus which will be cultured  WBC in [place] was 16 and xray was suspicious for osteomyelitis.   Limb preservation service consulted: she may be a candidate for surgical debridement.  IV vancomycin and Unasyn   Fortaz and cefazolin has been started per ID 10/2  Cefuroxime started by ID 10/5  ESR in [place] was 109  Infectious disease consulted  Wound culture positive for group A strep  Bone culture  found group A strep, staph aureus, and alcaligenes faecalis.    Acute on chronic respiratory failure with hypoxia (HCC)- (present on admission)  Assessment & Plan  She is chronically on 2 liters of oxygen and is now requiring 5 liters  This may be due to the 24 mg morphine, 15 mg versed and 2 mg ativan given per transfer sheet.  Supplemental oxygen ordered.  If her sats decrease, her fentanyl patch may need to be removed and possibly narcan      Non compliance with medical treatment- (present on admission)  Assessment & Plan  She stopped taking all of her meds except fentanyl patch    DM2 (diabetes mellitus, type 2) (CMS-HCC)- (present on admission)  Assessment & Plan  Start on insulin sliding scale with serial Accu-Checks  Hemoglobin A1c 13.3  Hypoglycemic protocol in place  10/7: Patient is having hypoglycemic episodes overnight.  I will decrease her Lantus to 80 units in the evening and keep the 100 units during the day.    Panic attacks  Assessment & Plan  Continue ativan PRN    Cellulitis of hand- (present on admission)  Assessment & Plan  Left hand dorsum    Hx of right BKA (HCC)- (present on admission)  Assessment & Plan  Hx of    Sepsis (HCC)- (present on admission)  Assessment & Plan  This is Sepsis Present on admission  SIRS criteria identified on my evaluation include: Leukocytosis, with WBC greater than 12,000  Source is left foot infection  Sepsis protocol initiated  Fluid resuscitation ordered per protocol  IV antibiotics as appropriate for source of sepsis  While organ dysfunction may be noted elsewhere in this problem list or in the chart, degree of organ dysfunction does not meet CMS criteria for severe sepsis          Tobacco abuse- (present on admission)  Assessment & Plan  Nicotine patch offered    Chronic pain- (present on admission)  Assessment & Plan  Continue fentanyl patch 75 mcg home dose is 50mcg    Hypertension- (present on admission)  Assessment & Plan  Controlled        VTE prophylaxis:  lovenox

## 2020-10-07 NOTE — CARE PLAN
Problem: Safety  Goal: Will remain free from injury  Outcome: PROGRESSING AS EXPECTED     Problem: Safety  Goal: Will remain free from falls  Outcome: PROGRESSING AS EXPECTED     Problem: Discharge Barriers/Planning  Goal: Patient's continuum of care needs will be met  Outcome: PROGRESSING AS EXPECTED     Problem: Pain Management  Goal: Pain level will decrease to patient's comfort goal  Outcome: PROGRESSING AS EXPECTED     Problem: Psychosocial Needs:  Goal: Level of anxiety will decrease  Outcome: PROGRESSING AS EXPECTED

## 2020-10-07 NOTE — PROGRESS NOTES
Infectious Disease Progress Note    Author: Georgi Braun M.D. Date & Time created: 10/7/2020  3:51 PM     Cefuroxime 1.5 Q8 hr 10/5-present Total Therapy Day #8     Vancomycin 9/28-10/2  Ceftazadime 10/2  Cefazolin 10/2  Unasyn 9/29-10/5     9/30: She does not want to be here. She refuses MRI and says it is her knee that is her problem and she does not want any surgery for her foot.  10/1: L knee synovial fluid growing GPC. Patient still refusing BKA. CM looking into SNF  10/2 : Antibiotics adjusted to cefazolin and ceftazidime since pharmacy requests no ceftaroline. Second GNR now identified as oxidase positive  So could be Pseudomonas.  10/3: Endorses L knee pain. Pending SNF placements. PICC line placed yesterday  10/4: L knee pain. No other issues.   10/5: L knee cultures still pending GPC. Additional growth of Alcaligines on metatarsal joint. Knee pain 9 of 10 today. Pending SNF  10/6: Pain scale better at 7 of 10. [facility]?  10/7: She clearly is displaying increased pain in her left knee when I try to passively move it compared to last Friday when I saw her last. Her knee is not really warm, no erythema and swelling is less now.     Interval History:  Past 24 hrs reviewed with RN  Labs Reviewed, Medications Reviewed, Radiology Reviewed, Wound Reviewed, Fluids Reviewed and GI Nutrition Reviewed    Review of Systems:  Review of Systems   Constitutional: Negative for chills and fever.   Respiratory: Negative for cough and shortness of breath.    Cardiovascular: Negative for chest pain.   Gastrointestinal: Negative for abdominal pain, diarrhea, nausea and vomiting.   Genitourinary: Negative for dysuria.   Musculoskeletal:        C/O increased pain in her knee.   Skin: Negative for itching.       Physical Exam:  Physical Exam  Vitals signs and nursing note reviewed.   Constitutional:       General: She is not in acute distress.     Appearance: She is obese. She is not ill-appearing, toxic-appearing  or diaphoretic.   HENT:      Head: Normocephalic and atraumatic.   Cardiovascular:      Rate and Rhythm: Normal rate and regular rhythm.      Heart sounds: No murmur.   Pulmonary:      Effort: Pulmonary effort is normal. No respiratory distress.      Breath sounds: No wheezing or rhonchi.   Abdominal:      General: There is no distension.      Palpations: Abdomen is soft.      Tenderness: There is no abdominal tenderness. There is no guarding.   Musculoskeletal:      Comments: She clearly is displaying increased pain in her left knee when I try to passively move it compared to last Friday when I saw her last. Her knee is not really warm, no erythema and swelling is less now.    Skin:     General: Skin is warm and dry.   Neurological:      Mental Status: She is alert and oriented to person, place, and time.      Deep Tendon Reflexes: Abnormal reflex: .   Psychiatric:      Comments: Falls asleep during my exam secondary to pain meds most likely.         Labs:  Recent Results (from the past 24 hour(s))   ACCU-CHEK GLUCOSE    Collection Time: 10/06/20  6:00 PM   Result Value Ref Range    Glucose - Accu-Ck 84 65 - 99 mg/dL   ACCU-CHEK GLUCOSE    Collection Time: 10/06/20 10:12 PM   Result Value Ref Range    Glucose - Accu-Ck 66 65 - 99 mg/dL   ACCU-CHEK GLUCOSE    Collection Time: 10/06/20 10:42 PM   Result Value Ref Range    Glucose - Accu-Ck 66 65 - 99 mg/dL   ACCU-CHEK GLUCOSE    Collection Time: 10/06/20 11:17 PM   Result Value Ref Range    Glucose - Accu-Ck 98 65 - 99 mg/dL   ACCU-CHEK GLUCOSE    Collection Time: 10/07/20 12:26 AM   Result Value Ref Range    Glucose - Accu-Ck 89 65 - 99 mg/dL   PROCALCITONIN    Collection Time: 10/07/20  3:40 AM   Result Value Ref Range    Procalcitonin <0.05 <0.25 ng/mL   Sed Rate    Collection Time: 10/07/20  3:40 AM   Result Value Ref Range    Sed Rate Westergren >120 (H) 0 - 30 mm/hour   CRP QUANTITIVE (NON-CARDIAC)    Collection Time: 10/07/20  3:40 AM   Result Value Ref Range      Stat C-Reactive Protein 5.20 (H) 0.00 - 0.75 mg/dL   ACCU-CHEK GLUCOSE    Collection Time: 10/07/20  9:04 AM   Result Value Ref Range    Glucose - Accu-Ck 105 (H) 65 - 99 mg/dL   ACCU-CHEK GLUCOSE    Collection Time: 10/07/20  1:06 PM   Result Value Ref Range    Glucose - Accu-Ck 82 65 - 99 mg/dL     Results     Procedure Component Value Units Date/Time    Anaerobic Culture [[id number]] Collected: 09/29/20 2017    Order Status: Completed Specimen: Bone Updated: 10/04/20 1920     Significant Indicator NEG     Source BONE     Site First Metatarsal     Culture Result No Anaerobes isolated.    Narrative:      CALL  [facility]  MS5 tel. [phone],  CALLED  MS5 tel. [phone] 10/01/2020, 13:19, RB PERF. RESULTS CALLED  TO:[id number] RN group A  Surgery Specimen    CULTURE TISSUE W/ GRM STAIN [[id number]]  (Abnormal)  (Susceptibility) Collected: 09/29/20 2017    Order Status: Completed Specimen: Bone Updated: 10/04/20 1920     Significant Indicator POS     Source BONE     Site First Metatarsal     Culture Result -     Gram Stain Result Rare WBCs.  No organisms seen.       Culture Result Beta Hemolytic Streptococcus group A  Light growth        Staphylococcus aureus  Rare growth        Alcaligenes faecalis  Rare growth      Narrative:      CALL  [facility]  MS5 tel. [phone],  CALLED  MS5 tel. [phone] 10/01/2020, 13:19, RB PERF. RESULTS CALLED  TO:[id number] RN group A  Surgery Specimen    Susceptibility     Staphylococcus aureus (2)     Antibiotic Interpretation Microscan Method Status    Azithromycin Sensitive <=2 mcg/mL SLICK Final    Clindamycin Sensitive <=0.5 mcg/mL SLICK Final    Cefazolin Sensitive <=8 mcg/mL SLICK Final    Ceftaroline Sensitive <=0.5 mcg/mL SLICK Final    Daptomycin Sensitive <=1 mcg/mL SLICK Final    Erythromycin Sensitive <=0.25 mcg/mL SLICK Final    Oxacillin Sensitive <=0.25 mcg/mL SLICK Final    Penicillin Resistant >8 mcg/mL SLICK Final    Pip/Tazobactam Sensitive <=4 mcg/mL SLICK Final    Ampicillin/sulbactam Sensitive  <=8/4 mcg/mL SLICK Final    Trimeth/Sulfa Sensitive <=0.5/9.5 mcg/mL SLICK Final    Tetracycline Sensitive <=4 mcg/mL SLICK Final    Vancomycin Sensitive 1 mcg/mL SLICK Final          Alcaligenes faecalis (3)     Antibiotic Interpretation Microscan Method Status    Pip/Tazobactam Sensitive <=16 mcg/mL SLICK Final    Trimeth/Sulfa Sensitive <=2/38 mcg/mL SLICK Final    Tobramycin Sensitive <=4 mcg/mL SLICK Final    Amikacin Sensitive <=16 mcg/mL SLICK Final    Ceftriaxone Sensitive <=1 mcg/mL SLICK Final    Ceftazidime Sensitive 4 mcg/mL SLICK Final    Cefotaxime Sensitive <=2 mcg/mL SLICK Final    Ciprofloxacin Intermediate 2 mcg/mL SLICK Final    Cefepime Sensitive 8 mcg/mL SLICK Final    Gentamicin Sensitive <=4 mcg/mL SLICK Final                   CULTURE WOUND W/ GRAM STAIN [[id number]]  (Abnormal)  (Susceptibility) Collected: 09/28/20 2054    Order Status: Completed Specimen: Wound from Left Foot Updated: 10/04/20 1908     Significant Indicator POS     Source WND     Site LEFT FOOT     Culture Result -     Gram Stain Result Rare WBCs.  Moderate Gram positive cocci.       Culture Result Beta Hemolytic Streptococcus group A  Moderate growth        Morganella morganii  Moderate growth        Alcaligenes faecalis  Light growth        Enterococcus faecalis  Light growth        Diphtheroids  Light growth      Narrative:      CALL  Mckeon  MS5 tel. [phone],  CALLED  MS5 tel. [phone] 09/30/2020, 13:00, RB PERF. RESULTS CALLED  TO:[id number], RN    Susceptibility     Morganella morganii (2)     Antibiotic Interpretation Microscan Method Status    Ceftriaxone Sensitive <=1 mcg/mL SLICK Final    Ceftazidime Sensitive <=1 mcg/mL SLICK Final    Cefotaxime Sensitive <=2 mcg/mL SLICK Final    Cefazolin Resistant >16 mcg/mL SLICK Final    Ciprofloxacin Sensitive <=1 mcg/mL SLICK Final    Cefepime Sensitive <=2 mcg/mL SLICK Final    Cefotetan Sensitive <=16 mcg/mL SLICK Final    Ertapenem Sensitive <=0.5 mcg/mL SLICK Final    Ampicillin Resistant >16 mcg/mL SLICK Final     Gentamicin Sensitive <=4 mcg/mL SLICK Final    Pip/Tazobactam Sensitive <=16 mcg/mL SLICK Final    Trimeth/Sulfa Sensitive <=2/38 mcg/mL SLICK Final    Tobramycin Sensitive <=4 mcg/mL SLICK Final          Alcaligenes faecalis (3)     Antibiotic Interpretation Microscan Method Status    Ceftriaxone Sensitive 2 mcg/mL SLICK Final    Ceftazidime Sensitive 4 mcg/mL SLICK Final    Cefotaxime Sensitive 8 mcg/mL SLICK Final    Ciprofloxacin Intermediate 2 mcg/mL SLICK Final    Cefepime Sensitive 8 mcg/mL SLICK Final    Gentamicin Sensitive <=4 mcg/mL SLICK Final    Pip/Tazobactam Sensitive <=16 mcg/mL SLICK Final    Trimeth/Sulfa Sensitive <=2/38 mcg/mL SLICK Final    Tobramycin Sensitive <=4 mcg/mL SLICK Final    Amikacin Sensitive <=16 mcg/mL SLICK Final          Enterococcus faecalis (4)     Antibiotic Interpretation Microscan Method Status    Ampicillin Sensitive <=2 mcg/mL SLICK Final    Vancomycin Sensitive 1 mcg/mL SLICK Final    Daptomycin Sensitive <=1 mcg/mL SLICK Final    Gent Synergy Sensitive <=500 mcg/mL SLICK Final    Penicillin Sensitive 2 mcg/mL SLICK Final                   CULTURE WOUND W/ GRAM STAIN [[id number]] Collected: 09/29/20 2015    Order Status: Completed Specimen: Wound Updated: 10/04/20 1708     Significant Indicator NEG     Source WND     Site Left Knee Effusion Drainage     Culture Result No growth at 72 hours.     Gram Stain Result Many WBCs.  Rare Gram positive cocci.      Narrative:      Surgery Specimen    Anaerobic Culture [[id number]] Collected: 09/29/20 2015    Order Status: Completed Specimen: Wound Updated: 10/04/20 1708     Significant Indicator NEG     Source WND     Site Left Knee Effusion Drainage     Culture Result No Anaerobes isolated.    Narrative:      Surgery Specimen    BLOOD CULTURE [[id number]] Collected: 09/28/20 1951    Order Status: Completed Specimen: Blood from Peripheral Updated: 10/04/20 0700     Significant Indicator NEG     Source BLD     Site PERIPHERAL     Culture Result No growth after 5 days of  "incubation.    Narrative:      Per Hospital Policy: Only change Specimen Src: to \"Line\" if  specified by physician order.  No site indicated    BLOOD CULTURE [[id number]] Collected: 20    Order Status: Completed Specimen: Blood from Peripheral Updated: 10/04/20 0700     Significant Indicator NEG     Source BLD     Site PERIPHERAL     Culture Result No growth after 5 days of incubation.    Narrative:      Per Hospital Policy: Only change Specimen Src: to \"Line\" if  specified by physician order.  No site indicated    URINE CULTURE(NEW) [[id number]] Collected: 20    Order Status: Completed Specimen: Urine Updated: 10/01/20 0838     Significant Indicator NEG     Source UR     Site -     Culture Result Mixed skin roland 10-50,000 cfu/mL    Narrative:      Indication for culture:->Emergency Room Patient  Indication for culture:->Emergency Room Patient        Hemodynamics:  Temp (24hrs), Av.9 °C (98.5 °F), Min:36.8 °C (98.2 °F), Max:37.1 °C (98.7 °F)  Temperature: 36.8 °C (98.2 °F)  Pulse  Av.3  Min: 60  Max: 122   Blood Pressure: 132/88     Peripheral IV 10/02/20 20 G Anterior;Left Forearm (Active)   Site Assessment Clean;Dry;Intact 10/07/20 0815   Dressing Type Transparent 10/07/20 0815   Line Status Saline locked 10/07/20 0815   Dressing Status Clean;Dry;Intact 10/07/20 0815   Dressing Intervention N/A 10/07/20 0815   Dressing Change Due 10/09/20 10/05/20 0840   Date Primary Tubing Changed 10/02/20 10/02/20 0100   Date Secondary Tubing Changed 10/02/20 10/02/20 0100   NEXT Primary Tubing Change  10/03/20 10/02/20 0100   NEXT Secondary Tubing Change  10/03/20 10/02/20 0100   Infiltration Grading ([facility] [place] [facility]) 0 10/07/20 0815   Phlebitis Scale ([facility] Only) 0 10/07/20 0815       PICC Single Lumen 10/02/20 Left Basilic (Active)   Site Assessment Clean;Dry;Intact 10/07/20 0815   Line Status Blood return noted;Infusing;Scrubbed the hub prior to access 10/07/20 0815   Line Secured at (cm) 0 cm " 10/02/20 1705   Extremity Circumference (cm) 34 cm 10/02/20 1705   Dressing Type Biopatch;Securing device;Transparent;Occlusive 10/07/20 0815   Dressing Status Clean;Dry;Intact 10/07/20 0815   Dressing Intervention N/A 10/07/20 0815   Dressing Change Due 10/10/20 10/07/20 0815   Date IV Connector(s) Changed 10/05/20 10/05/20 2306   NEXT Primary Tubing Change  10/08/20 10/05/20 2306   NEXT Secondary Tubing Change  10/08/20 10/05/20 2306   NEXT IV Connector(s) Change 10/06/20 10/05/20 2306   Line Necessity Assessed Antibiotic Therapy Greater than 7 Days 10/07/20 0815     Wound:  @WOUNDLDA(4)@     Fluids:  Intake/Output       10/05/20 0700 - 10/06/20 0659 10/06/20 0700 - 10/07/20 0659 10/07/20 0700 - 10/08/20 0659      [id number] 4724-0212 Total 3888-4246 9027-4362 Total 4374-9601 0217-0663 Total       Intake    P.O.  240  200 440  240  -- 240  650  -- 650    P.O. 240 200 440 240 -- 240 650 -- 650    IV Piggyback  --  -- --  --  100 100  --  -- --    Volume (mL) (cefUROxime (ZINACEF) 1.5 g in  mL IVPB) -- -- -- -- 100 100 -- -- --    Total Intake 240 200 440 240 100 340 650 -- 650       Output    Urine  900  700 1600  --  1800 1800  325  -- 325    Output (mL) (Urethral Catheter Latex 16 Fr.)  -- 1800 1800 325 -- 325    Total Output  -- 1800 1800 325 -- 325       Net I/O     -660 -500 -1160 240 -1700 -1460 325 -- 325           GI/Nutrition:  Orders Placed This Encounter   Procedures   • Diet Order Diabetic     Standing Status:   Standing     Number of Occurrences:   1     Order Specific Question:   Diet:     Answer:   Diabetic [3]     Medications:  Current Facility-Administered Medications   Medication Last Dose   • insulin glargine (Lantus) injection     • [START ON 10/8/2020] insulin glargine (Lantus) injection     • ibuprofen (MOTRIN) tablet 400 mg     • LORazepam (ATIVAN) tablet 0.5 mg 0.5 mg at 10/07/20 1224   • cefUROxime (ZINACEF) 1.5 g in  mL IVPB Stopped at 10/07/20 1418   •  insulin regular (HumuLIN R,NovoLIN R) injection Stopped at 10/06/20 0800    And   • glucose 4 g chewable tablet 16 g 16 g at 10/06/20 2245    And   • dextrose 50% (D50W) injection 50 mL     • fentaNYL (DURAGESIC) 75 MCG/HR 1 Patch 1 Patch at 10/07/20 1340   • cyclobenzaprine (Flexeril) tablet 5 mg 5 mg at 10/07/20 0752   • albuterol (PROVENTIL) 2.5mg/0.5ml nebulizer solution 2.5 mg 2.5 mg at 10/05/20 1229   • oxyCODONE-acetaminophen (PERCOCET-10)  MG per tablet 1 Tab 1 Tab at 10/07/20 1224   • sodium chloride (OCEAN) 0.65 % nasal spray 2 Spray 2 Spray at 10/02/20 0851   • omeprazole (PRILOSEC) capsule 20 mg 20 mg at 10/07/20 0449   • albuterol inhaler 2 Puff 2 Puff at 10/06/20 0851   • enoxaparin (LOVENOX) inj 40 mg 40 mg at 10/07/20 0448   • senna-docusate (PERICOLACE or SENOKOT S) 8.6-50 MG per tablet 2 Tab 2 Tab at 10/07/20 0449    And   • polyethylene glycol/lytes (MIRALAX) PACKET 1 Packet      And   • magnesium hydroxide (MILK OF MAGNESIA) suspension 30 mL      And   • bisacodyl (DULCOLAX) suppository 10 mg 10 mg at 10/02/20 2132   • acetaminophen (TYLENOL) tablet 650 mg     • ondansetron (ZOFRAN) syringe/vial injection 4 mg 4 mg at 10/03/20 0930   • ondansetron (ZOFRAN ODT) dispertab 4 mg 4 mg at 10/02/20 0414   • promethazine (PHENERGAN) tablet 12.5-25 mg 25 mg at 10/02/20 1927   • promethazine (PHENERGAN) suppository 12.5-25 mg     • prochlorperazine (COMPAZINE) injection 5-10 mg 10 mg at 10/05/20 1327   • nicotine (NICODERM) 21 MG/24HR 21 mg 21 mg at 10/04/20 0525    And   • nicotine polacrilex (NICORETTE) 2 MG piece 2 mg       Medical Decision Making, by Problem:  Active Hospital Problems    Diagnosis   • *Diabetic foot ulcer (HCC) [E11.621, L97.509]   • Acute on chronic respiratory failure with hypoxia (HCC) [J96.21]   • Non compliance with medical treatment [Z91.19]   • DM2 (diabetes mellitus, type 2) (CMS-HCC) [E11.9]   • Chronic pain [G89.29]   • Hypertension [I10]   • Tobacco abuse [Z72.0]   •  Panic attacks [F41.0]   • Sepsis ([facility]) [A41.9]   • Hx of right BKA ([facility]) [Z89.511]   • Cellulitis of hand [L03.119]     IMPRESSION:  - Sepsis- resolved  - Acute on chronic L 1st MTP osteomyelitis s/p I+D 9/29      Non surgical cultures 9/28 - Group A strep, Morganella, Group D Enterococcus, GNR (colonizations)     Surgical cultures 9/29 - Group A strep, MSSA, Alcaligines faecalis  - Acute L knee septic arthritis s/p washout 9/29 - GPCs  - Poorly controlled diabetes mellitus with insulin dependence.  - Prior osteomyelitis, status post right below-knee amputation in 2017.  - Hypertension.     RECOMMENDATION:     - Treating her surgicalcultures growing Group A strep and MSSA. Non surgical cultures gram stains only showing GPC's with polymicrobial growth suggestive of colonization. Cefuroxime will address all her organisms.   - Continue Cefuroxime 1.5 Q8hrs x 6 weeks. Target date 11/10.   - Her L knee synovial cultures are NG at 72hrs.   - PICC line in place 10/2  - Dr Pak recommending BKA for osteo of her foot. Patient refusing. As a result, will need minimum of 6 week therapy.   - weekly labs CBC, CMP, ESR, CRP  [facility] office available for questions or advice on her treatment [phone]     CM looking into SNF options - [facility]. She clearly is displaying increased pain in her left knee when I try to passively move it compared to last Friday when I saw her last. Her knee is not really warm, no erythema and swelling is less now. Case and treatment reviewed with patient, micro and RN 30 min on floor with face to face 50% and 100% in direct patient care/counseling today.

## 2020-10-07 NOTE — PROGRESS NOTES
Pt is A&Ox4. VSS. Pt on 4LNC with diminished lungs. Pt has torres in for retention. LUE PICC for long term abx. Wound vac to L/t foot. R/t BKA. Left knee ace wrapped s/p aspiration. Pt sleeping most of this shift. Pt FSBG at the beginning of the shift 66. Gave juice and re-checked in 15 mins with sugar remaining at 66. Then administered glucose tablets. Re-checked in 15 mins with FSBG of 98. Re-checked 1 hour later for FSBG of 89. APRN made aware, and APRN stated to hold morning dose of 100 units of lantus. Bed low and locked, call light within reach, safety precautions in place, hourly rounding, WCM.

## 2020-10-07 NOTE — DISCHARGE PLANNING
Agency/Facility Name: [facility]     Outcome: Left voicemail for Jessica in admissions regarding referral status. Requested call back.     @1140  Agency/Facility Name: [facility]   Spoke To: Jessica   Outcome: Per Jessica, she is trying to verify insurance but pts name is coming up at [facility]. Jessica requested to know if pts previous last name was Mike.   LSW notified.     @115  Agency/Facility Name: [facility]  Outcome: Left voicemail for Jessica notifying pts previous last name was Mike.     @1415  Agency/Facility Name: [facility]   Outcome: Left voicemail for admissions requesting update on insurance auth. Requested call back.     @[phone]  Agency/Facility Name: [facility]   Spoke To: Sammie   Outcome: Per Sammie still awaiting for insurance auth and hopes it will be received by tomorrow.

## 2020-10-07 NOTE — PROGRESS NOTES
"   Orthopaedic Progress Note    Interval changes:  Patient doing well  Continued knee pain    ROS - Patient denies any new issues.  Pain well controlled.    /88   Pulse (!) 107   Temp 36.8 °C (98.2 °F) (Temporal)   Resp (!) 21   Ht 1.702 m (5' 7\")   Wt 125 kg (275 lb 9.2 oz)   SpO2 94%       Patient seen and examined  No acute distress  Breathing non labored  RRR  LLE knee dressing CDI, DNVI, moves ankle, cap refill <2 sec.     Recent Labs     10/06/20  0321   WBC 10.6   RBC 3.38*   HEMOGLOBIN 9.3*   HEMATOCRIT 31.4*   MCV 92.9   MCH 27.5   MCHC 29.6*   RDW 55.5*   PLATELETCT 365   MPV 10.2       Active Hospital Problems    Diagnosis   • Diabetic foot ulcer (HCC) [E11.621, L97.509]     Priority: High   • Acute on chronic respiratory failure with hypoxia (HCC) [J96.21]     Priority: Medium   • Non compliance with medical treatment [Z91.19]     Priority: Medium   • DM2 (diabetes mellitus, type 2) (CMS-HCC) [E11.9]     Priority: Medium   • Chronic pain [G89.29]     Priority: Low   • Hypertension [I10]     Priority: Low   • Tobacco abuse [Z72.0]     Priority: Low   • Panic attacks [F41.0]   • Sepsis (HCC) [A41.9]   • Hx of right BKA (HCC) [Z89.511]   • Cellulitis of hand [L03.119]       Assessment/Plan:  Doing well  LPS following for foot issues   Ortho trauma PA following left knee  POD#8 S/P:  1.  Excisional debridement of residual left first metatarsal.    2.  Arthrocentesis, left knee.    3.  Arthrotomy and drainage, left knee.   Wt bearing status - NWB LLE  Wound care/Drains - Left knee dressing changed every other day  Future Procedures - none planned   Lovenox: Start 9/30, Duration-until ambulatory > 150'  Sutures/Staples out- 10-14 days post operatively  PT/OT-initiated  Antibiotics: zinacef 1.5g IV q8  DVT Prophylaxis- TEDS/SCDs/Foot pumps  Long-none  Case Coordination for Discharge Planning - Disposition pending abx needs   "

## 2020-10-07 NOTE — PROGRESS NOTES
Assume care of pt at 0700. Report received from NOC RN. Pt is A/O x4. Pain is 10/10, medicated per MAR. Pt is resting in bed. Bed in lowest and locked position, call light in reach, hourly rounding in place. Labs reviewed. Communication board updated. Will continue to monitor.     Wound team to change dressing today. SETH.

## 2020-10-08 ENCOUNTER — PATIENT OUTREACH (OUTPATIENT)
Dept: HEALTH INFORMATION MANAGEMENT | Facility: OTHER | Age: 55
End: 2020-10-08

## 2020-10-08 VITALS
BODY MASS INDEX: 43.25 KG/M2 | WEIGHT: 275.57 LBS | SYSTOLIC BLOOD PRESSURE: 118 MMHG | OXYGEN SATURATION: 92 % | TEMPERATURE: 97.9 F | HEIGHT: 67 IN | RESPIRATION RATE: 20 BRPM | DIASTOLIC BLOOD PRESSURE: 70 MMHG | HEART RATE: 102 BPM

## 2020-10-08 LAB
ALBUMIN SERPL BCP-MCNC: 2.3 G/DL (ref 3.2–4.9)
ALBUMIN/GLOB SERPL: 0.6 G/DL
ALP SERPL-CCNC: 251 U/L (ref 30–99)
ALT SERPL-CCNC: <5 U/L (ref 2–50)
ANION GAP SERPL CALC-SCNC: 4 MMOL/L (ref 7–16)
ANISOCYTOSIS BLD QL SMEAR: ABNORMAL
AST SERPL-CCNC: 6 U/L (ref 12–45)
BASOPHILS # BLD AUTO: 0.5 % (ref 0–1.8)
BASOPHILS # BLD: 0.05 K/UL (ref 0–0.12)
BILIRUB SERPL-MCNC: 0.5 MG/DL (ref 0.1–1.5)
BUN SERPL-MCNC: 11 MG/DL (ref 8–22)
CALCIUM SERPL-MCNC: 8.4 MG/DL (ref 8.5–10.5)
CHLORIDE SERPL-SCNC: 101 MMOL/L (ref 96–112)
CO2 SERPL-SCNC: 34 MMOL/L (ref 20–33)
COMMENT 1642: NORMAL
CREAT SERPL-MCNC: 0.37 MG/DL (ref 0.5–1.4)
CRP SERPL HS-MCNC: 4.93 MG/DL (ref 0–0.75)
EOSINOPHIL # BLD AUTO: 0.08 K/UL (ref 0–0.51)
EOSINOPHIL NFR BLD: 0.9 % (ref 0–6.9)
ERYTHROCYTE [DISTWIDTH] IN BLOOD BY AUTOMATED COUNT: 56.2 FL (ref 35.9–50)
ERYTHROCYTE [SEDIMENTATION RATE] IN BLOOD BY WESTERGREN METHOD: 57 MM/HOUR (ref 0–30)
GLOBULIN SER CALC-MCNC: 4 G/DL (ref 1.9–3.5)
GLUCOSE BLD-MCNC: 115 MG/DL (ref 65–99)
GLUCOSE BLD-MCNC: 56 MG/DL (ref 65–99)
GLUCOSE BLD-MCNC: 66 MG/DL (ref 65–99)
GLUCOSE BLD-MCNC: 69 MG/DL (ref 65–99)
GLUCOSE BLD-MCNC: 94 MG/DL (ref 65–99)
GLUCOSE SERPL-MCNC: 93 MG/DL (ref 65–99)
HCT VFR BLD AUTO: 30.1 % (ref 37–47)
HGB BLD-MCNC: 8.8 G/DL (ref 12–16)
IMM GRANULOCYTES # BLD AUTO: 0.12 K/UL (ref 0–0.11)
IMM GRANULOCYTES NFR BLD AUTO: 1.3 % (ref 0–0.9)
LYMPHOCYTES # BLD AUTO: 2.17 K/UL (ref 1–4.8)
LYMPHOCYTES NFR BLD: 23.8 % (ref 22–41)
MCH RBC QN AUTO: 27.3 PG (ref 27–33)
MCHC RBC AUTO-ENTMCNC: 29.2 G/DL (ref 33.6–35)
MCV RBC AUTO: 93.5 FL (ref 81.4–97.8)
MONOCYTES # BLD AUTO: 0.86 K/UL (ref 0–0.85)
MONOCYTES NFR BLD AUTO: 9.5 % (ref 0–13.4)
MORPHOLOGY BLD-IMP: NORMAL
NEUTROPHILS # BLD AUTO: 5.82 K/UL (ref 2–7.15)
NEUTROPHILS NFR BLD: 64 % (ref 44–72)
NRBC # BLD AUTO: 0 K/UL
NRBC BLD-RTO: 0 /100 WBC
PLATELET # BLD AUTO: 353 K/UL (ref 164–446)
PLATELET BLD QL SMEAR: NORMAL
PMV BLD AUTO: 10.2 FL (ref 9–12.9)
POLYCHROMASIA BLD QL SMEAR: NORMAL
POTASSIUM SERPL-SCNC: 3.8 MMOL/L (ref 3.6–5.5)
PROT SERPL-MCNC: 6.3 G/DL (ref 6–8.2)
RBC # BLD AUTO: 3.22 M/UL (ref 4.2–5.4)
RBC BLD AUTO: PRESENT
SODIUM SERPL-SCNC: 139 MMOL/L (ref 135–145)
WBC # BLD AUTO: 9.1 K/UL (ref 4.8–10.8)

## 2020-10-08 PROCEDURE — 700102 HCHG RX REV CODE 250 W/ 637 OVERRIDE(OP): Performed by: HOSPITALIST

## 2020-10-08 PROCEDURE — 80053 COMPREHEN METABOLIC PANEL: CPT

## 2020-10-08 PROCEDURE — 90686 IIV4 VACC NO PRSV 0.5 ML IM: CPT | Performed by: HOSPITALIST

## 2020-10-08 PROCEDURE — 97535 SELF CARE MNGMENT TRAINING: CPT

## 2020-10-08 PROCEDURE — 700111 HCHG RX REV CODE 636 W/ 250 OVERRIDE (IP): Performed by: INTERNAL MEDICINE

## 2020-10-08 PROCEDURE — 700111 HCHG RX REV CODE 636 W/ 250 OVERRIDE (IP): Performed by: HOSPITALIST

## 2020-10-08 PROCEDURE — A9270 NON-COVERED ITEM OR SERVICE: HCPCS | Performed by: HOSPITALIST

## 2020-10-08 PROCEDURE — 82962 GLUCOSE BLOOD TEST: CPT | Mod: 91

## 2020-10-08 PROCEDURE — 700111 HCHG RX REV CODE 636 W/ 250 OVERRIDE (IP): Performed by: STUDENT IN AN ORGANIZED HEALTH CARE EDUCATION/TRAINING PROGRAM

## 2020-10-08 PROCEDURE — 700111 HCHG RX REV CODE 636 W/ 250 OVERRIDE (IP): Performed by: ORTHOPAEDIC SURGERY

## 2020-10-08 PROCEDURE — 85652 RBC SED RATE AUTOMATED: CPT

## 2020-10-08 PROCEDURE — 700105 HCHG RX REV CODE 258: Performed by: INTERNAL MEDICINE

## 2020-10-08 PROCEDURE — 3E02340 INTRODUCTION OF INFLUENZA VACCINE INTO MUSCLE, PERCUTANEOUS APPROACH: ICD-10-PCS | Performed by: HOSPITALIST

## 2020-10-08 PROCEDURE — 99239 HOSP IP/OBS DSCHRG MGMT >30: CPT | Performed by: HOSPITALIST

## 2020-10-08 PROCEDURE — 85025 COMPLETE CBC W/AUTO DIFF WBC: CPT

## 2020-10-08 PROCEDURE — 86140 C-REACTIVE PROTEIN: CPT

## 2020-10-08 RX ORDER — CYCLOBENZAPRINE HCL 5 MG
5 TABLET ORAL 3 TIMES DAILY PRN
Qty: 30 TAB | Refills: 0 | Status: SHIPPED
Start: 2020-10-08

## 2020-10-08 RX ORDER — FENTANYL 75 UG/1
1 PATCH TRANSDERMAL
Qty: 5 PATCH | Refills: 0 | Status: SHIPPED
Start: 2020-10-10 | End: 2020-10-08 | Stop reason: SDUPTHER

## 2020-10-08 RX ORDER — MORPHINE SULFATE 4 MG/ML
2 INJECTION, SOLUTION INTRAMUSCULAR; INTRAVENOUS ONCE
Status: COMPLETED | OUTPATIENT
Start: 2020-10-08 | End: 2020-10-08

## 2020-10-08 RX ORDER — LORAZEPAM 0.5 MG/1
0.5 TABLET ORAL EVERY 4 HOURS PRN
Qty: 18 TAB | Refills: 0 | Status: SHIPPED | OUTPATIENT
Start: 2020-10-08 | End: 2020-10-11

## 2020-10-08 RX ORDER — FENTANYL 75 UG/1
1 PATCH TRANSDERMAL
Qty: 1 PATCH | Refills: 0 | Status: SHIPPED | OUTPATIENT
Start: 2020-10-10 | End: 2020-10-13

## 2020-10-08 RX ORDER — IBUPROFEN 400 MG/1
400 TABLET ORAL 4 TIMES DAILY PRN
Qty: 30 TAB | Status: SHIPPED
Start: 2020-10-08

## 2020-10-08 RX ORDER — LORAZEPAM 0.5 MG/1
0.5 TABLET ORAL EVERY 4 HOURS PRN
Qty: 30 TAB | Refills: 0 | Status: SHIPPED
Start: 2020-10-08 | End: 2020-10-08 | Stop reason: SDUPTHER

## 2020-10-08 RX ORDER — OXYCODONE AND ACETAMINOPHEN 10; 325 MG/1; MG/1
1 TABLET ORAL EVERY 4 HOURS PRN
Qty: 15 TAB | Refills: 0 | Status: SHIPPED
Start: 2020-10-08 | End: 2020-10-08 | Stop reason: SDUPTHER

## 2020-10-08 RX ORDER — MORPHINE SULFATE 4 MG/ML
1 INJECTION, SOLUTION INTRAMUSCULAR; INTRAVENOUS ONCE
Status: COMPLETED | OUTPATIENT
Start: 2020-10-08 | End: 2020-10-08

## 2020-10-08 RX ORDER — OMEPRAZOLE 20 MG/1
20 CAPSULE, DELAYED RELEASE ORAL DAILY
Qty: 30 CAP | Status: SHIPPED
Start: 2020-10-09

## 2020-10-08 RX ORDER — INSULIN GLARGINE 100 [IU]/ML
60 INJECTION, SOLUTION SUBCUTANEOUS EVERY MORNING
Qty: 10 ML | Status: SHIPPED
Start: 2020-10-09

## 2020-10-08 RX ORDER — OXYCODONE AND ACETAMINOPHEN 10; 325 MG/1; MG/1
1 TABLET ORAL EVERY 4 HOURS PRN
Qty: 15 TAB | Refills: 0 | Status: SHIPPED | OUTPATIENT
Start: 2020-10-08 | End: 2020-10-11

## 2020-10-08 RX ORDER — INSULIN GLARGINE 100 [IU]/ML
60 INJECTION, SOLUTION SUBCUTANEOUS EVERY EVENING
Qty: 10 ML | Refills: 0 | Status: SHIPPED
Start: 2020-10-08

## 2020-10-08 RX ADMIN — LORAZEPAM 0.5 MG: 0.5 TABLET ORAL at 02:07

## 2020-10-08 RX ADMIN — INSULIN GLARGINE 60 UNITS: 100 INJECTION, SOLUTION SUBCUTANEOUS at 08:17

## 2020-10-08 RX ADMIN — OXYCODONE HYDROCHLORIDE AND ACETAMINOPHEN 1 TABLET: 10; 325 TABLET ORAL at 08:20

## 2020-10-08 RX ADMIN — LORAZEPAM 0.5 MG: 0.5 TABLET ORAL at 08:20

## 2020-10-08 RX ADMIN — LORAZEPAM 0.5 MG: 0.5 TABLET ORAL at 13:18

## 2020-10-08 RX ADMIN — OXYCODONE HYDROCHLORIDE AND ACETAMINOPHEN 1 TABLET: 10; 325 TABLET ORAL at 13:18

## 2020-10-08 RX ADMIN — DOCUSATE SODIUM 50 MG AND SENNOSIDES 8.6 MG 2 TABLET: 8.6; 5 TABLET, FILM COATED ORAL at 05:26

## 2020-10-08 RX ADMIN — OMEPRAZOLE 20 MG: 20 CAPSULE, DELAYED RELEASE ORAL at 05:26

## 2020-10-08 RX ADMIN — IBUPROFEN 400 MG: 800 TABLET, FILM COATED ORAL at 02:38

## 2020-10-08 RX ADMIN — MORPHINE SULFATE 2 MG: 4 INJECTION INTRAVENOUS at 03:48

## 2020-10-08 RX ADMIN — MORPHINE SULFATE 1 MG: 4 INJECTION INTRAVENOUS at 16:22

## 2020-10-08 RX ADMIN — SODIUM CHLORIDE 1.5 G: 900 INJECTION INTRAVENOUS at 14:16

## 2020-10-08 RX ADMIN — ENOXAPARIN SODIUM 40 MG: 40 INJECTION SUBCUTANEOUS at 05:26

## 2020-10-08 RX ADMIN — OXYCODONE HYDROCHLORIDE AND ACETAMINOPHEN 1 TABLET: 10; 325 TABLET ORAL at 02:07

## 2020-10-08 RX ADMIN — SODIUM CHLORIDE 1.5 G: 900 INJECTION INTRAVENOUS at 05:26

## 2020-10-08 RX ADMIN — CYCLOBENZAPRINE 5 MG: 10 TABLET, FILM COATED ORAL at 02:38

## 2020-10-08 RX ADMIN — INFLUENZA A VIRUS A/GUANGDONG-MAONAN/SWL1536/2019 CNIC-1909 (H1N1) ANTIGEN (FORMALDEHYDE INACTIVATED), INFLUENZA A VIRUS A/HONG KONG/2671/2019 (H3N2) ANTIGEN (FORMALDEHYDE INACTIVATED), INFLUENZA B VIRUS B/PHUKET/3073/2013 ANTIGEN (FORMALDEHYDE INACTIVATED), AND INFLUENZA B VIRUS B/WASHINGTON/02/2019 ANTIGEN (FORMALDEHYDE INACTIVATED) 0.5 ML: 15; 15; 15; 15 INJECTION, SUSPENSION INTRAMUSCULAR at 15:56

## 2020-10-08 ASSESSMENT — ENCOUNTER SYMPTOMS
NAUSEA: 0
COUGH: 0
DIARRHEA: 0
SHORTNESS OF BREATH: 0
ABDOMINAL PAIN: 0
CHILLS: 0
FEVER: 0
VOMITING: 0
HEADACHES: 1

## 2020-10-08 ASSESSMENT — COGNITIVE AND FUNCTIONAL STATUS - GENERAL
PERSONAL GROOMING: A LITTLE
HELP NEEDED FOR BATHING: A LOT
DRESSING REGULAR UPPER BODY CLOTHING: A LITTLE
SUGGESTED CMS G CODE MODIFIER DAILY ACTIVITY: CK
DAILY ACTIVITIY SCORE: 16
TOILETING: A LOT
DRESSING REGULAR LOWER BODY CLOTHING: A LITTLE
EATING MEALS: A LITTLE

## 2020-10-08 NOTE — PROGRESS NOTES
Infectious Disease Progress Note    Author: Georgi Braun M.D. Date & Time created: 10/8/2020  2:03 PM     Cefuroxime 1.5 Q8 hr 10/5-present.  Total Therapy Day #9     Vancomycin 9/28-10/2  Ceftazadime 10/2  Cefazolin 10/2  Unasyn 9/29-10/5   Interval History:  9/30: She does not want to be here. She refuses MRI and says it is her knee that is her problem and she does not want any surgery for her foot.  10/1: L knee synovial fluid growing GPC. Patient still refusing BKA. CM looking into SNF  10/2 : Antibiotics adjusted to cefazolin and ceftazidime since pharmacy requests no ceftaroline. Second GNR now identified as oxidase positive  So could be Pseudomonas.  10/3: Endorses L knee pain. Pending SNF placements. PICC line placed yesterday  10/4: L knee pain. No other issues.   10/5: L knee cultures still pending GPC. Additional growth of Alcaligines on metatarsal joint. Knee pain 9 of 10 today. Pending SNF  10/6: Pain scale better at 7 of 10. [facility]?  10/7: She clearly is displaying increased pain in her left knee when I try to passively move it compared to last Friday when I saw her last. Her knee is not really warm, no erythema and swelling is less now.   10/8: Remaining afebrile.Continued left knee pain, and mild left palmar pain.  Remaining afebrile. WBC 9100, ESR 57 (down from >120), CRP 4.93 down from 27.    Past 24 hrs reviewed with RN  Labs Reviewed, Medications Reviewed, Radiology Reviewed, Wound Reviewed, Fluids Reviewed and GI Nutrition Reviewed    Review of Systems:  Review of Systems   Constitutional: Negative for chills and fever.   Respiratory: Negative for cough and shortness of breath.    Cardiovascular: Negative for chest pain.   Gastrointestinal: Negative for abdominal pain, diarrhea, nausea and vomiting.   Genitourinary: Negative for dysuria.   Musculoskeletal: Positive for joint pain.        C/O increased pain in her knee.   Skin: Negative for itching.   Neurological: Positive for  headaches.       Physical Exam:  Physical Exam  Vitals signs and nursing note reviewed.   Constitutional:       General: She is not in acute distress.     Appearance: She is obese. She is not ill-appearing, toxic-appearing or diaphoretic.   HENT:      Head: Normocephalic and atraumatic.      Nose: No congestion.      Mouth/Throat:      Mouth: Mucous membranes are moist.   Eyes:      Conjunctiva/sclera: Conjunctivae normal.   Cardiovascular:      Rate and Rhythm: Normal rate and regular rhythm.      Heart sounds: No murmur.   Pulmonary:      Effort: Pulmonary effort is normal. No respiratory distress.      Breath sounds: No wheezing or rhonchi.   Abdominal:      General: There is no distension.      Palpations: Abdomen is soft.      Tenderness: There is no abdominal tenderness. There is no guarding.   Musculoskeletal:      Comments: Left knee remains quite tender. VAC in Left foot wound. Large bulla of left palm with surrounding erythema. Tenderness with traction on left 2nd flexor tendon.   Skin:     General: Skin is warm and dry.      Findings: No rash.   Neurological:      Mental Status: She is alert and oriented to person, place, and time.      Deep Tendon Reflexes: Abnormal reflex: .   Psychiatric:         Mood and Affect: Mood normal.     Labs:  Recent Results (from the past 24 hour(s))   ACCU-CHEK GLUCOSE    Collection Time: 10/07/20  5:39 PM   Result Value Ref Range    Glucose - Accu-Ck 49 (L) 65 - 99 mg/dL   ACCU-CHEK GLUCOSE    Collection Time: 10/07/20  5:57 PM   Result Value Ref Range    Glucose - Accu-Ck 49 (L) 65 - 99 mg/dL   ACCU-CHEK GLUCOSE    Collection Time: 10/07/20  6:29 PM   Result Value Ref Range    Glucose - Accu-Ck 102 (H) 65 - 99 mg/dL   ACCU-CHEK GLUCOSE    Collection Time: 10/07/20 10:05 PM   Result Value Ref Range    Glucose - Accu-Ck 208 (H) 65 - 99 mg/dL   ACCU-CHEK GLUCOSE    Collection Time: 10/08/20  8:10 AM   Result Value Ref Range    Glucose - Accu-Ck 115 (H) 65 - 99 mg/dL   CBC  WITH DIFFERENTIAL    Collection Time: 10/08/20  8:30 AM   Result Value Ref Range    WBC 9.1 4.8 - 10.8 K/uL    RBC 3.22 (L) 4.20 - 5.40 M/uL    Hemoglobin 8.8 (L) 12.0 - 16.0 g/dL    Hematocrit 30.1 (L) 37.0 - 47.0 %    MCV 93.5 81.4 - 97.8 fL    MCH 27.3 27.0 - 33.0 pg    MCHC 29.2 (L) 33.6 - 35.0 g/dL    RDW 56.2 (H) 35.9 - 50.0 fL    Platelet Count 353 164 - 446 K/uL    MPV 10.2 9.0 - 12.9 fL    Neutrophils-Polys 64.00 44.00 - 72.00 %    Lymphocytes 23.80 22.00 - 41.00 %    Monocytes 9.50 0.00 - 13.40 %    Eosinophils 0.90 0.00 - 6.90 %    Basophils 0.50 0.00 - 1.80 %    Immature Granulocytes 1.30 (H) 0.00 - 0.90 %    Nucleated RBC 0.00 /100 WBC    Neutrophils (Absolute) 5.82 2.00 - 7.15 K/uL    Lymphs (Absolute) 2.17 1.00 - 4.80 K/uL    Monos (Absolute) 0.86 (H) 0.00 - 0.85 K/uL    Eos (Absolute) 0.08 0.00 - 0.51 K/uL    Baso (Absolute) 0.05 0.00 - 0.12 K/uL    Immature Granulocytes (abs) 0.12 (H) 0.00 - 0.11 K/uL    NRBC (Absolute) 0.00 K/uL    Anisocytosis 1+    Comp Metabolic Panel    Collection Time: 10/08/20  8:30 AM   Result Value Ref Range    Sodium 139 135 - 145 mmol/L    Potassium 3.8 3.6 - 5.5 mmol/L    Chloride 101 96 - 112 mmol/L    Co2 34 (H) 20 - 33 mmol/L    Anion Gap 4.0 (L) 7.0 - 16.0    Glucose 93 65 - 99 mg/dL    Bun 11 8 - 22 mg/dL    Creatinine 0.37 (L) 0.50 - 1.40 mg/dL    Calcium 8.4 (L) 8.5 - 10.5 mg/dL    AST(SGOT) 6 (L) 12 - 45 U/L    ALT(SGPT) <5 2 - 50 U/L    Alkaline Phosphatase 251 (H) 30 - 99 U/L    Total Bilirubin 0.5 0.1 - 1.5 mg/dL    Albumin 2.3 (L) 3.2 - 4.9 g/dL    Total Protein 6.3 6.0 - 8.2 g/dL    Globulin 4.0 (H) 1.9 - 3.5 g/dL    A-G Ratio 0.6 g/dL   Sed Rate    Collection Time: 10/08/20  8:30 AM   Result Value Ref Range    Sed Rate Westergren 57 (H) 0 - 30 mm/hour   CRP QUANTITIVE (NON-CARDIAC)    Collection Time: 10/08/20  8:30 AM   Result Value Ref Range    Stat C-Reactive Protein 4.93 (H) 0.00 - 0.75 mg/dL   ESTIMATED GFR    Collection Time: 10/08/20  8:30 AM      Result Value Ref Range    GFR If African American >60 >60 mL/min/1.73 m 2    GFR If Non African American >60 >60 mL/min/1.73 m 2   PERIPHERAL SMEAR REVIEW    Collection Time: 10/08/20  8:30 AM   Result Value Ref Range    Peripheral Smear Review see below    PLATELET ESTIMATE    Collection Time: 10/08/20  8:30 AM   Result Value Ref Range    Plt Estimation Normal    MORPHOLOGY    Collection Time: 10/08/20  8:30 AM   Result Value Ref Range    RBC Morphology Present     Polychromia 1+    DIFFERENTIAL COMMENT    Collection Time: 10/08/20  8:30 AM   Result Value Ref Range    Comments-Diff see below    ACCU-CHEK GLUCOSE    Collection Time: 10/08/20 11:57 AM   Result Value Ref Range    Glucose - Accu-Ck 66 65 - 99 mg/dL   ACCU-CHEK GLUCOSE    Collection Time: 10/08/20 12:42 PM   Result Value Ref Range    Glucose - Accu-Ck 56 (L) 65 - 99 mg/dL     Results     Procedure Component Value Units Date/Time    Anaerobic Culture [[id number]] Collected: 09/29/20 2017    Order Status: Completed Specimen: Bone Updated: 10/04/20 1920     Significant Indicator NEG     Source BONE     Site First Metatarsal     Culture Result No Anaerobes isolated.    CULTURE TISSUE W/ GRM STAIN [[id number]]  (Abnormal)  (Susceptibility) Collected: 09/29/20 2017    Order Status: Completed Specimen: Bone Updated: 10/04/20 1920     Significant Indicator POS     Source BONE     Site First Metatarsal     Culture Result -     Gram Stain Result Rare WBCs.  No organisms seen.       Culture Result Beta Hemolytic Streptococcus group A  Light growth        Staphylococcus aureus  Rare growth        Alcaligenes faecalis  Rare growth      Susceptibility     Staphylococcus aureus (2)     Antibiotic Interpretation Microscan Method Status    Azithromycin Sensitive <=2 mcg/mL SLICK Final    Clindamycin Sensitive <=0.5 mcg/mL SLICK Final    Cefazolin Sensitive <=8 mcg/mL SLICK Final    Ceftaroline Sensitive <=0.5 mcg/mL SLICK Final    Daptomycin Sensitive <=1 mcg/mL SLICK Final     Erythromycin Sensitive <=0.25 mcg/mL SLICK Final    Oxacillin Sensitive <=0.25 mcg/mL SLICK Final    Penicillin Resistant >8 mcg/mL SLICK Final    Pip/Tazobactam Sensitive <=4 mcg/mL SLICK Final    Ampicillin/sulbactam Sensitive <=8/4 mcg/mL SLICK Final    Trimeth/Sulfa Sensitive <=0.5/9.5 mcg/mL SLICK Final    Tetracycline Sensitive <=4 mcg/mL SLICK Final    Vancomycin Sensitive 1 mcg/mL SLICK Final          Alcaligenes faecalis (3)     Antibiotic Interpretation Microscan Method Status    Pip/Tazobactam Sensitive <=16 mcg/mL SLICK Final    Trimeth/Sulfa Sensitive <=2/38 mcg/mL SLICK Final    Tobramycin Sensitive <=4 mcg/mL SLICK Final    Amikacin Sensitive <=16 mcg/mL SLICK Final    Ceftriaxone Sensitive <=1 mcg/mL SLICK Final    Ceftazidime Sensitive 4 mcg/mL SLICK Final    Cefotaxime Sensitive <=2 mcg/mL SLICK Final    Ciprofloxacin Intermediate 2 mcg/mL SLICK Final    Cefepime Sensitive 8 mcg/mL SLICK Final    Gentamicin Sensitive <=4 mcg/mL SLICK Final                   CULTURE WOUND W/ GRAM STAIN [[id number]]  (Abnormal)  (Susceptibility) Collected: 09/28/20 2054    Order Status: Completed Specimen: Wound from Left Foot Updated: 10/04/20 1908     Significant Indicator POS     Source WND     Site LEFT FOOT     Culture Result -     Gram Stain Result Rare WBCs.  Moderate Gram positive cocci.       Culture Result Beta Hemolytic Streptococcus group A  Moderate growth        Morganella morganii  Moderate growth        Alcaligenes faecalis  Light growth        Enterococcus faecalis  Light growth        Diphtheroids  Light growth      Narrative:      CALL  [place]  MS5 tel. [phone],  CALLED  MS5 tel. [phone] 09/30/2020, 13:00, RB PERF. RESULTS CALLED  TO:[id number], RN    Susceptibility     Morganella morganii (2)     Antibiotic Interpretation Microscan Method Status    Ceftriaxone Sensitive <=1 mcg/mL SLICK Final    Ceftazidime Sensitive <=1 mcg/mL SLICK Final    Cefotaxime Sensitive <=2 mcg/mL SLICK Final    Cefazolin Resistant >16 mcg/mL SLICK Final     Ciprofloxacin Sensitive <=1 mcg/mL SLICK Final    Cefepime Sensitive <=2 mcg/mL SLICK Final    Cefotetan Sensitive <=16 mcg/mL SLICK Final    Ertapenem Sensitive <=0.5 mcg/mL SLICK Final    Ampicillin Resistant >16 mcg/mL SLICK Final    Gentamicin Sensitive <=4 mcg/mL SLICK Final    Pip/Tazobactam Sensitive <=16 mcg/mL SLICK Final    Trimeth/Sulfa Sensitive <=2/38 mcg/mL SLICK Final    Tobramycin Sensitive <=4 mcg/mL SLICK Final          Alcaligenes faecalis (3)     Antibiotic Interpretation Microscan Method Status    Ceftriaxone Sensitive 2 mcg/mL SLICK Final    Ceftazidime Sensitive 4 mcg/mL SLICK Final    Cefotaxime Sensitive 8 mcg/mL SLICK Final    Ciprofloxacin Intermediate 2 mcg/mL SLICK Final    Cefepime Sensitive 8 mcg/mL SLICK Final    Gentamicin Sensitive <=4 mcg/mL SLICK Final    Pip/Tazobactam Sensitive <=16 mcg/mL SLICK Final    Trimeth/Sulfa Sensitive <=2/38 mcg/mL SLICK Final    Tobramycin Sensitive <=4 mcg/mL SLICK Final    Amikacin Sensitive <=16 mcg/mL SLICK Final          Enterococcus faecalis (4)     Antibiotic Interpretation Microscan Method Status    Ampicillin Sensitive <=2 mcg/mL SLICK Final    Vancomycin Sensitive 1 mcg/mL SLICK Final    Daptomycin Sensitive <=1 mcg/mL SLIKC Final    Gent Synergy Sensitive <=500 mcg/mL SLICK Final    Penicillin Sensitive 2 mcg/mL SLICK Final                   CULTURE WOUND W/ GRAM STAIN [[id number]] Collected: 09/29/20 2015    Order Status: Completed Specimen: Wound Updated: 10/04/20 1708     Significant Indicator NEG     Source WND     Site Left Knee Effusion Drainage     Culture Result No growth at 72 hours.     Gram Stain Result Many WBCs.  Rare Gram positive cocci.      Narrative:      Surgery Specimen    Anaerobic Culture [[id number]] Collected: 09/29/20 2015    Order Status: Completed Specimen: Wound Updated: 10/04/20 1708     Significant Indicator NEG     Source WND     Site Left Knee Effusion Drainage     Culture Result No Anaerobes isolated.    Narrative:      Surgery Specimen    BLOOD CULTURE  "[[id number]] Collected: 20    Order Status: Completed Specimen: Blood from Peripheral Updated: 10/04/20 0700     Significant Indicator NEG     Source BLD     Site PERIPHERAL     Culture Result No growth after 5 days of incubation.    Narrative:      Per Hospital Policy: Only change Specimen Src: to \"Line\" if  specified by physician order.  No site indicated    BLOOD CULTURE [[id number]] Collected: 20    Order Status: Completed Specimen: Blood from Peripheral Updated: 10/04/20 0700     Significant Indicator NEG     Source BLD     Site PERIPHERAL     Culture Result No growth after 5 days of incubation.        Hemodynamics:  Temp (24hrs), Av.6 °C (97.8 °F), Min:36.2 °C (97.2 °F), Max:36.8 °C (98.2 °F)  Temperature: 36.6 °C (97.9 °F)  Pulse  Av.2  Min: 60  Max: 122   Blood Pressure: 118/70     Peripheral IV 10/02/20 20 G Anterior;Left Forearm (Active)   Site Assessment Clean;Dry;Intact 10/08/20 0830       PICC Single Lumen 10/02/20 Left Basilic (Active)   Site Assessment Clean;Dry;Intact 10/08/20 0830      Fluids:  Intake/Output       10/06/20 0700 - 10/07/20 0659 10/07/20 0700 - 10/08/20 0659 10/08/20 0700 - 10/09/20 0659      3601-8591 0813-0906 Total 0789-6468 9941-2354 Total 3498-4887 0989-2108 Total       Intake    P.O.  240  -- 240  650  480 1130  300  -- 300    IV Piggyback  --  100 100  --  110 110  --  -- --    Total Intake 240 100 340  300 -- 300       Output    Urine  --  1800 1800  1525  1400 2925  --  -- --    Total Output -- 1800 1800 1525 1400 2925 -- -- --       Net I/O     240 -1700 -1460 -875 -810 -1685 300 -- 300           Medications:  Current Facility-Administered Medications   Medication Last Dose   • ibuprofen (MOTRIN) tablet 400 mg 400 mg at 10/08/20 0238   • insulin glargine (Lantus) injection 60 Units at 10/07/20 1832   • insulin glargine (Lantus) injection 60 Units at 10/08/20 0817   • LORazepam (ATIVAN) tablet 0.5 mg 0.5 mg at 10/08/20 1318   • " cefUROxime (ZINACEF) 1.5 g in  mL IVPB Stopped at 10/08/20 0556   • insulin regular (HumuLIN R,NovoLIN R) injection Stopped at 10/08/20 0830   • fentaNYL (DURAGESIC) 75 MCG/HR 1 Patch 1 Patch at 10/07/20 1340   • cyclobenzaprine (Flexeril) tablet 5 mg 5 mg at 10/08/20 0238   • albuterol (PROVENTIL) 2.5mg/0.5ml nebulizer solution 2.5 mg 2.5 mg at 10/05/20 1229   • oxyCODONE-acetaminophen (PERCOCET-10)  MG per tablet 1 Tab 1 Tab at 10/08/20 1318   • sodium chloride (OCEAN) 0.65 % nasal spray 2 Spray 2 Spray at 10/02/20 0851   • omeprazole (PRILOSEC) capsule 20 mg 20 mg at 10/08/20 0526   • albuterol inhaler 2 Puff 2 Puff at 10/06/20 0851   • enoxaparin (LOVENOX) inj 40 mg 40 mg at 10/08/20 0526   • nicotine (NICODERM) 21 MG/24HR 21 mg 21 mg at 10/04/20 0525    And   • nicotine polacrilex (NICORETTE) 2 MG piece 2 mg       Medical Decision Making, by Problem:  Active Hospital Problems    Diagnosis   • *Diabetic foot ulcer (HCC) [E11.621, L97.509] with underlying osteomyelitis   • Acute on chronic respiratory failure with hypoxia (HCC) [J96.21]   • Non compliance with medical treatment [Z91.19]   • DM2 (diabetes mellitus, type 2) (CMS-HCC) [E11.9]   • Chronic pain [G89.29]   • Hypertension [I10]   • Tobacco abuse [Z72.0]   • Panic attacks [F41.0]   • Hx of right BKA (HCC) [Z89.511]   • Cellulitis of hand [L03.119]     IMPRESSION:  - Sepsis- resolved  - Acute on chronic L 1st MTP osteomyelitis s/p I+D 9/29      Non surgical cultures 9/28 - Group A strep, Morganella, Group D Enterococcus, GNR (colonizations)     Surgical cultures 9/29 - Group A strep, MSSA, Alcaligines faecalis  - Acute L knee septic arthritis s/p washout 9/29 - GPCs either Gp A Strep or MSSA.  - Poorly controlled diabetes mellitus with insulin dependence.  - Prior osteomyelitis, status post right below-knee amputation in 2017.  - Hypertension.  - Palmar cellulitis with tenosynovitis of left 2nd flexor tendon.   RECOMMENDATION:   - Treating  her surgical cultures growing Group A strep, Alcaligenes faecalis and MSSA. Non surgical cultures associated with Gram stains only showing GPC's with polymicrobial growth suggestive of colonization. Cefuroxime will address all her organisms.   - Continue Cefuroxime 1.5 Q8hrs x 6 weeks. Target date 11/10.   - Her L knee synovial cultures are NG at 72 hrs and GPC seen are most likely either Gp A Strep or MSSA.   - PICC line in place 10/2  - Dr Pak recommending BKA for osteo of her foot. Patient refusing. As a result, will need minimum of 6 week therapy.   - weekly labs CBC, CMP, ESR, CRP  [facility] office available for questions or advice on her treatment [phone]     CM has arranged LTACH in [place]. She clearly is displaying continuing pain in her left knee Her knee is not really warm, no erythema but still quite tender. Case and treatment reviewed with patient, CM and RN. 40 min on floor with  50% in direct patient care/counseling today.

## 2020-10-08 NOTE — DISCHARGE PLANNING
Anticipated Discharge Disposition:   · [facility].     Action:   · Pt accepted at [facility] and facility is able to accept pt today. Provider and BSN updated.   · Provider will need to complete Doc to Doc report with Dr. Rodriguez who can be reached at [phone] prior to 1600. LSW sent TT to Dr. Banuelos to notify.   · LSW completed transportation forms and faxed to [facility] . [facility] able to scheduled transport for 1500 via [facility]  · COBRA and transfer packet initiated-pending discharge summary. LSW discussed discharge plans with pt. Pt signed COBRA and is aware of transport. She voiced wanting to stay at the hospital. LSW discussed with pt next steps in her recovery and ultimate goal of returning home. Pt voiced understanding related to moving forward with discharge. Pt requested LSW update her daughter, Edith. Outreach call placed to Edith to notify. Edith provided with contact information for [facility].     Barriers to Discharge:   · Completion of transfer packet    Plan:   · Pt to discharge today to [facility] via [facility].

## 2020-10-08 NOTE — PROGRESS NOTES
Received report from salina RN and assumed care of pt. Pt currently sitting in bed eating snack. Denies any acute needs or concerns at this time. Aox4.    Call light within reach and fall precautions in place. Will monitor

## 2020-10-08 NOTE — DISCHARGE INSTRUCTIONS
Discharge Instructions    Left lateral foot: Offload with Mepilex.  Change every 72 hours or PRN   Left knee incision: Adhesive foam change every 72 hours or PRN.   Left knee puncture site: Gauze and Hypafix tape.  Change every 72 hours or PRN.      Be sure to schedule a follow-up appointment with your primary care doctor or any specialists as instructed.     Discharge Plan:   Diet Plan: Discussed  Activity Level: Discussed  Confirmed Follow up Appointment: No (Comments)(LTAC)  Confirmed Symptoms Management: Discussed  Medication Reconciliation Updated: Yes  Influenza Vaccine Indication: Patient Refuses    I understand that a diet low in cholesterol, fat, and sodium is recommended for good health. Unless I have been given specific instructions below for another diet, I accept this instruction as my diet prescription.   Other diet: diabetic    Special Instructions: None    · Is patient discharged on Warfarin / Coumadin?   No     Depression / Suicide Risk    As you are discharged from this [facility] facility, it is important to learn how to keep safe from harming yourself.    Recognize the warning signs:  · Abrupt changes in personality, positive or negative- including increase in energy   · Giving away possessions  · Change in eating patterns- significant weight changes-  positive or negative  · Change in sleeping patterns- unable to sleep or sleeping all the time   · Unwillingness or inability to communicate  · Depression  · Unusual sadness, discouragement and loneliness  · Talk of wanting to die  · Neglect of personal appearance   · Rebelliousness- reckless behavior  · Withdrawal from people/activities they love  · Confusion- inability to concentrate     If you or a loved one observes any of these behaviors or has concerns about self-harm, here's what you can do:  · Talk about it- your feelings and reasons for harming yourself  · Remove any means that you might use to hurt yourself (examples: pills, rope,  extension cords, firearm)  · Get professional help from the community (Mental Health, Substance Abuse, psychological counseling)  · Do not be alone:Call your Safe Contact- someone whom you trust who will be there for you.  · Call your local CRISIS HOTLINE 408-1521 or 596-825-9775  · Call your local Children's Mobile Crisis Response Team [place] (383) 817-7642 or www.Paddle8  · Call the toll free National Suicide Prevention Hotlines   · National Suicide Prevention Lifeline 463-222-MPDX (1230)  · National Hope Line Network 800-SUICIDE (827-5439)

## 2020-10-08 NOTE — DISCHARGE PLANNING
Agency/Facility Name: [facility]  Outcome: Faxed progress notes, MAR and DC summary per request.

## 2020-10-08 NOTE — DISCHARGE PLANNING
Received Transport Form @ 4253  Spoke to Flor @ [facility]     Transport is scheduled for 10/8 @1500 via [facility] going to [facility].    Jessica at [facility] notified of transport time. Jessica requested DC summary and Doc to Doc report called to Dr. Rodriguez at [phone] before 4pm.   KATIANA notified.

## 2020-10-08 NOTE — DISCHARGE SUMMARY
Discharge Summary    CHIEF COMPLAINT ON ADMISSION  Chief Complaint   Patient presents with   • Knee Pain   • Wound Infection       Reason for Admission  EMS     Admission Date  9/28/2020    CODE STATUS  Full Code    HPI & HOSPITAL COURSE  This is a 55 y.o. female here with with past medical history of diabetes mellitus, hypertension chronic hypoxic respiratory failure on 2 L oxygen  apparently has been off of her diabetes medicines for at least 3 months.  She presented to the emergency room in [facility] with left foot infection and pain for the past few days it is been worsening.  She also complains of left knee swelling.  She also notes of left hand pain and redness as well   She presented with sepsis and was given vancomycin and Unasyn.  She was found to be hyperglycemic.  ESR and CRP were elevated.  She was found to have decreased swelling of her hand and left foot.  Orthopedics was consulted and recommended debridement with biopsy of bone.  She was also taken for an arthrocentesis of the left knee.  Patient's wound cultures came back positive for group A strep.  A wound VAC was placed on the left foot and a Hemovac was placed on the left knee which was eventually discontinued.  Orthopedics recommended a BKA of the left foot but patient refused.  Infectious disease changed antibiotics to Fortaz and cefazolin.  Arthrocentesis cultures the knee were have been negative.  Surgical cultures found group A strep, enterococcus, Morganella, Alcaligenes fecalis, and diphtheroids.  Bone biopsy the first metatarsal found group A strep, staph aureus and alcaligenes faecalis.  Final recommendations from ID was cefuroxime 1.5g every 8 hours for 6 weeks.  Final date of antibiotics is 11/10.  In regards to her pain was controlled by increasing her fentanyl patch from 50 to 75 MCG.  Percocet, ibuprofen, Toradol and Flexeril.  Patient kept having panic attacks and she was prescribed oral Ativan PRN.  Patient's home  Lantus dose is 100 twice daily which she was not taking at home.  Hemoglobin A1c was 13.3.  100 units twice daily was started she is kept having frequent hypoglycemic episodes and was transitioned down to 60 units twice daily.    Therefore, she is discharged in fair and stable condition to a long-term acute care hospital.    The patient met 2-midnight criteria for an inpatient stay at the time of discharge.    Discharge Date  10/8/2020    FOLLOW UP ITEMS POST DISCHARGE  Follow-up with orthopedics  Follow-up with infectious disease  Complete antibiotic course until 11/10  DISCHARGE DIAGNOSES  Principal Problem:    Diabetic foot ulcer (HCC) POA: Yes  Active Problems:    DM2 (diabetes mellitus, type 2) (CMS-[facility]) POA: Yes    Non compliance with medical treatment POA: Yes    Acute on chronic respiratory failure with hypoxia ([facility]) POA: Yes    Sepsis ([facility]) POA: Yes    Hx of right BKA ([facility]) POA: Yes    Cellulitis of hand POA: Yes    Panic attacks POA: Unknown    Hypertension POA: Yes    Chronic pain POA: Yes    Tobacco abuse POA: Yes  Resolved Problems:    Right BKA infection ([facility]) POA: Yes      FOLLOW UP  No future appointments.  No follow-up provider specified.    MEDICATIONS ON DISCHARGE     Medication List      START taking these medications      Instructions   albuterol 2.5mg/0.5ml Nebu  Commonly known as: PROVENTIL   0.5 mL by Nebulization route every 6 hours as needed for Shortness of Breath.  Dose: 2.5 mg     cyclobenzaprine 5 mg tablet  Commonly known as: Flexeril   Take 1 Tab by mouth 3 times a day as needed.  Dose: 5 mg     enoxaparin 40 MG/0.4ML Soln inj  Start taking on: October 9, 2020  Commonly known as: LOVENOX   Inject 40 mg as instructed every day.  Dose: 40 mg     fentaNYL 75 MCG/HR Pt72  Start taking on: October 10, 2020  Commonly known as: DURAGESIC  Replaces: fentaNYL 50 MCG/HR Pt72   Apply 1 Patch to skin as directed every 72 hours for 3 days.  Dose: 1 Patch     ibuprofen 400 MG Tabs  Commonly known  as: MOTRIN   Take 1 Tab by mouth 4 times a day as needed for Moderate Pain.  Dose: 400 mg     * insulin glargine 100 UNIT/ML Soln  Commonly known as: Lantus   Inject 60 Units as instructed every evening.  Dose: 60 Units     * insulin glargine 100 UNIT/ML Soln  Start taking on: October 9, 2020  Commonly known as: Lantus   Inject 60 Units as instructed every morning.  Dose: 60 Units     LORazepam 0.5 MG Tabs  Commonly known as: ATIVAN   Take 1 Tab by mouth every four hours as needed for Anxiety for up to 3 days.  Dose: 0.5 mg     NS SOLN 100 mL with cefUROxime 1.5 g SOLR 1.5 g   1.5 g by Intravenous route every 8 hours for 33 days.  Dose: 1.5 g     omeprazole 20 MG delayed-release capsule  Start taking on: October 9, 2020  Commonly known as: PRILOSEC   Take 1 Cap by mouth every day.  Dose: 20 mg     oxyCODONE-acetaminophen  MG Tabs  Commonly known as: PERCOCET-10   Take 1 Tab by mouth every four hours as needed for up to 3 days.  Dose: 1 Tab         * This list has 2 medication(s) that are the same as other medications prescribed for you. Read the directions carefully, and ask your doctor or other care provider to review them with you.            STOP taking these medications    fentaNYL 50 MCG/HR Pt72  Commonly known as: DURAGESIC  Replaced by: fentaNYL 75 MCG/HR Pt72            Allergies  No Known Allergies    DIET  Orders Placed This Encounter   Procedures   • Diet Order Diabetic     Standing Status:   Standing     Number of Occurrences:   1     Order Specific Question:   Diet:     Answer:   Diabetic [3]       ACTIVITY  As tolerated.  Weight bearing as tolerated    CONSULTATIONS  Orthopedics Dr. Mayberry  Infectious disease    PROCEDURES  9/29/2020  1.  Excisional debridement of residual left first metatarsal.    2.  Arthrocentesis, left knee.    3.  Arthrotomy and drainage, left knee.      LABORATORY  Lab Results   Component Value Date    SODIUM 139 10/08/2020    POTASSIUM 3.8 10/08/2020    CHLORIDE 101  10/08/2020    CO2 34 (H) 10/08/2020    GLUCOSE 93 10/08/2020    BUN 11 10/08/2020    CREATININE 0.37 (L) 10/08/2020        Lab Results   Component Value Date    WBC 9.1 10/08/2020    HEMOGLOBIN 8.8 (L) 10/08/2020    HEMATOCRIT 30.1 (L) 10/08/2020    PLATELETCT 353 10/08/2020        Total time of the discharge process exceeds 50 minutes.

## 2020-10-08 NOTE — THERAPY
"Occupational Therapy  Daily Treatment     Patient Name: Greta Echevarria  Age:  55 y.o., Sex:  female  Medical Record #: [id number]  Today's Date: 10/8/2020     Precautions  Precautions: Fall Risk, Non Weight Bearing Left Lower Extremity  Comments: R BKA, wound vac L LE    Assessment    Pt seen for OT session. Pt refusing to attempt any SB ADLs txfs at this time; per chart will only allow use of mechanical lift. Educated on continued EOB activity with nsg as refused EOB this session. Provided with green theraband and educated on technique/safety for exercises, as well as need for UB strength once pt \"ready\" for txfs. Declined to complete at this time; recommend reinforcement. Continues to be limited by decreased functional mobility, activity tolerance, cognition, strength, AROM, coordination, balance, adherence to precautions, and pain which are currently affecting pt's ability to complete ADLs/IADLs at baseline. Will continue to follow.     Plan    Treatment plan modified to 1 time per week until therapy goals are met for the following treatments:  Adaptive Equipment, Neuro Re-Education / Balance, Self Care/Activities of Daily Living, Therapeutic Activities and Therapeutic Exercises. Will decrease freq, but will increase once pt willing to participate in full OT including SB ADL txfs.    DC Equipment Recommendations: Unable to determine at this time  Discharge Recommendations: Recommend post-acute placement for additional occupational therapy services prior to discharge home    Subjective    \"I'm not ready yet. I can't use a slideboard.\"      Objective       10/08/20 0851   Pain 0 - 10 Group   Location Knee   Location Orientation Left   Therapist Pain Assessment Post Activity Pain Same as Prior to Activity;During Activity;Nurse Notified  (not quantified)   Cognition    Cognition / Consciousness X   Level of Consciousness Alert   Safety Awareness Impaired   Attention Impaired   Comments Reports she is \"not ready\" for SB " "transfers, but the pain in \"almost under control.\" Insisted she \"can't do a SB txf,\" w/o ever attempting   Passive ROM Upper Body   Passive ROM Upper Body X   Comments L hand limited by pain   Active ROM Upper Body   Active ROM Upper Body  X   Dominant Hand Left   ROM Lt Hand Moderate Impaired   Comments L hand limited by edema and pain; unclear etiology   Strength Upper Body   Upper Body Strength  X   Supine Upper Body Exercises   Comments provided with green theraband; edu on UB exercises, but refused to demo exercises d/t \"knee pain\"   Fine Motor / Dexterity    Comments  L hand appears to be improving   Other Treatments   Other Treatments Provided will decrease freq until pt is willing to attempt OOB mobility (ADL SB txfs); reporst she \"isn't ready yet\" and will only use keesha lift. encouraged continued EOB activity   Balance   Comments refused EOB; remained supine for session   Bed Mobility    Comments refused EOB; remained supine for session   Activities of Daily Living   Comments refused EOB; remained supine for session   Functional Mobility   Sit to Stand Unable to Participate   Toilet Transfers Refused   Mobility refused EOB; remained supine for session.   Activity Tolerance   Comments refused EOB; remained supine for session   Patient / Family Goals   Patient / Family Goal #1 to not be in pain   Short Term Goals   Short Term Goal # 1 will complete LB dressing with SPV   Goal Outcome # 1 Goal not met   Short Term Goal # 2 will complete seated g/h with SPV   Goal Outcome # 2 Goal not met   Short Term Goal # 3 will complete SB txf to BSC with SPV   Goal Outcome # 3 Goal not met   Anticipated Discharge Equipment and Recommendations   DC Equipment Recommendations Unable to determine at this time   Discharge Recommendations Recommend post-acute placement for additional occupational therapy services prior to discharge home         "

## 2020-10-08 NOTE — DISCHARGE PLANNING
Agency/Facility Name: [facility]   Spoke To: Jessica   Outcome: Per Jessica, insurance auth was received. Can accept pt today.

## 2020-10-08 NOTE — PROGRESS NOTES
Receiving facility asked for the wound vac to be removed and place a wet to dry. Ok per castillo Bernard.

## 2020-10-08 NOTE — CARE PLAN
Problem: Communication  Goal: The ability to communicate needs accurately and effectively will improve  Outcome: PROGRESSING AS EXPECTED     Problem: Safety  Goal: Will remain free from falls  Outcome: PROGRESSING AS EXPECTED     Problem: Respiratory:  Goal: Respiratory status will improve  Outcome: PROGRESSING AS EXPECTED     Problem: Skin Integrity  Goal: Risk for impaired skin integrity will decrease  Outcome: PROGRESSING AS EXPECTED     Problem: Pain Management  Goal: Pain level will decrease to patient's comfort goal  Outcome: PROGRESSING AS EXPECTED     Problem: Psychosocial Needs:  Goal: Level of anxiety will decrease  Outcome: PROGRESSING AS EXPECTED

## 2020-10-09 ENCOUNTER — PATIENT OUTREACH (OUTPATIENT)
Dept: HEALTH INFORMATION MANAGEMENT | Facility: OTHER | Age: 55
End: 2020-10-09

## 2020-10-09 NOTE — PROGRESS NOTES
Pt Greta discharged to [facility] on 10/8. [facility] will no longer follow.

## 2020-10-09 NOTE — PROGRESS NOTES
Diabetes education: met with pt briefly before discharge. Pt states she has all her medications and supplies at home.

## 2020-12-07 DIAGNOSIS — R07.89 OTHER CHEST PAIN: Primary | ICD-10-CM

## 2020-12-09 LAB — EKG IMPRESSION: NORMAL

## 2020-12-09 PROCEDURE — 93010 ELECTROCARDIOGRAM REPORT: CPT | Performed by: INTERNAL MEDICINE

## 2021-03-15 NOTE — CARE PLAN
Addended by: Gay Garland on: 3/15/2021 12:57 PM     Modules accepted: Orders Problem: Safety  Goal: Will remain free from falls  Outcome: PROGRESSING AS EXPECTED  Antonia Cano Fall Risk Assessment:     Last Known Fall: Within the last six months  Mobility: Use of assistive device/requires assist of two people  Medications: Cardiovascular and central nervous system meds  Mental Status/LOC/Awareness: Awake, alert, and oriented to date, place, and person  Toileting Needs: Use of catheters or diversion devices  Volume/Electrolyte Status: Low blood sugar/electrolyte imbalances  Communication/Sensory: Visual (Glasses)/hearing deficit, Neuropathy  Behavior: Appropriate behavior  Antonia Cano Fall Risk Total: 18  Fall Risk Level: HIGH RISK    Universal Fall Precautions:  call light/belongings in reach, bed in low position and locked, siderails up x 2, use non-slip footwear    Fall Risk Level Interventions:    TRIAL ([place] 8, NEURO, MED JUANA 5) Moderate Fall Risk Interventions  Place yellow fall risk ID band on patient: verified  Provide patient/family education based on risk assessment : completed  Educate patient/family to call staff for assistance when getting out of bed: completed  Place fall precaution signage outside patient door: completed  Utilize bed/chair fall alarm: completedTRIAL (TELE 8, NEURO, Moz JUANA 5) High Fall Risk Interventions  Place yellow fall risk ID band on patient: verified  Provide patient/family education based on risk assessment: completed  Educate patient/family to call staff for assistance when getting out of bed: completed  Place fall precaution signage outside patient door: verified  Place patient in room close to nursing station: completed  Utilize bed/chair fall alarm: completed  Notify charge of high risk for huddle: completed    Patient Specific Interventions:     Medication: limit combination of prn medications  Mental Status/LOC/Awareness: reinforce falls education and check on patient hourly  Toileting: monitor intake and output/use of appropriate  interventions  Volume/Electrolyte Status: ensure patient remains hydrated and monitor blood sugars and maintain appropriate blood sugar levels if diabetic  Communication/Sensory: update plan of care on whiteboard and ensure proper positioning when transferrng/ambulating  Behavioral: engage patient in daily activities and administer medication as ordered  Mobility: utilize bed/chair fall alarm and ensure bed is locked and in lowest position    Problem: Pain Management  Goal: Pain level will decrease to patient's comfort goal  Outcome: PROGRESSING SLOWER THAN EXPECTED

## 2021-04-07 DIAGNOSIS — R07.89 OTHER CHEST PAIN: Primary | ICD-10-CM

## 2021-04-08 LAB — EKG IMPRESSION: NORMAL

## 2021-06-10 DIAGNOSIS — R07.89 OTHER CHEST PAIN: Primary | ICD-10-CM

## 2021-06-10 LAB — EKG IMPRESSION: NORMAL

## 2024-09-03 NOTE — CARE PLAN
Problem: Communication  Goal: The ability to communicate needs accurately and effectively will improve  Outcome: PROGRESSING AS EXPECTED  Note: Pt able to communicate needs effectively. Uses call light appropriately.     Problem: Pain Management  Goal: Pain level will decrease to patient's comfort goal  Outcome: PROGRESSING AS EXPECTED  Note: Pt c/o knee pain. Discussed with MD. New orders placed      Healthy Tips and Recommendations    Exercise for 30 minutes 3-5 times per week.  This would be brisk walking, running, biking, swimming, etc.      Eat 3 healthy, well-rounded meals per day and 2 small snacks per day.    Important things to remember when choosing foods:   1 gram of protein = 4 calories   1 gram of carbohydrate = 4 calories   1 gram of fat = 9 calories (twice as much as protein and carb)    Intake of Macronutrients - how much should you get from each:     Protein:  40% of your calories   Carbs (Carbohydrates):  30% of your calories   Fats:  20% or less of your calories     Decrease your bad carbs and increase your good carbohydrate intake:     Veggies are good carbs, pasta and sugars are bad carbs (unless the pasta is wheat or veggie pasta)    Eat 1-2 servings of vegetables with each meal (3-6 servings per day)   One serving of veggies = enough to fill your two hands cupped together.    Eat 2-3 servings of fruits per day  (if you are trying to lose weight, try to limit your fruit intake to 2 servings per day as it is higher in sugar content)   One serving of fruit = medium size piece    Eat whole grains such as wheat bread and wheat rice.    Protein:  Try to eat about 20-25 grams of protein per meal and 10 grams of protein in your snacks.    Examples of good sources of protein are:   Greek yogurt, chicken, beans (legumes), cottage cheese (low fat or non-fat), tuna, natural peanut butter (no-stir is good), protein bars or drinks, milk, string cheese, almonds, fish, beef jerky (although this is high in sodium), soy milk, hummus, tofu.    Fish oil or krill oil:  2000 mg daily  Vitamin D 3: 5205-1618 units daily    Avoid soda (diet or regular) as it either has high amounts of sugar or aspartame, which has been linked to weight gain and diabetes.    We should be drinking half our ideal body weight in ounces of water per day.     Take your ideal body weight divided by 2.  That is the number in ounces of  water you should be drinking.  (example:  150 pounds/2 = 75 ounces of water per day).    Need help with housing, transportation, food, health, dental and so much more! Check out www.[employer] for resources in our community.    Thank you for visiting [facility] in [place]. Please contact me if you have questions or problems before your next appointment.  If you were here for something acute today and your symptoms worsen or do not resolve, please return to see me or visit our Walk-In.  Visit the nearest Emergency Room for emergent symptoms.  If you are unsure of whom to follow up with, call [phone] and ask to speak to my office or the on-call provider.     Pharmacy Options:  We do have an available benefit to our patients through mail-order pharmacy.  [facility] does have 2 pharmacies located in [place] as well if you are interested in picking up your prescription(s) in person.   There are a number of program benefits once you’ve signed up for automatic prescription refills.  You will save time by not having to call in your refills.   You will make fewer trips to the pharmacy for medication refills.   Our pharmacists and other caregivers will have more time to spend with you when you are in our pharmacies.   Our central fill Pharmacy team will be focused solely on filling your medications and coordinating care with your physician(s).  You can enroll in the program in any of these 3 ways:  Online: Visit us at [email or web address].   By Phone: Call us toll-free at [phone].   On Paper: Complete a hard copy application and leave it at your preferred [facility], or mail it to the address shown on the enrollment form.    Test results:  If we ordered bloodwork or tests today and you have an upcoming  appointment, we will discuss these results at your appointment.  I will notify you sooner if there any concerning findings that cannot wait.  If you do not have an upcoming follow up appointment,  results can always be obtained via our patient portal, [employer], [email or web address].  Alternatively, we will contact you via letter or phone with testing results within 1 week.    Referrals:  If you are referred to a specialist, our Referrals department will call you with your appointment date and time within 3 business days. If you have not heard from them in this time frame, please call [phone] and ask for the area you were referred to.  If you were scheduled for an appointment with a specialist during your visit, your appointment information will be listed below.    Medical Imaging:  If you had an order for a radiology test ordered today, please call our pre-service department to schedule at your earliest convenience [phone].  When calling pre-service we will work with you to find the date and time that works best for your test leaving enough time to ensure insurance authorization is in place prior to your arrival.  If at any time you have questions about this process, please call pre-service at the number of the above.    Survey:  You may receive a survey in the mail, and I ask that you please fill out the survey regarding your visit today.  If you have any concerns, please notify me and I will address them as soon as I am aware of them.  Your health and your care are of the utmost importance to me.    Contact Us:  For your convenience, please consider using the online patient portal.  This will allow you to communicate with your care team when it is convenient for you. [employer] gives you immediate online access to your personal health information, including lab tests and results.  It also allows you to schedule appointments, send messages to me or my nurse with questions, request refills, and even pay your bills.  Sign up now at [email or web address].     [facility] you [facility] visiting [facility].  Klever Agarwal,  APNP  ---------------------------------------------------------------------------------------------------------------------

## 2025-06-16 NOTE — PROGRESS NOTES
12 hour chart check   2/8/2021 - early ns cataracts. No visually signficant  4/25/2022 - slight progression  4/24/2023 -no significant progression since last year  6/10/2024 -overall stable.  No significant progression.  Still 20/20 minus with glasses Rx  6/16/2025-no progression

## (undated) DEVICE — HEAD HOLDER JUNIOR/ADULT

## (undated) DEVICE — PACK LOWER EXTREMITY - (2/CA)

## (undated) DEVICE — DETERGENT RENUZYME PLUS 10 OZ PACKET (50/BX)

## (undated) DEVICE — SUTURE 0 VICRYL PLUS CT-1 - 8 X 18 INCH (12/BX)

## (undated) DEVICE — BOVIE BLADE COATED - (50/PK)

## (undated) DEVICE — GLOVE BIOGEL SZ 6.5 SURGICAL PF LTX (50PR/BX 4BX/CA)

## (undated) DEVICE — SUTURE GENERAL

## (undated) DEVICE — LACTATED RINGERS INJ 1000 ML - (14EA/CA 60CA/PF)

## (undated) DEVICE — SPONGE GAUZESTER 4 X 4 4PLY - (128PK/CA)

## (undated) DEVICE — STOCKINET TUBULAR 6IN STERILE - 6 X 48YDS (25/CA)

## (undated) DEVICE — ELECTRODE 850 FOAM ADHESIVE - HYDROGEL RADIOTRNSPRNT (50/PK)

## (undated) DEVICE — SUTURE 0 VICRYL PLUS CT-1 - 36 INCH (36/BX)

## (undated) DEVICE — BLADE SURGICAL #15 - (50/BX 3BX/CA)

## (undated) DEVICE — STOCKINET BIAS 4 IN STERILE - (20/CA)

## (undated) DEVICE — PADDING CAST 4 IN STERILE - 4 X 4 YDS (24/CA)

## (undated) DEVICE — PAD LAP STERILE 18 X 18 - (5/PK 40PK/CA)

## (undated) DEVICE — PROTECTOR ULNA NERVE - (36PR/CA)

## (undated) DEVICE — SUTURE 2-0 VICRYL PLUS CT-1 36 (36PK/BX)"

## (undated) DEVICE — GOWN SURGEONS X-LARGE - DISP. (30/CA)

## (undated) DEVICE — TUBING CLEARLINK DUO-VENT - C-FLO (48EA/CA)

## (undated) DEVICE — MASK ANESTHESIA ADULT  - (100/CA)

## (undated) DEVICE — DRAPE LARGE 3 QUARTER - (20/CA)

## (undated) DEVICE — COTTON ROLL 1 POUND BIOSEAL - (5RL/CA)

## (undated) DEVICE — SODIUM CHL. IRRIGATION 0.9% 3000ML (4EA/CA 65CA/PF)

## (undated) DEVICE — GLOVE BIOGEL PI INDICATOR SZ 8.0 SURGICAL PF LF -(50/BX 4BX/CA)

## (undated) DEVICE — SET IRRIGATION CYSTOSCOPY Y-TYPE L81 IN (20EA/CA)

## (undated) DEVICE — SODIUM CHL IRRIGATION 0.9% 1000ML (12EA/CA)

## (undated) DEVICE — KIT ANESTHESIA W/CIRCUIT & 3/LT BAG W/FILTER (20EA/CA)

## (undated) DEVICE — DRAPE U ORTHOPEDIC - (10/BX)

## (undated) DEVICE — BANDAGE ELASTIC 6 HONEYCOMB - 6X5YD LF (20/CA)"

## (undated) DEVICE — GLOVE BIOGEL SZ 7.5 SURGICAL PF LTX - (50PR/BX 4BX/CA)

## (undated) DEVICE — GLOVE BIOGEL INDICATOR SZ 8.5 SURGICAL PF LTX - (50/BX 4BX/CA)

## (undated) DEVICE — CATHETER IV 14 GA X 2 ---SURG.& SDS ONLY---(200EA/CA)

## (undated) DEVICE — SENSOR SPO2 NEO LNCS ADHESIVE (20/BX) SEE USER NOTES

## (undated) DEVICE — CHLORAPREP 26 ML APPLICATOR - ORANGE TINT(25/CA)

## (undated) DEVICE — NEEDLE SPINAL NON-SAFETY 20 GA X 3 IN (25/BX)

## (undated) DEVICE — NEPTUNE 4 PORT MANIFOLD - (20/PK)

## (undated) DEVICE — GLOVE BIOGEL INDICATOR SZ 7.5 SURGICAL PF LTX - (50PR/BX 4BX/CA)

## (undated) DEVICE — PAD PREP 24 X 48 CUFFED - (100/CA)

## (undated) DEVICE — SUTURE 3-0 ETHILON FSLX 30 (36PK/BX)"

## (undated) DEVICE — PADDING CAST 6 IN STERILE - 6 X 4 YDS (24/CA)

## (undated) DEVICE — SUTURE 2-0 VICRYL PLUS CT-1 - 8 X 18 INCH(12/BX)

## (undated) DEVICE — GLOVE BIOGEL INDICATOR SZ 7SURGICAL PF LTX - (50/BX 4BX/CA)

## (undated) DEVICE — TOWELS CLOTH SURGICAL - (4/PK 20PK/CA)

## (undated) DEVICE — GOWN SURGICAL X-LARGE ULTRA - FILM-REINFORCED (20/CA)

## (undated) DEVICE — DRAPE SURG STERI-DRAPE 7X11OD - (40EA/CA)

## (undated) DEVICE — WRAP COBAN SELF-ADHERENT 6 IN X  5YDS STERILE TAN (12/CA)

## (undated) DEVICE — SUTURE  0 ETHIBOND CT-1 30 IN (36PK/BX)

## (undated) DEVICE — GLOVE BIOGEL PI ORTHO SZ 7.5 PF LF (40PR/BX)

## (undated) DEVICE — SUTURE 3-0 ETHILON PS-1 (36PK/BX)

## (undated) DEVICE — STOCKINET BIAS 6 IN STERILE - (20/CA)

## (undated) DEVICE — HANDPIECE 10FT INTPLS SCT PLS IRRIGATION HAND CONTROL SET (6/PK)

## (undated) DEVICE — DRAPE LOWER EXTREMETY - (6/CA)

## (undated) DEVICE — SYRINGE 10 ML CONTROL LL (25EA/BX 4BX/CA)

## (undated) DEVICE — BANDAGE ELASTIC STERILE MATRIX 6 X 10 (20EA/CA)

## (undated) DEVICE — SLEEVE, VASO, THIGH, MED

## (undated) DEVICE — MASK, LARYNGEAL AIRWAY #4

## (undated) DEVICE — CANISTER SUCTION 3000ML MECHANICAL FILTER AUTO SHUTOFF MEDI-VAC NONSTERILE LF DISP  (40EA/CA)

## (undated) DEVICE — SET EXTENSION WITH 2 PORTS (48EA/CA) ***PART #2C8610 IS A SUBSTITUTE*****

## (undated) DEVICE — GLOVE BIOGEL PI ORTHO SZ 8 PF LF (40PR/BX)

## (undated) DEVICE — SET LEADWIRE 5 LEAD BEDSIDE DISPOSABLE ECG (1SET OF 5/EA)

## (undated) DEVICE — DRAPE SURGICAL U 77X120 - (10/CA)

## (undated) DEVICE — TIP INTPLS HFLO ML ORFC BTRY - (12/CS)  FOR SURGILAV

## (undated) DEVICE — SUCTION INSTRUMENT YANKAUER BULBOUS TIP W/O VENT (50EA/CA)

## (undated) DEVICE — ELECTRODE DUAL RETURN W/ CORD - (50/PK)

## (undated) DEVICE — BANDAGE ROLL STERILE BULKEE 4.5 IN X 4 YD (100EA/CA)

## (undated) DEVICE — CONTAINER SPECIMEN BAG OR - STERILE 4 OZ W/LID (100EA/CA)

## (undated) DEVICE — BLADE SAGITTAL SAW DUAL CUT 75.0 X 25.0MM (1/EA)

## (undated) DEVICE — GOWN WARMING STANDARD FLEX - (30/CA)

## (undated) DEVICE — GLOVE BIOGEL PI ORTHO SZ 7 PF LF (40PR/BX)

## (undated) DEVICE — SYRINGE 30 ML LS (56/BX)

## (undated) DEVICE — GLOVE BIOGEL INDICATOR SZ 8 SURGICAL PF LTX - (50/BX 4BX/CA)

## (undated) DEVICE — BLADE SURGICAL #10 - (50/BX)

## (undated) DEVICE — DRESSING XEROFORM 1X8 - (50/BX 4BX/CA)

## (undated) DEVICE — KIT ROOM DECONTAMINATION

## (undated) DEVICE — KIT EVACUATER 3 SPRING PVC LF 1/8 DRAIN SIZE (10EA/CA)"

## (undated) DEVICE — SUTURE 2-0 SILK SH (36PK/BX)

## (undated) DEVICE — PEN SKIN MARKER W/RULER - (50EA/BX)

## (undated) DEVICE — GLOVE BIOGEL PI INDICATOR SZ 7.0 SURGICAL PF LF - (50/BX 4BX/CA)

## (undated) DEVICE — SUTURE 2-0 SILK 12 X 18" (36PK/BX)"

## (undated) DEVICE — GLOVE BIOGEL ECLIPSE PF LATEX SIZE 7.5

## (undated) DEVICE — NEEDLE SAFETY 18 GA X 1 1/2 IN (100EA/BX)

## (undated) DEVICE — GLOVE SZ 7 BIOGEL PI MICRO - PF LF (50PR/BX 4BX/CA)

## (undated) DEVICE — SUTURE 2-0 ETHILON FS - (36/BX) 18 INCH